# Patient Record
Sex: FEMALE | Race: WHITE | NOT HISPANIC OR LATINO | Employment: OTHER | URBAN - METROPOLITAN AREA
[De-identification: names, ages, dates, MRNs, and addresses within clinical notes are randomized per-mention and may not be internally consistent; named-entity substitution may affect disease eponyms.]

---

## 2017-01-09 ENCOUNTER — MYC MEDICAL ADVICE (OUTPATIENT)
Dept: FAMILY MEDICINE | Facility: CLINIC | Age: 72
End: 2017-01-09

## 2017-01-09 DIAGNOSIS — Z79.01 LONG-TERM (CURRENT) USE OF ANTICOAGULANTS: Primary | ICD-10-CM

## 2017-01-09 DIAGNOSIS — I48.0 PAROXYSMAL ATRIAL FIBRILLATION (H): ICD-10-CM

## 2017-01-09 NOTE — TELEPHONE ENCOUNTER
Attempted to call pt to clarify below. Phone was answered and hung up x2  Needs Recall  Madhuri Edge RN

## 2017-01-09 NOTE — TELEPHONE ENCOUNTER
Fax from [facility] in [place], [place] requesting refill on Atenolol 50  Take 1 tab daily qnty #90 last filled 10/29/2015.    [facility] has HISTORICALLY  Atenolol 50 mg with med note takes half tab twice daily.       ATENOLOL PO     --      Sig: Take 25 mg by mouth daily     Class: Historical     Route: Oral     Order: [id number]       Medication Notes           DERRICK RAMIREZ   Lizandromichael Aug 16, 2016 11:36 AM (CDT)  50mg tablet - take 1/2 tablet once a.m and once p.m                      Last Written Prescription Date: --  Last Fill Quantity: --, # refills: --    Last Office Visit with [facility], P or [facility] prescribing provider:  12/27/2016   Future Office Visit:        BP Readings from Last 3 Encounters:   12/27/16 126/77   10/12/16 124/67   08/16/16 126/72           .

## 2017-01-11 ENCOUNTER — MYC MEDICAL ADVICE (OUTPATIENT)
Dept: FAMILY MEDICINE | Facility: CLINIC | Age: 72
End: 2017-01-11

## 2017-01-12 RX ORDER — ATENOLOL 50 MG/1
50 TABLET ORAL 2 TIMES DAILY
Qty: 90 TABLET | Refills: 3 | Status: CANCELLED | OUTPATIENT
Start: 2017-01-12

## 2017-01-12 NOTE — TELEPHONE ENCOUNTER
Message received today clarifying original dose as noted below:  Atenololl 50MG Tab Sandoz Qty 180 one tb twice daily. This is on the computer. PLEASE HAVE dr. moralez call me in regards to my inr    INR addressed in a separate encounter routed to INR nurse.    50 mg dose pended for provider.  Please review and approve as appropriate,  Hanna Hendrickson RN  [employer]

## 2017-01-13 ENCOUNTER — ANTICOAGULATION THERAPY VISIT (OUTPATIENT)
Dept: NURSING | Facility: CLINIC | Age: 72
End: 2017-01-13
Payer: MEDICARE

## 2017-01-13 ENCOUNTER — TELEPHONE (OUTPATIENT)
Dept: FAMILY MEDICINE | Facility: CLINIC | Age: 72
End: 2017-01-13

## 2017-01-13 DIAGNOSIS — Z79.01 LONG-TERM (CURRENT) USE OF ANTICOAGULANTS: Primary | ICD-10-CM

## 2017-01-13 DIAGNOSIS — I48.91 ATRIAL FIBRILLATION (H): ICD-10-CM

## 2017-01-13 PROCEDURE — 99207 ZZC NO CHARGE NURSE ONLY: CPT | Performed by: INTERNAL MEDICINE

## 2017-01-13 NOTE — TELEPHONE ENCOUNTER
Reason for Call:  Other call back    Detailed comments: patient said she saw a Dr in [place] and had her INR checked and she has  Not heard back from us about the results, states he have it and it's High and would like to speak to the  INR nurse about her results    Phone Number Patient can be reached at: Cell number on file:    Telephone Information:   Mobile [phone]       Best Time: anytime (verified this phone # for call back)    Can we leave a detailed message on this number? YES    Call taken on 1/13/2017 at 11:45 AM by Jt Galicia

## 2017-01-13 NOTE — PROGRESS NOTES
"  ANTICOAGULATION FOLLOW-UP CLINIC VISIT    Patient Name:  Catalina Marie  Date:  1/13/2017  Contact Type:  Telephone    SUBJECTIVE:     Patient Findings     Positives OTC meds, Inflammation    Comments Patient states she just got over a cold and did start a new vitamin for her hair. She is worried that her newest INR result of 3.6 is too high (very anxious) and only wants advice from Dr. Tracy who is not in the office today. After 20 min of explaining to her all this writer knows about an INR of 3.6 she finally agreed to take the dosage advice I gave her.  I normally would not of changed her dose at all but due to her anxiety that her blood \"was too thin\" I instructed her to take half of today's dose only today and then continue on as normal. Also to increase her green vegetable intake but she quickly responded \"I already eat greens everyday\". Also instructed not to start any new medications or supplements until she checks with her pharmacy for an interaction with Warfarin.           OBJECTIVE    INR   Date Value Ref Range Status   01/10/2017 3.6  Final       ASSESSMENT / PLAN  INR assessment SUPRA    Recheck INR In: 2 WEEKS    INR Location Outside lab      Anticoagulation Summary as of 1/13/2017     INR goal 2.0-3.0   Selected INR No new INR was available at the time of this encounter.   Maintenance plan 2.5 mg (5 mg x 0.5) on Tue; 5 mg (5 mg x 1) all other days   Full instructions 1/13: 2.5 mg; Otherwise 2.5 mg on Tue; 5 mg all other days   Weekly total 32.5 mg   Plan last modified Elodia Campbell RN (9/15/2016)   Next INR check 1/30/2017   Priority INR   Target end date     Indications   Long-term (current) use of anticoagulants [Z79.01] [Z79.01]  Atrial fibrillation (H) [I48.91] [I48.91]         Anticoagulation Episode Summary     INR check location     Preferred lab     Send INR reminders to [facility] ANTICOAGULATION    Comments       Anticoagulation Care Providers     Provider Role Specialty Phone number    " "Ian Tracy MD Referring Internal Medicine [phone]            See the Encounter Report to view Anticoagulation Flowsheet and Dosing Calendar (Go to Encounters tab in chart review, and find the Anticoagulation Therapy Visit)    Patient states she just got over a cold and did start a new vitamin for her hair. She is worried that her newest INR result of 3.6 is too high (very anxious) and only wants advice from Dr. Tracy who is not in the office today. After 20 min of explaining to her all this writer knows about an INR of 3.6 she finally agreed to take the dosage advice I gave her.  I normally would not of changed her dose at all but due to her anxiety that her blood \"was too thin\" I instructed her to take half of today's dose only today and then continue on as normal. Also to increase her green vegetable intake but she quickly responded \"I already eat greens everyday\". Also instructed not to start any new medications or supplements until she checks with her pharmacy for an interaction with Warfarin.    Dosage adjustment made based on physician directed care plan.    Janice Lovett RN                 "

## 2017-02-01 ENCOUNTER — ANTICOAGULATION THERAPY VISIT (OUTPATIENT)
Dept: NURSING | Facility: CLINIC | Age: 72
End: 2017-02-01
Payer: MEDICARE

## 2017-02-01 ENCOUNTER — TELEPHONE (OUTPATIENT)
Dept: FAMILY MEDICINE | Facility: CLINIC | Age: 72
End: 2017-02-01

## 2017-02-01 DIAGNOSIS — Z79.01 LONG-TERM (CURRENT) USE OF ANTICOAGULANTS: Primary | ICD-10-CM

## 2017-02-01 DIAGNOSIS — I48.91 ATRIAL FIBRILLATION (H): ICD-10-CM

## 2017-02-01 LAB — INR PPP: 3

## 2017-02-01 PROCEDURE — 85610 PROTHROMBIN TIME: CPT | Performed by: INTERNAL MEDICINE

## 2017-02-01 PROCEDURE — 99207 ZZC NO CHARGE NURSE ONLY: CPT | Performed by: INTERNAL MEDICINE

## 2017-02-01 NOTE — TELEPHONE ENCOUNTER
Call back to patient, INR is 3.0. Instructed on dosage.  Verfied through [facility] located in [place], [place] [email or web address] [place]      [address]   [address] [address]   Phone Number: [phone]  Fax: [phone].    See Anticoagulation Encounter.  Janice Lovett RN

## 2017-02-01 NOTE — TELEPHONE ENCOUNTER
Reason for Call:  Other INR    Detailed comments: Pt called asking to speak to INR nurse  To get her INR result that was done somewhere in [place]  Pt said we were supposed to get a copy within 24hrs      Phone Number Patient can be reached at: Home number on file [phone] (home)    Best Time:     Can we leave a detailed message on this number? YES    Call taken on 2/1/2017 at 8:19 AM by Caty Humphries

## 2017-02-01 NOTE — PROGRESS NOTES
ANTICOAGULATION FOLLOW-UP CLINIC VISIT    Patient Name:  Catalina Marie  Date:  2/1/2017  Contact Type:  Face to Face    SUBJECTIVE:     Patient Findings     Positives No Problem Findings           OBJECTIVE    INR   Date Value Ref Range Status   01/30/2017 3.0  Final       ASSESSMENT / PLAN  INR assessment THER    Recheck INR In: 4 WEEKS    INR Location Outside lab      Anticoagulation Summary as of 2/1/2017     INR goal 2.0-3.0   Selected INR 3.0 (1/30/2017)   Maintenance plan 2.5 mg (5 mg x 0.5) on Tue; 5 mg (5 mg x 1) all other days   Full instructions 2.5 mg on Tue; 5 mg all other days   Weekly total 32.5 mg   No change documented Janice Lovett RN   Plan last modified Elodia Campbell RN (9/15/2016)   Next INR check 3/1/2017   Priority INR   Target end date     Indications   Long-term (current) use of anticoagulants [Z79.01] [Z79.01]  Atrial fibrillation (H) [I48.91] [I48.91]         Anticoagulation Episode Summary     INR check location     Preferred lab     Send INR reminders to  ANTICOAGULATION    Comments       Anticoagulation Care Providers     Provider Role Specialty Phone number    Ian Tracy MD Referring Internal Medicine [phone]            See the Encounter Report to view Anticoagulation Flowsheet and Dosing Calendar (Go to Encounters tab in chart review, and find the Anticoagulation Therapy Visit)    Dosage adjustment made based on physician directed care plan.    Janice Lovett, RN

## 2017-02-27 LAB — INR PPP: 2.6

## 2017-02-28 ENCOUNTER — MYC MEDICAL ADVICE (OUTPATIENT)
Dept: FAMILY MEDICINE | Facility: CLINIC | Age: 72
End: 2017-02-28

## 2017-02-28 ENCOUNTER — ANTICOAGULATION THERAPY VISIT (OUTPATIENT)
Dept: NURSING | Facility: CLINIC | Age: 72
End: 2017-02-28
Payer: MEDICARE

## 2017-02-28 DIAGNOSIS — I48.91 ATRIAL FIBRILLATION (H): ICD-10-CM

## 2017-02-28 DIAGNOSIS — Z79.01 LONG-TERM (CURRENT) USE OF ANTICOAGULANTS: ICD-10-CM

## 2017-02-28 PROCEDURE — 99207 ZZC NO CHARGE NURSE ONLY: CPT | Performed by: INTERNAL MEDICINE

## 2017-02-28 NOTE — MR AVS SNAPSHOT
Catalina Marie   2/28/2017   Anticoagulation Therapy Visit    Description:  71 year old female   Provider:  Ian Tracy MD   Department:  [facility]           INR as of 2/28/2017     Today's INR 2.6 (2/27/2017)      Anticoagulation Summary as of 2/28/2017     INR goal 2.0-3.0   Today's INR 2.6 (2/27/2017)   Full instructions 2.5 mg on Tue; 5 mg all other days   Next INR check 3/27/2017    Indications   Long-term (current) use of anticoagulants [Z79.01] [Z79.01]  Atrial fibrillation (H) [I48.91] [I48.91]         Contact Numbers     Clinic Number:         February 2017 Details    Sun Mon Tue Wed Thu Fri Sat        1               2               3               4                 5               6               7               8               9               10               11                 12               13               14               15               16               17               18                 19               20               21               22               23               24               25                 26               27               28      2.5 mg   See details           Date Details   02/28 This INR check               How to take your warfarin dose     To take:  2.5 mg Take 0.5 of a 5 mg tablet.           March 2017 Details    Sun Mon Tue Wed Thu Fri Sat        1      5 mg         2      5 mg         3      5 mg         4      5 mg           5      5 mg         6      5 mg         7      2.5 mg         8      5 mg         9      5 mg         10      5 mg         11      5 mg           12      5 mg         13      5 mg         14      2.5 mg         15      5 mg         16      5 mg         17      5 mg         18      5 mg           19      5 mg         20      5 mg         21      2.5 mg         22      5 mg         23      5 mg         24      5 mg         25      5 mg           26      5 mg         27            28               29               30               31                  Date Details   No additional details    Date of next INR:  3/27/2017         How to take your warfarin dose     To take:  2.5 mg Take 0.5 of a 5 mg tablet.    To take:  5 mg Take 1 of the 5 mg tablets.

## 2017-02-28 NOTE — TELEPHONE ENCOUNTER
I called [facility] directly and took verbal results over the phone  INR drawn on 2/27/17 was 2.6 and PT was 26.5   See Anticoagulation Encounter from today 2/28/17.  I called patient and spoke with her directly.     Carline Myrick RN

## 2017-02-28 NOTE — PROGRESS NOTES
ANTICOAGULATION FOLLOW-UP CLINIC VISIT    Patient Name:  Catalina Marie  Date:  2/28/2017  Contact Type:  Telephone/ Spoke with Jennifer on the phone and gave her the results from yesterday. Provided dosing instructions. Recheck 4 weeks. Patient will still be in [place] at that time.    SUBJECTIVE:        OBJECTIVE    INR   Date Value Ref Range Status   02/27/2017 2.6  Final       ASSESSMENT / PLAN  INR assessment THER    Recheck INR In: 4 WEEKS    INR Location Outside lab      Anticoagulation Summary as of 2/28/2017     INR goal 2.0-3.0   Today's INR 2.6 (2/27/2017)   Maintenance plan 2.5 mg (5 mg x 0.5) on Tue; 5 mg (5 mg x 1) all other days   Full instructions 2.5 mg on Tue; 5 mg all other days   Weekly total 32.5 mg   No change documented Carline Myrick RN   Plan last modified Elodia Campbell RN (9/15/2016)   Next INR check 3/27/2017   Priority INR   Target end date     Indications   Long-term (current) use of anticoagulants [Z79.01] [Z79.01]  Atrial fibrillation (H) [I48.91] [I48.91]         Anticoagulation Episode Summary     INR check location     Preferred lab     Send INR reminders to [facility] ANTICOAGULATION    Comments       Anticoagulation Care Providers     Provider Role Specialty Phone number    Ian Tracy MD Referring Internal Medicine [phone]            See the Encounter Report to view Anticoagulation Flowsheet and Dosing Calendar (Go to Encounters tab in chart review, and find the Anticoagulation Therapy Visit)    Dosage adjustment made based on physician directed care plan.  No change. I called [facility] directly and got verbal result of INR over the phone.   I called patient and gave dosing instructions and will recheck in 4 weeks.   Patient will be in [place] until end of May.     Carline Myrick RN

## 2017-03-27 LAB — INR PPP: 2.6

## 2017-03-29 ENCOUNTER — MYC MEDICAL ADVICE (OUTPATIENT)
Dept: FAMILY MEDICINE | Facility: CLINIC | Age: 72
End: 2017-03-29

## 2017-03-30 ENCOUNTER — ANTICOAGULATION THERAPY VISIT (OUTPATIENT)
Dept: FAMILY MEDICINE | Facility: CLINIC | Age: 72
End: 2017-03-30
Payer: MEDICARE

## 2017-03-30 DIAGNOSIS — Z79.01 LONG-TERM (CURRENT) USE OF ANTICOAGULANTS: ICD-10-CM

## 2017-03-30 DIAGNOSIS — I48.91 ATRIAL FIBRILLATION (H): ICD-10-CM

## 2017-03-30 PROCEDURE — 99207 ZZC NO CHARGE NURSE ONLY: CPT | Performed by: INTERNAL MEDICINE

## 2017-03-30 NOTE — MR AVS SNAPSHOT
Catalina Marie   3/30/2017   Anticoagulation Therapy Visit    Description:  71 year old female   Provider:  Ian Tracy MD   Department:  [facility]           INR as of 3/30/2017     Today's INR 2.6 (3/27/2017)      Anticoagulation Summary as of 3/30/2017     INR goal 2.0-3.0   Today's INR 2.6 (3/27/2017)   Full instructions 2.5 mg on Tue; 5 mg all other days   Next INR check 4/24/2017    Indications   Long-term (current) use of anticoagulants [Z79.01] [Z79.01]  Atrial fibrillation (H) [I48.91] [I48.91]         March 2017 Details    Sun Mon Tue Wed Thu Fri Sat        1               2               3               4                 5               6               7               8               9               10               11                 12               13               14               15               16               17               18                 19               20               21               22               23               24               25                 26               27               28               29               30      5 mg   See details      31      5 mg           Date Details   03/30 This INR check               How to take your warfarin dose     To take:  5 mg Take 1 of the 5 mg tablets.           April 2017 Details    Sun Mon Tue Wed Thu Fri Sat           1      5 mg           2      5 mg         3      5 mg         4      2.5 mg         5      5 mg         6      5 mg         7      5 mg         8      5 mg           9      5 mg         10      5 mg         11      2.5 mg         12      5 mg         13      5 mg         14      5 mg         15      5 mg           16      5 mg         17      5 mg         18      2.5 mg         19      5 mg         20      5 mg         21      5 mg         22      5 mg           23      5 mg         24            25               26               27               28               29                 30                       Date Details   No additional details    Date of next INR:  4/24/2017         How to take your warfarin dose     To take:  2.5 mg Take 0.5 of a 5 mg tablet.    To take:  5 mg Take 1 of the 5 mg tablets.

## 2017-03-30 NOTE — TELEPHONE ENCOUNTER
"Spoke with patient today (in [place], [place]).   No faxed INR results received in clinic.  INR done at [facility] Monday 3-27-17.     Called [facility].  [place] they have been faxing to unknown fax number.  They confirmed all the faxed attempts have \"not gone thru\".   Updated fax number information for [facility] (general line).   Left detailed message with the specific [facility] lab in [place], [place]---leaving the fax number---Attn: INR clnic.   Asked that [facility] return call to INR nurse to confirm they received the information and have updated their records.      In the meantime--INR results were received today from 3-27-17.  Patient contacted with results, Warfarin dosing, and next INR date. Patient informed of the reason why clinic has not been receiving INR results.      Elodia Campbell RN, BSN    "

## 2017-03-30 NOTE — PROGRESS NOTES
ANTICOAGULATION FOLLOW-UP CLINIC VISIT    Patient Name:  Catalina Marie  Date:  3/30/2017  Contact Type:  Faxed results received 3-30-17.  Pt contacted by phone.     SUBJECTIVE:     Patient Findings     Comments [address]   [address]  [place] [address]   [facility] Phone Number: [phone]  [place] location phone #:  [phone]  Fax: [phone].    Patient sent [employer] message that she had INR done 3-27-17 and hadn't heard from INR clinic.   No faxed results received.   Contacted [facility] for results: 2.6  [place] that [facility] has been faxing results to wrong fax #----not associated with this clinic.  [facility] also confirmed the faxes had not gone thru.   Updated all info, rachel fax # with [facility] today.   Spoke with patient over the phone with INR results, Warfarin dosing, and next INR date.    See flowsheet for plan.               OBJECTIVE    INR   Date Value Ref Range Status   03/27/2017 2.6  Final       ASSESSMENT / PLAN  INR assessment THER    Recheck INR In: 4 WEEKS    INR Location Outside lab      Anticoagulation Summary as of 3/30/2017     INR goal 2.0-3.0   Today's INR 2.6 (3/27/2017)   Maintenance plan 2.5 mg (5 mg x 0.5) on Tue; 5 mg (5 mg x 1) all other days   Full instructions 2.5 mg on Tue; 5 mg all other days   Weekly total 32.5 mg   No change documented Elodia Campbell, RN   Plan last modified Elodia Campbell RN (9/15/2016)   Next INR check 4/24/2017   Priority INR   Target end date     Indications   Long-term (current) use of anticoagulants [Z79.01] [Z79.01]  Atrial fibrillation (H) [I48.91] [I48.91]         Anticoagulation Episode Summary     INR check location     Preferred lab     Send INR reminders to [facility] ANTICOAGULATION    Comments Expect FAXED INR results from [facility] in [place], [place]. Phone: [phone].  General Results#: [phone]  Call pt's cell in [place] with results/plan: [phone]      Anticoagulation Care Providers     Provider Role Specialty Phone number    Ian Tracy MD  Referring Internal Medicine [phone]            See the Encounter Report to view Anticoagulation Flowsheet and Dosing Calendar (Go to Encounters tab in chart review, and find the Anticoagulation Therapy Visit)    Dosage adjustment made based on physician directed care plan.    Elodia Campbell RN

## 2017-03-30 NOTE — TELEPHONE ENCOUNTER
Still waiting to hear back from [facility] in [place]---to confirm they received updated fax information.      Elodia Campbell RN, BSN

## 2017-04-01 DIAGNOSIS — I10 ESSENTIAL HYPERTENSION: Primary | ICD-10-CM

## 2017-04-01 NOTE — TELEPHONE ENCOUNTER
furosemide (LASIX) 20 MG tablet      Last Written Prescription Date:  ?  Last Fill Quantity: ?,   # refills: ?  Last Office Visit with [facility], [facility] or [facility] prescribing provider: 12/27/2016  Future Office visit:    Next 5 appointments (look out 90 days)     Jun 28, 2017 11:30 AM CDT   [facility]    [address] [facility] [address]   [phone]                   Routing refill request to provider for review/approval because:  Medication is reported/historical

## 2017-04-03 DIAGNOSIS — I48.91 ATRIAL FIBRILLATION (H): ICD-10-CM

## 2017-04-03 DIAGNOSIS — Z79.01 LONG-TERM (CURRENT) USE OF ANTICOAGULANTS: Primary | ICD-10-CM

## 2017-04-03 RX ORDER — FUROSEMIDE 20 MG
TABLET ORAL
Qty: 180 TABLET | Refills: 0 | Status: SHIPPED | OUTPATIENT
Start: 2017-04-03 | End: 2017-06-27

## 2017-04-03 RX ORDER — WARFARIN SODIUM 5 MG/1
2.5-5 TABLET ORAL DAILY
Qty: 90 TABLET | Refills: 0 | Status: ON HOLD | OUTPATIENT
Start: 2017-04-03 | End: 2017-06-13

## 2017-04-03 NOTE — TELEPHONE ENCOUNTER
Prescription approved per [facility] Refill Protocol.  Elodia Campbell RN, BSN

## 2017-04-03 NOTE — TELEPHONE ENCOUNTER
warfarin (COUMADIN) 5 MG tablet        Last Written Prescription Date:  ?  Last Fill Quantity: ?,   # refills: ?  Last Office Visit with [facility], [facility] or [facility] prescribing provider: 12/27/2016  Future Office visit:    Next 5 appointments (look out 90 days)     Jun 28, 2017 11:30 AM CDT   PHYSICAL with Ian Tracy MD   [facility]    [address] [facility] [address]   [phone]                   Routing refill request to provider for review/approval because:  Medication is reported/historical

## 2017-04-04 NOTE — TELEPHONE ENCOUNTER
We received INR result for patient through the mail for her INR drawn on 3/28/17   No faxes received from Christine Myrick RN

## 2017-04-06 ENCOUNTER — TELEPHONE (OUTPATIENT)
Dept: FAMILY MEDICINE | Facility: CLINIC | Age: 72
End: 2017-04-06

## 2017-04-06 DIAGNOSIS — I48.20 CHRONIC ATRIAL FIBRILLATION (H): Primary | ICD-10-CM

## 2017-04-06 NOTE — TELEPHONE ENCOUNTER
Reason for Call:  Medication or medication refill:    Do you use a [facility]?  Name of the pharmacy and phone number for the current request:         [facility] [id number] [facility] [address] AT [facility].    Name of the medication requested: warfarin (COUMADIN) 5 MG tablet    Other request: patient is having trouble getting the 5mg pill from the .    Requesting that doctor or nurse call Leyda to approve a different dosage (possibly 2.5mg-2 pills)    [facility] phone number is:  [phone]    Can we leave a detailed message on this number? YES    Phone number patient can be reached at: Home number on file [phone] (home)    Best Time: any time    Call taken on 4/6/2017 at 4:36 PM by Sultana Cardoso  .

## 2017-04-07 RX ORDER — WARFARIN SODIUM 2.5 MG/1
2.5 TABLET ORAL DAILY
Qty: 60 TABLET | Refills: 3 | Status: ON HOLD | OUTPATIENT
Start: 2017-04-07 | End: 2017-06-13

## 2017-04-19 DIAGNOSIS — E78.5 HYPERLIPIDEMIA LDL GOAL <100: Primary | ICD-10-CM

## 2017-04-19 NOTE — TELEPHONE ENCOUNTER
Routing refill request to provider for review/approval because:  Medication is reported/historical    PCP: Called Patient to clarify her atorvastatin dosing.     Patient states that she takes Atorvastatin 10mg, 1/2 tablet daily.    Medication pended.     Please review and approve as appropriate.    Thank you    Kiki Love RN

## 2017-04-19 NOTE — TELEPHONE ENCOUNTER
Fax from [facility] ([facility]) requesting refill of     Atorvastatin 10mg    Chart has HISTORICAL entry of atorvastatin 5mg    We have not filled for patient from this clinic and there is no mention of atorvastatin in the 12/27/16 nor 8/16/16 OV with Dr Tracy.    Needs triage - what dose is patient taking?    Next 5 appointments (look out 90 days)     Jun 28, 2017 11:30 AM CDT   [facility]    [address] [facility] [address]   [phone]                   Lab Results   Component Value Date    CHOL 157 10/12/2016     Lab Results   Component Value Date    HDL 36 10/12/2016     Lab Results   Component Value Date    LDL 63 10/12/2016     Lab Results   Component Value Date    TRIG 288 10/12/2016     No results found for: LEVAR Zepeda, RT (R)

## 2017-04-20 RX ORDER — ATORVASTATIN CALCIUM 10 MG/1
TABLET, FILM COATED ORAL
Qty: 90 TABLET | Refills: 3 | Status: SHIPPED | OUTPATIENT
Start: 2017-04-20 | End: 2018-06-08

## 2017-04-24 LAB — INR PPP: 2.3

## 2017-04-25 ENCOUNTER — MYC MEDICAL ADVICE (OUTPATIENT)
Dept: FAMILY MEDICINE | Facility: CLINIC | Age: 72
End: 2017-04-25

## 2017-04-25 ENCOUNTER — ANTICOAGULATION THERAPY VISIT (OUTPATIENT)
Dept: NURSING | Facility: CLINIC | Age: 72
End: 2017-04-25
Payer: MEDICARE

## 2017-04-25 DIAGNOSIS — I48.91 ATRIAL FIBRILLATION (H): ICD-10-CM

## 2017-04-25 DIAGNOSIS — Z79.01 LONG-TERM (CURRENT) USE OF ANTICOAGULANTS: ICD-10-CM

## 2017-04-25 PROCEDURE — 99207 ZZC NO CHARGE NURSE ONLY: CPT | Performed by: INTERNAL MEDICINE

## 2017-04-25 NOTE — MR AVS SNAPSHOT
Catalina Marie   4/25/2017   Anticoagulation Therapy Visit    Description:  71 year old female   Provider:  Ian Tracy MD   Department:  [facility]           INR as of 4/25/2017     Today's INR 2.3 (4/24/2017)      Anticoagulation Summary as of 4/25/2017     INR goal 2.0-3.0   Today's INR 2.3 (4/24/2017)   Full instructions 2.5 mg on Tue; 5 mg all other days   Next INR check 5/23/2017    Indications   Long-term (current) use of anticoagulants [Z79.01] [Z79.01]  Atrial fibrillation (H) [I48.91] [I48.91]         Contact Numbers     Clinic Number:         April 2017 Details    Sun Mon Tue Wed Thu Fri Sat           1                 2               3               4               5               6               7               8                 9               10               11               12               13               14               15                 16               17               18               19               20               21               22                 23               24               25      2.5 mg   See details      26      5 mg         27      5 mg         28      5 mg         29      5 mg           30      5 mg                Date Details   04/25 This INR check               How to take your warfarin dose     To take:  2.5 mg Take 0.5 of a 5 mg tablet.    To take:  5 mg Take 1 of the 5 mg tablets.           May 2017 Details    Sun Mon Tue Wed Thu Fri Sat      1      5 mg         2      2.5 mg         3      5 mg         4      5 mg         5      5 mg         6      5 mg           7      5 mg         8      5 mg         9      2.5 mg         10      5 mg         11      5 mg         12      5 mg         13      5 mg           14      5 mg         15      5 mg         16      2.5 mg         17      5 mg         18      5 mg         19      5 mg         20      5 mg           21      5 mg         22      5 mg         23            24               25               26                27                 28               29               30               31                   Date Details   No additional details    Date of next INR:  5/23/2017         How to take your warfarin dose     To take:  2.5 mg Take 0.5 of a 5 mg tablet.    To take:  5 mg Take 1 of the 5 mg tablets.

## 2017-04-25 NOTE — PROGRESS NOTES
ANTICOAGULATION FOLLOW-UP CLINIC VISIT    Patient Name:  Catalina Marie  Date:  4/25/2017  Contact Type:  Sent patient Jimuboxhart message with INR results, dosing instructions, and recheck date.     SUBJECTIVE:     Patient Findings     Positives No Problem Findings           OBJECTIVE    INR   Date Value Ref Range Status   04/24/2017 2.3  Corrected       ASSESSMENT / PLAN  INR assessment THER    Recheck INR In: 4 WEEKS    INR Location Outside lab      Anticoagulation Summary as of 4/25/2017     INR goal 2.0-3.0   Today's INR    Maintenance plan 2.5 mg (5 mg x 0.5) on Tue; 5 mg (5 mg x 1) all other days   Full instructions 2.5 mg on Tue; 5 mg all other days   Weekly total 32.5 mg   No change documented Carline Myrick RN   Plan last modified Elodia Campbell RN (9/15/2016)   Next INR check 5/23/2017   Priority INR   Target end date     Indications   Long-term (current) use of anticoagulants [Z79.01] [Z79.01]  Atrial fibrillation (H) [I48.91] [I48.91]         Anticoagulation Episode Summary     INR check location     Preferred lab     Send INR reminders to [facility]    Comments Expect FAXED INR results from [employer] in [place], [place]. Phone: [phone].  General Results#: [phone]  Call pt's cell in [place] with results/plan: [phone]      Anticoagulation Care Providers     Provider Role Specialty Phone number    Ian Tracy MD Referring Internal Medicine [phone]            See the Encounter Report to view Anticoagulation Flowsheet and Dosing Calendar (Go to Encounters tab in chart review, and find the Anticoagulation Therapy Visit)    Dosage adjustment made based on physician directed care plan.      Carline Myrick, WAQAS

## 2017-05-16 ENCOUNTER — MYC MEDICAL ADVICE (OUTPATIENT)
Dept: FAMILY MEDICINE | Facility: CLINIC | Age: 72
End: 2017-05-16

## 2017-05-16 NOTE — TELEPHONE ENCOUNTER
Spoke with [employer] dx.  [facility] [place] is different then other [facility] locations. They have a separate database.  Spoke with Grazyna in customer service.  They are refaxing INR result from 5/15. Will await result.  Taryn Parham RN

## 2017-05-17 ENCOUNTER — ANTICOAGULATION THERAPY VISIT (OUTPATIENT)
Dept: FAMILY MEDICINE | Facility: CLINIC | Age: 72
End: 2017-05-17
Payer: MEDICARE

## 2017-05-17 DIAGNOSIS — Z79.01 LONG-TERM (CURRENT) USE OF ANTICOAGULANTS: ICD-10-CM

## 2017-05-17 DIAGNOSIS — I48.91 ATRIAL FIBRILLATION (H): ICD-10-CM

## 2017-05-17 LAB — INR PPP: 2.2

## 2017-05-17 PROCEDURE — 99207 ZZC NO CHARGE NURSE ONLY: CPT | Performed by: INTERNAL MEDICINE

## 2017-05-17 NOTE — PROGRESS NOTES
ANTICOAGULATION FOLLOW-UP CLINIC VISIT    Patient Name:  Catalina Marie  Date:  5/17/2017  Contact Type:  Telephone    SUBJECTIVE:     Patient Findings     Positives No Problem Findings           OBJECTIVE    INR   Date Value Ref Range Status   05/17/2017 2.2  Final       ASSESSMENT / PLAN  INR assessment THER    Recheck INR In: 4 WEEKS    INR Location Outside lab      Anticoagulation Summary as of 5/17/2017     INR goal 2.0-3.0   Today's INR 2.2   Maintenance plan 2.5 mg (5 mg x 0.5) on Tue; 5 mg (5 mg x 1) all other days   Full instructions 2.5 mg on Tue; 5 mg all other days   Weekly total 32.5 mg   Plan last modified Elodia Campbell RN (9/15/2016)   Next INR check 6/14/2017   Priority INR   Target end date     Indications   Long-term (current) use of anticoagulants [Z79.01] [Z79.01]  Atrial fibrillation (H) [I48.91] [I48.91]         Anticoagulation Episode Summary     INR check location     Preferred lab     Send INR reminders to [facility]    Comments Expect FAXED INR results from [employer] in [place], [place]. Phone: [phone].  General Results#: [phone]  Call pt's cell in [place] with results/plan: [phone]      Anticoagulation Care Providers     Provider Role Specialty Phone number    Ian Tracy MD Referring Internal Medicine [phone]            See the Encounter Report to view Anticoagulation Flowsheet and Dosing Calendar (Go to Encounters tab in chart review, and find the Anticoagulation Therapy Visit)    Dosage adjustment made based on physician directed care plan. Fax received from [employer] in [place].  Reached patient by phone AND sent my Chart message.  INR 2.2.  Continue same dose of 2.5 mg Tu, 5 mg ROW and recheck in 4 weeks.    Alyssa Valdovinos RN

## 2017-05-17 NOTE — MR AVS SNAPSHOT
Catalina Marie   5/17/2017   Anticoagulation Therapy Visit    Description:  71 year old female   Provider:  Ian Tracy MD   Department:  [facility]           INR as of 5/17/2017     Today's INR 2.2      Anticoagulation Summary as of 5/17/2017     INR goal 2.0-3.0   Today's INR 2.2   Full instructions 2.5 mg on Tue; 5 mg all other days   Next INR check 6/14/2017    Indications   Long-term (current) use of anticoagulants [Z79.01] [Z79.01]  Atrial fibrillation (H) [I48.91] [I48.91]         May 2017 Details    Sun Mon Tue Wed Thu Fri Sat      1               2               3               4               5               6                 7               8               9               10               11               12               13                 14               15               16               17      5 mg   See details      18      5 mg         19      5 mg         20      5 mg           21      5 mg         22      5 mg         23      2.5 mg         24      5 mg         25      5 mg         26      5 mg         27      5 mg           28      5 mg         29      5 mg         30      2.5 mg         31      5 mg             Date Details   05/17 This INR check               How to take your warfarin dose     To take:  2.5 mg Take 0.5 of a 5 mg tablet.    To take:  5 mg Take 1 of the 5 mg tablets.           June 2017 Details    Sun Mon Tue Wed Thu Fri Sat         1      5 mg         2      5 mg         3      5 mg           4      5 mg         5      5 mg         6      2.5 mg         7      5 mg         8      5 mg         9      5 mg         10      5 mg           11      5 mg         12      5 mg         13      2.5 mg         14            15               16               17                 18               19               20               21               22               23               24                 25               26               27               28               29                30                 Date Details   No additional details    Date of next INR:  6/14/2017         How to take your warfarin dose     To take:  2.5 mg Take 0.5 of a 5 mg tablet.    To take:  5 mg Take 1 of the 5 mg tablets.

## 2017-05-17 NOTE — TELEPHONE ENCOUNTER
See anticoagulation encounter.  I notified patient by My Chart and spoke to her on her mobile #.  INR 2.2.  Continue same and recheck in 4 weeks.  Alyssa Valdovinos RN

## 2017-06-01 ENCOUNTER — TELEPHONE (OUTPATIENT)
Dept: FAMILY MEDICINE | Facility: CLINIC | Age: 72
End: 2017-06-01

## 2017-06-01 NOTE — TELEPHONE ENCOUNTER
1st  Attempt: Left Message for VIP Mammogram Patient - schedule in VIP slots only.    2nd attempt not required per age

## 2017-06-12 ENCOUNTER — HOSPITAL ENCOUNTER (OUTPATIENT)
Facility: CLINIC | Age: 72
Setting detail: OBSERVATION
Discharge: HOME | End: 2017-06-13
Attending: EMERGENCY MEDICINE | Admitting: INTERNAL MEDICINE
Payer: MEDICARE

## 2017-06-12 DIAGNOSIS — T50.901A ACCIDENTAL MEDICATION OVERDOSE, INITIAL ENCOUNTER: ICD-10-CM

## 2017-06-12 LAB
ANION GAP SERPL CALCULATED.3IONS-SCNC: 9 MMOL/L (ref 3–14)
BUN SERPL-MCNC: 25 MG/DL (ref 7–30)
CALCIUM SERPL-MCNC: 8.4 MG/DL (ref 8.5–10.1)
CHLORIDE SERPL-SCNC: 106 MMOL/L (ref 94–109)
CO2 SERPL-SCNC: 27 MMOL/L (ref 20–32)
CREAT SERPL-MCNC: 1.17 MG/DL (ref 0.52–1.04)
GFR SERPL CREATININE-BSD FRML MDRD: 45 ML/MIN/1.7M2
GLUCOSE SERPL-MCNC: 145 MG/DL (ref 70–99)
INR PPP: 3.12 (ref 0.86–1.14)
POTASSIUM SERPL-SCNC: 3.7 MMOL/L (ref 3.4–5.3)
SODIUM SERPL-SCNC: 142 MMOL/L (ref 133–144)

## 2017-06-12 PROCEDURE — 80048 BASIC METABOLIC PNL TOTAL CA: CPT | Performed by: EMERGENCY MEDICINE

## 2017-06-12 PROCEDURE — 99285 EMERGENCY DEPT VISIT HI MDM: CPT

## 2017-06-12 PROCEDURE — 85610 PROTHROMBIN TIME: CPT | Performed by: EMERGENCY MEDICINE

## 2017-06-12 PROCEDURE — 93005 ELECTROCARDIOGRAM TRACING: CPT

## 2017-06-12 ASSESSMENT — ENCOUNTER SYMPTOMS
HEMATURIA: 0
NECK PAIN: 0
DYSURIA: 0
DIZZINESS: 0
ABDOMINAL PAIN: 0
VOMITING: 0
LIGHT-HEADEDNESS: 0
WEAKNESS: 0
SHORTNESS OF BREATH: 0
HEADACHES: 0
CHILLS: 0
BACK PAIN: 0
COUGH: 0
NAUSEA: 0
DIARRHEA: 0
FEVER: 0

## 2017-06-12 NOTE — IP AVS SNAPSHOT
[facility]    [address] [place] [address]    Phone:  [phone]                                       After Visit Summary   6/12/2017    Catalina Marie    MRN: [id number]           After Visit Summary Signature Page     I have received my discharge instructions, and my questions have been answered. I have discussed any challenges I see with this plan with the nurse or doctor.    ..........................................................................................................................................  Patient/Patient Representative Signature      ..........................................................................................................................................  Patient Representative Print Name and Relationship to Patient    ..................................................               ................................................  Date                                            Time    ..........................................................................................................................................  Reviewed by Signature/Title    ...................................................              ..............................................  Date                                                            Time

## 2017-06-12 NOTE — IP AVS SNAPSHOT
MRN:[id number]                      After Visit Summary   6/12/2017    aCtalina Marie    MRN: [id number]           Thank you!     Thank you for choosing [facility] for your care. Our goal is always to provide you with excellent care. Hearing back from our patients is one way we can continue to improve our services. Please take a few minutes to complete the written survey that you may receive in the mail after you visit with us. Thank you!        Patient Information     Date Of Birth          1945        About your hospital stay     You were admitted on:  June 13, 2017 [facility]    You were discharged on:  June 13, 2017        Reason for your hospital stay       You were admitted to [facility] for accidental overdose                  Who to Call     For medical emergencies, please call 911.  For non-urgent questions about your medical care, please call your primary care provider or clinic, [phone]          Attending Provider     Provider Specialty    Trigger, Abraham Hoang MD Emergency Medicine    Vicente, Smooth Romo DO Emergency Medicine    [facility] boyer MD Internal Medicine       Primary Care Provider Office Phone # Fax #    Ian Tracy [phone]      After Care Instructions     Activity       Your activity upon discharge: activity as tolerated            Diet       Follow this diet upon discharge: Orders Placed This Encounter      Regular Diet Adult                  Follow-up Appointments     Follow-up and recommended labs and tests        Follow up with primary care provider, Ian Tracy, within 7 days for hospital follow- up.  No follow up labs or test are needed.                  Your next 10 appointments already scheduled     Jun 13, 2017  1:45 PM CDT   Anticoagulation Visit with  ANTICOAGULATION CLINIC   [facility]    [address] [place] [address]   [phone]            Jun 28, 2017 11:30 AM CDT   [facility]YS[facility]ay [facility]   [facility]    [address] [place] [address]   [phone]              Further instructions from your care team       You were admitted to [facility] for accidental medication overdoses.  Hold these morning medications today  - Atenolol  - Furosamide (Lasix)  - Lisinopril  - Propafenone  - Verapamil    Hold your coumadin for two days.   Follow up with your primary care provider in 7-10 days for hospital follow up and INR recheck.    Warfarin Instruction     You have started taking a medicine called warfarin. This is a blood-thinning medicine (anticoagulant). It helps prevent and treat blood clots.      Before leaving the hospital, make sure you know how much to take and how long to take it.      You will need regular blood tests to make sure your blood is clotting safely. It is very important to see your doctor for regular blood tests.    Talk to your doctor before taking any new medicine (this includes over-the-counter drugs and herbal products). Many medicines can interact with warfarin. This may cause more bleeding or too much clotting.     Eating a lot of vitamin K--found in green, leafy vegetables--can change the way warfarin works in your body. Do NOT avoid these foods. Instead, try to eat the same amount each day.     Bleeding is the most common side-effect of warfarin. You may notice bleeding gums, a bloody nose, bruises and bleeding longer when you cut yourself. See a doctor at once if:   o You cough up blood  o You find blood in your stool (poop)  o You have a deep cut, or a cut that bleeds longer than 10 minutes   o You have a bad cut, hard fall, accident or hit your head (go to urgent care or the emergency room).    For women who can get pregnant: This medicine can harm an unborn baby. Be very careful not to  "get pregnant while taking this medicine. If you think you might be pregnant, call your doctor right away.    For more information, read \"Guide to Warfarin Therapy,  the booklet you received in the hospital.        Pending Results     Date and Time Order Name Status Description    6/13/2017 1006 EKG 12-lead, tracing only Preliminary             Statement of Approval     Ordered          06/13/17 1015  I have reviewed and agree with all the recommendations and orders detailed in this document.  EFFECTIVE NOW     Approved and electronically signed by:  Anne Lilly PA-C             Admission Information     Date & Time Department Dept. Phone    6/12/2017 [facility] [phone]      Your Vitals Were     Blood Pressure Pulse Temperature Respirations Height Weight    129/56 (BP Location: Right arm) 62 96.7  F (35.9  C) (Oral) 16 1.651 m (5' 5\") 65 kg (143 lb 3.2 oz)    Pulse Oximetry BMI (Body Mass Index)                94% 23.83 kg/m2          [employer] Information     [employer] gives you secure access to your electronic health record. If you see a primary care provider, you can also send messages to your care team and make appointments. If you have questions, please call your primary care clinic.  If you do not have a primary care provider, please call [phone] and they will assist you.        Care EveryWhere ID     This is your Care EveryWhere ID. This could be used by other organizations to access your [facility] medical records  [id number]           Review of your medicines      CONTINUE these medicines which have NOT CHANGED        Dose / Directions    ATENOLOL PO        Dose:  25 mg   Take 25 mg by mouth 2 times daily Takes 1/2 of 50mg tab   Refills:  0       atorvastatin 10 MG tablet   Commonly known as:  LIPITOR   Used for:  Hyperlipidemia LDL goal <100        Take 1/2 tablet by mouth daily.   Quantity:  90 tablet   Refills:  3       Biotin 3 MG Tabs        Take by mouth daily " (unknown dose)   Refills:  0       CALCIUM + D PO        Take by mouth daily   Refills:  0       carboxymethylcellulose 1 % ophthalmic solution   Commonly known as:  CELLUVISC/REFRESH LIQUIGEL        Dose:  1 drop   Place 1 drop into both eyes 4 times daily as needed for dry eyes   Refills:  0       DIGOX 250 MCG tablet   Used for:  Benign essential hypertension   Generic drug:  digoxin        TAKE 1 TABLET BY MOUTH EVERY DAY   Quantity:  90 tablet   Refills:  3       FELODIPINE ER PO        Dose:  5 mg   Take 5 mg by mouth 2 times daily   Refills:  0       FENOFIBRATE PO        Dose:  160 mg   Take 160 mg by mouth daily   Refills:  0       furosemide 20 MG tablet   Commonly known as:  LASIX   Used for:  Essential hypertension        TAKE 1 TABLET BY MOUTH TWICE DAILY   Quantity:  180 tablet   Refills:  0       lisinopril 40 MG tablet   Commonly known as:  PRINIVIL/ZESTRIL   Used for:  Benign essential hypertension        TAKE 1 TABLET BY MOUTH EVERY DAY   Quantity:  90 tablet   Refills:  3       LORazepam 0.5 MG tablet   Commonly known as:  ATIVAN   Used for:  Long-term (current) use of anticoagulants        Dose:  0.5 mg   Take 1 tablet (0.5 mg) by mouth every 8 hours as needed for anxiety   Quantity:  20 tablet   Refills:  0       PROPAFENONE HCL PO        Dose:  112.5-225 mg   Take 112.5-225 mg by mouth 3 times daily Takes 225mg in AM and afternoon and 112.5mg (1/2 tablet) in evening   Refills:  0       SERTRALINE HCL PO        Dose:  50 mg   Take 50 mg by mouth every evening   Refills:  0       verapamil 240 MG Cp24 24 hr capsule   Commonly known as:  VERELAN   Used for:  Benign essential hypertension        TAKE 1 CAPSULE BY MOUTH EVERY DAY   Quantity:  90 capsule   Refills:  3       WARFARIN SODIUM PO        Dose:  2.5-5 mg   Take 2.5-5 mg by mouth every evening Takes 5mg daily except 2.5mg on Tuesdays   Refills:  0                Protect others around you: Learn how to safely use, store and throw away your  medicines at www.disposemymeds.org.             Medication List: This is a list of all your medications and when to take them. Check marks below indicate your daily home schedule. Keep this list as a reference.      Medications           Morning Afternoon Evening Bedtime As Needed    ATENOLOL PO   Take 25 mg by mouth 2 times daily Takes 1/2 of 50mg tab                                atorvastatin 10 MG tablet   Commonly known as:  LIPITOR   Take 1/2 tablet by mouth daily.                                Biotin 3 MG Tabs   Take by mouth daily (unknown dose)                                CALCIUM + D PO   Take by mouth daily                                carboxymethylcellulose 1 % ophthalmic solution   Commonly known as:  CELLUVISC/REFRESH LIQUIGEL   Place 1 drop into both eyes 4 times daily as needed for dry eyes                                DIGOX 250 MCG tablet   TAKE 1 TABLET BY MOUTH EVERY DAY   Generic drug:  digoxin                                FELODIPINE ER PO   Take 5 mg by mouth 2 times daily                                FENOFIBRATE PO   Take 160 mg by mouth daily                                furosemide 20 MG tablet   Commonly known as:  LASIX   TAKE 1 TABLET BY MOUTH TWICE DAILY                                lisinopril 40 MG tablet   Commonly known as:  PRINIVIL/ZESTRIL   TAKE 1 TABLET BY MOUTH EVERY DAY                                LORazepam 0.5 MG tablet   Commonly known as:  ATIVAN   Take 1 tablet (0.5 mg) by mouth every 8 hours as needed for anxiety                                PROPAFENONE HCL PO   Take 112.5-225 mg by mouth 3 times daily Takes 225mg in AM and afternoon and 112.5mg (1/2 tablet) in evening                                SERTRALINE HCL PO   Take 50 mg by mouth every evening                                verapamil 240 MG Cp24 24 hr capsule   Commonly known as:  VERELAN   TAKE 1 CAPSULE BY MOUTH EVERY DAY                                WARFARIN SODIUM PO   Take 2.5-5 mg by mouth  every evening Takes 5mg daily except 2.5mg on Tuesdays

## 2017-06-13 VITALS
OXYGEN SATURATION: 94 % | BODY MASS INDEX: 23.86 KG/M2 | SYSTOLIC BLOOD PRESSURE: 129 MMHG | RESPIRATION RATE: 16 BRPM | WEIGHT: 143.2 LBS | HEART RATE: 62 BPM | TEMPERATURE: 96.7 F | DIASTOLIC BLOOD PRESSURE: 56 MMHG | HEIGHT: 65 IN

## 2017-06-13 PROBLEM — T50.901A OVERDOSE, ACCIDENTAL OR UNINTENTIONAL, INITIAL ENCOUNTER: Status: ACTIVE | Noted: 2017-06-13

## 2017-06-13 LAB
ANION GAP SERPL CALCULATED.3IONS-SCNC: 7 MMOL/L (ref 3–14)
BUN SERPL-MCNC: 22 MG/DL (ref 7–30)
CALCIUM SERPL-MCNC: 7.9 MG/DL (ref 8.5–10.1)
CHLORIDE SERPL-SCNC: 109 MMOL/L (ref 94–109)
CO2 SERPL-SCNC: 27 MMOL/L (ref 20–32)
CREAT SERPL-MCNC: 0.98 MG/DL (ref 0.52–1.04)
GFR SERPL CREATININE-BSD FRML MDRD: 56 ML/MIN/1.7M2
GLUCOSE SERPL-MCNC: 139 MG/DL (ref 70–99)
INR PPP: 3.8 (ref 0.86–1.14)
INTERPRETATION ECG - MUSE: NORMAL
POTASSIUM SERPL-SCNC: 3.9 MMOL/L (ref 3.4–5.3)
SODIUM SERPL-SCNC: 143 MMOL/L (ref 133–144)

## 2017-06-13 PROCEDURE — 36415 COLL VENOUS BLD VENIPUNCTURE: CPT | Performed by: INTERNAL MEDICINE

## 2017-06-13 PROCEDURE — 99207 ZZC APP CREDIT; MD BILLING SHARED VISIT: CPT | Performed by: PHYSICIAN ASSISTANT

## 2017-06-13 PROCEDURE — G0378 HOSPITAL OBSERVATION PER HR: HCPCS

## 2017-06-13 PROCEDURE — 85610 PROTHROMBIN TIME: CPT | Performed by: INTERNAL MEDICINE

## 2017-06-13 PROCEDURE — 80048 BASIC METABOLIC PNL TOTAL CA: CPT | Performed by: INTERNAL MEDICINE

## 2017-06-13 PROCEDURE — 99235 HOSP IP/OBS SAME DATE MOD 70: CPT | Performed by: INTERNAL MEDICINE

## 2017-06-13 PROCEDURE — 25000128 H RX IP 250 OP 636: Performed by: EMERGENCY MEDICINE

## 2017-06-13 PROCEDURE — 93005 ELECTROCARDIOGRAM TRACING: CPT

## 2017-06-13 RX ORDER — NALOXONE HYDROCHLORIDE 0.4 MG/ML
.1-.4 INJECTION, SOLUTION INTRAMUSCULAR; INTRAVENOUS; SUBCUTANEOUS
Status: DISCONTINUED | OUTPATIENT
Start: 2017-06-13 | End: 2017-06-13 | Stop reason: HOSPADM

## 2017-06-13 RX ORDER — FLUTICASONE PROPIONATE 50 MCG
2 SPRAY, SUSPENSION (ML) NASAL
Status: DISCONTINUED | OUTPATIENT
Start: 2017-06-13 | End: 2017-06-13 | Stop reason: HOSPADM

## 2017-06-13 RX ORDER — LORAZEPAM 0.5 MG/1
0.5 TABLET ORAL EVERY 8 HOURS PRN
Status: DISCONTINUED | OUTPATIENT
Start: 2017-06-13 | End: 2017-06-13 | Stop reason: HOSPADM

## 2017-06-13 RX ADMIN — SODIUM CHLORIDE 1000 ML: 9 INJECTION, SOLUTION INTRAVENOUS at 00:13

## 2017-06-13 NOTE — DISCHARGE INSTRUCTIONS
You were admitted to [facility] for accidental medication overdoses.  Hold these morning medications today  - Atenolol  - Furosamide (Lasix)  - Lisinopril  - Propafenone  - Verapamil    Hold your coumadin for two days.   Follow up with your primary care provider in 7-10 days for hospital follow up and INR recheck.

## 2017-06-13 NOTE — PLAN OF CARE
Problem: Goal Outcome Summary  Goal: Goal Outcome Summary  Outcome: Improving  A&O x4. VSS. Regular diet. Independent. Saline locked. Tele SB. Pt denies dizziness, nausea, headache, pain, SOB.  in room w/ pt overnight. Continue to monitor.

## 2017-06-13 NOTE — DISCHARGE SUMMARY
[facility]    Discharge Summary  Hospitalist    Date of Admission:  6/12/2017  Date of Discharge:  6/13/2017  Discharging Provider: Anne Lilly PA-C  Date of Service (when I saw the patient): 06/13/17    Discharge Diagnoses   Unintentional overdose  CKD 3  Supratherapeutic INR    History of Present Illness   Catalina Marie is an 71 year old female who presented with unintentional overdose.    Hospital Course   Catalina Marie was admitted on 6/12/2017.  The following problems were addressed during her hospitalization:    Unintentional overdose of propafenone, felodipine, warfarin, Zoloft, verapamil, and Lasix:  Patient accidentally doubled her evening dose of medications. 12 lead EKG in ED shows sinus bradycardia. Poison control contacted and recommended 12 hours of observation. Patient monitored overnight and continued to have sinus bradycardia on telemetry without any dizziness, lightheadedness or palpitaitons. Repeat EKG in the AM showed no QT prolongation. Patient discharged to home with instructions to hold AM medications and to hold coumadin for two days.     Supratherapeutic INR in setting of paroxysmal atrial fibrillation:  Patients INR 3.12 on presentation. This elevated to 3.8 on recheck. Patient instructed to hold coumadin for two days. Patient to follow up with PCP for recheck    Chronic kidney disease:  Creatinine remained at baseline during hospitalization.     Anne Lilly PA-C    The patient was discussed with Dr. Engel who agrees with the plan as outlined above.    Significant Results and Procedures   none    Pending Results   These results will be followed up by none  Unresulted Labs Ordered in the Past 30 Days of this Admission     No orders found for last 61 day(s).          Code Status   Full Code       Primary Care Physician   Ian Tracy    Physical Exam   Temp: 96.7  F (35.9  C) Temp src: Oral BP: 129/56 Pulse: 62 Heart Rate: 51 Resp:  16 SpO2: 94 % O2 Device: None (Room air)    Vitals:    06/13/17 0028   Weight: 65 kg (143 lb 3.2 oz)     Vital Signs with Ranges  Temp:  [96.5  F (35.8  C)-98.2  F (36.8  C)] 96.7  F (35.9  C)  Pulse:  [62] 62  Heart Rate:  [51-62] 51  Resp:  [10-18] 16  BP: (119-135)/(47-70) 129/56  SpO2:  [92 %-94 %] 94 %       Constitutional: Well appearing, female, in no acute distress  Respiratory: Clear to auscultation bilaterally, no increased work of breathing  Cardiovascular: Regular rate and rhythm, no S3, no S4, no other extra heart sounds  GI: Soft, nontender, nondistended, normal active bowel sounds  Skin/Integumen: Warm, dry, no edema  Other:    Discharge Disposition   Discharged to home  Condition at discharge: Stable    Consultations This Hospital Stay   None    Time Spent on this Encounter   I, Anne Lilly, personally saw the patient today and spent greater than 30 minutes discharging this patient.    Discharge Orders     Reason for your hospital stay   You were admitted to [facility] Observation unit for accidental overdose     Follow-up and recommended labs and tests    Follow up with primary care provider, Ian Tracy, within 7 days for hospital follow- up.  No follow up labs or test are needed.     Activity   Your activity upon discharge: activity as tolerated     Full Code     Diet   Follow this diet upon discharge: Orders Placed This Encounter     Regular Diet Adult       Discharge Medications   Current Discharge Medication List      CONTINUE these medications which have NOT CHANGED    Details   ATENOLOL PO Take 25 mg by mouth 2 times daily Takes 1/2 of 50mg tab      FENOFIBRATE PO Take 160 mg by mouth daily      PROPAFENONE HCL PO Take 112.5-225 mg by mouth 3 times daily Takes 225mg in AM and afternoon and 112.5mg (1/2 tablet) in evening      SERTRALINE HCL PO Take 50 mg by mouth every evening      WARFARIN SODIUM PO Take 2.5-5 mg by mouth every evening Takes 5mg daily except 2.5mg on  Tuesdays      Biotin 3 MG TABS Take by mouth daily (unknown dose)      carboxymethylcellulose (CELLUVISC/REFRESH LIQUIGEL) 1 % ophthalmic solution Place 1 drop into both eyes 4 times daily as needed for dry eyes      atorvastatin (LIPITOR) 10 MG tablet Take 1/2 tablet by mouth daily.  Qty: 90 tablet, Refills: 3    Associated Diagnoses: Hyperlipidemia LDL goal <100      furosemide (LASIX) 20 MG tablet TAKE 1 TABLET BY MOUTH TWICE DAILY  Qty: 180 tablet, Refills: 0    Associated Diagnoses: Essential hypertension      lisinopril (PRINIVIL/ZESTRIL) 40 MG tablet TAKE 1 TABLET BY MOUTH EVERY DAY  Qty: 90 tablet, Refills: 3    Associated Diagnoses: Benign essential hypertension      DIGOX 250 MCG tablet TAKE 1 TABLET BY MOUTH EVERY DAY  Qty: 90 tablet, Refills: 3    Associated Diagnoses: Benign essential hypertension      verapamil (VERELAN) 240 MG CP24 24 hr capsule TAKE 1 CAPSULE BY MOUTH EVERY DAY  Qty: 90 capsule, Refills: 3    Associated Diagnoses: Benign essential hypertension      LORazepam (ATIVAN) 0.5 MG tablet Take 1 tablet (0.5 mg) by mouth every 8 hours as needed for anxiety  Qty: 20 tablet, Refills: 0    Associated Diagnoses: Long-term (current) use of anticoagulants      Calcium Carbonate-Vitamin D (CALCIUM + D PO) Take by mouth daily      FELODIPINE ER PO Take 5 mg by mouth 2 times daily         STOP taking these medications       fluticasone (FLONASE) 50 MCG/ACT spray Comments:   Reason for Stopping:             Allergies   Allergies   Allergen Reactions     Penicillins      Data   Most Recent 3 CBC's:No lab results found.   Most Recent 3 BMP's:  Recent Labs   Lab Test  06/13/17   0540  06/12/17   2235  12/27/16   1155   NA  143  142  139   POTASSIUM  3.9  3.7  3.9   CHLORIDE  109  106  104   CO2  27  27  29   BUN  22  25  17   CR  0.98  1.17*  1.05*   ANIONGAP  7  9  6   MARIELA  7.9*  8.4*  8.9   GLC  139*  145*  108*     Most Recent 2 LFT's:  Recent Labs   Lab Test 06/27/16 06/24/15   AST  18  23   ALT  21   25   ALKPHOS  77  84   BILITOTAL  0.4  0.6     Most Recent INR's and Anticoagulation Dosing History:  Anticoagulation Dose History     Recent Dosing and Labs Latest Ref Rng & Units 2017 2017 3/27/2017 2017 2017 2017 2017    INR 0.86 - 1.14 3.0 2.6 2.6 2.3  2.2 3.12(H) 3.80(H)    INR 0.86 - 1.14 - - - - - - -        Most Recent 3 Troponin's:No lab results found.  Most Recent Cholesterol Panel:  Recent Labs   Lab Test  10/12/16   1130   CHOL  157   LDL  63   HDL  36*   TRIG  288*     Most Recent 6 Bacteria Isolates From Any Culture (See EPIC Reports for Culture Details):No lab results found.  Most Recent TSH, T4 and A1c Labs:  Recent Labs   Lab Test  10/12/16   1130 16   TSH   --   1.68   A1C  6.2*   --      Results for orders placed or performed in visit on 07/15/09   MYOCARDIAL IMAGE CHUY,SPECT    Impression       CATALINA PETTY       GATED MYOCARDIAL PERFUSION SCINTIGRAPHY WITH INTRAVENOUS   PHARMACOLOGIC VASODILATATION       Done on 7/15/2009.  Catalina Petty is a 63-year-old female.  :    1945.       INDICATION:  Assessment of myocardial perfusion in a patient with a   history of chest pain.         IMPRESSION:   I.  Adenosine Test   1.  Adenosine infusion dictated under separate cover.   2.  ECG report done under separate cover.       II. Myocardial Perfusion Scintigraphy   1.  Myocardial perfusion using single isotope technique demonstrated   no perfusion defects.  There is no evidence of myocardial ischemia or   infarction on this study.   2.  Gating demonstrated normal wall motion and normal left   ventricular chamber size.   3.  The ejection fraction is normal at 74%.   4.  This is a high quality, normal study.       RESTING TOMOGRAPHY:  Following the injection of 11.5 mCi of   technetium 99m sestamibi intravenously, gamma camera imaging was   performed using 180* SPECT technique.       INTRAVENOUS ADENOSINE MONITORING PERIOD:  Done under separate  cover.    At three minutes into the infusion, the patient was injected with 33   mCi technetium 99m sestamibi intravenously.       ECG report done under separate cover.        Gamma camera imaging was performed later using 180* SPECT technique.       TOMOGRAPHIC RESULTS:  On the stress images, there is normal   perfusion.  On resting images, there is normal perfusion.  Gated   imaging demonstrates normal wall motion and normal left ventricular   chamber size.  The ejection fraction is normal at 74%.

## 2017-06-13 NOTE — H&P
CHIEF COMPLAINT:  Unintentional overdose.      HISTORY OF PRESENT ILLNESS:  Catalina Marie is a very pleasant 71-year-old woman with a known history of atrial fibrillation, chronic kidney disease, hypertension, and a history of breast cancer, as well as schwannoma in the past, who was in her routine state of health, but inadvertently took a double dose of her evening medications.  She apparently had a couple glasses of wine, and forgot that she had already taken her medications.  This was not an intentional overdose.      She tells me that she took an extra dose of her atenolol, propafenone, felodipine, warfarin, Zoloft, verapamil, and Lasix.  She also takes lisinopril daily, but that is in the morning and she did not take an extra dose of that, nor did she take an extra dose of her digoxin which is also a morning medication.  She has been feeling okay, a little bit weak and dizzy, but mostly she was worried about the effects of the propafenone, so she came into the Emergency Department.  She was seen by Dr. Smooth Zhang, who discussed the case with me.  He had also discussed the case with Poison Control, and based on their recommendations, it was recommended that she be observed for 12 hours to make sure that there are no adverse effects.  He obtained an EKG, which I personally reviewed.  The EKG does show sinus bradycardia and is otherwise unremarkable.      On my questioning, she is not short of breath.  She denies any complaints other than leg swelling.  She is not having any dysuria or any GI complaints.  She denies any history of stroke or seizure.  She denies any history of diabetes, although there is a family history.  Overall, she just feels a bit dehydrated and is thirsty at this time.      PAST MEDICAL HISTORY:   1.  History of schwannoma.   2.  History of breast cancer.   3.  Chronic kidney disease.   4.  Hypertension.   5.  Atrial fibrillation.      PAST SURGICAL HISTORY:   1.   Phacoemulsification of clear cornea with intraocular lens implant.   2.  Status post brain tumor resection.   3.  Status post colonoscopy.   4.  Status post breast surgery.      FAMILY HISTORY:  Significant for type 2 diabetes.      SOCIAL HISTORY:  She drinks wine.  She lives in [place] for 7 months of the year.  She normally sees Dr. Tracy for her medical problems.  She does not smoke cigarettes; she quit many years ago.  She is here with her .      ALLERGIES:  She notes a remote reaction to penicillin, but she cannot remember what it was.      PRIOR TO ADMISSION MEDICATIONS:   1.  Digoxin 0.25 mg p.o. daily.   2.  Lipitor 5 mg p.o. daily.   3.  Lisinopril 40 mg p.o. daily.   4.  Furosemide 20 mg p.o. daily.   5.  Propafenone 225 mg p.o. three times daily.   6.  Atenolol 25 mg p.o. twice daily.   7.  Felodipine 5 mg p.o. twice daily.   8.  Warfarin 5 mg p.o. daily.   9.  Zoloft 50 mg p.o. daily.   10.  Verapamil 240 mg p.o. daily.      REVIEW OF SYSTEMS:  A 10-point system review was performed and was negative with the exception of those complaints noted in the HPI.      PHYSICAL EXAMINATION:   VITAL SIGNS:  Temperature 98.2, heart rate 62, blood pressure 125/68, respiratory rate 18, heart rate 94.   GENERAL:  This is a pleasant woman in no immediate distress.  She looks her stated age.   HEENT:  Head exam reveals no facial asymmetry.  Pupils are equal and reactive to light bilaterally; extraocular movements are intact.  Oropharynx is clear.  Tongue is midline.  Mucous membranes are a little bit dry.   RESPIRATORY:  Clear bilaterally without crackles or wheezes.   CARDIAC:  Regular rate and rhythm.  S1 and S2 are heard.  No murmurs.   ABDOMEN:  Soft, nontender, nondistended.  Bowel sounds are heard.   EXTREMITIES:  Lower extremity exam is significant for 2+ pedal edema bilaterally at this time.  There is no calf tenderness on exam.   NEUROLOGIC:  No gross focal deficits.   EYES:  Pupils are equal and  reactive to light bilaterally; extraocular movements are intact.  Sclerae are anicteric.      LABORATORY FINDINGS:  I personally reviewed the EKG and found it to be sinus bradycardia.      Sodium 142, potassium 3.7, chloride 106, CO2 27, BUN 25, creatinine 1.17, calcium 8.4, anion gap 9, glucose 145.  INR is 3.12.      ASSESSMENT AND PLAN:  This is a 71-year-old woman who had an accidental overdose of her evening medications.   1.  Unintentional overdose of propafenone, felodipine, warfarin, Zoloft, verapamil, and Lasix:  She is presently hemodynamically stable with only bradycardia noted on EKG.  Based on the recommendations of Poison Control, we will place her on observation status and monitor her for 12 hours on telemetry.  We will plan on holding her a.m. medications.  We will repeat an INR in the a.m., and a set of electrolytes and a CBC.  She will likely need to hold the warfarin for a day, and may need to reduce the propafenone for a day.   2.  Supratherapeutic INR:  Her INR is slightly above 3, which is outside the therapeutic range for her.  This is most likely due to the overdose as noted above.   3.  Chronic kidney disease:  At present, her creatinine appears to be at her baseline.  Due to the additional dose of Lasix she received, we will give her some IV fluids tonight, and plan on holding her a.m. lisinopril dose until the creatinine is known.      DVT PROPHYLAXIS:  Sequential knee sleeves will be ordered.      CODE STATUS:  FULL.      DISPOSITION:  I anticipate a less than 48-hour stay.  As such, she will be placed on observation status.         NIMISHA SKY MD             D: 2017 00:18   T: 2017 00:59   MT: [id number]      Name:     ESSENCE PETTY   MRN:      [id number]        Account:      [id number]   :      1945           Admitted:     [id number]      Document: [id number]       cc: Ian Tracy MD

## 2017-06-13 NOTE — ED NOTES
[facility]  ED Nurse Handoff Report    ED Chief complaint: Ingestion (Pt. accidentally took pm meds twice. 1st dose at 1700 and 2nd dose at 2015. Propaferone 225mg, atenolol 50 mg, felodinpine 10mg, warfarin 10mg, serstraline 100mg, verapamil 480mg, lasix 40mg. These are total doses. Pt. also had 2 glasses of wine. Denies CP or SOB. No light headedness. Alert and orient. times 3.)      ED Diagnosis:   Final diagnoses:   None       Code Status: Full Code    Allergies:   Allergies   Allergen Reactions     Penicillins        Activity level - Baseline/Home:  Independent    Activity Level - Current:   Independent     Needed?: No    Isolation: No  Infection: Not Applicable    Bariatric?: No    Vital Signs:   Vitals:    06/12/17 2145 06/12/17 2200 06/12/17 2215 06/12/17 2230   BP: 119/61 122/59 132/70    Pulse:       Resp:       Temp:       TempSrc:       SpO2: 92% 92% 93% 94%       Cardiac Rhythm: ,        Pain level:      Is this patient confused?: No    Patient Report: Initial Complaint: Took meds twice tonight by accident  Focused Assessment: Pt here with history of hypertension, hyperlipidemia, atrial fibrillation and CKD who presents after accidentally doubling up on her pills today. The patient reports that she is nervous but otherwise feels fine. She states she took her pills at 1700 and then again at 2015: Propaferone 225mg, Atenolol 50 mg, Felodipine 10mg, Warfarin 10mg, Sertraline 100mg, Verapamil 480mg, Lasix 40mg. These are total doses. She states she has had 2 glasses of wine.  She states her usual blood pressure is around 130 in the morning and then drops until she takes her second dose of medication.  A&O; independent.    Tests Performed: labs, ekg  Abnormal Results: INR ^  Treatments provided: monitor    Family Comments: spouse    OBS brochure/video discussed/provided to patient: Yes    ED Medications: Medications - No data to display    Drips infusing?:  No      ED NURSE PHONE  NUMBER: [id number]

## 2017-06-13 NOTE — PROGRESS NOTES
Observation goals PRIOR TO DISCHARGE     Comments: -diagnostic tests and consults completed and resulted - N/A  -vital signs normal or at patient baseline, normal telemetry - met, NSR/SB. Pt asymptomatic       Pt feels back to baseline and ready to discharge. Will update PA.

## 2017-06-13 NOTE — ED PROVIDER NOTES
History   Chief Complaint:  Ingestion    HPI   Catalina Marie is a 71 year old female with a history of hypertension, hyperlipidemia, atrial fibrillation and CKD who presents after accidentally doubling up on her pills today. The patient reports that she is nervous but otherwise feels fine. She states she took her pills at 1700 and then again at 2015: Propafenone 225mg, Atenolol 50 mg, Felodipine 10mg, Warfarin 10mg, Sertraline 100mg, Verapamil 480mg, Lasix 40mg. These are total doses. She states she has had 2 glasses of wine. She states she get leg swelling in the evening or when traveling. The patient denies any dizziness, lightheadedness, vision changes, headache, pain, chest pain, shortness of breath, abdominal pain, diarrhea., nausea, vomiting, or fever. Of note, Ian Tracy is the patient's primary care physician. She states her usual blood pressure is around 130 in the morning and then drops until she takes her second dose of medication. The patient reports that after taking her mediations at 1700 this evening, she had some wine and then when she saw her pill container again, she didn't register that she had already taken her medications so she took her Tuesday medications by accident.    Allergies:  Penicillins    Medications:    Lipitor  Coumadin  Lasix  Lisinopril  Digox  Fenofibrate  Verelan  Tenormin  Flonase  Ativan  Felodipine  Calcium Carbonate  Propafenone  Zoloft    Past Medical History:    Arrhythmia  Diabetes -type 2  Hypertension  Hyperlipidemia  Adjustment disorder   CKD  Atrial fibrillation    Past Surgical History:    Breast surgery  Eye surgery  Head and neck surgery    Family History:    History reviewed. No pertinent family history.     Social History:  Marital Status:   [2]  Smoking status; former   Alcohol use: occasional    Review of Systems   Constitutional: Negative for chills and fever.   Respiratory: Negative for cough and shortness of breath.    Cardiovascular:  Negative for chest pain.   Gastrointestinal: Negative for abdominal pain, diarrhea, nausea and vomiting.   Genitourinary: Negative for dysuria and hematuria.   Musculoskeletal: Negative for back pain and neck pain.   Neurological: Negative for dizziness, syncope, weakness, light-headedness and headaches.   All other systems reviewed and are negative.    Physical Exam     Patient Vitals for the past 24 hrs:   BP Temp Temp src Pulse Heart Rate Resp SpO2   06/12/17 2300 135/69 - - - 53 10 92 %   06/12/17 2230 - - - - - - 94 %   06/12/17 2215 132/70 - - - - - 93 %   06/12/17 2200 122/59 - - - - - 92 %   06/12/17 2145 119/61 - - - - - 92 %   06/12/17 2131 125/68 98.2  F (36.8  C) Oral 62 62 18 92 %   06/12/17 2130 125/68 - - - - - -        Physical Exam  General: Alert and cooperative with exam. Patient in mild distress. Normal mentation.  Head:  Scalp is NC/AT  Eyes:  No scleral icterus, PERRL  ENT:  The external nose and ears are normal. The oropharynx is normal and without erythema; mucus membranes are moist. Uvula midline, no evidence of deep space infection.  Neck:  Normal range of motion without rigidity.  CV:  Regular rate and rhythm    No pathologic murmur   Resp:  Breath sounds are clear bilaterally    Non-labored, no retractions or accessory muscle use  GI:  Abdomen is soft, no distension, no tenderness. No peritoneal signs  MS:  No lower extremity edema   Skin:  Warm and dry, No rash or lesions noted.  Neuro: Oriented x 3. No gross motor deficits.    Emergency Department Course     Laboratory:  Laboratory findings were communicated with the patient who voiced understanding of the findings.    BMP: Glucose 145 (H), Creatinine 1.17 (H), GFR Estimate 45 (L), Calcium 8.4 (L)  INR: 3.12 (H)    Emergency Department Course:  Nursing notes and vitals reviewed.  I performed an exam of the patient as documented above.   IV was inserted and blood was drawn for laboratory testing, results above.  I discussed the treatment  plan with the patient. They expressed understanding of this plan and consented to admission for observation. I discussed the patient with Dr. Christiano Dye, who will admit the patient to a monitored observation bed for further evaluation and treatment.  I personally reviewed the laboratory results with the patient and answered all related questions prior to admission for observation.  Impression & Plan      Medical Decision Making:  Catalina Marie is a 71 year old female who accidentally ingested a double dose of her medication this evening. The patient's medical history and records were reviewed. Poison control was contacted and recommended twelve hours of monitoring. Currently, the patient feels asymptomatic. Labs obtained and demonstrate mild elevation of INR (3.12); no current bleeding. Basic metabolic panel is not significantly changed. Poison control recommended calcium gluconate and Atropine as needed for hypotension/bradycardia respectively if it develops. Patient remains stable throughout her emergency department course. EKG obtained and demonstrates mild sinus bradycardia. She will be admitted to the observation unit for further evaluation and care. Presentation consistent with accidental drug ingestion.     Diagnosis:    ICD-10-CM    1. Accidental medication overdose, initial encounter T50.901A      Disposition:   The patient is admitted for observation into the care of Dr. Christiano Dye.    Scribe Disclosure:  I, Dean Jefferson, am serving as a scribe at 11:17 PM on 6/12/2017 to document services personally performed by Smooth Zhang DO, based on my observations and the provider's statements to me.     EMERGENCY DEPARTMENT       Smooth Zhang DO  06/19/17 1743

## 2017-06-13 NOTE — PLAN OF CARE
Problem: Discharge Planning  Goal: Discharge Planning (Adult, OB, Behavioral, Peds)  Outcome: Improving  Observation goals PRIOR TO DISCHARGE     Comments: -diagnostic tests and consults completed and resulted - not met  -vital signs normal or at patient baseline, normal telemetry - partially met, Tele SB  Nurse to notify provider when observation goals have been met and patient is ready for discharge.      Pt arrived to Obs unit around 0030. Pt denies dizziness, nausea, lightheadedness. States she feels fine.

## 2017-06-13 NOTE — PLAN OF CARE
Problem: Goal Outcome Summary  Goal: Goal Outcome Summary  Outcome: Adequate for Discharge Date Met:  06/13/17  Observation goals PRIOR TO DISCHARGE     Comments: -diagnostic tests and consults completed and resulted - N/A  -vital signs normal or at patient baseline, normal telemetry - met, NSR/SB. Pt asymptomatic     Repeat EKG done.       Patient A/O x4.  No syncopal episodes noted.  Patient up IND.  Spouse at bedside to transport her home.   Discharge instructions were reviewed with patient. Went over medication schedule with pt. She verbalized understanding. Discharge papers sent home with patient.

## 2017-06-13 NOTE — PLAN OF CARE
Problem: Discharge Planning  Goal: Discharge Planning (Adult, OB, Behavioral, Peds)  Outcome: Improving  Observation goals PRIOR TO DISCHARGE     Comments: -diagnostic tests and consults completed and resulted - not met  -vital signs normal or at patient baseline, normal telemetry - met, NSR  Nurse to notify provider when observation goals have been met and patient is ready for discharge.      Pt arrived to Obs unit around 0030. Pt denies dizziness, nausea, lightheadedness. States she feels fine.

## 2017-06-13 NOTE — PHARMACY-ADMISSION MEDICATION HISTORY
Admission medication history interview status for the 6/12/2017  admission is complete. See EPIC admission navigator for prior to admission medications     Medication history source reliability:Good    Actions taken by pharmacist (provider contacted, etc): Spoke with patient who provided a list and also knew what she takes.       Additional medication history information not noted on PTA med list :None    Medication reconciliation/reorder completed by provider prior to medication history? Yes    Time spent in this activity: 20 minutes    Prior to Admission medications    Medication Sig Last Dose Taking? Auth Provider   ATENOLOL PO Take 25 mg by mouth 2 times daily Takes 1/2 of 50mg tab 6/12/2017 at x3 Yes Unknown, Entered By History   FENOFIBRATE PO Take 160 mg by mouth daily 6/12/2017 at Unknown time Yes Unknown, Entered By History   PROPAFENONE HCL PO Take 112.5-225 mg by mouth 3 times daily Takes 225mg in AM and afternoon and 112.5mg (1/2 tablet) in evening 6/12/2017 at x4 Yes Unknown, Entered By History   SERTRALINE HCL PO Take 50 mg by mouth every evening 6/12/2017 at x2 Yes Unknown, Entered By History   WARFARIN SODIUM PO Take 2.5-5 mg by mouth every evening Takes 5mg daily except 2.5mg on Tuesdays  Yes Unknown, Entered By History   Biotin 3 MG TABS Take by mouth daily (unknown dose) 6/12/2017 at Unknown time Yes Unknown, Entered By History   carboxymethylcellulose (CELLUVISC/REFRESH LIQUIGEL) 1 % ophthalmic solution Place 1 drop into both eyes 4 times daily as needed for dry eyes prn Yes Unknown, Entered By History   atorvastatin (LIPITOR) 10 MG tablet Take 1/2 tablet by mouth daily. 6/12/2017 at AM Yes Ian Tracy MD   furosemide (LASIX) 20 MG tablet TAKE 1 TABLET BY MOUTH TWICE DAILY 6/12/2017 at x3 Yes Ian Tracy MD   lisinopril (PRINIVIL/ZESTRIL) 40 MG tablet TAKE 1 TABLET BY MOUTH EVERY DAY 6/12/2017 at Unknown time Yes Ian Tracy MD   DIGOX 250 MCG tablet TAKE 1 TABLET BY MOUTH EVERY  DAY 6/12/2017 at Unknown time Yes Ian Tracy MD   verapamil (VERELAN) 240 MG CP24 24 hr capsule TAKE 1 CAPSULE BY MOUTH EVERY DAY 6/12/2017 at AM Yes Ian Tracy MD   LORazepam (ATIVAN) 0.5 MG tablet Take 1 tablet (0.5 mg) by mouth every 8 hours as needed for anxiety prn, rare use Yes Ian Tracy MD   Calcium Carbonate-Vitamin D (CALCIUM + D PO) Take by mouth daily 6/12/2017 at Unknown time Yes Reported, Patient   FELODIPINE ER PO Take 5 mg by mouth 2 times daily   Reported, Patient       Shelia Arredondo, PharmD

## 2017-06-17 LAB — INTERPRETATION ECG - MUSE: NORMAL

## 2017-06-19 ENCOUNTER — ANTICOAGULATION THERAPY VISIT (OUTPATIENT)
Dept: NURSING | Facility: CLINIC | Age: 72
End: 2017-06-19
Payer: MEDICARE

## 2017-06-19 DIAGNOSIS — Z79.01 LONG-TERM (CURRENT) USE OF ANTICOAGULANTS: ICD-10-CM

## 2017-06-19 DIAGNOSIS — I48.91 ATRIAL FIBRILLATION (H): ICD-10-CM

## 2017-06-19 LAB — INR POINT OF CARE: 3.1 (ref 0.86–1.14)

## 2017-06-19 PROCEDURE — 99207 ZZC NO CHARGE NURSE ONLY: CPT

## 2017-06-19 PROCEDURE — 85610 PROTHROMBIN TIME: CPT | Mod: QW

## 2017-06-19 PROCEDURE — 36416 COLLJ CAPILLARY BLOOD SPEC: CPT

## 2017-06-19 NOTE — MR AVS SNAPSHOT
Catalina Marie   6/19/2017 11:15 AM   Anticoagulation Therapy Visit    Description:  71 year old female   Provider:   ANTICOAGULATION CLINIC   Department:  [facility] Nurse           INR as of 6/19/2017     Today's INR 3.1!      Anticoagulation Summary as of 6/19/2017     INR goal 2.0-3.0   Today's INR 3.1!   Full instructions 6/19: 2.5 mg; Otherwise 2.5 mg on Tue; 5 mg all other days   Next INR check 6/28/2017    Indications   Long-term (current) use of anticoagulants [Z79.01] [Z79.01]  Atrial fibrillation (H) [I48.91] [I48.91]         Your next Anticoagulation Clinic appointment(s)     Jun 28, 2017 11:00 AM CDT   Anticoagulation Visit with  ANTICOAGULATION CLINIC   [facility] [place] ([facility])    [address]  [place] [address]   [phone]              Contact Numbers     Clinic Number:         June 2017 Details    Sun Mon Tue Wed Thu Fri Sat         1               2               3                 4               5               6               7               8               9               10                 11               12               13               14               15               16               17                 18               19      2.5 mg   See details      20      2.5 mg         21      5 mg         22      5 mg         23      5 mg         24      5 mg           25      5 mg         26      5 mg         27      2.5 mg         28            29               30                 Date Details   06/19 This INR check       Date of next INR:  6/28/2017         How to take your warfarin dose     To take:  2.5 mg Take 0.5 of a 5 mg tablet.    To take:  5 mg Take 1 of the 5 mg tablets.

## 2017-06-19 NOTE — PROGRESS NOTES
ANTICOAGULATION FOLLOW-UP CLINIC VISIT    Patient Name:  Catalina Marie  Date:  6/19/2017  Contact Type:  Face to Face    SUBJECTIVE:     Patient Findings     Positives Hospital admission    Comments Recent hospitalization due to accidental medication overdose.            OBJECTIVE    INR Protime   Date Value Ref Range Status   06/19/2017 3.1 (A) 0.86 - 1.14 Final       ASSESSMENT / PLAN  INR assessment THER    Recheck INR In: 1 WEEK    INR Location Clinic      Anticoagulation Summary as of 6/19/2017     INR goal 2.0-3.0   Today's INR 3.1!   Maintenance plan 2.5 mg (5 mg x 0.5) on Tue; 5 mg (5 mg x 1) all other days   Full instructions 6/19: 2.5 mg; Otherwise 2.5 mg on Tue; 5 mg all other days   Weekly total 32.5 mg   Plan last modified Elodia Campbell RN (9/15/2016)   Next INR check 6/28/2017   Priority INR   Target end date     Indications   Long-term (current) use of anticoagulants [Z79.01] [Z79.01]  Atrial fibrillation (H) [I48.91] [I48.91]         Anticoagulation Episode Summary     INR check location     Preferred lab     Send INR reminders to CS ANTICOAGULATION    Comments Expect FAXED INR results from [facility] in [place], [place]. Phone: [phone].  General Results#: [phone]  Call pt's cell in [place] with results/plan: [phone]      Anticoagulation Care Providers     Provider Role Specialty Phone number    Ian Tracy MD Referring Internal Medicine [phone]            See the Encounter Report to view Anticoagulation Flowsheet and Dosing Calendar (Go to Encounters tab in chart review, and find the Anticoagulation Therapy Visit)    Dosage adjustment made based on physician directed care plan.  Patient had recent hospitalization following accidental medication double dose of all medications.   INR was SUPRA last week. Will have patient do 2.5 mg tonight and tomorrow; then resume normal dosing after. Recheck INR in 1 week when she is scheduled for Physical with Dr. Tracy.     Carline Myrick RN

## 2017-06-20 DIAGNOSIS — I48.0 PAROXYSMAL ATRIAL FIBRILLATION (H): Primary | ICD-10-CM

## 2017-06-20 RX ORDER — PROPAFENONE HYDROCHLORIDE 225 MG/1
225 TABLET, FILM COATED ORAL 3 TIMES DAILY
Qty: 225 TABLET | Refills: 3 | Status: SHIPPED | OUTPATIENT
Start: 2017-06-20 | End: 2017-06-22

## 2017-06-21 NOTE — TELEPHONE ENCOUNTER
Refill request from [facility] for Propafenone 150MG tablets, SIG take one tablet by mouth three times a day  Med is historical, 225MG, SIG take 225mg in AM and afternoon and 112.5 (1/2 tablet) in evening    Please review and adjust as needed    Pending Prescriptions:                       Disp   Refills    propafenone (RYTHMOL) 225 MG tablet       225 ta*1            Sig: Take 1 tablet (225 mg) by mouth 3 times daily           Takes 225mg in AM and afternoon and 112.5mg (1/2           tablet) in evening        Last Written Prescription Date:  Historical  Last Fill Quantity: -,   # refills: -  Last Office Visit with [facility], [facility] or [facility] prescribing provider: 12/27/16 Rossana  Future Office visit:    Next 5 appointments (look out 90 days)     Jun 28, 2017 11:30 AM CDT   PHYSICAL with Ian Tracy MD   [facility]    [address] [address]   [phone]                   Routing refill request to provider for review/approval because:  Medication is reported/historical    Margie Dowell RT(R)

## 2017-06-22 ENCOUNTER — MYC MEDICAL ADVICE (OUTPATIENT)
Dept: FAMILY MEDICINE | Facility: CLINIC | Age: 72
End: 2017-06-22

## 2017-06-22 ENCOUNTER — TELEPHONE (OUTPATIENT)
Dept: FAMILY MEDICINE | Facility: CLINIC | Age: 72
End: 2017-06-22

## 2017-06-22 DIAGNOSIS — I48.0 PAROXYSMAL ATRIAL FIBRILLATION (H): ICD-10-CM

## 2017-06-22 RX ORDER — PROPAFENONE HYDROCHLORIDE 225 MG/1
225 TABLET, FILM COATED ORAL 3 TIMES DAILY
Qty: 225 TABLET | Refills: 3 | Status: SHIPPED | OUTPATIENT
Start: 2017-06-22 | End: 2017-06-22

## 2017-06-22 RX ORDER — PROPAFENONE HYDROCHLORIDE 150 MG/1
150 TABLET, COATED ORAL 3 TIMES DAILY
Qty: 270 TABLET | Refills: 3 | Status: SHIPPED | OUTPATIENT
Start: 2017-06-22 | End: 2017-10-12

## 2017-06-22 NOTE — TELEPHONE ENCOUNTER
Reason for Call:  Medication or medication refill:    Do you use a [facility]?  Name of the pharmacy and phone number for the current request:       [facility] [address] [place], [place] - [address]    Name of the medication requested: propafenone (RYTHMOL) 225 MG tablet    Other request: pt usually takes 150 mg of medication.  Pharmacy just wants to know if they should fill as sent for 225 mg or get  A new script for 150 mg?  Pt. Is waiting for meds.  Please call    Can we leave a detailed message on this number? YES    Phone number pharmacy can be reached at: [phone]    Best Time: any time    Call taken on 6/22/2017 at 2:36 PM by Sultana Cardoso  .

## 2017-06-22 NOTE — TELEPHONE ENCOUNTER
Patient called to report that her pharmacy benefit plan through [employer] was not carrying her Rythmol and it's making her feel unbelievably anxious.  Contacted the [facility] pharmacy and the [facility] to have the availability of filling her prescription and the patient will be talking to her pharmacy benefit plan through her insurance company

## 2017-06-27 DIAGNOSIS — I10 ESSENTIAL HYPERTENSION: ICD-10-CM

## 2017-06-28 ENCOUNTER — OFFICE VISIT (OUTPATIENT)
Dept: FAMILY MEDICINE | Facility: CLINIC | Age: 72
End: 2017-06-28
Payer: MEDICARE

## 2017-06-28 ENCOUNTER — ANTICOAGULATION THERAPY VISIT (OUTPATIENT)
Dept: NURSING | Facility: CLINIC | Age: 72
End: 2017-06-28
Payer: MEDICARE

## 2017-06-28 VITALS
OXYGEN SATURATION: 96 % | HEART RATE: 53 BPM | WEIGHT: 138 LBS | BODY MASS INDEX: 23.56 KG/M2 | HEIGHT: 64 IN | DIASTOLIC BLOOD PRESSURE: 60 MMHG | TEMPERATURE: 97.5 F | SYSTOLIC BLOOD PRESSURE: 100 MMHG

## 2017-06-28 DIAGNOSIS — M85.89 OSTEOPENIA OF MULTIPLE SITES: ICD-10-CM

## 2017-06-28 DIAGNOSIS — F43.22 ADJUSTMENT DISORDER WITH ANXIOUS MOOD: ICD-10-CM

## 2017-06-28 DIAGNOSIS — C50.919 MALIGNANT NEOPLASM OF BREAST IN FEMALE, ESTROGEN RECEPTOR POSITIVE, UNSPECIFIED LATERALITY, UNSPECIFIED SITE OF BREAST (H): ICD-10-CM

## 2017-06-28 DIAGNOSIS — I48.91 ATRIAL FIBRILLATION (H): ICD-10-CM

## 2017-06-28 DIAGNOSIS — Z79.01 LONG-TERM (CURRENT) USE OF ANTICOAGULANTS: ICD-10-CM

## 2017-06-28 DIAGNOSIS — R53.83 FATIGUE, UNSPECIFIED TYPE: ICD-10-CM

## 2017-06-28 DIAGNOSIS — E04.2 NON-TOXIC MULTINODULAR GOITER: ICD-10-CM

## 2017-06-28 DIAGNOSIS — N18.30 CKD (CHRONIC KIDNEY DISEASE) STAGE 3, GFR 30-59 ML/MIN (H): ICD-10-CM

## 2017-06-28 DIAGNOSIS — E78.5 HYPERLIPIDEMIA LDL GOAL <100: ICD-10-CM

## 2017-06-28 DIAGNOSIS — I48.0 PAROXYSMAL ATRIAL FIBRILLATION (H): Primary | ICD-10-CM

## 2017-06-28 DIAGNOSIS — Z90.13 H/O BILATERAL MASTECTOMY: ICD-10-CM

## 2017-06-28 DIAGNOSIS — Z17.0 MALIGNANT NEOPLASM OF BREAST IN FEMALE, ESTROGEN RECEPTOR POSITIVE, UNSPECIFIED LATERALITY, UNSPECIFIED SITE OF BREAST (H): ICD-10-CM

## 2017-06-28 DIAGNOSIS — E11.9 TYPE 2 DIABETES MELLITUS WITHOUT COMPLICATION, WITHOUT LONG-TERM CURRENT USE OF INSULIN (H): ICD-10-CM

## 2017-06-28 DIAGNOSIS — Z00.00 ENCOUNTER FOR ROUTINE ADULT HEALTH EXAMINATION WITHOUT ABNORMAL FINDINGS: ICD-10-CM

## 2017-06-28 DIAGNOSIS — I10 BENIGN ESSENTIAL HYPERTENSION: ICD-10-CM

## 2017-06-28 LAB
ALBUMIN UR-MCNC: NEGATIVE MG/DL
APPEARANCE UR: CLEAR
BILIRUB UR QL STRIP: NEGATIVE
COLOR UR AUTO: YELLOW
ERYTHROCYTE [DISTWIDTH] IN BLOOD BY AUTOMATED COUNT: 13.2 % (ref 10–15)
GLUCOSE UR STRIP-MCNC: NEGATIVE MG/DL
HBA1C MFR BLD: 6.5 % (ref 4.3–6)
HCT VFR BLD AUTO: 42.6 % (ref 35–47)
HGB BLD-MCNC: 14.2 G/DL (ref 11.7–15.7)
HGB UR QL STRIP: NEGATIVE
HYALINE CASTS #/AREA URNS LPF: ABNORMAL /LPF (ref 0–2)
INR POINT OF CARE: 2.3 (ref 0.86–1.14)
KETONES UR STRIP-MCNC: NEGATIVE MG/DL
LEUKOCYTE ESTERASE UR QL STRIP: ABNORMAL
MCH RBC QN AUTO: 30.7 PG (ref 26.5–33)
MCHC RBC AUTO-ENTMCNC: 33.3 G/DL (ref 31.5–36.5)
MCV RBC AUTO: 92 FL (ref 78–100)
NITRATE UR QL: NEGATIVE
NON-SQ EPI CELLS #/AREA URNS LPF: ABNORMAL /LPF
PH UR STRIP: 5 PH (ref 5–7)
PLATELET # BLD AUTO: 294 10E9/L (ref 150–450)
RBC # BLD AUTO: 4.63 10E12/L (ref 3.8–5.2)
RBC #/AREA URNS AUTO: ABNORMAL /HPF (ref 0–2)
SP GR UR STRIP: 1.02 (ref 1–1.03)
URN SPEC COLLECT METH UR: ABNORMAL
UROBILINOGEN UR STRIP-ACNC: 0.2 EU/DL (ref 0.2–1)
WBC # BLD AUTO: 10 10E9/L (ref 4–11)
WBC #/AREA URNS AUTO: ABNORMAL /HPF (ref 0–2)

## 2017-06-28 PROCEDURE — 82306 VITAMIN D 25 HYDROXY: CPT | Performed by: INTERNAL MEDICINE

## 2017-06-28 PROCEDURE — 85610 PROTHROMBIN TIME: CPT | Mod: QW

## 2017-06-28 PROCEDURE — 84443 ASSAY THYROID STIM HORMONE: CPT | Performed by: INTERNAL MEDICINE

## 2017-06-28 PROCEDURE — 82043 UR ALBUMIN QUANTITATIVE: CPT | Performed by: INTERNAL MEDICINE

## 2017-06-28 PROCEDURE — 81001 URINALYSIS AUTO W/SCOPE: CPT | Performed by: INTERNAL MEDICINE

## 2017-06-28 PROCEDURE — 99207 C FOOT EXAM  NO CHARGE: CPT | Performed by: INTERNAL MEDICINE

## 2017-06-28 PROCEDURE — 80061 LIPID PANEL: CPT | Performed by: INTERNAL MEDICINE

## 2017-06-28 PROCEDURE — 85027 COMPLETE CBC AUTOMATED: CPT | Performed by: INTERNAL MEDICINE

## 2017-06-28 PROCEDURE — 36416 COLLJ CAPILLARY BLOOD SPEC: CPT

## 2017-06-28 PROCEDURE — 80053 COMPREHEN METABOLIC PANEL: CPT | Performed by: INTERNAL MEDICINE

## 2017-06-28 PROCEDURE — 83036 HEMOGLOBIN GLYCOSYLATED A1C: CPT | Performed by: INTERNAL MEDICINE

## 2017-06-28 PROCEDURE — G0439 PPPS, SUBSEQ VISIT: HCPCS | Performed by: INTERNAL MEDICINE

## 2017-06-28 PROCEDURE — 99207 ZZC NO CHARGE NURSE ONLY: CPT

## 2017-06-28 RX ORDER — FUROSEMIDE 20 MG
20 TABLET ORAL 2 TIMES DAILY
Qty: 180 TABLET | Refills: 1 | Status: SHIPPED | OUTPATIENT
Start: 2017-06-28 | End: 2017-12-30

## 2017-06-28 NOTE — MR AVS SNAPSHOT
Catalina Marie   6/28/2017 11:00 AM   Anticoagulation Therapy Visit    Description:  71 year old female   Provider:   ANTICOAGULATION CLINIC   Department:  [facility] Nurse           INR as of 6/28/2017     Today's INR 2.3      Anticoagulation Summary as of 6/28/2017     INR goal 2.0-3.0   Today's INR 2.3   Full instructions 2.5 mg on Tue; 5 mg all other days   Next INR check 7/26/2017    Indications   Long-term (current) use of anticoagulants [Z79.01] [Z79.01]  Atrial fibrillation (H) [I48.91] [I48.91]         Your next Anticoagulation Clinic appointment(s)     Jul 26, 2017 11:00 AM CDT   Anticoagulation Visit with  ANTICOAGULATION CLINIC   [facility]    [address]  [place] [address]   [phone]              Contact Numbers     Clinic Number:         June 2017 Details    Sun Mon Tue Wed Thu Fri Sat         1               2               3                 4               5               6               7               8               9               10                 11               12               13               14               15               16               17                 18               19               20               21               22               23               24                 25               26               27               28      5 mg   See details      29      5 mg         30      5 mg           Date Details   06/28 This INR check               How to take your warfarin dose     To take:  5 mg Take 1 of the 5 mg tablets.           July 2017 Details    Sun Mon Tue Wed Thu Fri Sat           1      5 mg           2      5 mg         3      5 mg         4      2.5 mg         5      5 mg         6      5 mg         7      5 mg         8      5 mg           9      5 mg         10      5 mg         11      2.5 mg         12      5 mg         13      5 mg         14      5 mg         15      5 mg           16      5 mg         17      5  mg         18      2.5 mg         19      5 mg         20      5 mg         21      5 mg         22      5 mg           23      5 mg         24      5 mg         25      2.5 mg         26            27               28               29                 30               31                     Date Details   No additional details    Date of next INR:  7/26/2017         How to take your warfarin dose     To take:  2.5 mg Take 0.5 of a 5 mg tablet.    To take:  5 mg Take 1 of the 5 mg tablets.

## 2017-06-28 NOTE — TELEPHONE ENCOUNTER
Prescription approved per [facility] [place] [facility].  Madhuri Edge RN

## 2017-06-28 NOTE — NURSING NOTE
"Chief Complaint   Patient presents with     Wellness Visit       Initial /60 (BP Location: Left arm, Patient Position: Sitting, Cuff Size: Adult Regular)  Pulse 53  Temp 97.5  F (36.4  C) (Oral)  Ht 5' 4\" (1.626 m)  Wt 138 lb (62.6 kg)  SpO2 96%  Breastfeeding? No  BMI 23.69 kg/m2 Estimated body mass index is 23.69 kg/(m^2) as calculated from the following:    Height as of this encounter: 5' 4\" (1.626 m).    Weight as of this encounter: 138 lb (62.6 kg).  Medication Reconciliation: complete   Betty Zunigaing- CMA      "
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
## 2017-06-28 NOTE — TELEPHONE ENCOUNTER
Pending Prescriptions:                       Disp   Refills    furosemide (LASIX) 20 MG tablet           180 ta*0            Sig: Take 1 tablet (20 mg) by mouth 2 times daily          Last Written Prescription Date: 4/3/17  Last Fill Quantity: 180, # refills: 0  Last Office Visit with [facility], [facility] or [facility] prescribing provider: 12/27/17 Rossana  Next 5 appointments (look out 90 days)     Jun 28, 2017 11:30 AM CDT   PHYSICAL with Ian Tracy MD   [facility]    [address] [address]   [phone]                   Potassium   Date Value Ref Range Status   06/13/2017 3.9 3.4 - 5.3 mmol/L Final     Creatinine   Date Value Ref Range Status   06/13/2017 0.98 0.52 - 1.04 mg/dL Final     BP Readings from Last 3 Encounters:   06/13/17 129/56   12/27/16 126/77   10/12/16 124/67     RT Ana(R)

## 2017-06-28 NOTE — MR AVS SNAPSHOT
After Visit Summary   6/28/2017    Catalina Marie    MRN: [id number]           Patient Information     Date Of Birth          1945        Visit Information        Provider Department      6/28/2017 11:30 AM Ian Tracy MD [facility]        Today's Diagnoses     Paroxysmal atrial fibrillation (H)    -  1    Type 2 diabetes mellitus without complication, without long-term current use of insulin (H)        Malignant neoplasm of breast in female, estrogen receptor positive, unspecified laterality, unspecified site of breast (H)        Adjustment disorder with anxious mood        Benign essential hypertension        Hyperlipidemia LDL goal <100        CKD (chronic kidney disease) stage 3, GFR 30-59 ml/min        H/O bilateral mastectomy        Fatigue, unspecified type        Osteopenia of multiple sites        Encounter for routine adult health examination without abnormal findings        Non-toxic multinodular goiter          Care Instructions      Preventive Health Recommendations    Female Ages 65 +    Yearly exam:     See your health care provider every year in order to  o Review health changes.   o Discuss preventive care.    o Review your medicines if your doctor has prescribed any.      You no longer need a yearly Pap test unless you've had an abnormal Pap test in the past 10 years. If you have vaginal symptoms, such as bleeding or discharge, be sure to talk with your provider about a Pap test.      Every 1 to 2 years, have a mammogram.  If you are over 69, talk with your health care provider about whether or not you want to continue having screening mammograms.      Every 10 years, have a colonoscopy. Or, have a yearly FIT test (stool test). These exams will check for colon cancer.       Have a cholesterol test every 5 years, or more often if your doctor advises it.       Have a diabetes test (fasting glucose) every three years. If you are at risk for diabetes, you should  have this test more often.       At age 65, have a bone density scan (DEXA) to check for osteoporosis (brittle bone disease).    Shots:    Get a flu shot each year.    Get a tetanus shot every 10 years.    Talk to your doctor about your pneumonia vaccines. There are now two you should receive - Pneumovax (PPSV 23) and Prevnar (PCV 13).    Talk to your doctor about the shingles vaccine.    Talk to your doctor about the hepatitis B vaccine.    Nutrition:     Eat at least 5 servings of fruits and vegetables each day.      Eat whole-grain bread, whole-wheat pasta and brown rice instead of white grains and rice.      Talk to your provider about Calcium and Vitamin D.     Lifestyle    Exercise at least 150 minutes a week (30 minutes a day, 5 days a week). This will help you control your weight and prevent disease.      Limit alcohol to one drink per day.      No smoking.       Wear sunscreen to prevent skin cancer.       See your dentist twice a year for an exam and cleaning.      See your eye doctor every 1 to 2 years to screen for conditions such as glaucoma, macular degeneration and cataracts.          Follow-ups after your visit        Your next 10 appointments already scheduled     Jul 12, 2017  3:00 PM CDT   [facility]    [address] [facility] [id number] [phone]           Bring a current list of meds and any records pertaining to this visit.  For Physicals, please bring immunization records and any forms needing to be filled out.  Please arrive 10 minutes early to complete paperwork.            Jul 26, 2017 11:00 AM CDT   Anticoagulation Visit with  ANTICOAGULATION CLINIC   [facility])    [address]  [facility] [id number] [phone]              Who to contact     If you have questions or need follow up information about today's clinic visit or your schedule please contact  "[facility] directly at [phone].  Normal or non-critical lab and imaging results will be communicated to you by MyChart, letter or phone within 4 business days after the clinic has received the results. If you do not hear from us within 7 days, please contact the clinic through [employer] or phone. If you have a critical or abnormal lab result, we will notify you by phone as soon as possible.  Submit refill requests through [employer] or call your pharmacy and they will forward the refill request to us. Please allow 3 business days for your refill to be completed.          Additional Information About Your Visit        [employer] Information     [employer] gives you secure access to your electronic health record. If you see a primary care provider, you can also send messages to your care team and make appointments. If you have questions, please call your primary care clinic.  If you do not have a primary care provider, please call [phone] and they will assist you.        Care EveryWhere ID     This is your Care EveryWhere ID. This could be used by other organizations to access your [facility] medical records  [id number]        Your Vitals Were     Pulse Temperature Height Pulse Oximetry Breastfeeding? BMI (Body Mass Index)    53 97.5  F (36.4  C) (Oral) 5' 4\" (1.626 m) 96% No 23.69 kg/m2       Blood Pressure from Last 3 Encounters:   06/28/17 100/60   06/13/17 129/56   12/27/16 126/77    Weight from Last 3 Encounters:   06/28/17 138 lb (62.6 kg)   06/13/17 143 lb 3.2 oz (65 kg)   12/27/16 140 lb (63.5 kg)              We Performed the Following     Albumin Random Urine Quantitative     CBC with platelets     Comprehensive metabolic panel     FOOT EXAM     Hemoglobin A1c     Lipid panel reflex to direct LDL     TSH with free T4 reflex     UA reflex to Microscopic and Culture     Urine Microscopic     Vitamin D Deficiency          Today's Medication Changes          These changes are accurate as of: 6/28/17 " 11:59 PM.  If you have any questions, ask your nurse or doctor.               These medicines have changed or have updated prescriptions.        Dose/Directions    propafenone 150 MG Tabs tablet   Commonly known as:  RYTHMOL   This may have changed:  additional instructions   Used for:  Paroxysmal atrial fibrillation (H)        Dose:  150 mg   Take 1 tablet (150 mg) by mouth 3 times daily Takes 225mg in AM and afternoon and 112.5mg (1/2 tablet) in evening   Quantity:  270 tablet   Refills:  3                Primary Care Provider Office Phone # Fax #    Ian Tracy [phone]       [facility] [address]  [facility] [address]        Equal Access to Services     ANJANA JAMES : Hadii brandyn aguilerao Soadolfo, waaxda luqadaha, qaybta kaalmada adeegyada, ed sharma . So [facility] [phone].    ATENCIÓN: Si habla español, tiene a dahl disposición servicios gratuitos de asistencia lingüística. Llame al [phone].    We comply with applicable federal civil rights laws and [place] laws. We do not discriminate on the basis of race, color, national origin, age, disability sex, sexual orientation or gender identity.            Thank you!     Thank you for choosing [facility]  for your care. Our goal is always to provide you with excellent care. Hearing back from our patients is one way we can continue to improve our services. Please take a few minutes to complete the written survey that you may receive in the mail after your visit with us. Thank you!             Your Updated Medication List - Protect others around you: Learn how to safely use, store and throw away your medicines at www.disposemymeds.org.          This list is accurate as of: 6/28/17 11:59 PM.  Always use your most recent med list.                   Brand Name Dispense Instructions for use Diagnosis    atorvastatin 10 MG tablet    LIPITOR    90 tablet    Take 1/2 tablet by mouth  daily.    Hyperlipidemia LDL goal <100       Biotin 3 MG Tabs      Take by mouth daily (unknown dose)        CALCIUM + D PO      Take by mouth daily        carboxymethylcellulose 1 % ophthalmic solution    CELLUVISC/REFRESH LIQUIGEL     Place 1 drop into both eyes 4 times daily as needed for dry eyes        DIGOX 250 MCG tablet   Generic drug:  digoxin     90 tablet    TAKE 1 TABLET BY MOUTH EVERY DAY    Benign essential hypertension       FELODIPINE ER PO      Take 5 mg by mouth 2 times daily        FENOFIBRATE PO      Take 160 mg by mouth daily        furosemide 20 MG tablet    LASIX    180 tablet    Take 1 tablet (20 mg) by mouth 2 times daily    Essential hypertension       lisinopril 40 MG tablet    PRINIVIL/ZESTRIL    90 tablet    TAKE 1 TABLET BY MOUTH EVERY DAY    Benign essential hypertension       LORazepam 0.5 MG tablet    ATIVAN    20 tablet    Take 1 tablet (0.5 mg) by mouth every 8 hours as needed for anxiety    Long-term (current) use of anticoagulants       propafenone 150 MG Tabs tablet    RYTHMOL    270 tablet    Take 1 tablet (150 mg) by mouth 3 times daily Takes 225mg in AM and afternoon and 112.5mg (1/2 tablet) in evening    Paroxysmal atrial fibrillation (H)       SERTRALINE HCL PO      Take 50 mg by mouth every evening        verapamil 240 MG Cp24 24 hr capsule    VERELAN    90 capsule    TAKE 1 CAPSULE BY MOUTH EVERY DAY    Benign essential hypertension       WARFARIN SODIUM PO      Take 2.5-5 mg by mouth every evening Takes 5mg daily except 2.5mg on Tuesdays

## 2017-06-29 LAB
ALBUMIN SERPL-MCNC: 4.4 G/DL (ref 3.4–5)
ALP SERPL-CCNC: 82 U/L (ref 40–150)
ALT SERPL W P-5'-P-CCNC: 29 U/L (ref 0–50)
ANION GAP SERPL CALCULATED.3IONS-SCNC: 8 MMOL/L (ref 3–14)
AST SERPL W P-5'-P-CCNC: 20 U/L (ref 0–45)
BILIRUB SERPL-MCNC: 0.6 MG/DL (ref 0.2–1.3)
BUN SERPL-MCNC: 23 MG/DL (ref 7–30)
CALCIUM SERPL-MCNC: 9.6 MG/DL (ref 8.5–10.1)
CHLORIDE SERPL-SCNC: 107 MMOL/L (ref 94–109)
CHOLEST SERPL-MCNC: 159 MG/DL
CO2 SERPL-SCNC: 27 MMOL/L (ref 20–32)
CREAT SERPL-MCNC: 1.32 MG/DL (ref 0.52–1.04)
CREAT UR-MCNC: 158 MG/DL
DEPRECATED CALCIDIOL+CALCIFEROL SERPL-MC: 43 UG/L (ref 20–75)
GFR SERPL CREATININE-BSD FRML MDRD: 39 ML/MIN/1.7M2
GLUCOSE SERPL-MCNC: 124 MG/DL (ref 70–99)
HDLC SERPL-MCNC: 36 MG/DL
LDLC SERPL CALC-MCNC: 63 MG/DL
MICROALBUMIN UR-MCNC: 22 MG/L
MICROALBUMIN/CREAT UR: 13.8 MG/G CR (ref 0–25)
NONHDLC SERPL-MCNC: 123 MG/DL
POTASSIUM SERPL-SCNC: 4.6 MMOL/L (ref 3.4–5.3)
PROT SERPL-MCNC: 8.1 G/DL (ref 6.8–8.8)
SODIUM SERPL-SCNC: 142 MMOL/L (ref 133–144)
TRIGL SERPL-MCNC: 301 MG/DL
TSH SERPL DL<=0.005 MIU/L-ACNC: 0.82 MU/L (ref 0.4–4)

## 2017-07-02 DIAGNOSIS — Z79.01 LONG-TERM (CURRENT) USE OF ANTICOAGULANTS: ICD-10-CM

## 2017-07-02 DIAGNOSIS — I48.91 ATRIAL FIBRILLATION, UNSPECIFIED TYPE (H): Primary | ICD-10-CM

## 2017-07-03 NOTE — TELEPHONE ENCOUNTER
"QUESTION Atenolol       Epic historical entry in June \"25 mg twice daily.  Takes 1/2 of 50 mg\"    Pharm request  Is for 50 mg take 1 tab daily    ATENOLOL PO     --   Sig: Take 25 mg by mouth 2 times daily Takes 1/2 of 50mg tab   Class: Historical          Last Written Prescription Date: ---historical  Last Fill Quantity: --, # refills: --    Last Office Visit with [facility] or  Health prescribing provider:  06/28/2017   Future Office Visit:    Next 5 appointments (look out 90 days)     Jul 12, 2017  3:00 PM CDT   [facility] Visit with Ian [facility] MD   [facility]    [address] [facility] [id number] [phone]                    BP Readings from Last 3 Encounters:   06/28/17 100/60   06/13/17 129/56   12/27/16 126/77       WARFARIN SODIUM PO     --   Sig: Take 2.5-5 mg by mouth every evening Takes 5mg daily except 2.5mg on Tuesdays         Last Written Prescription Date: ---  Last Fill Qty: --, # refills: --  Last Office Visit with [facility], [facility] or [facility] prescribing provider: 06/28/2017  Next 5 appointments (look out 90 days)     Jul 12, 2017  3:00 PM CDT   Office Visit with Ian Tracy MD   [facility]    [address] [facility] [id number] [phone]                   Date and Result of Last PT/INR:   Lab Results   Component Value Date    INR 2.3 06/28/2017    INR 3.1 06/19/2017    INR 3.80 06/13/2017    INR 3.12 06/12/2017    PT 39.8 04/29/2016    PT 24.3 02/09/2016            "

## 2017-07-03 NOTE — TELEPHONE ENCOUNTER
Can we call patient and confirm dose she is taking before sending in prescription?    Jose Ponce MD

## 2017-07-03 NOTE — TELEPHONE ENCOUNTER
PCP: Please clarify what dose of Atenolol patient should be taking.   Is she suppose to be taking Atenolol 25 mg daily or Atenolol 25 mg BID (total 50 mg daily)?  Pharmacy is requesting Atenolol 50 mg with instructions to take 1 tablet daily  See pended medication  Medication is listed as HISTORICAL.  Our medication is reports the following:    ATENOLOL PO     --      Sig: Take 25 mg by mouth 2 times daily Takes 1/2 of 50mg tab     Carline Myrick RN

## 2017-07-05 RX ORDER — ATENOLOL 25 MG/1
25 TABLET ORAL 2 TIMES DAILY
Qty: 180 TABLET | Refills: 3 | Status: SHIPPED | OUTPATIENT
Start: 2017-07-05 | End: 2017-07-19 | Stop reason: DRUGHIGH

## 2017-07-05 RX ORDER — WARFARIN SODIUM 5 MG/1
TABLET ORAL
Qty: 90 TABLET | Refills: 0 | Status: SHIPPED | OUTPATIENT
Start: 2017-07-05 | End: 2018-05-29

## 2017-07-05 NOTE — TELEPHONE ENCOUNTER
Phoned patient and she is taking Atenolol 25 mg BID (total 50 mg daily)  She would also like the script to say that it needs to be the Viktor Brand.  Erika Haley CMA

## 2017-07-12 ENCOUNTER — OFFICE VISIT (OUTPATIENT)
Dept: FAMILY MEDICINE | Facility: CLINIC | Age: 72
End: 2017-07-12
Payer: MEDICARE

## 2017-07-12 VITALS
SYSTOLIC BLOOD PRESSURE: 137 MMHG | BODY MASS INDEX: 23.78 KG/M2 | OXYGEN SATURATION: 95 % | HEART RATE: 55 BPM | WEIGHT: 139.3 LBS | HEIGHT: 64 IN | DIASTOLIC BLOOD PRESSURE: 63 MMHG | TEMPERATURE: 97.6 F

## 2017-07-12 DIAGNOSIS — C50.919 MALIGNANT NEOPLASM OF BREAST IN FEMALE, ESTROGEN RECEPTOR POSITIVE, UNSPECIFIED LATERALITY, UNSPECIFIED SITE OF BREAST (H): ICD-10-CM

## 2017-07-12 DIAGNOSIS — N18.30 CKD (CHRONIC KIDNEY DISEASE) STAGE 3, GFR 30-59 ML/MIN (H): ICD-10-CM

## 2017-07-12 DIAGNOSIS — E78.5 HYPERLIPIDEMIA LDL GOAL <100: ICD-10-CM

## 2017-07-12 DIAGNOSIS — L82.0 INFLAMED SEBORRHEIC KERATOSIS: Primary | ICD-10-CM

## 2017-07-12 DIAGNOSIS — I10 BENIGN ESSENTIAL HYPERTENSION: ICD-10-CM

## 2017-07-12 DIAGNOSIS — F43.22 ADJUSTMENT DISORDER WITH ANXIOUS MOOD: ICD-10-CM

## 2017-07-12 DIAGNOSIS — I48.0 PAROXYSMAL ATRIAL FIBRILLATION (H): ICD-10-CM

## 2017-07-12 DIAGNOSIS — E11.9 TYPE 2 DIABETES MELLITUS WITHOUT COMPLICATION, WITHOUT LONG-TERM CURRENT USE OF INSULIN (H): ICD-10-CM

## 2017-07-12 DIAGNOSIS — Z17.0 MALIGNANT NEOPLASM OF BREAST IN FEMALE, ESTROGEN RECEPTOR POSITIVE, UNSPECIFIED LATERALITY, UNSPECIFIED SITE OF BREAST (H): ICD-10-CM

## 2017-07-12 DIAGNOSIS — Z90.13 H/O BILATERAL MASTECTOMY: ICD-10-CM

## 2017-07-12 PROCEDURE — 17110 DESTRUCTION B9 LES UP TO 14: CPT | Performed by: INTERNAL MEDICINE

## 2017-07-12 PROCEDURE — 99214 OFFICE O/P EST MOD 30 MIN: CPT | Mod: 25 | Performed by: INTERNAL MEDICINE

## 2017-07-12 NOTE — PROGRESS NOTES
SUBJECTIVE:                                                    Catalina Marie is a 71 year old female who presents to clinic today for the following health issues:    Mrs. Marie presents to the clinic for lab results from annual physical on 6/28/2017. Patient also returns for cryotherapy of several warts.     She notes an episode of atrial fibrillation after annual physical exam when she was asked to partake in a cognitive exam. She notes combination of familial history of Alzheimer's, recognition of getting older and current family stressors including her daughter and increased her anxiety to the point of causing atrial fibrillation.     She notes vasomotor rhinitis was not helped with fluticasone.     Patient notes when she tried to refill her atenolol, her pharmacist told her she was unable to obtain this prescription due to a national shortage. She wonders if she will need to take this medication any longer. Currently her rate is under good control. She notes her rate will occasionally be as low as 50 and she will wait to take her atenolol as heart rate will lower further.     She notes lower extremity edema is doing well today, however, at times her ankle will have extreme, bothersome, swelling.    Patient notes several seborrheic keratoses that she has been excoriating, and would like them cryogenically treated; one on her right arm, one on her back and one on her left lower rib    Problem list and histories reviewed & adjusted, as indicated.  Additional history: as documented    Current Outpatient Prescriptions   Medication Sig Dispense Refill     warfarin (COUMADIN) 5 MG tablet TAKE ONE-HALF TO ONE TABLET BY MOUTH DAILY OR AS DIRECTED BY INR CLINIC 90 tablet 0     atenolol (TENORMIN) 25 MG tablet Take 1 tablet (25 mg) by mouth 2 times daily 180 tablet 3     furosemide (LASIX) 20 MG tablet Take 1 tablet (20 mg) by mouth 2 times daily 180 tablet 1     propafenone (RYTHMOL) 150 MG TABS tablet Take 1  tablet (150 mg) by mouth 3 times daily Takes 225mg in AM and afternoon and 112.5mg (1/2 tablet) in evening (Patient taking differently: Take 150 mg by mouth 3 times daily Takes 150mg in AM at 5pm 75mg and at bedtime patient takes 150mg) 270 tablet 3     FENOFIBRATE PO Take 160 mg by mouth daily       SERTRALINE HCL PO Take 50 mg by mouth every evening       WARFARIN SODIUM PO Take 2.5-5 mg by mouth every evening Takes 5mg daily except 2.5mg on Tuesdays       Biotin 3 MG TABS Take by mouth daily (unknown dose)       carboxymethylcellulose (CELLUVISC/REFRESH LIQUIGEL) 1 % ophthalmic solution Place 1 drop into both eyes 4 times daily as needed for dry eyes       atorvastatin (LIPITOR) 10 MG tablet Take 1/2 tablet by mouth daily. 90 tablet 3     lisinopril (PRINIVIL/ZESTRIL) 40 MG tablet TAKE 1 TABLET BY MOUTH EVERY DAY 90 tablet 3     DIGOX 250 MCG tablet TAKE 1 TABLET BY MOUTH EVERY DAY 90 tablet 3     verapamil (VERELAN) 240 MG CP24 24 hr capsule TAKE 1 CAPSULE BY MOUTH EVERY DAY 90 capsule 3     LORazepam (ATIVAN) 0.5 MG tablet Take 1 tablet (0.5 mg) by mouth every 8 hours as needed for anxiety 20 tablet 0     FELODIPINE ER PO Take 5 mg by mouth 2 times daily       Calcium Carbonate-Vitamin D (CALCIUM + D PO) Take by mouth daily       Allergies   Allergen Reactions     Penicillins      Reviewed and updated as needed this visit by clinical staff  Tobacco  Allergies  Meds  Soc Hx      Reviewed and updated as needed this visit by Provider       ROS:  Constitutional, HEENT, cardiovascular, pulmonary, gi and gu systems are negative, except as otherwise noted.    This document serves as a record of the services and decisions personally performed and made by Ian Tracy MD. It was created on his/her behalf by Hanna Tse, a trained medical scribe. The creation of this document is based the provider's statements to the medical scribe.  Nicko Tse 3:59 PM, July 12, 2017     OBJECTIVE:     /63  "(BP Location: Left arm, Patient Position: Chair, Cuff Size: Adult Regular)  Pulse 55  Temp 97.6  F (36.4  C) (Oral)  Ht 1.626 m (5' 4\")  Wt 63.2 kg (139 lb 4.8 oz)  SpO2 95%  Breastfeeding? No  BMI 23.91 kg/m2  Body mass index is 23.91 kg/(m^2).   Neck was supple without adenopathy or thyromegaly her carotids were normal without bruits  Chest clear to auscultation and percussion  Cardiovascular S1 and S2 are physiologic without murmurs or gallops  Abdomen bowel sounds were normal.  There is no palpable mass or organomegaly  Extremities nontender with trace-1+ pretibail edema  Pulses pedal pulses are as described otherwise his pulses are bilaterally symmetrical throughout without bruits  Procedure  Skin she had 3 painful raised seborrheic keratoses right forearm and right posterior shoulder and the left lower rib cage that were treated with nitrogen x2.  No HJR and JVD    Labs reviewed from 6/28/2017.     ASSESSMENT/PLAN:   1. Inflamed seborrheic keratosis  Treatment of three seborrheic keratoses were performed using a freeze-thaw-freeze technique.    2. Paroxysmal atrial fibrillation (H)    3. Type 2 diabetes mellitus without complication, without long-term current use of insulin (H)    4. Malignant neoplasm of breast in female, estrogen receptor positive, unspecified laterality, unspecified site of breast (H)    5. Benign essential hypertension  Cut to 0.5 tab of digoxin QD. Follow up for change in beta-blocker if needed.    6. Adjustment disorder with anxious mood    7. Hyperlipidemia LDL goal <100    8. CKD (chronic kidney disease) stage 3, GFR 30-59 ml/min    9. H/O bilateral mastectomy    Hypercholesterolemia and hypertriglyceridemia:  -Advised to watch the amounts of fats and carbohydrates in diet. Continue medication unchanged       Expect seborrheic keratoses to scab and convalesce in 2 two weeks, if further treatment is needed, return to clinic.    Ian Tracy MD  [facility]  The " information in this document, created by the medical scribe for me, accurately reflects the services I personally performed and the decisions made by me. I have reviewed and approved this document for accuracy prior to leaving the patient care area.  Ian Tracy MD  3:33 PM, 07/12/17

## 2017-07-12 NOTE — NURSING NOTE
"Chief Complaint   Patient presents with     RECHECK       Initial /63 (BP Location: Left arm, Patient Position: Chair, Cuff Size: Adult Regular)  Pulse 55  Temp 97.6  F (36.4  C) (Oral)  Ht 5' 4\" (1.626 m)  Wt 139 lb 4.8 oz (63.2 kg)  SpO2 95%  Breastfeeding? No  BMI 23.91 kg/m2 Estimated body mass index is 23.91 kg/(m^2) as calculated from the following:    Height as of this encounter: 5' 4\" (1.626 m).    Weight as of this encounter: 139 lb 4.8 oz (63.2 kg).  Medication Reconciliation: complete.  Rylie Llamas CMA    "

## 2017-07-12 NOTE — MR AVS SNAPSHOT
After Visit Summary   7/12/2017    Catalina Marie    MRN: [id number]           Patient Information     Date Of Birth          1945        Visit Information        Provider Department      7/12/2017 3:00 PM Ian Tracy MD [facility]        Today's Diagnoses     Inflamed seborrheic keratosis    -  1    Paroxysmal atrial fibrillation (H)        Type 2 diabetes mellitus without complication, without long-term current use of insulin (H)        Malignant neoplasm of breast in female, estrogen receptor positive, unspecified laterality, unspecified site of breast (H)        Benign essential hypertension        Adjustment disorder with anxious mood        Hyperlipidemia LDL goal <100        CKD (chronic kidney disease) stage 3, GFR 30-59 ml/min        H/O bilateral mastectomy           Follow-ups after your visit        Your next 10 appointments already scheduled     Jul 26, 2017 11:00 AM CDT   Antico[facility] Visit with [facility]   [facility]    [address]  [place] [address] [phone]              Who to contact     If you have questions or need follow up information about today's clinic visit or your schedule please contact [facility] directly at [phone].  Normal or non-critical lab and imaging results will be communicated to you by MyChart, letter or phone within 4 business days after the clinic has received the results. If you do not hear from us within 7 days, please contact the clinic through MyChart or phone. If you have a critical or abnormal lab result, we will notify you by phone as soon as possible.  Submit refill requests through [employer] (formerly [employer]) or call your pharmacy and they will forward the refill request to us. Please allow 3 business days for your refill to be completed.          Additional Information About Your Visit        MyChart Information     [employer] (formerly [employer]) gives you secure access to your  "electronic health record. If you see a primary care provider, you can also send messages to your care team and make appointments. If you have questions, please call your primary care clinic.  If you do not have a primary care provider, please call [phone] and they will assist you.        Care EveryWhere ID     This is your Care EveryWhere ID. This could be used by other organizations to access your [place] medical records  [id number]        Your Vitals Were     Pulse Temperature Height Pulse Oximetry Breastfeeding? BMI (Body Mass Index)    55 97.6  F (36.4  C) (Oral) 5' 4\" (1.626 m) 95% No 23.91 kg/m2       Blood Pressure from Last 3 Encounters:   07/19/17 134/73   07/12/17 137/63   06/28/17 100/60    Weight from Last 3 Encounters:   07/19/17 137 lb (62.1 kg)   07/12/17 139 lb 4.8 oz (63.2 kg)   06/28/17 138 lb (62.6 kg)              We Performed the Following     DESTRUCT BENIGN LESION, UP TO 14          Today's Medication Changes          These changes are accurate as of: 7/12/17 11:59 PM.  If you have any questions, ask your nurse or doctor.               These medicines have changed or have updated prescriptions.        Dose/Directions    propafenone 150 MG Tabs tablet   Commonly known as:  RYTHMOL   This may have changed:  additional instructions   Used for:  Paroxysmal atrial fibrillation (H)        Dose:  150 mg   Take 1 tablet (150 mg) by mouth 3 times daily Takes 225mg in AM and afternoon and 112.5mg (1/2 tablet) in evening   Quantity:  270 tablet   Refills:  3                Primary Care Provider Office Phone # Fax #    Ian Tracy [phone]       [facility] [address] [facility] [[email or web address] [address]  [facility] [address]        Equal Access to Services     ANJANA JAMES : Hadii brandyn Horn, waantoinette gonzalez, qaybta kaalmajoyce flores, ed tyler. So [facility] [phone].    ATENCIÓN: Si habla español, tiene a dahl disposición servicios gratuitos de " asistencia lingüística. Hamida al [phone].    We comply with applicable federal civil rights laws and [place] laws. We do not discriminate on the basis of race, color, national origin, age, disability sex, sexual orientation or gender identity.            Thank you!     Thank you for choosing [facility]  for your care. Our goal is always to provide you with excellent care. Hearing back from our patients is one way we can continue to improve our services. Please take a few minutes to complete the written survey that you may receive in the mail after your visit with us. Thank you!             Your Updated Medication List - Protect others around you: Learn how to safely use, store and throw away your medicines at www.disposemymeds.org.          This list is accurate as of: 7/12/17 11:59 PM.  Always use your most recent med list.                   Brand Name Dispense Instructions for use Diagnosis    atenolol 25 MG tablet    TENORMIN    180 tablet    Take 1 tablet (25 mg) by mouth 2 times daily    Atrial fibrillation, unspecified type (H)       atorvastatin 10 MG tablet    LIPITOR    90 tablet    Take 1/2 tablet by mouth daily.    Hyperlipidemia LDL goal <100       Biotin 3 MG Tabs      Take by mouth daily (unknown dose)        CALCIUM + D PO      Take by mouth daily        carboxymethylcellulose 1 % ophthalmic solution    CELLUVISC/REFRESH LIQUIGEL     Place 1 drop into both eyes 4 times daily as needed for dry eyes        DIGOX 250 MCG tablet   Generic drug:  digoxin     90 tablet    TAKE 1 TABLET BY MOUTH EVERY DAY    Benign essential hypertension       FELODIPINE ER PO      Take 5 mg by mouth 2 times daily        FENOFIBRATE PO      Take 160 mg by mouth daily        furosemide 20 MG tablet    LASIX    180 tablet    Take 1 tablet (20 mg) by mouth 2 times daily    Essential hypertension       lisinopril 40 MG tablet    PRINIVIL/ZESTRIL    90 tablet    TAKE 1 TABLET BY MOUTH EVERY DAY    Benign  essential hypertension       LORazepam 0.5 MG tablet    ATIVAN    20 tablet    Take 1 tablet (0.5 mg) by mouth every 8 hours as needed for anxiety    Long-term (current) use of anticoagulants       propafenone 150 MG Tabs tablet    RYTHMOL    270 tablet    Take 1 tablet (150 mg) by mouth 3 times daily Takes 225mg in AM and afternoon and 112.5mg (1/2 tablet) in evening    Paroxysmal atrial fibrillation (H)       SERTRALINE HCL PO      Take 50 mg by mouth every evening        verapamil 240 MG Cp24 24 hr capsule    VERELAN    90 capsule    TAKE 1 CAPSULE BY MOUTH EVERY DAY    Benign essential hypertension       * WARFARIN SODIUM PO      Take 2.5-5 mg by mouth every evening Takes 5mg daily except 2.5mg on Tuesdays        * warfarin 5 MG tablet    COUMADIN    90 tablet    TAKE ONE-HALF TO ONE TABLET BY MOUTH DAILY OR AS DIRECTED BY INR CLINIC    Long-term (current) use of anticoagulants       * Notice:  This list has 2 medication(s) that are the same as other medications prescribed for you. Read the directions carefully, and ask your doctor or other care provider to review them with you.

## 2017-07-17 ENCOUNTER — TRANSFERRED RECORDS (OUTPATIENT)
Dept: HEALTH INFORMATION MANAGEMENT | Facility: CLINIC | Age: 72
End: 2017-07-17

## 2017-07-19 ENCOUNTER — OFFICE VISIT (OUTPATIENT)
Dept: FAMILY MEDICINE | Facility: CLINIC | Age: 72
End: 2017-07-19
Payer: MEDICARE

## 2017-07-19 VITALS
OXYGEN SATURATION: 99 % | TEMPERATURE: 97.6 F | HEIGHT: 64 IN | HEART RATE: 64 BPM | BODY MASS INDEX: 23.39 KG/M2 | WEIGHT: 137 LBS | SYSTOLIC BLOOD PRESSURE: 134 MMHG | DIASTOLIC BLOOD PRESSURE: 73 MMHG

## 2017-07-19 DIAGNOSIS — I10 BENIGN ESSENTIAL HYPERTENSION: Primary | ICD-10-CM

## 2017-07-19 DIAGNOSIS — I48.0 PAROXYSMAL ATRIAL FIBRILLATION (H): ICD-10-CM

## 2017-07-19 DIAGNOSIS — F43.22 ADJUSTMENT DISORDER WITH ANXIOUS MOOD: ICD-10-CM

## 2017-07-19 DIAGNOSIS — E11.9 TYPE 2 DIABETES MELLITUS WITHOUT COMPLICATION, WITHOUT LONG-TERM CURRENT USE OF INSULIN (H): ICD-10-CM

## 2017-07-19 DIAGNOSIS — R07.89 ATYPICAL CHEST PAIN: ICD-10-CM

## 2017-07-19 PROCEDURE — 99214 OFFICE O/P EST MOD 30 MIN: CPT | Performed by: INTERNAL MEDICINE

## 2017-07-19 RX ORDER — ATENOLOL 100 MG/1
100 TABLET ORAL DAILY
Qty: 30 TABLET | Refills: 3 | Status: SHIPPED | OUTPATIENT
Start: 2017-07-19 | End: 2018-05-29

## 2017-07-19 RX ORDER — ATENOLOL 50 MG/1
50 TABLET ORAL DAILY
Qty: 30 TABLET | Refills: 0 | Status: CANCELLED | OUTPATIENT
Start: 2017-07-19

## 2017-07-19 NOTE — PROGRESS NOTES
SUBJECTIVE:                                                    Catalina Marie is a 71 year old female who presents to clinic today for the following health issues:    Patient has received a month supply of atenolol from [employer] and 60 day supply from [employer]. She wants to have enough medication to get through the end of the year as she will be in the middle of the [place], in [facility], starting December 8th through Christmas.    Patient also notes intermittent right side pain below the rib cage yesterday and three days ago, each following lunch, while walking. Lunch consisted of ham, a few chips, a pickle, radish and a piece of cheese. Reportedly the pains ceased after resting. She denies pain with exertion.  Denies radiation. Denies rash, changes in bowel habits and difficulties with urination. She endorses fired and greasy food causes loose stools. Patient also notes dysphagia with baguettes.     Since patient changed to 0.5 tablets digoxin and notes she has taken her dose later in the the morning due to a rate of 50 when she wakes. At this time her rate is closer to mid 50s.     Diabetes Follow-up      Patient is checking blood sugars: not at all    Diabetic concerns: None     Symptoms of hypoglycemia (low blood sugar): none     Paresthesias (numbness or burning in feet) or sores: No     Date of last diabetic eye exam: 2016    Hyperlipidemia Follow-Up      Rate your low fat/cholesterol diet?: good    Taking statin?  Yes, no muscle aches from statin    Other lipid medications/supplements?:  none    Hypertension Follow-up      Outpatient blood pressures are being checked at home.  Results are /60-70's.    Low Salt Diet: not monitoring salt    Amount of exercise or physical activity: 6-7 days/week for an average of 30-45 minutes    Problems taking medications regularly: No    Medication side effects: none    Diet: regular (no restrictions)    Medication Followup of Atenolol    Taking Medication as  prescribed: yes    Side Effects:  None    Medication Helping Symptoms:  yes     Problem list and histories reviewed & adjusted, as indicated.  Additional history: as documented    Current Outpatient Prescriptions   Medication Sig Dispense Refill     atenolol (TENORMIN) 50 MG tablet Take 1 tablet (50 mg) by mouth daily (Patient taking differently: Take 50 mg by mouth daily Patient is to take 1/2 tablet twice daily.) 45 tablet 3     warfarin (COUMADIN) 5 MG tablet TAKE ONE-HALF TO ONE TABLET BY MOUTH DAILY OR AS DIRECTED BY INR CLINIC 90 tablet 0     furosemide (LASIX) 20 MG tablet Take 1 tablet (20 mg) by mouth 2 times daily 180 tablet 1     propafenone (RYTHMOL) 150 MG TABS tablet Take 1 tablet (150 mg) by mouth 3 times daily Takes 225mg in AM and afternoon and 112.5mg (1/2 tablet) in evening (Patient taking differently: Take 150 mg by mouth 3 times daily Takes 150mg in AM at 5pm 75mg and at bedtime patient takes 150mg) 270 tablet 3     FENOFIBRATE PO Take 160 mg by mouth daily       SERTRALINE HCL PO Take 50 mg by mouth every evening       WARFARIN SODIUM PO Take 2.5-5 mg by mouth every evening Takes 5mg daily except 2.5mg on Tuesdays       Biotin 3 MG TABS Take by mouth daily (unknown dose)       carboxymethylcellulose (CELLUVISC/REFRESH LIQUIGEL) 1 % ophthalmic solution Place 1 drop into both eyes 4 times daily as needed for dry eyes       atorvastatin (LIPITOR) 10 MG tablet Take 1/2 tablet by mouth daily. 90 tablet 3     lisinopril (PRINIVIL/ZESTRIL) 40 MG tablet TAKE 1 TABLET BY MOUTH EVERY DAY 90 tablet 3     DIGOX 250 MCG tablet TAKE 1 TABLET BY MOUTH EVERY DAY 90 tablet 3     verapamil (VERELAN) 240 MG CP24 24 hr capsule TAKE 1 CAPSULE BY MOUTH EVERY DAY 90 capsule 3     LORazepam (ATIVAN) 0.5 MG tablet Take 1 tablet (0.5 mg) by mouth every 8 hours as needed for anxiety 20 tablet 0     FELODIPINE ER PO Take 5 mg by mouth 2 times daily       Calcium Carbonate-Vitamin D (CALCIUM + D PO) Take by mouth daily    "    [DISCONTINUED] atenolol (TENORMIN) 25 MG tablet Take 1 tablet (25 mg) by mouth 2 times daily 180 tablet 3     Allergies   Allergen Reactions     Penicillins        Reviewed and updated as needed this visit by clinical staff  Tobacco  Allergies  Meds  Soc Hx      Reviewed and updated as needed this visit by Provider         ROS:  Constitutional, HEENT, cardiovascular, pulmonary, gi and gu systems are negative, except as otherwise noted.    This document serves as a record of the services and decisions personally performed and made by Ian Tracy MD. It was created on his/her behalf by Hanna Tse, a trained medical scribe. The creation of this document is based the provider's statements to the medical scribe.  Scribe Hanna Tse 2:45 PM, July 19, 2017     OBJECTIVE:   Breastfeeding? No   /73 (BP Location: Right arm, Patient Position: Sitting, Cuff Size: Adult Regular)  Pulse 64  Temp 97.6  F (36.4  C) (Tympanic)  Ht 5' 4\" (1.626 m)  Wt 137 lb (62.1 kg)  SpO2 99%  Breastfeeding? No  BMI 23.52 kg/m2    There is no height or weight on file to calculate BMI.   Neck was supple without adenopathy or thyromegaly her carotids were normal without bruits  Chest clear to auscultation and percussion  Cardiovascular S1 and S2 are physiologic without murmurs or gallops  Abdomen bowel sounds were normal.  There is no palpable mass or organomegaly  Extremities nontender without any edema  Pulses pedal pulses are as described otherwise his pulses are bilaterally symmetrical throughout without bruits  Skin without significant abnormality   ASSESSMENT/PLAN:   1. Paroxysmal atrial fibrillation (H)  Begin atenolol at dose and regimen below.   - atenolol (TENORMIN) 100 MG tablet; Take 1 tablet (100 mg) by mouth daily  Dispense: 30 tablet; Refill: 3      - atenolol (TENORMIN) 100 MG tablet; Take 1 tablet (100 mg) by mouth daily  Dispense: 30 tablet; Refill: 3  - Digoxin level; Future    2. Benign " essential hypertension      3. Type 2 diabetes mellitus without complication, without long-term current use of insulin (H)      4. Atypical chest pain  Chest pain @ rt lower rib cage most likely related to musculoskelatl,doubt cholelithiasis, pulmonary or cardiac etiol.    5. Adjustment disorder with anxious mood        For hair loss, follow up with patient for potential treatments that have no contraindication or interacting with current medications     Follow up for digoxin at next INR appointment.    Ian Tracy MD  [facility]    The information in this document, created by the medical scribe for me, accurately reflects the services I personally performed and the decisions made by me. I have reviewed and approved this document for accuracy prior to leaving the patient care area.  Ian Tracy MD  2:30 PM, 07/19/17

## 2017-07-19 NOTE — MR AVS SNAPSHOT
After Visit Summary   7/19/2017    Catalina Marie    MRN: [id number]           Patient Information     Date Of Birth          1945        Visit Information        Provider Department      7/19/2017 2:30 PM Ian Tracy MD [facility]        Today's Diagnoses     Benign essential hypertension    -  1    Paroxysmal atrial fibrillation (H)        Type 2 diabetes mellitus without complication, without long-term current use of insulin (H)        Atypical chest pain        Adjustment disorder with anxious mood           Follow-ups after your visit        Your next 10 appointments already scheduled     Jul 26, 2017 11:00 AM CDT   Anticoagulation Visit with [facility] ANTICOAGULATION CLINIC   [facility] ([facility]    [address] [facility] [address] [phone]            Jul 26, 2017 11:30 AM CDT   Office Visit with Ian Tracy MD   [facility] ([facility])    [address] [facility] [address] [phone]           Bring a current list of meds and any records pertaining to this visit.  For Physicals, please bring immunization records and any forms needing to be filled out.  Please arrive 10 minutes early to complete paperwork.              Future tests that were ordered for you today     Open Future Orders        Priority Expected Expires Ordered    Digoxin level Routine  7/19/2018 7/19/2017            Who to contact     If you have questions or need follow up information about today's clinic visit or your schedule please contact [facility] directly at [phone].  Normal or non-critical lab and imaging results will be communicated to you by MyChart, letter or phone within 4 business days after the clinic has received the results. If you do not hear from us within 7 days, please contact the clinic through MyChart or phone. If you have a critical or abnormal lab result, we will notify you by phone as soon as  "possible.  Submit refill requests through [employer] or call your pharmacy and they will forward the refill request to us. Please allow 3 business days for your refill to be completed.          Additional Information About Your Visit        [employer] Information     [employer] gives you secure access to your electronic health record. If you see a primary care provider, you can also send messages to your care team and make appointments. If you have questions, please call your primary care clinic.  If you do not have a primary care provider, please call [phone] and they will assist you.        Care EveryWhere ID     This is your Care EveryWhere ID. This could be used by other organizations to access your [place] medical records  [id number]        Your Vitals Were     Pulse Temperature Height Pulse Oximetry Breastfeeding? BMI (Body Mass Index)    64 97.6  F (36.4  C) (Tympanic) 5' 4\" (1.626 m) 99% No 23.52 kg/m2       Blood Pressure from Last 3 Encounters:   07/19/17 134/73   07/12/17 137/63   06/28/17 100/60    Weight from Last 3 Encounters:   07/19/17 137 lb (62.1 kg)   07/12/17 139 lb 4.8 oz (63.2 kg)   06/28/17 138 lb (62.6 kg)                 Today's Medication Changes          These changes are accurate as of: 7/19/17 11:59 PM.  If you have any questions, ask your nurse or doctor.               These medicines have changed or have updated prescriptions.        Dose/Directions    * atenolol 50 MG tablet   Commonly known as:  TENORMIN   This may have changed:  additional instructions   Used for:  Paroxysmal atrial fibrillation (H)   Changed by:  Ian Tracy MD        Dose:  50 mg   Take 1 tablet (50 mg) by mouth daily   Quantity:  45 tablet   Refills:  3       * atenolol 100 MG tablet   Commonly known as:  TENORMIN   This may have changed:    - medication strength  - how much to take  - when to take this   Used for:  Paroxysmal atrial fibrillation (H)   Changed by:  Ian Tracy MD        Dose:  100 mg "   Take 1 tablet (100 mg) by mouth daily   Quantity:  30 tablet   Refills:  3       propafenone 150 MG Tabs tablet   Commonly known as:  RYTHMOL   This may have changed:  additional instructions   Used for:  Paroxysmal atrial fibrillation (H)        Dose:  150 mg   Take 1 tablet (150 mg) by mouth 3 times daily Takes 225mg in AM and afternoon and 112.5mg (1/2 tablet) in evening   Quantity:  270 tablet   Refills:  3       * Notice:  This list has 2 medication(s) that are the same as other medications prescribed for you. Read the directions carefully, and ask your doctor or other care provider to review them with you.         Where to get your medicines      These medications were sent to [facility] [address] [place] [facility] [place], [place] - [address]  [address] [facility] [address]     Phone:  [phone]     atenolol 100 MG tablet                Primary Care Provider Office Phone # Fax #    Ian Tracy [phone]       [facility] [address]  [facility] [address]        Equal Access to Services     [facility]: Hadii aad ku hadasho Soadolfo, waaxda luqadaha, qaybta kaalmada adeamberyajoyce, ed sharma . So [facility] [phone].    ATENCIÓN: Si habla español, tiene a dahl disposición servicios gratuitos de asistencia lingüística. Llame al [phone].    We comply with applicable federal civil rights laws and [place] laws. We do not discriminate on the basis of race, color, national origin, age, disability sex, sexual orientation or gender identity.            Thank you!     Thank you for choosing [facility]  for your care. Our goal is always to provide you with excellent care. Hearing back from our patients is one way we can continue to improve our services. Please take a few minutes to complete the written survey that you may receive in the mail after your visit with us. Thank you!             Your Updated Medication List - Protect others around you:  Learn how to safely use, store and throw away your medicines at www.disposemymeds.org.          This list is accurate as of: 7/19/17 11:59 PM.  Always use your most recent med list.                   Brand Name Dispense Instructions for use Diagnosis    * atenolol 50 MG tablet    TENORMIN    45 tablet    Take 1 tablet (50 mg) by mouth daily    Paroxysmal atrial fibrillation (H)       * atenolol 100 MG tablet    TENORMIN    30 tablet    Take 1 tablet (100 mg) by mouth daily    Paroxysmal atrial fibrillation (H)       atorvastatin 10 MG tablet    LIPITOR    90 tablet    Take 1/2 tablet by mouth daily.    Hyperlipidemia LDL goal <100       Biotin 3 MG Tabs      Take by mouth daily (unknown dose)        CALCIUM + D PO      Take by mouth daily        carboxymethylcellulose 1 % ophthalmic solution    CELLUVISC/REFRESH LIQUIGEL     Place 1 drop into both eyes 4 times daily as needed for dry eyes        DIGOX 250 MCG tablet   Generic drug:  digoxin     90 tablet    TAKE 1 TABLET BY MOUTH EVERY DAY    Benign essential hypertension       FELODIPINE ER PO      Take 5 mg by mouth 2 times daily        FENOFIBRATE PO      Take 160 mg by mouth daily        furosemide 20 MG tablet    LASIX    180 tablet    Take 1 tablet (20 mg) by mouth 2 times daily    Essential hypertension       lisinopril 40 MG tablet    PRINIVIL/ZESTRIL    90 tablet    TAKE 1 TABLET BY MOUTH EVERY DAY    Benign essential hypertension       LORazepam 0.5 MG tablet    ATIVAN    20 tablet    Take 1 tablet (0.5 mg) by mouth every 8 hours as needed for anxiety    Long-term (current) use of anticoagulants       propafenone 150 MG Tabs tablet    RYTHMOL    270 tablet    Take 1 tablet (150 mg) by mouth 3 times daily Takes 225mg in AM and afternoon and 112.5mg (1/2 tablet) in evening    Paroxysmal atrial fibrillation (H)       SERTRALINE HCL PO      Take 50 mg by mouth every evening        verapamil 240 MG Cp24 24 hr capsule    VERELAN    90 capsule    TAKE 1 CAPSULE  BY MOUTH EVERY DAY    Benign essential hypertension       * WARFARIN SODIUM PO      Take 2.5-5 mg by mouth every evening Takes 5mg daily except 2.5mg on Tuesdays        * warfarin 5 MG tablet    COUMADIN    90 tablet    TAKE ONE-HALF TO ONE TABLET BY MOUTH DAILY OR AS DIRECTED BY INR CLINIC    Long-term (current) use of anticoagulants       * Notice:  This list has 4 medication(s) that are the same as other medications prescribed for you. Read the directions carefully, and ask your doctor or other care provider to review them with you.

## 2017-07-19 NOTE — NURSING NOTE
"Chief Complaint   Patient presents with     RECHECK       Initial /73 (BP Location: Right arm, Patient Position: Sitting, Cuff Size: Adult Regular)  Pulse 64  Temp 97.6  F (36.4  C) (Tympanic)  Ht 5' 4\" (1.626 m)  Wt 137 lb (62.1 kg)  SpO2 99%  Breastfeeding? No  BMI 23.52 kg/m2 Estimated body mass index is 23.52 kg/(m^2) as calculated from the following:    Height as of this encounter: 5' 4\" (1.626 m).    Weight as of this encounter: 137 lb (62.1 kg).  Medication Reconciliation: complete   Betty Zunigaing- CMA      "

## 2017-07-20 ENCOUNTER — MYC MEDICAL ADVICE (OUTPATIENT)
Dept: FAMILY MEDICINE | Facility: CLINIC | Age: 72
End: 2017-07-20

## 2017-07-26 ENCOUNTER — ANTICOAGULATION THERAPY VISIT (OUTPATIENT)
Dept: NURSING | Facility: CLINIC | Age: 72
End: 2017-07-26
Payer: MEDICARE

## 2017-07-26 DIAGNOSIS — I48.0 PAROXYSMAL ATRIAL FIBRILLATION (H): ICD-10-CM

## 2017-07-26 DIAGNOSIS — Z79.01 LONG-TERM (CURRENT) USE OF ANTICOAGULANTS: ICD-10-CM

## 2017-07-26 LAB
DIGOXIN SERPL-MCNC: 1.1 UG/L (ref 0.5–2)
INR POINT OF CARE: 3.2 (ref 0.86–1.14)

## 2017-07-26 PROCEDURE — 36416 COLLJ CAPILLARY BLOOD SPEC: CPT

## 2017-07-26 PROCEDURE — 80162 ASSAY OF DIGOXIN TOTAL: CPT | Performed by: INTERNAL MEDICINE

## 2017-07-26 PROCEDURE — 85610 PROTHROMBIN TIME: CPT | Mod: QW

## 2017-07-26 PROCEDURE — 99207 ZZC NO CHARGE NURSE ONLY: CPT

## 2017-07-26 NOTE — PROGRESS NOTES
ANTICOAGULATION FOLLOW-UP CLINIC VISIT    Patient Name:  Catalina Marie  Date:  7/26/2017  Contact Type:  Face to Face    SUBJECTIVE:     Patient Findings     Positives No Problem Findings    Comments Patient does report that she has not eaten as much greens over last 1 week as she normally does. INR slightly SUPRA today. Will have patient continue with current dose and patient plans to go back to her normal intake of greens as she usually does. Recheck in 4 weeks.            OBJECTIVE    INR Protime   Date Value Ref Range Status   07/26/2017 3.2 (A) 0.86 - 1.14 Final       ASSESSMENT / PLAN  INR assessment SUPRA    Recheck INR In: 4 WEEKS    INR Location Clinic      Anticoagulation Summary as of 7/26/2017     INR goal 2.0-3.0   Today's INR 3.2!   Maintenance plan 2.5 mg (5 mg x 0.5) on Tue; 5 mg (5 mg x 1) all other days   Full instructions 2.5 mg on Tue; 5 mg all other days   Weekly total 32.5 mg   No change documented Carline Myrick RN   Plan last modified Elodia Campbell RN (9/15/2016)   Next INR check 8/23/2017   Priority INR   Target end date     Indications   Long-term (current) use of anticoagulants [Z79.01] [Z79.01]  Atrial fibrillation (H) [I48.91] [I48.91]         Anticoagulation Episode Summary     INR check location     Preferred lab     Send INR reminders to [facility]    Comments Expect FAXED INR results from [facility] in [place], [place]. Phone: [phone].  General Results#: [phone]  Call pt's cell in [place] with results/plan: [phone]      Anticoagulation Care Providers     Provider Role Specialty Phone number    Ian Tracy MD Referring Internal Medicine [phone]            See the Encounter Report to view Anticoagulation Flowsheet and Dosing Calendar (Go to Encounters tab in chart review, and find the Anticoagulation Therapy Visit)    Dosage adjustment made based on physician directed care plan.  Recheck in 4 weeks. No changes to dose.     Carline Myrick RN

## 2017-07-26 NOTE — MR AVS SNAPSHOT
Catalina Marie   7/26/2017 11:00 AM   Anticoagulation Therapy Visit    Description:  71 year old female   Provider:  [facility]   Department:  [facility] Nurse           INR as of 7/26/2017     Today's INR 3.2!      Anticoagulation Summary as of 7/26/2017     INR goal 2.0-3.0   Today's INR 3.2!   Full instructions 2.5 mg on Tue; 5 mg all other days   Next INR check 8/23/2017    Indications   Long-term (current) use of anticoagulants [Z79.01] [Z79.01]  Atrial fibrillation (H) [I48.91] [I48.91]         Your next Anticoagulation Clinic appointment(s)     Aug 23, 2017 11:15 AM CDT   Anticoagulation Visit with  ANTICOAGULATION CLINIC   [facility]    [address]  [place] [address]   [phone]              Contact Numbers     Clinic Number:         July 2017 Details    Sun Mon Tue Wed Thu Fri Sat           1                 2               3               4               5               6               7               8                 9               10               11               12               13               14               15                 16               17               18               19               20               21               22                 23               24               25               26      5 mg   See details      27      5 mg         28      5 mg         29      5 mg           30      5 mg         31      5 mg               Date Details   07/26 This INR check               How to take your warfarin dose     To take:  5 mg Take 1 of the 5 mg tablets.           August 2017 Details    Sun Mon Tue Wed Thu Fri Sat       1      2.5 mg         2      5 mg         3      5 mg         4      5 mg         5      5 mg           6      5 mg         7      5 mg         8      2.5 mg         9      5 mg         10      5 mg         11      5 mg         12      5 mg           13      5 mg         14      5 mg         15      2.5 mg          16      5 mg         17      5 mg         18      5 mg         19      5 mg           20      5 mg         21      5 mg         22      2.5 mg         23            24               25               26                 27               28               29               30               31                  Date Details   No additional details    Date of next INR:  8/23/2017         How to take your warfarin dose     To take:  2.5 mg Take 0.5 of a 5 mg tablet.    To take:  5 mg Take 1 of the 5 mg tablets.

## 2017-08-23 ENCOUNTER — ANTICOAGULATION THERAPY VISIT (OUTPATIENT)
Dept: NURSING | Facility: CLINIC | Age: 72
End: 2017-08-23
Payer: MEDICARE

## 2017-08-23 ENCOUNTER — TELEPHONE (OUTPATIENT)
Dept: FAMILY MEDICINE | Facility: CLINIC | Age: 72
End: 2017-08-23

## 2017-08-23 DIAGNOSIS — Z79.01 LONG-TERM (CURRENT) USE OF ANTICOAGULANTS: ICD-10-CM

## 2017-08-23 DIAGNOSIS — I48.0 PAROXYSMAL ATRIAL FIBRILLATION (H): ICD-10-CM

## 2017-08-23 DIAGNOSIS — Z79.01 LONG-TERM (CURRENT) USE OF ANTICOAGULANTS: Primary | ICD-10-CM

## 2017-08-23 LAB — INR POINT OF CARE: 3.7 (ref 0.86–1.14)

## 2017-08-23 PROCEDURE — 85610 PROTHROMBIN TIME: CPT | Mod: QW

## 2017-08-23 PROCEDURE — 99207 ZZC NO CHARGE NURSE ONLY: CPT

## 2017-08-23 PROCEDURE — 36416 COLLJ CAPILLARY BLOOD SPEC: CPT

## 2017-08-23 NOTE — MR AVS SNAPSHOT
Catalina Marie   8/23/2017 11:15 AM   Anticoagulation Therapy Visit    Description:  71 year old female   Provider:   [facility]INIC   Department:  [facility] Nurse           INR as of 8/23/2017     Today's INR 3.7!      Anticoagulation Summary as of 8/23/2017     INR goal 2.0-3.0   Today's INR 3.7!   Full instructions 8/23: 2.5 mg; Otherwise 2.5 mg on Tue; 5 mg all other days   Next INR check 9/5/2017    Indications   Long-term (current) use of anticoagulants [Z79.01] [Z79.01]  Atrial fibrillation (H) [I48.91] [I48.91]         Your next Anticoagulation Clinic appointment(s)     Sep 05, 2017 11:15 AM CDT   Anticoagulation Visit with  ANT[facility]AGULATION CLINIC   [facility]    [address]  [place] [address]   [phone]              Contact Numbers     Clinic Number:         August 2017 Details    Sun Mon Tue Wed Thu Fri Sat       1               2               3               4               5                 6               7               8               9               10               11               12                 13               14               15               16               17               18               19                 20               21               22               23      2.5 mg   See details      24      5 mg         25      5 mg         26      5 mg           27      5 mg         28      5 mg         29      2.5 mg         30      5 mg         31      5 mg            Date Details   08/23 This INR check               How to take your warfarin dose     To take:  2.5 mg Take 0.5 of a 5 mg tablet.    To take:  5 mg Take 1 of the 5 mg tablets.           September 2017 Details    Sun Mon Tue Wed Thu Fri Sat          1      5 mg         2      5 mg           3      5 mg         4      5 mg         5            6               7               8               9                 10               11               12               13                14               15               16                 17               18               19               20               21               22               23                 24               25               26               27               28               29               30                Date Details   No additional details    Date of next INR:  9/5/2017         How to take your warfarin dose     To take:  2.5 mg Take 0.5 of a 5 mg tablet.    To take:  5 mg Take 1 of the 5 mg tablets.

## 2017-08-23 NOTE — TELEPHONE ENCOUNTER
Dr Tracy---please sign annual INR Referral.   Pended---ready for signature.     Thank you,  Elodia MENG RN,BSN

## 2017-08-23 NOTE — PROGRESS NOTES
"  ANTICOAGULATION FOLLOW-UP CLINIC VISIT    Patient Name:  Catalina Marie  Date:  8/23/2017  Contact Type:  Face to Face    SUBJECTIVE:     Patient Findings     Positives Unexplained INR or factor level change    Comments Patient doesn't want to change Warfarin dosing schedule \"yet\".  One time dose change today--then resuming previous dosing schedule .   Will recheck x 10 days.   Patient advised to make sure she is eating greens---her normal diet.            OBJECTIVE    INR Protime   Date Value Ref Range Status   08/23/2017 3.7 (A) 0.86 - 1.14 Final       ASSESSMENT / PLAN  INR assessment SUPRA    Recheck INR In: 10 DAYS    INR Location Clinic      Anticoagulation Summary as of 8/23/2017     INR goal 2.0-3.0   Today's INR 3.7!   Maintenance plan 2.5 mg (5 mg x 0.5) on Tue; 5 mg (5 mg x 1) all other days   Full instructions 8/23: 2.5 mg; Otherwise 2.5 mg on Tue; 5 mg all other days   Weekly total 32.5 mg   Plan last modified Elodia Campbell RN (9/15/2016)   Next INR check 9/5/2017   Priority INR   Target end date     Indications   Long-term (current) use of anticoagulants [Z79.01] [Z79.01]  Atrial fibrillation (H) [I48.91] [I48.91]         Anticoagulation Episode Summary     INR check location     Preferred lab     Send INR reminders to [facility]    Comments Expect FAXED INR results from [facility] in [place], [place]. Phone: [phone].  General Results#: [phone]  Call pt's cell in [place] with results/plan: [phone]      Anticoagulation Care Providers     Provider Role Specialty Phone number    Ian Tracy MD Referring Internal Medicine [phone]            See the Encounter Report to view Anticoagulation Flowsheet and Dosing Calendar (Go to Encounters tab in chart review, and find the Anticoagulation Therapy Visit)    Dosage adjustment made based on physician directed care plan.    Elodia Campbell RN               "

## 2017-09-05 ENCOUNTER — ANTICOAGULATION THERAPY VISIT (OUTPATIENT)
Dept: NURSING | Facility: CLINIC | Age: 72
End: 2017-09-05
Payer: MEDICARE

## 2017-09-05 DIAGNOSIS — I48.0 PAROXYSMAL ATRIAL FIBRILLATION (H): ICD-10-CM

## 2017-09-05 DIAGNOSIS — Z79.01 LONG-TERM (CURRENT) USE OF ANTICOAGULANTS: ICD-10-CM

## 2017-09-05 LAB — INR POINT OF CARE: 3.1 (ref 0.86–1.14)

## 2017-09-05 PROCEDURE — 85610 PROTHROMBIN TIME: CPT | Mod: QW

## 2017-09-05 PROCEDURE — 99207 ZZC NO CHARGE NURSE ONLY: CPT

## 2017-09-05 PROCEDURE — 36416 COLLJ CAPILLARY BLOOD SPEC: CPT

## 2017-09-05 NOTE — MR AVS SNAPSHOT
Catalina Marie   9/5/2017 11:15 AM   Anticoagulation Therapy Visit    Description:  71 year old female   Provider:  [facility]   Department:   Nurse           INR as of 9/5/2017     Today's INR 3.1!      Anticoagulation Summary as of 9/5/2017     INR goal 2.0-3.0   Today's INR 3.1!   Full instructions 2.5 mg on Tue; 5 mg all other days   Next INR check 9/28/2017    Indications   Long-term (current) use of anticoagulants [Z79.01] [Z79.01]  Atrial fibrillation (H) [I48.91] [I48.91]         Your next Anticoagulation Clinic appointment(s)     Sep 28, 2017  9:45 AM CDT   Anticoagulation Visit with  ANTICOAGULATION CLINIC   [facility] [place] ([facility])    [address]  [place] [address]   [phone]              Contact Numbers     Clinic Number:         September 2017 Details    Sun Mon Tue Wed Thu Fri Sat          1               2                 3               4               5      2.5 mg   See details      6      5 mg         7      5 mg         8      5 mg         9      5 mg           10      5 mg         11      5 mg         12      2.5 mg         13      5 mg         14      5 mg         15      5 mg         16      5 mg           17      5 mg         18      5 mg         19      2.5 mg         20      5 mg         21      5 mg         22      5 mg         23      5 mg           24      5 mg         25      5 mg         26      2.5 mg         27      5 mg         28            29               30                Date Details   09/05 This INR check       Date of next INR:  9/28/2017         How to take your warfarin dose     To take:  2.5 mg Take 0.5 of a 5 mg tablet.    To take:  5 mg Take 1 of the 5 mg tablets.

## 2017-09-05 NOTE — PROGRESS NOTES
ANTICOAGULATION FOLLOW-UP CLINIC VISIT    Patient Name:  Catalina Marie  Date:  9/5/2017  Contact Type:  Face to Face    SUBJECTIVE:     Patient Findings     Positives No Problem Findings    Comments Patient did have unexplained SUPRA 10 days ago. Patient's INR is 3.1 today. Patient reports that she continued on same Vitamin K intake over last 10 days. Will have patient continue 2.5 mg Tues; 5 mg AOD. Reheck in 4 weeks.   Patient will be leaving for [place] at beginning of November. Would like a INR check at end of September and then end of October before leaving for [place].            OBJECTIVE    INR Protime   Date Value Ref Range Status   09/05/2017 3.1 (A) 0.86 - 1.14 Final       ASSESSMENT / PLAN  INR assessment THER    Recheck INR In: 4 WEEKS    INR Location Clinic      Anticoagulation Summary as of 9/5/2017     INR goal 2.0-3.0   Today's INR 3.1!   Maintenance plan 2.5 mg (5 mg x 0.5) on Tue; 5 mg (5 mg x 1) all other days   Full instructions 2.5 mg on Tue; 5 mg all other days   Weekly total 32.5 mg   No change documented Carline Myrick, RN   Plan last modified Elodia Campbell, RN (9/15/2016)   Next INR check 9/28/2017   Priority INR   Target end date     Indications   Long-term (current) use of anticoagulants [Z79.01] [Z79.01]  Atrial fibrillation (H) [I48.91] [I48.91]         Anticoagulation Episode Summary     INR check location     Preferred lab     Send INR reminders to [facility]    Comments Expect FAXED INR results from [facility] in [place], [place]. Phone: [phone].  General Results#: [phone]  Call pt's cell in [place] with results/plan: [phone]      Anticoagulation Care Providers     Provider Role Specialty Phone number    Ian Tracy MD Referring Internal Medicine [phone]            See the Encounter Report to view Anticoagulation Flowsheet and Dosing Calendar (Go to Encounters tab in chart review, and find the Anticoagulation Therapy Visit)    Dosage adjustment made based on  physician directed care plan.  Patient did have unexplained SUPRA 10 days ago. Patient's INR is 3.1 today. Patient reports that she continued on same Vitamin K intake over last 10 days. Will have patient continue 2.5 mg Tues; 5 mg AOD. Recheck in 4 weeks.   Patient will be leaving for [place] at beginning of November. Would like a INR check at end of September and then end of October before leaving for [place].     Carline Myrick RN

## 2017-09-15 DIAGNOSIS — I10 BENIGN ESSENTIAL HYPERTENSION: Primary | ICD-10-CM

## 2017-09-16 NOTE — TELEPHONE ENCOUNTER
FELODIPINE ER PO      Last Written Prescription Date:  ?  Last Fill Quantity: ?,   # refills: ?  Last Office Visit with [facility], P or [facility] prescribing provider: 7/19/17  Future Office visit:    Next 5 appointments (look out 90 days)     Oct 10, 2017 10:00 AM CDT   Office Visit with Ian Tracy MD   [facility]    [address] [address]   [phone]                   Routing refill request to provider for review/approval because:  Medication is reported/historical        Maribell Ram RT(R)

## 2017-09-18 ENCOUNTER — TELEPHONE (OUTPATIENT)
Dept: FAMILY MEDICINE | Facility: CLINIC | Age: 72
End: 2017-09-18

## 2017-09-18 RX ORDER — FELODIPINE 5 MG/1
5 TABLET, EXTENDED RELEASE ORAL 2 TIMES DAILY
Qty: 180 TABLET | Refills: 3 | Status: SHIPPED | OUTPATIENT
Start: 2017-09-18 | End: 2019-06-03

## 2017-09-18 NOTE — TELEPHONE ENCOUNTER
Reason for call:  Same Day Appointment   Requested Provider: Dr. Lin     PCP: [unfilled]    Reason for visit: Patient states that she is leaving out [place] in the next month for the winter and needs to be seen    Duration of symptoms: none     Have you been treated for this in the past? Yes    Additional comments: Patient would like appointment today or tomorrow         Phone number to reach patient:  Cell number on file:    Telephone Information:   Mobile [phone]       Best Time:  Anytime     Can we leave a detailed message on this number?  YES

## 2017-09-18 NOTE — TELEPHONE ENCOUNTER
PCP:    Pt has chronic A-Fib. The Pt reports she has been on Rythmol for the past year. The Pt reports that over the summer she has had multiple episodes of A-fib.   During the last week, she reports going into A-fib a few times, not symptomatic during episodes. Taking Rythmol as prescribed. Is leaving for out of state in about a month, would like to make any changes to medication prior to leaving.     Due to recent increase in A-fib episodes -- I did scheduled the Pt in same day slot tomorrow.     Nicole Umanzor RN

## 2017-09-19 ENCOUNTER — OFFICE VISIT (OUTPATIENT)
Dept: FAMILY MEDICINE | Facility: CLINIC | Age: 72
End: 2017-09-19
Payer: MEDICARE

## 2017-09-19 VITALS
HEIGHT: 64 IN | WEIGHT: 137 LBS | TEMPERATURE: 97.2 F | SYSTOLIC BLOOD PRESSURE: 135 MMHG | BODY MASS INDEX: 23.39 KG/M2 | OXYGEN SATURATION: 96 % | HEART RATE: 53 BPM | DIASTOLIC BLOOD PRESSURE: 65 MMHG

## 2017-09-19 DIAGNOSIS — E11.9 TYPE 2 DIABETES MELLITUS WITHOUT COMPLICATION, WITHOUT LONG-TERM CURRENT USE OF INSULIN (H): ICD-10-CM

## 2017-09-19 DIAGNOSIS — N18.30 CKD (CHRONIC KIDNEY DISEASE) STAGE 3, GFR 30-59 ML/MIN (H): ICD-10-CM

## 2017-09-19 DIAGNOSIS — I10 BENIGN ESSENTIAL HYPERTENSION: ICD-10-CM

## 2017-09-19 DIAGNOSIS — I48.0 PAROXYSMAL ATRIAL FIBRILLATION (H): Primary | ICD-10-CM

## 2017-09-19 DIAGNOSIS — E78.5 HYPERLIPIDEMIA LDL GOAL <100: ICD-10-CM

## 2017-09-19 DIAGNOSIS — F43.22 ADJUSTMENT DISORDER WITH ANXIOUS MOOD: ICD-10-CM

## 2017-09-19 PROCEDURE — 99214 OFFICE O/P EST MOD 30 MIN: CPT | Performed by: INTERNAL MEDICINE

## 2017-09-19 NOTE — MR AVS SNAPSHOT
After Visit Summary   9/19/2017    Catalina Marie    MRN: [id number]           Patient Information     Date Of Birth          1945        Visit Information        Provider Department      9/19/2017 2:30 PM Ian Tracy MD [facility]        Today's Diagnoses     Paroxysmal atrial fibrillation (H)    -  1    Benign essential hypertension        Type 2 diabetes mellitus without complication, without long-term current use of insulin (H)        Adjustment disorder with anxious mood        Hyperlipidemia LDL goal <100        CKD (chronic kidney disease) stage 3, GFR 30-59 ml/min           Follow-ups after your visit        Your next 10 appointments already scheduled     Sep 28, 2017  9:45 AM CDT   Anticoagulation Visit with  ANTICOAGULATION CLINIC   [facility]    [address] [address] [phone]            Oct 10, 2017 10:00 AM CDT   Office Visit with Ian Tracy MD   [facility] ([facility])    [address] [facility] [address] [phone]           Bring a current list of meds and any records pertaining to this visit. For Physicals, please bring immunization records and any forms needing to be filled out. Please arrive 10 minutes early to complete paperwork.              Who to contact     If you have questions or need follow up information about today's clinic visit or your schedule please contact [facility] directly at [phone].  Normal or non-critical lab and imaging results will be communicated to you by MyChart, letter or phone within 4 business days after the clinic has received the results. If you do not hear from us within 7 days, please contact the clinic through MyChart or phone. If you have a critical or abnormal lab result, we will notify you by phone as soon as possible.  Submit refill requests through [employer] or call your pharmacy and they will forward the  "refill request to us. Please allow 3 business days for your refill to be completed.          Additional Information About Your Visit        [employer] Information     [email or web address] gives you secure access to your electronic health record. If you see a primary care provider, you can also send messages to your care team and make appointments. If you have questions, please call your primary care clinic.  If you do not have a primary care provider, please call [phone] and they will assist you.        Care EveryWhere ID     This is your Care EveryWhere ID. This could be used by other organizations to access your [facility] medical records  [id number]        Your Vitals Were     Pulse Temperature Height Pulse Oximetry Breastfeeding? BMI (Body Mass Index)    53 97.2  F (36.2  C) (Tympanic) 5' 4\" (1.626 m) 96% No 23.52 kg/m2       Blood Pressure from Last 3 Encounters:   09/19/17 135/65   07/19/17 134/73   07/12/17 137/63    Weight from Last 3 Encounters:   09/19/17 137 lb (62.1 kg)   07/19/17 137 lb (62.1 kg)   07/12/17 139 lb 4.8 oz (63.2 kg)                 Today's Medication Changes          These changes are accurate as of: 9/19/17 11:59 PM.  If you have any questions, ask your nurse or doctor.               These medicines have changed or have updated prescriptions.        Dose/Directions    * atenolol 50 MG tablet   Commonly known as:  TENORMIN   This may have changed:  additional instructions   Used for:  Paroxysmal atrial fibrillation (H)   Changed by:  Ian Tracy MD        Dose:  50 mg   Take 1 tablet (50 mg) by mouth daily   Quantity:  45 tablet   Refills:  3       * atenolol 100 MG tablet   Commonly known as:  TENORMIN   This may have changed:  Another medication with the same name was changed. Make sure you understand how and when to take each.   Used for:  Paroxysmal atrial fibrillation (H)   Changed by:  Ian Tracy MD        Dose:  100 mg   Take 1 tablet (100 mg) by mouth daily   Quantity:  30 tablet "   Refills:  3       DIGOX 250 MCG tablet   This may have changed:  See the new instructions.   Used for:  Benign essential hypertension   Generic drug:  digoxin        TAKE 1 TABLET BY MOUTH EVERY DAY   Quantity:  90 tablet   Refills:  3       propafenone 150 MG Tabs tablet   Commonly known as:  RYTHMOL   This may have changed:  additional instructions   Used for:  Paroxysmal atrial fibrillation (H)        Dose:  150 mg   Take 1 tablet (150 mg) by mouth 3 times daily Takes 225mg in AM and afternoon and 112.5mg (1/2 tablet) in evening   Quantity:  270 tablet   Refills:  3       * Notice:  This list has 2 medication(s) that are the same as other medications prescribed for you. Read the directions carefully, and ask your doctor or other care provider to review them with you.             Primary Care Provider Office Phone # Fax #    Ian Tracy [phone] [address] [facility] [address]  [facility] [address]        Equal Access to Services     [facility] HospitalMILAGRO : Hadii brandyn fischer hadasho Soshellyali, waaxda luqadaha, qaybta kaalmada adeegyada, waxay lina sharma . So [facility] [phone].    ATENCIÓN: Si habla español, tiene a dahl disposición servicios gratuitos de asistencia lingüística. Hamida al [phone].    We comply with applicable federal civil rights laws and [place] laws. We do not discriminate on the basis of race, color, national origin, age, disability sex, sexual orientation or gender identity.            Thank you!     Thank you for choosing [facility]  for your care. Our goal is always to provide you with excellent care. Hearing back from our patients is one way we can continue to improve our services. Please take a few minutes to complete the written survey that you may receive in the mail after your visit with us. Thank you!             Your Updated Medication List - Protect others around you: Learn how to safely use, store and throw away your medicines at  www.disposemymeds.org.          This list is accurate as of: 9/19/17 11:59 PM.  Always use your most recent med list.                   Brand Name Dispense Instructions for use Diagnosis    * atenolol 50 MG tablet    TENORMIN    45 tablet    Take 1 tablet (50 mg) by mouth daily    Paroxysmal atrial fibrillation (H)       * atenolol 100 MG tablet    TENORMIN    30 tablet    Take 1 tablet (100 mg) by mouth daily    Paroxysmal atrial fibrillation (H)       atorvastatin 10 MG tablet    LIPITOR    90 tablet    Take 1/2 tablet by mouth daily.    Hyperlipidemia LDL goal <100       Biotin 3 MG Tabs      Take by mouth daily (unknown dose)        CALCIUM + D PO      Take by mouth daily        carboxymethylcellulose 1 % ophthalmic solution    CELLUVISC/REFRESH LIQUIGEL     Place 1 drop into both eyes 4 times daily as needed for dry eyes        DIGOX 250 MCG tablet   Generic drug:  digoxin     90 tablet    TAKE 1 TABLET BY MOUTH EVERY DAY    Benign essential hypertension       felodipine ER 5 MG 24 hr tablet    PLENDIL    180 tablet    Take 1 tablet (5 mg) by mouth 2 times daily    Benign essential hypertension       FENOFIBRATE PO      Take 160 mg by mouth daily        furosemide 20 MG tablet    LASIX    180 tablet    Take 1 tablet (20 mg) by mouth 2 times daily    Essential hypertension       lisinopril 40 MG tablet    PRINIVIL/ZESTRIL    90 tablet    TAKE 1 TABLET BY MOUTH EVERY DAY    Benign essential hypertension       LORazepam 0.5 MG tablet    ATIVAN    20 tablet    Take 1 tablet (0.5 mg) by mouth every 8 hours as needed for anxiety    Long-term (current) use of anticoagulants       propafenone 150 MG Tabs tablet    RYTHMOL    270 tablet    Take 1 tablet (150 mg) by mouth 3 times daily Takes 225mg in AM and afternoon and 112.5mg (1/2 tablet) in evening    Paroxysmal atrial fibrillation (H)       SERTRALINE HCL PO      Take 50 mg by mouth every evening        verapamil 240 MG Cp24 24 hr capsule    VERELAN    90 capsule     TAKE 1 CAPSULE BY MOUTH EVERY DAY    Benign essential hypertension       * WARFARIN SODIUM PO      Take 2.5-5 mg by mouth every evening Takes 5mg daily except 2.5mg on Tuesdays        * warfarin 5 MG tablet    COUMADIN    90 tablet    TAKE ONE-HALF TO ONE TABLET BY MOUTH DAILY OR AS DIRECTED BY INR CLINIC    Long-term (current) use of anticoagulants       * Notice:  This list has 4 medication(s) that are the same as other medications prescribed for you. Read the directions carefully, and ask your doctor or other care provider to review them with you.

## 2017-09-19 NOTE — NURSING NOTE
"Chief Complaint   Patient presents with     Recheck Medication       Initial /65 (BP Location: Left arm, Patient Position: Sitting, Cuff Size: Adult Regular)  Pulse 53  Temp 97.2  F (36.2  C) (Tympanic)  Ht 5' 4\" (1.626 m)  Wt 137 lb (62.1 kg)  SpO2 96%  Breastfeeding? No  BMI 23.52 kg/m2 Estimated body mass index is 23.52 kg/(m^2) as calculated from the following:    Height as of this encounter: 5' 4\" (1.626 m).    Weight as of this encounter: 137 lb (62.1 kg).  Medication Reconciliation: complete   Betty Peguero- RKAESH      "
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
## 2017-09-20 ENCOUNTER — TELEPHONE (OUTPATIENT)
Dept: FAMILY MEDICINE | Facility: CLINIC | Age: 72
End: 2017-09-20

## 2017-09-20 DIAGNOSIS — I48.0 PAROXYSMAL ATRIAL FIBRILLATION (H): Primary | ICD-10-CM

## 2017-09-20 DIAGNOSIS — I48.0 PAROXYSMAL ATRIAL FIBRILLATION (H): ICD-10-CM

## 2017-09-20 LAB — DIGOXIN SERPL-MCNC: 2.6 UG/L (ref 0.5–2)

## 2017-09-20 PROCEDURE — 80162 ASSAY OF DIGOXIN TOTAL: CPT | Performed by: INTERNAL MEDICINE

## 2017-09-20 PROCEDURE — 80048 BASIC METABOLIC PNL TOTAL CA: CPT | Performed by: INTERNAL MEDICINE

## 2017-09-20 PROCEDURE — 36415 COLL VENOUS BLD VENIPUNCTURE: CPT | Performed by: INTERNAL MEDICINE

## 2017-09-20 NOTE — TELEPHONE ENCOUNTER
Patient forgot to hold her digoxin prior to having her blood drawn.  We'll repeat dig level tomorrow fasting prior to taking her medication.

## 2017-09-20 NOTE — TELEPHONE ENCOUNTER
I called and spoke with Sarah from [facility]  He was calling to report that the Dig level taken today was 2.6      PCP: Please advise.     Carline Myrick RN

## 2017-09-20 NOTE — TELEPHONE ENCOUNTER
Reason for Call:  Other Labs 9/20    Detailed comments: Sarah requests to speak with a nurse regarding labs  That were done today 9/20    Sarah from [facility]  ph.[phone]    Best Time: Anytime    Call taken on 9/20/2017 at 3:36 PM by Cecilia Parada

## 2017-09-21 DIAGNOSIS — I48.0 PAROXYSMAL ATRIAL FIBRILLATION (H): ICD-10-CM

## 2017-09-21 LAB
ANION GAP SERPL CALCULATED.3IONS-SCNC: 7 MMOL/L (ref 3–14)
BUN SERPL-MCNC: 21 MG/DL (ref 7–30)
CALCIUM SERPL-MCNC: 9 MG/DL (ref 8.5–10.1)
CHLORIDE SERPL-SCNC: 105 MMOL/L (ref 94–109)
CO2 SERPL-SCNC: 28 MMOL/L (ref 20–32)
CREAT SERPL-MCNC: 1.34 MG/DL (ref 0.52–1.04)
DIGOXIN SERPL-MCNC: 1.1 UG/L (ref 0.5–2)
GFR SERPL CREATININE-BSD FRML MDRD: 39 ML/MIN/1.7M2
GLUCOSE SERPL-MCNC: 121 MG/DL (ref 70–99)
POTASSIUM SERPL-SCNC: 4.2 MMOL/L (ref 3.4–5.3)
SODIUM SERPL-SCNC: 140 MMOL/L (ref 133–144)

## 2017-09-21 PROCEDURE — 36415 COLL VENOUS BLD VENIPUNCTURE: CPT | Performed by: INTERNAL MEDICINE

## 2017-09-21 PROCEDURE — 80162 ASSAY OF DIGOXIN TOTAL: CPT | Performed by: INTERNAL MEDICINE

## 2017-09-28 ENCOUNTER — ANTICOAGULATION THERAPY VISIT (OUTPATIENT)
Dept: NURSING | Facility: CLINIC | Age: 72
End: 2017-09-28
Payer: MEDICARE

## 2017-09-28 ENCOUNTER — OFFICE VISIT (OUTPATIENT)
Dept: FAMILY MEDICINE | Facility: CLINIC | Age: 72
End: 2017-09-28
Payer: MEDICARE

## 2017-09-28 VITALS
BODY MASS INDEX: 23.9 KG/M2 | DIASTOLIC BLOOD PRESSURE: 70 MMHG | HEART RATE: 64 BPM | HEIGHT: 64 IN | TEMPERATURE: 98 F | SYSTOLIC BLOOD PRESSURE: 133 MMHG | OXYGEN SATURATION: 97 % | WEIGHT: 140 LBS

## 2017-09-28 DIAGNOSIS — I48.0 PAROXYSMAL ATRIAL FIBRILLATION (H): Primary | ICD-10-CM

## 2017-09-28 DIAGNOSIS — I48.0 PAROXYSMAL ATRIAL FIBRILLATION (H): ICD-10-CM

## 2017-09-28 DIAGNOSIS — Z17.0 MALIGNANT NEOPLASM OF BREAST IN FEMALE, ESTROGEN RECEPTOR POSITIVE, UNSPECIFIED LATERALITY, UNSPECIFIED SITE OF BREAST (H): ICD-10-CM

## 2017-09-28 DIAGNOSIS — I10 BENIGN ESSENTIAL HYPERTENSION: ICD-10-CM

## 2017-09-28 DIAGNOSIS — E11.9 TYPE 2 DIABETES MELLITUS WITHOUT COMPLICATION, WITHOUT LONG-TERM CURRENT USE OF INSULIN (H): ICD-10-CM

## 2017-09-28 DIAGNOSIS — Z79.01 LONG-TERM (CURRENT) USE OF ANTICOAGULANTS: ICD-10-CM

## 2017-09-28 DIAGNOSIS — C50.919 MALIGNANT NEOPLASM OF BREAST IN FEMALE, ESTROGEN RECEPTOR POSITIVE, UNSPECIFIED LATERALITY, UNSPECIFIED SITE OF BREAST (H): ICD-10-CM

## 2017-09-28 DIAGNOSIS — N18.30 CKD (CHRONIC KIDNEY DISEASE) STAGE 3, GFR 30-59 ML/MIN (H): ICD-10-CM

## 2017-09-28 DIAGNOSIS — F43.22 ADJUSTMENT DISORDER WITH ANXIOUS MOOD: ICD-10-CM

## 2017-09-28 LAB — INR POINT OF CARE: 2.7 (ref 0.86–1.14)

## 2017-09-28 PROCEDURE — 99214 OFFICE O/P EST MOD 30 MIN: CPT | Performed by: INTERNAL MEDICINE

## 2017-09-28 PROCEDURE — 99207 ZZC NO CHARGE NURSE ONLY: CPT

## 2017-09-28 PROCEDURE — 85610 PROTHROMBIN TIME: CPT | Mod: QW

## 2017-09-28 PROCEDURE — 36416 COLLJ CAPILLARY BLOOD SPEC: CPT

## 2017-09-28 NOTE — PROGRESS NOTES
SUBJECTIVE:   Catalina Marie is a 72 year old female who presents to clinic today for the following health issues:    Patient presents to the clinic to follow up on her atrial fibrillation. She states that yesterday she was in Afib for two hours from around 5 am to 7 am, she then reports that the Afib resolved. She went back into Afib around 5 pm in the evening and was not able to go to sleep till around 2 am. She is exercising regularly, with only one or two days off a week depending on how she is feeling. She states that she is having some family stress that is hard on her. She complains of a fragile right hip, she states that when she gets out of the car she is sometimes paralyzed by pain and cannot move.  She pointed out that she felt that she had a change in her implants where it looked like she might be leaking    Problem list and histories reviewed & adjusted, as indicated.  Additional history: as documented    Current Outpatient Prescriptions   Medication Sig Dispense Refill     felodipine ER (PLENDIL) 5 MG 24 hr tablet Take 1 tablet (5 mg) by mouth 2 times daily 180 tablet 3     atenolol (TENORMIN) 100 MG tablet Take 1 tablet (100 mg) by mouth daily 30 tablet 3     atenolol (TENORMIN) 50 MG tablet Take 1 tablet (50 mg) by mouth daily (Patient taking differently: Take 50 mg by mouth daily Patient is to take 1/2 tablet twice daily.) 45 tablet 3     warfarin (COUMADIN) 5 MG tablet TAKE ONE-HALF TO ONE TABLET BY MOUTH DAILY OR AS DIRECTED BY INR CLINIC 90 tablet 0     furosemide (LASIX) 20 MG tablet Take 1 tablet (20 mg) by mouth 2 times daily 180 tablet 1     propafenone (RYTHMOL) 150 MG TABS tablet Take 1 tablet (150 mg) by mouth 3 times daily Takes 225mg in AM and afternoon and 112.5mg (1/2 tablet) in evening (Patient taking differently: Take 150 mg by mouth 3 times daily Takes 150mg in AM at 5pm 75mg and at bedtime patient takes 150mg) 270 tablet 3     FENOFIBRATE PO Take 160 mg by mouth daily        "SERTRALINE HCL PO Take 50 mg by mouth every evening       WARFARIN SODIUM PO Take 2.5-5 mg by mouth every evening Takes 5mg daily except 2.5mg on Tuesdays       Biotin 3 MG TABS Take by mouth daily (unknown dose)       carboxymethylcellulose (CELLUVISC/REFRESH LIQUIGEL) 1 % ophthalmic solution Place 1 drop into both eyes 4 times daily as needed for dry eyes       atorvastatin (LIPITOR) 10 MG tablet Take 1/2 tablet by mouth daily. 90 tablet 3     lisinopril (PRINIVIL/ZESTRIL) 40 MG tablet TAKE 1 TABLET BY MOUTH EVERY DAY 90 tablet 3     DIGOX 250 MCG tablet TAKE 1 TABLET BY MOUTH EVERY DAY (Patient taking differently: TAKE 1/2 TABLET BY MOUTH EVERY DAY) 90 tablet 3     verapamil (VERELAN) 240 MG CP24 24 hr capsule TAKE 1 CAPSULE BY MOUTH EVERY DAY 90 capsule 3     LORazepam (ATIVAN) 0.5 MG tablet Take 1 tablet (0.5 mg) by mouth every 8 hours as needed for anxiety 20 tablet 0     Calcium Carbonate-Vitamin D (CALCIUM + D PO) Take by mouth daily       Allergies   Allergen Reactions     Penicillins        Reviewed and updated as needed this visit by clinical staff       Reviewed and updated as needed this visit by Provider         ROS:  Constitutional, HEENT, cardiovascular, pulmonary, gi and gu systems are negative, except as otherwise noted.    This document serves as a record of the services and decisions personally performed and made by Ian Tracy MD. It was created on his/her behalf by Momo Santillan, a trained medical scribe. The creation of this document is based the provider's statements to the medical scribe.  Scribmichael Santillan 2:20 PM, September 28, 2017    OBJECTIVE:   /70 (BP Location: Right arm, Patient Position: Chair, Cuff Size: Adult Regular)  Pulse 64  Temp 98  F (36.7  C) (Oral)  Ht 1.626 m (5' 4\")  Wt 63.5 kg (140 lb)  SpO2 97%  Breastfeeding? No  BMI 24.03 kg/m2  Body mass index is 24.03 kg/(m^2).    Neck was supple without adenopathy or thyromegaly her carotids were normal " without bruits  Chest clear to auscultation and percussion  Cardiovascular S1 and S2 are physiologic without murmurs or gallops  Abdomen bowel sounds were normal.  There is no palpable mass or organomegaly  Extremities nontender with trace of pretibial edema  Pulses pedal pulses are as described otherwise his pulses are bilaterally symmetrical throughout without bruits  Skin without significant abnormality,   Her implants showed a pie shaped defect on the superior aspect of her incision on both of her implants without any crepitation or other noticeable changes  Discussed her associated anxiety and stress as a key role in aggravating her afib  Diagnostic Test Results:  Results for orders placed or performed in visit on 09/28/17 (from the past 24 hour(s))   INR point of care   Result Value Ref Range    INR Protime 2.7 (A) 0.86 - 1.14       ASSESSMENT/PLAN:     There are no diagnoses linked to this encounter.  1. Paroxysmal atrial fibrillation (H)  Increasing frequency of atrial fibrillation most likely related to excessive stress will reevaluate medication changes at her next visit    2. Malignant neoplasm of breast in female, estrogen receptor positive, unspecified laterality, unspecified site of breast (H)  Defect noted in bilateral implants which requires further evaluation.    3. CKD (chronic kidney disease) stage 3, GFR 30-59 ml/min      4. Benign essential hypertension  Currently under good control    5. Type 2 diabetes mellitus without complication, without long-term current use of insulin (H)  ,Monitoring diet relatively well, continued activityAnd general weight control    6. Adjustment disorder with anxious mood  Being exacerbated by her daughters agitated depression associated with her evolving marital discord and anticipating her filing for divorce      -Future ultrasound to determine status of implants, patient will return after ultra sound.    Ian Tracy MD  [facility]    The  information in this document, created by the medical scribe for me, accurately reflects the services I personally performed and the decisions made by me. I have reviewed and approved this document for accuracy prior to leaving the patient care area.  Ian Tracy MD  4:26 PM, 09/28/17

## 2017-09-28 NOTE — MR AVS SNAPSHOT
After Visit Summary   9/28/2017    Catalina Marie    MRN: [id number]           Patient Information     Date Of Birth          1945        Visit Information        Provider Department      9/28/2017 1:45 PM Ian Tracy MD [facility]        Today's Diagnoses     Paroxysmal atrial fibrillation (H)    -  1    Malignant neoplasm of breast in female, estrogen receptor positive, unspecified laterality, unspecified site of breast (H)        CKD (chronic kidney disease) stage 3, GFR 30-59 ml/min        Benign essential hypertension        Type 2 diabetes mellitus without complication, without long-term current use of insulin (H)        Adjustment disorder with anxious mood           Follow-ups after your visit        Your next 10 appointments already scheduled     Oct 10, 2017 10:00 AM CDT   [facility] Tracy [facility]    [address]  [facility] [id number] [phone]           Bring a current list of meds and any records pertaining to this visit. For Physicals, please bring immunization records and any forms needing to be filled out. Please arrive 10 minutes early to complete paperwork.            Oct 23, 2017 10:00 AM CDT   [facility] with [facility]   [facility]    [address]  [facility] [id number] [phone]              Who to contact     If you have questions or need follow up information about today's clinic visit or your schedule please contact [facility] directly at [phone].  Normal or non-critical lab and imaging results will be communicated to you by MyChart, letter or phone within 4 business days after the clinic has received the results. If you do not hear from us within 7 days, please contact the clinic through MyChart or phone. If you have a critical or abnormal lab result, we will notify you by phone as soon as  "possible.  Submit refill requests through [facility] or call your pharmacy and they will forward the refill request to us. Please allow 3 business days for your refill to be completed.          Additional Information About Your Visit        [employer] Information     [facility] gives you secure access to your electronic health record. If you see a primary care provider, you can also send messages to your care team and make appointments. If you have questions, please call your primary care clinic.  If you do not have a primary care provider, please call [phone] and they will assist you.        Care EveryWhere ID     This is your Care EveryWhere ID. This could be used by other organizations to access your [facility] medical records  [id number]        Your Vitals Were     Pulse Temperature Height Pulse Oximetry Breastfeeding? BMI (Body Mass Index)    64 98  F (36.7  C) (Oral) 5' 4\" (1.626 m) 97% No 24.03 kg/m2       Blood Pressure from Last 3 Encounters:   09/28/17 133/70   09/19/17 135/65   07/19/17 134/73    Weight from Last 3 Encounters:   09/28/17 140 lb (63.5 kg)   09/19/17 137 lb (62.1 kg)   07/19/17 137 lb (62.1 kg)              Today, you had the following     No orders found for display         Today's Medication Changes          These changes are accurate as of: 9/28/17 11:59 PM.  If you have any questions, ask your nurse or doctor.               These medicines have changed or have updated prescriptions.        Dose/Directions    * atenolol 50 MG tablet   Commonly known as:  TENORMIN   This may have changed:  additional instructions   Used for:  Paroxysmal atrial fibrillation (H)   Changed by:  Ian Tracy MD        Dose:  50 mg   Take 1 tablet (50 mg) by mouth daily   Quantity:  45 tablet   Refills:  3       * atenolol 100 MG tablet   Commonly known as:  TENORMIN   This may have changed:  Another medication with the same name was changed. Make sure you understand how and when to take each.   Used for:  " Paroxysmal atrial fibrillation (H)   Changed by:  Ian Tracy MD        Dose:  100 mg   Take 1 tablet (100 mg) by mouth daily   Quantity:  30 tablet   Refills:  3       DIGOX 250 MCG tablet   This may have changed:  See the new instructions.   Used for:  Benign essential hypertension   Generic drug:  digoxin        TAKE 1 TABLET BY MOUTH EVERY DAY   Quantity:  90 tablet   Refills:  3       propafenone 150 MG Tabs tablet   Commonly known as:  RYTHMOL   This may have changed:  additional instructions   Used for:  Paroxysmal atrial fibrillation (H)        Dose:  150 mg   Take 1 tablet (150 mg) by mouth 3 times daily Takes 225mg in AM and afternoon and 112.5mg (1/2 tablet) in evening   Quantity:  270 tablet   Refills:  3       * Notice:  This list has 2 medication(s) that are the same as other medications prescribed for you. Read the directions carefully, and ask your doctor or other care provider to review them with you.             Primary Care Provider Office Phone # Fax #    Ian Tracy [phone] [address] [address]        Equal Access to Services     [facility]: Hadii aad ku hadasho Soadolfo, waaxda luqadaha, qaybta kaalmada adeegyajoyce, ed sharma . So [facility] [phone].    ATENCIÓN: Si habla español, tiene a dahl disposición servicios gratuitos de asistencia lingüística. Llame al [phone].    We comply with applicable federal civil rights laws and [place] laws. We do not discriminate on the basis of race, color, national origin, age, disability, sex, sexual orientation, or gender identity.            Thank you!     Thank you for choosing [facility]  for your care. Our goal is always to provide you with excellent care. Hearing back from our patients is one way we can continue to improve our services. Please take a few minutes to complete the written survey that you may receive in the mail after your visit  with us. Thank you!             Your Updated Medication List - Protect others around you: Learn how to safely use, store and throw away your medicines at www.disposemymeds.org.          This list is accurate as of: 9/28/17 11:59 PM.  Always use your most recent med list.                   Brand Name Dispense Instructions for use Diagnosis    * atenolol 50 MG tablet    TENORMIN    45 tablet    Take 1 tablet (50 mg) by mouth daily    Paroxysmal atrial fibrillation (H)       * atenolol 100 MG tablet    TENORMIN    30 tablet    Take 1 tablet (100 mg) by mouth daily    Paroxysmal atrial fibrillation (H)       atorvastatin 10 MG tablet    LIPITOR    90 tablet    Take 1/2 tablet by mouth daily.    Hyperlipidemia LDL goal <100       Biotin 3 MG Tabs      Take by mouth daily (unknown dose)        CALCIUM + D PO      Take by mouth daily        carboxymethylcellulose 1 % ophthalmic solution    CELLUVISC/REFRESH LIQUIGEL     Place 1 drop into both eyes 4 times daily as needed for dry eyes        DIGOX 250 MCG tablet   Generic drug:  digoxin     90 tablet    TAKE 1 TABLET BY MOUTH EVERY DAY    Benign essential hypertension       felodipine ER 5 MG 24 hr tablet    PLENDIL    180 tablet    Take 1 tablet (5 mg) by mouth 2 times daily    Benign essential hypertension       FENOFIBRATE PO      Take 160 mg by mouth daily        furosemide 20 MG tablet    LASIX    180 tablet    Take 1 tablet (20 mg) by mouth 2 times daily    Essential hypertension       lisinopril 40 MG tablet    PRINIVIL/ZESTRIL    90 tablet    TAKE 1 TABLET BY MOUTH EVERY DAY    Benign essential hypertension       LORazepam 0.5 MG tablet    ATIVAN    20 tablet    Take 1 tablet (0.5 mg) by mouth every 8 hours as needed for anxiety    Long-term (current) use of anticoagulants       propafenone 150 MG Tabs tablet    RYTHMOL    270 tablet    Take 1 tablet (150 mg) by mouth 3 times daily Takes 225mg in AM and afternoon and 112.5mg (1/2 tablet) in evening    Paroxysmal  atrial fibrillation (H)       SERTRALINE HCL PO      Take 50 mg by mouth every evening        verapamil 240 MG Cp24 24 hr capsule    VERELAN    90 capsule    TAKE 1 CAPSULE BY MOUTH EVERY DAY    Benign essential hypertension       * WARFARIN SODIUM PO      Take 2.5-5 mg by mouth every evening Takes 5mg daily except 2.5mg on Tuesdays        * warfarin 5 MG tablet    COUMADIN    90 tablet    TAKE ONE-HALF TO ONE TABLET BY MOUTH DAILY OR AS DIRECTED BY INR CLINIC    Long-term (current) use of anticoagulants       * Notice:  This list has 4 medication(s) that are the same as other medications prescribed for you. Read the directions carefully, and ask your doctor or other care provider to review them with you.

## 2017-09-28 NOTE — MR AVS SNAPSHOT
Catalina Marie   9/28/2017 9:45 AM   Anticoagulation Therapy Visit    Description:  72 year old female   Provider:   ANTICOAGULATION CLINIC   Department:   Nurse           INR as of 9/28/2017     Today's INR 2.7      Anticoagulation Summary as of 9/28/2017     INR goal 2.0-3.0   Today's INR 2.7   Full instructions 2.5 mg on Tue; 5 mg all other days   Next INR check 10/23/2017    Indications   Long-term (current) use of anticoagulants [Z79.01] [Z79.01]  Atrial fibrillation (H) [I48.91] [I48.91]         Your next Anticoagulation Clinic appointment(s)     Sep 28, 2017  9:45 AM CDT   Anticoagulation Visit with  ANTICOAGULATION CLINIC   [facility]    [address]  [place] [address]   [phone]            Oct 23, 2017 10:00 AM CDT   Anticoagulation Visit with  ANTICOAGULATION CLINIC   [facility] ([facility]    [address]  [place] [address]   [phone]              Contact Numbers     Clinic Number:         September 2017 Details    Sun Mon Tue Wed Thu Fri Sat          1               2                 3               4               5               6               7               8               9                 10               11               12               13               14               15               16                 17               18               19               20               21               22               23                 24               25               26               27               28      5 mg   See details      29      5 mg         30      5 mg          Date Details   09/28 This INR check               How to take your warfarin dose     To take:  5 mg Take 1 of the 5 mg tablets.           October 2017 Details    Sun Mon Tue Wed Thu Fri Sat     1      5 mg         2      5 mg         3      2.5 mg         4      5 mg         5      5 mg         6      5 mg         7      5 mg           8       5 mg         9      5 mg         10      2.5 mg         11      5 mg         12      5 mg         13      5 mg         14      5 mg           15      5 mg         16      5 mg         17      2.5 mg         18      5 mg         19      5 mg         20      5 mg         21      5 mg           22      5 mg         23            24               25               26               27               28                 29               30               31                    Date Details   No additional details    Date of next INR:  10/23/2017         How to take your warfarin dose     To take:  2.5 mg Take 0.5 of a 5 mg tablet.    To take:  5 mg Take 1 of the 5 mg tablets.

## 2017-09-28 NOTE — PROGRESS NOTES
ANTICOAGULATION FOLLOW-UP CLINIC VISIT    Patient Name:  Catalina Marie  Date:  9/28/2017  Contact Type:  Face to Face    SUBJECTIVE:     Patient Findings     Positives No Problem Findings           OBJECTIVE    INR Protime   Date Value Ref Range Status   09/28/2017 2.7 (A) 0.86 - 1.14 Final       ASSESSMENT / PLAN  INR assessment THER    Recheck INR In: 4 WEEKS    INR Location Clinic      Anticoagulation Summary as of 9/28/2017     INR goal 2.0-3.0   Today's INR 2.7   Maintenance plan 2.5 mg (5 mg x 0.5) on Tue; 5 mg (5 mg x 1) all other days   Full instructions 2.5 mg on Tue; 5 mg all other days   Weekly total 32.5 mg   No change documented Elodia Campbell RN   Plan last modified Elodia Campbell RN (9/15/2016)   Next INR check 10/23/2017   Priority INR   Target end date     Indications   Long-term (current) use of anticoagulants [Z79.01] [Z79.01]  Atrial fibrillation (H) [I48.91] [I48.91]         Anticoagulation Episode Summary     INR check location     Preferred lab     Send INR reminders to [facility]    Comments Expect FAXED INR results from [facility] in [place], [place]. Phone: [phone].  General Results#: [phone]  Call pt's cell in [place] with results/plan: [phone]      Anticoagulation Care Providers     Provider Role Specialty Phone number    Ian Tracy MD Referring Internal Medicine [phone]            See the Encounter Report to view Anticoagulation Flowsheet and Dosing Calendar (Go to Encounters tab in chart review, and find the Anticoagulation Therapy Visit)    Dosage adjustment made based on physician directed care plan.    Elodia Campbell RN

## 2017-09-28 NOTE — NURSING NOTE
"Chief Complaint   Patient presents with     RECHECK       Initial /70 (BP Location: Right arm, Patient Position: Chair, Cuff Size: Adult Regular)  Pulse 64  Temp 98  F (36.7  C) (Oral)  Ht 5' 4\" (1.626 m)  Wt 140 lb (63.5 kg)  SpO2 97%  Breastfeeding? No  BMI 24.03 kg/m2 Estimated body mass index is 24.03 kg/(m^2) as calculated from the following:    Height as of this encounter: 5' 4\" (1.626 m).    Weight as of this encounter: 140 lb (63.5 kg).  Medication Reconciliation: complete     Nayeli Melton MA     "

## 2017-10-10 ENCOUNTER — OFFICE VISIT (OUTPATIENT)
Dept: FAMILY MEDICINE | Facility: CLINIC | Age: 72
End: 2017-10-10
Payer: MEDICARE

## 2017-10-10 VITALS
HEIGHT: 64 IN | HEART RATE: 54 BPM | OXYGEN SATURATION: 95 % | TEMPERATURE: 98.2 F | WEIGHT: 138 LBS | SYSTOLIC BLOOD PRESSURE: 113 MMHG | BODY MASS INDEX: 23.56 KG/M2 | DIASTOLIC BLOOD PRESSURE: 66 MMHG

## 2017-10-10 DIAGNOSIS — F43.22 ADJUSTMENT DISORDER WITH ANXIOUS MOOD: ICD-10-CM

## 2017-10-10 DIAGNOSIS — Z23 NEED FOR PROPHYLACTIC VACCINATION AND INOCULATION AGAINST INFLUENZA: ICD-10-CM

## 2017-10-10 DIAGNOSIS — I48.0 PAROXYSMAL ATRIAL FIBRILLATION (H): ICD-10-CM

## 2017-10-10 DIAGNOSIS — E78.5 HYPERLIPIDEMIA LDL GOAL <100: ICD-10-CM

## 2017-10-10 DIAGNOSIS — Z17.0 MALIGNANT NEOPLASM OF BREAST IN FEMALE, ESTROGEN RECEPTOR POSITIVE, UNSPECIFIED LATERALITY, UNSPECIFIED SITE OF BREAST (H): ICD-10-CM

## 2017-10-10 DIAGNOSIS — Z90.13 H/O BILATERAL MASTECTOMY: ICD-10-CM

## 2017-10-10 DIAGNOSIS — C50.919 MALIGNANT NEOPLASM OF BREAST IN FEMALE, ESTROGEN RECEPTOR POSITIVE, UNSPECIFIED LATERALITY, UNSPECIFIED SITE OF BREAST (H): ICD-10-CM

## 2017-10-10 DIAGNOSIS — I10 BENIGN ESSENTIAL HYPERTENSION: ICD-10-CM

## 2017-10-10 DIAGNOSIS — E11.9 TYPE 2 DIABETES MELLITUS WITHOUT COMPLICATION, WITHOUT LONG-TERM CURRENT USE OF INSULIN (H): Primary | ICD-10-CM

## 2017-10-10 LAB — HBA1C MFR BLD: 6.5 % (ref 4.3–6)

## 2017-10-10 PROCEDURE — 83036 HEMOGLOBIN GLYCOSYLATED A1C: CPT | Performed by: INTERNAL MEDICINE

## 2017-10-10 PROCEDURE — 99207 C FOOT EXAM  NO CHARGE: CPT | Performed by: INTERNAL MEDICINE

## 2017-10-10 PROCEDURE — 99215 OFFICE O/P EST HI 40 MIN: CPT | Mod: 25 | Performed by: INTERNAL MEDICINE

## 2017-10-10 PROCEDURE — 90686 IIV4 VACC NO PRSV 0.5 ML IM: CPT | Performed by: INTERNAL MEDICINE

## 2017-10-10 PROCEDURE — G0008 ADMIN INFLUENZA VIRUS VAC: HCPCS | Performed by: INTERNAL MEDICINE

## 2017-10-10 PROCEDURE — 36415 COLL VENOUS BLD VENIPUNCTURE: CPT | Performed by: INTERNAL MEDICINE

## 2017-10-10 NOTE — PROGRESS NOTES
SUBJECTIVE:   Catalina Marie is a 72 year old female who presents to clinic today for the following health issues:    Patient reports 2 episodes of atrial fibrillation. The first episode was last Thursday from 3 AM to 3 PM with a rate of 109, 7 days after he daughter went to the . The second episode was last night and began at 6 PM, she reports she fell asleep in afib at 1 AM and woke with a regular rate and rhythm at 5:15 AM. Last night, rate ranged from . Patient also reports severe bloating and indigestion last night after she ate a dinner consisting of potato soup and salad. She wonders if afib could be correlated with diet. Patient eats 2 table spoons of yogurt daily and does not take a probilotic. Endorses bloating almost daily.      She has been experiencing significantly increased stress since the beginning of the summer due to the termination of her daughters marriage. She notes stress has subsequently increased since last visit. Patient has a history of anxiety and reports shortness of breath for half a day, daily. Patient sees improvement soon as she will be moving to [place]. She reports current mood is 2/10, with 1 being life threatening.     Patient reports she received 225 mg atenolol. She also requests the brand Myelin as she does not believe other brands work as well. Digoxin has decreased to 0.5 tablet.     Patient wonders if she should receive regular or high potency influenza immunization. Patient requests records for other providers in [place].     Diabetes Follow-up      Patient is checking blood sugars: not at all    Diabetic concerns: None     Symptoms of hypoglycemia (low blood sugar): none     Paresthesias (numbness or burning in feet) or sores: No     Date of last diabetic eye exam: 10/09/17    Hyperlipidemia Follow-Up      Rate your low fat/cholesterol diet?: good    Taking statin?  Yes, no muscle aches from statin    Other lipid medications/supplements?:   none    Hypertension Follow-up      Outpatient blood pressures are being checked at home.  Results are good.    Low Salt Diet: no added salt    Amount of exercise or physical activity: 4-5 days/week for an average of 30-45 minutes    Problems taking medications regularly: No    Medication side effects: none    Diet: low salt and low fat/cholesterol    Problem list and histories reviewed & adjusted, as indicated.  Additional history: as documented    Current Outpatient Prescriptions   Medication Sig Dispense Refill     felodipine ER (PLENDIL) 5 MG 24 hr tablet Take 1 tablet (5 mg) by mouth 2 times daily 180 tablet 3     atenolol (TENORMIN) 100 MG tablet Take 1 tablet (100 mg) by mouth daily 30 tablet 3     atenolol (TENORMIN) 50 MG tablet Take 1 tablet (50 mg) by mouth daily (Patient taking differently: Take 50 mg by mouth daily Patient is to take 1/2 tablet twice daily.) 45 tablet 3     warfarin (COUMADIN) 5 MG tablet TAKE ONE-HALF TO ONE TABLET BY MOUTH DAILY OR AS DIRECTED BY INR CLINIC 90 tablet 0     furosemide (LASIX) 20 MG tablet Take 1 tablet (20 mg) by mouth 2 times daily 180 tablet 1     propafenone (RYTHMOL) 150 MG TABS tablet Take 1 tablet (150 mg) by mouth 3 times daily Takes 225mg in AM and afternoon and 112.5mg (1/2 tablet) in evening (Patient taking differently: Take 150 mg by mouth 3 times daily Takes 150mg in AM at 5pm 75mg and at bedtime patient takes 150mg) 270 tablet 3     FENOFIBRATE PO Take 160 mg by mouth daily       SERTRALINE HCL PO Take 50 mg by mouth every evening       WARFARIN SODIUM PO Takes 5mg daily except 2.5mg on Tuesdays        Biotin 3 MG TABS Take by mouth daily (unknown dose)       carboxymethylcellulose (CELLUVISC/REFRESH LIQUIGEL) 1 % ophthalmic solution Place 1 drop into both eyes 4 times daily as needed for dry eyes       atorvastatin (LIPITOR) 10 MG tablet Take 1/2 tablet by mouth daily. 90 tablet 3     lisinopril (PRINIVIL/ZESTRIL) 40 MG tablet TAKE 1 TABLET BY MOUTH EVERY  "DAY 90 tablet 3     DIGOX 250 MCG tablet TAKE 1 TABLET BY MOUTH EVERY DAY (Patient taking differently: TAKE 1/2 TABLET BY MOUTH EVERY DAY) 90 tablet 3     verapamil (VERELAN) 240 MG CP24 24 hr capsule TAKE 1 CAPSULE BY MOUTH EVERY DAY 90 capsule 3     LORazepam (ATIVAN) 0.5 MG tablet Take 1 tablet (0.5 mg) by mouth every 8 hours as needed for anxiety 20 tablet 0     Calcium Carbonate-Vitamin D (CALCIUM + D PO) Take by mouth daily       Allergies   Allergen Reactions     Penicillins        Reviewed and updated as needed this visit by clinical staffTobacco  Allergies  Meds  Problems  Med Hx  Surg Hx  Fam Hx  Soc Hx        Reviewed and updated as needed this visit by Provider       ROS:  Constitutional, HEENT, cardiovascular, pulmonary, gi and gu systems are negative, except as otherwise noted.    This document serves as a record of the services and decisions personally performed and made by Ian Tracy MD. It was created on his/her behalf by Hanna Tse, a trained medical scribe. The creation of this document is based the provider's statements to the medical scribe.  Scribe Hanna Tse 10:32 AM, October 10, 2017     OBJECTIVE:   /66 (BP Location: Left arm, Cuff Size: Adult Regular)  Pulse 54  Temp 98.2  F (36.8  C) (Tympanic)  Ht 1.626 m (5' 4\")  Wt 62.6 kg (138 lb)  SpO2 95%  BMI 23.69 kg/m2  Body mass index is 23.69 kg/(m^2).  Neck was supple without adenopathy or thyromegaly her carotids were normal without bruits  Chest clear to auscultation and percussion  Cardiovascular S1 and S2 are physiologic without murmurs or gallops. Normal sinus rhythm with a well controlled ventricular response   Abdomen bowel sounds were normal.  There is no palpable mass or organomegaly  Extremities nontender with 1+ pedal edema  Pulses pedal pulses are as described otherwise his pulses are bilaterally symmetrical throughout without bruits  Skin without significant abnormality. Feet clean and dry "     ASSESSMENT/PLAN:   1. Type 2 diabetes mellitus without complication, without long-term current use of insulin (H)  Medication change will be discussed, if needed, with return of lab results.   - Hemoglobin A1c  - C FOOT EXAM  NO CHARGE    2. Paroxysmal atrial fibrillation (H)  Currently quiescent. Suspect increase episodic atrial fibrillation is related to stress. See #6.    3. Malignant neoplasm of breast in female, estrogen receptor positive, unspecified laterality, unspecified site of breast (H)    4. H/O bilateral mastectomy    5. Benign essential hypertension  Under great control. Continue medication unchanged.     6. Adjustment disorder with anxious mood  With acute increased stress, increased to 1.5 tablets sertraline daily.     7. Hyperlipidemia LDL goal <100    Follow up tonight with lab results and Rx information. Records were printed for patient to bring to [place]. Prescription was given for INR for the winter.    Except otherwise noted as above, all conditions are stable and medications are tolerated well. Continue related medications unchanged.     Ian Tracy MD  [facility]    The information in this document, created by the medical scribe for me, accurately reflects the services I personally performed and the decisions made by me. I have reviewed and approved this document for accuracy prior to leaving the patient care area.  Ian Tracy MD  10:28 AM, 10/10/17

## 2017-10-10 NOTE — MR AVS SNAPSHOT
After Visit Summary   10/10/2017    Catalina Marie    MRN: [id number]           Patient Information     Date Of Birth          1945        Visit Information        Provider Department      10/10/2017 10:00 AM Ian Tracy MD [facility]        Today's Diagnoses     Type 2 diabetes mellitus without complication, without long-term current use of insulin (H)    -  1    Paroxysmal atrial fibrillation (H)        Malignant neoplasm of breast in female, estrogen receptor positive, unspecified laterality, unspecified site of breast (H)        H/O bilateral mastectomy        Benign essential hypertension        Adjustment disorder with anxious mood        Hyperlipidemia LDL goal <100        Need for prophylactic vaccination and inoculation against influenza           Follow-ups after your visit        Your next 10 appointments already scheduled     Oct 23, 2017 10:00 AM CDT   Anticoagulation Visit with  ANTICOAGULATION CLINIC   [facility] [place] ([facility])    [address]  [place] [address] [phone]              Who to contact     If you have questions or need follow up information about today's clinic visit or your schedule please contact [facility] directly at [phone].  Normal or non-critical lab and imaging results will be communicated to you by [employer], letter or phone within 4 business days after the clinic has received the results. If you do not hear from us within 7 days, please contact the clinic through [employer] or phone. If you have a critical or abnormal lab result, we will notify you by phone as soon as possible.  Submit refill requests through [employer] or call your pharmacy and they will forward the refill request to us. Please allow 3 business days for your refill to be completed.          Additional Information About Your Visit        [employer] Information     [employer] gives you secure access to your electronic health record. If  "you see a primary care provider, you can also send messages to your care team and make appointments. If you have questions, please call your primary care clinic.  If you do not have a primary care provider, please call [phone] and they will assist you.        Care EveryWhere ID     This is your Care EveryWhere ID. This could be used by other organizations to access your [facility] medical records  [id number]        Your Vitals Were     Pulse Temperature Height Pulse Oximetry Breastfeeding? BMI (Body Mass Index)    54 98.2  F (36.8  C) (Tympanic) 5' 4\" (1.626 m) 95% No 23.69 kg/m2       Blood Pressure from Last 3 Encounters:   10/10/17 113/66   09/28/17 133/70   09/19/17 135/65    Weight from Last 3 Encounters:   10/10/17 138 lb (62.6 kg)   09/28/17 140 lb (63.5 kg)   09/19/17 137 lb (62.1 kg)              We Performed the Following     ADMIN INFLUENZA (For MEDICARE Patients ONLY) []     C FOOT EXAM  NO CHARGE     FLU VAC, SPLIT VIRUS IM > 3 YO (QUADRIVALENT) [91655]     Hemoglobin A1c          Today's Medication Changes          These changes are accurate as of: 10/10/17 11:59 PM.  If you have any questions, ask your nurse or doctor.               These medicines have changed or have updated prescriptions.        Dose/Directions    * atenolol 50 MG tablet   Commonly known as:  TENORMIN   This may have changed:  additional instructions   Used for:  Paroxysmal atrial fibrillation (H)   Changed by:  Ian Tracy MD        Dose:  50 mg   Take 1 tablet (50 mg) by mouth daily   Quantity:  45 tablet   Refills:  3       * atenolol 100 MG tablet   Commonly known as:  TENORMIN   This may have changed:  Another medication with the same name was changed. Make sure you understand how and when to take each.   Used for:  Paroxysmal atrial fibrillation (H)   Changed by:  Ian Tracy MD        Dose:  100 mg   Take 1 tablet (100 mg) by mouth daily   Quantity:  30 tablet   Refills:  3       DIGOX 250 MCG tablet "   This may have changed:  See the new instructions.   Used for:  Benign essential hypertension   Generic drug:  digoxin        TAKE 1 TABLET BY MOUTH EVERY DAY   Quantity:  90 tablet   Refills:  3       propafenone 150 MG Tabs tablet   Commonly known as:  RYTHMOL   This may have changed:  additional instructions   Used for:  Paroxysmal atrial fibrillation (H)        Dose:  150 mg   Take 1 tablet (150 mg) by mouth 3 times daily Takes 225mg in AM and afternoon and 112.5mg (1/2 tablet) in evening   Quantity:  270 tablet   Refills:  3       * Notice:  This list has 2 medication(s) that are the same as other medications prescribed for you. Read the directions carefully, and ask your doctor or other care provider to review them with you.             Primary Care Provider Office Phone # Fax #    Ian Tracy [phone] [address] [address]        Equal Access to Services     [facility]: Hadii brandyn ku hadasho Soomaali, waaxda luqadaha, qaybta kaalmada adeegyada, ed romoin hayjordan sharma . So [facility] [phone].    ATENCIÓN: Si habla español, tiene a dahl disposición servicios gratuitos de asistencia lingüística. Hamida al [phone].    We comply with applicable federal civil rights laws and [place] laws. We do not discriminate on the basis of race, color, national origin, age, disability, sex, sexual orientation, or gender identity.            Thank you!     Thank you for choosing [facility]  for your care. Our goal is always to provide you with excellent care. Hearing back from our patients is one way we can continue to improve our services. Please take a few minutes to complete the written survey that you may receive in the mail after your visit with us. Thank you!             Your Updated Medication List - Protect others around you: Learn how to safely use, store and throw away your medicines at www.disposemymeds.org.          This list  is accurate as of: 10/10/17 11:59 PM.  Always use your most recent med list.                   Brand Name Dispense Instructions for use Diagnosis    * atenolol 50 MG tablet    TENORMIN    45 tablet    Take 1 tablet (50 mg) by mouth daily    Paroxysmal atrial fibrillation (H)       * atenolol 100 MG tablet    TENORMIN    30 tablet    Take 1 tablet (100 mg) by mouth daily    Paroxysmal atrial fibrillation (H)       atorvastatin 10 MG tablet    LIPITOR    90 tablet    Take 1/2 tablet by mouth daily.    Hyperlipidemia LDL goal <100       Biotin 3 MG Tabs      Take by mouth daily (unknown dose)        CALCIUM + D PO      Take by mouth daily        carboxymethylcellulose 1 % ophthalmic solution    CELLUVISC/REFRESH LIQUIGEL     Place 1 drop into both eyes 4 times daily as needed for dry eyes        DIGOX 250 MCG tablet   Generic drug:  digoxin     90 tablet    TAKE 1 TABLET BY MOUTH EVERY DAY    Benign essential hypertension       felodipine ER 5 MG 24 hr tablet    PLENDIL    180 tablet    Take 1 tablet (5 mg) by mouth 2 times daily    Benign essential hypertension       FENOFIBRATE PO      Take 160 mg by mouth daily        furosemide 20 MG tablet    LASIX    180 tablet    Take 1 tablet (20 mg) by mouth 2 times daily    Essential hypertension       lisinopril 40 MG tablet    PRINIVIL/ZESTRIL    90 tablet    TAKE 1 TABLET BY MOUTH EVERY DAY    Benign essential hypertension       LORazepam 0.5 MG tablet    ATIVAN    20 tablet    Take 1 tablet (0.5 mg) by mouth every 8 hours as needed for anxiety    Long-term (current) use of anticoagulants       propafenone 150 MG Tabs tablet    RYTHMOL    270 tablet    Take 1 tablet (150 mg) by mouth 3 times daily Takes 225mg in AM and afternoon and 112.5mg (1/2 tablet) in evening    Paroxysmal atrial fibrillation (H)       SERTRALINE HCL PO      Take 50 mg by mouth every evening        verapamil 240 MG Cp24 24 hr capsule    VERELAN    90 capsule    TAKE 1 CAPSULE BY MOUTH EVERY DAY     Benign essential hypertension       * WARFARIN SODIUM PO      Takes 5mg daily except 2.5mg on Tuesdays        * warfarin 5 MG tablet    COUMADIN    90 tablet    TAKE ONE-HALF TO ONE TABLET BY MOUTH DAILY OR AS DIRECTED BY INR CLINIC    Long-term (current) use of anticoagulants       * Notice:  This list has 4 medication(s) that are the same as other medications prescribed for you. Read the directions carefully, and ask your doctor or other care provider to review them with you.

## 2017-10-10 NOTE — PROGRESS NOTES
Injectable Influenza Immunization Documentation    1.  Is the person to be vaccinated sick today?   No    2. Does the person to be vaccinated have an allergy to a component   of the vaccine?   No    3. Has the person to be vaccinated ever had a serious reaction   to influenza vaccine in the past?   No    4. Has the person to be vaccinated ever had Guillain-Barré syndrome?   No    Form completed by Betty Redmond CMA  Prior to injection verified patient identity using patient's name and date of birth.

## 2017-10-13 DIAGNOSIS — Z79.01 LONG-TERM (CURRENT) USE OF ANTICOAGULANTS: ICD-10-CM

## 2017-10-13 DIAGNOSIS — I48.91 ATRIAL FIBRILLATION (H): ICD-10-CM

## 2017-10-13 NOTE — TELEPHONE ENCOUNTER
warfarin (COUMADIN) 5 MG tablet      Last Written Prescription Date: 7/5/2017  Last Fill Qty: 90, # refills: 0  Last Office Visit with G, P or [facility] prescribing provider: 10/10/2017       Date and Result of Last PT/INR:   Lab Results   Component Value Date    INR 2.7 09/28/2017    INR 3.1 09/05/2017    INR 3.80 06/13/2017    INR 3.12 06/12/2017    PT 39.8 04/29/2016    PT 24.3 02/09/2016

## 2017-10-16 RX ORDER — WARFARIN SODIUM 5 MG/1
TABLET ORAL
Qty: 90 TABLET | Refills: 0 | Status: SHIPPED | OUTPATIENT
Start: 2017-10-16 | End: 2018-01-13

## 2017-10-16 NOTE — TELEPHONE ENCOUNTER
Prescription approved per [facility] Refill Protocol.  Maylin HOWARD RN

## 2017-10-18 ENCOUNTER — MYC MEDICAL ADVICE (OUTPATIENT)
Dept: FAMILY MEDICINE | Facility: CLINIC | Age: 72
End: 2017-10-18

## 2017-10-23 ENCOUNTER — TELEPHONE (OUTPATIENT)
Dept: FAMILY MEDICINE | Facility: CLINIC | Age: 72
End: 2017-10-23

## 2017-10-23 ENCOUNTER — OFFICE VISIT (OUTPATIENT)
Dept: FAMILY MEDICINE | Facility: CLINIC | Age: 72
End: 2017-10-23
Payer: MEDICARE

## 2017-10-23 ENCOUNTER — ANTICOAGULATION THERAPY VISIT (OUTPATIENT)
Dept: NURSING | Facility: CLINIC | Age: 72
End: 2017-10-23
Payer: MEDICARE

## 2017-10-23 VITALS
DIASTOLIC BLOOD PRESSURE: 62 MMHG | OXYGEN SATURATION: 96 % | WEIGHT: 138 LBS | BODY MASS INDEX: 23.56 KG/M2 | HEART RATE: 52 BPM | SYSTOLIC BLOOD PRESSURE: 124 MMHG | TEMPERATURE: 98.7 F | HEIGHT: 64 IN

## 2017-10-23 DIAGNOSIS — I48.0 PAROXYSMAL ATRIAL FIBRILLATION (H): ICD-10-CM

## 2017-10-23 DIAGNOSIS — I48.0 PAROXYSMAL ATRIAL FIBRILLATION (H): Primary | ICD-10-CM

## 2017-10-23 DIAGNOSIS — I10 BENIGN ESSENTIAL HYPERTENSION: ICD-10-CM

## 2017-10-23 DIAGNOSIS — F43.22 ADJUSTMENT DISORDER WITH ANXIOUS MOOD: ICD-10-CM

## 2017-10-23 DIAGNOSIS — Z79.01 LONG-TERM (CURRENT) USE OF ANTICOAGULANTS: ICD-10-CM

## 2017-10-23 DIAGNOSIS — E11.9 TYPE 2 DIABETES MELLITUS WITHOUT COMPLICATION, WITHOUT LONG-TERM CURRENT USE OF INSULIN (H): ICD-10-CM

## 2017-10-23 DIAGNOSIS — I50.31 ACUTE DIASTOLIC CONGESTIVE HEART FAILURE (H): ICD-10-CM

## 2017-10-23 LAB
ANION GAP SERPL CALCULATED.3IONS-SCNC: 10 MMOL/L (ref 3–14)
BUN SERPL-MCNC: 20 MG/DL (ref 7–30)
CALCIUM SERPL-MCNC: 9.2 MG/DL (ref 8.5–10.1)
CHLORIDE SERPL-SCNC: 107 MMOL/L (ref 94–109)
CO2 SERPL-SCNC: 25 MMOL/L (ref 20–32)
CREAT SERPL-MCNC: 1.03 MG/DL (ref 0.52–1.04)
GFR SERPL CREATININE-BSD FRML MDRD: 52 ML/MIN/1.7M2
GLUCOSE SERPL-MCNC: 115 MG/DL (ref 70–99)
INR POINT OF CARE: 3.4 (ref 0.86–1.14)
NT-PROBNP SERPL-MCNC: 485 PG/ML (ref 0–125)
POTASSIUM SERPL-SCNC: 4.5 MMOL/L (ref 3.4–5.3)
SODIUM SERPL-SCNC: 142 MMOL/L (ref 133–144)

## 2017-10-23 PROCEDURE — 99207 ZZC NO CHARGE NURSE ONLY: CPT

## 2017-10-23 PROCEDURE — 80048 BASIC METABOLIC PNL TOTAL CA: CPT | Performed by: INTERNAL MEDICINE

## 2017-10-23 PROCEDURE — 36416 COLLJ CAPILLARY BLOOD SPEC: CPT

## 2017-10-23 PROCEDURE — 85610 PROTHROMBIN TIME: CPT | Mod: QW

## 2017-10-23 PROCEDURE — 83880 ASSAY OF NATRIURETIC PEPTIDE: CPT | Performed by: INTERNAL MEDICINE

## 2017-10-23 PROCEDURE — 99215 OFFICE O/P EST HI 40 MIN: CPT | Performed by: INTERNAL MEDICINE

## 2017-10-23 RX ORDER — PROPAFENONE HYDROCHLORIDE 150 MG/1
150 TABLET, COATED ORAL 3 TIMES DAILY
Qty: 270 TABLET | Refills: 3 | Status: CANCELLED | OUTPATIENT
Start: 2017-10-23

## 2017-10-23 NOTE — MR AVS SNAPSHOT
Catalina Marie   10/23/2017 10:00 AM   Anticoagulation Therapy Visit    Description:  72 year old female   Provider:   ANTICOAGULATION CLINIC   Department:  [facility] Nurse           INR as of 10/23/2017     Today's INR 3.4!      Anticoagulation Summary as of 10/23/2017     INR goal 2.0-3.0   Today's INR 3.4!   Full instructions 10/27: 2.5 mg; Otherwise 2.5 mg on Tue; 5 mg all other days   Next INR check 10/30/2017    Indications   Long-term (current) use of anticoagulants [Z79.01] [Z79.01]  Atrial fibrillation (H) [I48.91] [I48.91]         Your next Anticoagulation Clinic appointment(s)     Oct 30, 2017 11:15 AM CDT   Anticoagulation Visit with  ANTICOAGULATION CLINIC   [facility] [place] ([facility])    [address]  [place] [address]   [phone]              Contact Numbers     Clinic Number:         October 2017 Details    Sun Mon Tue Wed Thu Fri Sat     1               2               3               4               5               6               7                 8               9               10               11               12               13               14                 15               16               17               18               19               20               21                 22               23      5 mg   See details      24      2.5 mg         25      5 mg         26      5 mg         27      2.5 mg         28      5 mg           29      5 mg         30            31                    Date Details   10/23 This INR check       Date of next INR:  10/30/2017         How to take your warfarin dose     To take:  2.5 mg Take 0.5 of a 5 mg tablet.    To take:  5 mg Take 1 of the 5 mg tablets.

## 2017-10-23 NOTE — PROGRESS NOTES
SUBJECTIVE:   Catalina Marie is a 72 year old female who presents to clinic today for the following health issues:    Patient with history of atrial fibrillation presents to the clinic to follow up on her dyspnea. She states that she has been having chest tightness with associated dyspnea with activity. She states her dyspnea is exacerbated by walking and climbing stairs etc. She denies any dyspnea while sleeping or sitting.  She has history of atrial fibrillation and has been having constant episodes of AFIB since starting propafenone. She states that her last episode of AFIB was on Saturday, she reports that the episode persisted for around 12 hours and finally resolved when she woke up the next day. She has occasional episodes of pretibial edema if she sits for long periods of time.      Problem list and histories reviewed & adjusted, as indicated.  Additional history: as documented    Current Outpatient Prescriptions   Medication Sig Dispense Refill     warfarin (COUMADIN) 5 MG tablet TAKE ONE-HALF TO ONE TABLET BY MOUTH DAILY OR AS DIRECTED BY INR CLINIC 90 tablet 0     propafenone (RYTHMOL) 150 MG TABS tablet Take 1 tablet (150 mg) by mouth 3 times daily 270 tablet 3     sertraline (ZOLOFT) 50 MG tablet Sig 75mg or 1 1/2 tabs po qd 135 tablet 3     felodipine ER (PLENDIL) 5 MG 24 hr tablet Take 1 tablet (5 mg) by mouth 2 times daily 180 tablet 3     atenolol (TENORMIN) 100 MG tablet Take 1 tablet (100 mg) by mouth daily 30 tablet 3     atenolol (TENORMIN) 50 MG tablet Take 1 tablet (50 mg) by mouth daily (Patient taking differently: Take 50 mg by mouth daily Patient is to take 1/2 tablet twice daily.) 45 tablet 3     warfarin (COUMADIN) 5 MG tablet TAKE ONE-HALF TO ONE TABLET BY MOUTH DAILY OR AS DIRECTED BY INR CLINIC 90 tablet 0     furosemide (LASIX) 20 MG tablet Take 1 tablet (20 mg) by mouth 2 times daily 180 tablet 1     FENOFIBRATE PO Take 160 mg by mouth daily       WARFARIN SODIUM PO Takes 5mg  "daily except 2.5mg on Tuesdays        Biotin 3 MG TABS Take by mouth daily (unknown dose)       carboxymethylcellulose (CELLUVISC/REFRESH LIQUIGEL) 1 % ophthalmic solution Place 1 drop into both eyes 4 times daily as needed for dry eyes       atorvastatin (LIPITOR) 10 MG tablet Take 1/2 tablet by mouth daily. 90 tablet 3     lisinopril (PRINIVIL/ZESTRIL) 40 MG tablet TAKE 1 TABLET BY MOUTH EVERY DAY 90 tablet 3     DIGOX 250 MCG tablet TAKE 1 TABLET BY MOUTH EVERY DAY (Patient taking differently: TAKE 1/2 TABLET BY MOUTH EVERY DAY) 90 tablet 3     verapamil (VERELAN) 240 MG CP24 24 hr capsule TAKE 1 CAPSULE BY MOUTH EVERY DAY 90 capsule 3     LORazepam (ATIVAN) 0.5 MG tablet Take 1 tablet (0.5 mg) by mouth every 8 hours as needed for anxiety 20 tablet 0     Calcium Carbonate-Vitamin D (CALCIUM + D PO) Take by mouth daily       Allergies   Allergen Reactions     Penicillins      Reviewed and updated as needed this visit by clinical staff  Tobacco  Allergies  Meds  Problems  Med Hx  Surg Hx  Fam Hx  Soc Hx        Reviewed and updated as needed this visit by Provider       ROS:  Constitutional, HEENT, cardiovascular, pulmonary, gi and gu systems are negative, except as otherwise noted.    This document serves as a record of the services and decisions personally performed and made by Ian Tracy MD. It was created on his/her behalf by Momo Santillan, a trained medical scribe. The creation of this document is based the provider's statements to the medical scribe.  Scribmichael Santillan 10:46 AM, October 23, 2017    OBJECTIVE:   /62 (BP Location: Left arm, Cuff Size: Adult Regular)  Pulse 52  Temp 98.7  F (37.1  C) (Tympanic)  Ht 1.626 m (5' 4\")  Wt 62.6 kg (138 lb)  SpO2 96%  BMI 23.69 kg/m2  Body mass index is 23.69 kg/(m^2).    Neck was supple without adenopathy or thyromegaly her carotids were normal without bruits  Chest clear to auscultation and percussion  Cardiovascular S1 and S2 are " physiologic without murmurs or gallops  Abdomen bowel sounds were normal.  There is no palpable mass or organomegaly  Extremities nontender with 1+ pretibial edema  Pulses pedal pulses are as described otherwise his pulses are bilaterally symmetrical throughout without bruits  Skin without significant abnormality  Neck veins were elevated with a positiive HJR    Diagnostic Test Results:  Results for orders placed or performed in visit on 10/23/17 (from the past 24 hour(s))   INR point of care   Result Value Ref Range    INR Protime 3.4 (A) 0.86 - 1.14       ASSESSMENT/PLAN:     1. Paroxysmal atrial fibrillation (H)  - Basic metabolic panel    2. Benign essential hypertension  -Well managed with current therapies.    3. Type 2 diabetes mellitus without complication, without long-term current use of insulin (H)    4. Adjustment disorder with anxious mood    5. Acute diastolic congestive heart failure (H)  - BNP-N terminal pro    -Recommended patient stay on the current dose of propafenone. She will return if she experiences an other episodes of atrial fibrillation. She is returning next Monday October 30, for a repeat INR    Ian Tracy MD  [facility]    The information in this document, created by the medical scribe for me, accurately reflects the services I personally performed and the decisions made by me. I have reviewed and approved this document for accuracy prior to leaving the patient care area.  Ian Tracy MD  11:04 AM, 10/23/17

## 2017-10-23 NOTE — MR AVS SNAPSHOT
After Visit Summary   10/23/2017    Catalina Marie    MRN: [id number]           Patient Information     Date Of Birth          1945        Visit Information        Provider Department      10/23/2017 10:45 AM Ian Tracy MD [facility]        Today's Diagnoses     Paroxysmal atrial fibrillation (H)    -  1    Benign essential hypertension        Type 2 diabetes mellitus without complication, without long-term current use of insulin (H)        Adjustment disorder with anxious mood        Acute diastolic congestive heart failure (H)           Follow-ups after your visit        Your next 10 appointments already scheduled     Oct 30, 2017 11:15 AM CDT   Anticoagulation Visit with  [facility]AGULATION CLINIC   [facility]    [address]  [facility] [address] [phone]              Who to contact     If you have questions or need follow up information about today's clinic visit or your schedule please contact [facility] directly at [phone].  Normal or non-critical lab and imaging results will be communicated to you by "[employer]hart, letter or phone within 4 business days after the clinic has received the results. If you do not hear from us within 7 days, please contact the clinic through [employer] or phone. If you have a critical or abnormal lab result, we will notify you by phone as soon as possible.  Submit refill requests through [email or web address] or call your pharmacy and they will forward the refill request to us. Please allow 3 business days for your refill to be completed.          Additional Information About Your Visit        MyChart Information     [email or web address] gives you secure access to your electronic health record. If you see a primary care provider, you can also send messages to your care team and make appointments. If you have questions, please call your primary care clinic.  If you do not have a primary care provider, please call  "[phone] and they will assist you.        Care EveryWhere ID     This is your Care EveryWhere ID. This could be used by other organizations to access your [facility] medical records  [id number]        Your Vitals Were     Pulse Temperature Height Pulse Oximetry BMI (Body Mass Index)       52 98.7  F (37.1  C) (Tympanic) 5' 4\" (1.626 m) 96% 23.69 kg/m2        Blood Pressure from Last 3 Encounters:   10/23/17 124/62   10/10/17 113/66   09/28/17 133/70    Weight from Last 3 Encounters:   10/23/17 138 lb (62.6 kg)   10/10/17 138 lb (62.6 kg)   09/28/17 140 lb (63.5 kg)              We Performed the Following     Basic metabolic panel     BNP-N terminal pro          Today's Medication Changes          These changes are accurate as of: 10/23/17 11:59 PM.  If you have any questions, ask your nurse or doctor.               These medicines have changed or have updated prescriptions.        Dose/Directions    * atenolol 50 MG tablet   Commonly known as:  TENORMIN   This may have changed:  additional instructions   Used for:  Paroxysmal atrial fibrillation (H)   Changed by:  Ian Tracy MD        Dose:  50 mg   Take 1 tablet (50 mg) by mouth daily   Quantity:  45 tablet   Refills:  3       * atenolol 100 MG tablet   Commonly known as:  TENORMIN   This may have changed:  Another medication with the same name was changed. Make sure you understand how and when to take each.   Used for:  Paroxysmal atrial fibrillation (H)   Changed by:  Ian Tracy MD        Dose:  100 mg   Take 1 tablet (100 mg) by mouth daily   Quantity:  30 tablet   Refills:  3       DIGOX 250 MCG tablet   This may have changed:  See the new instructions.   Used for:  Benign essential hypertension   Generic drug:  digoxin        TAKE 1 TABLET BY MOUTH EVERY DAY   Quantity:  90 tablet   Refills:  3       * Notice:  This list has 2 medication(s) that are the same as other medications prescribed for you. Read the directions carefully, and ask your " doctor or other care provider to review them with you.             Primary Care Provider Office Phone # Fax #    Ian Tracy [phone] [address] [address]        Equal Access to Services     ANJANA JAMES : Hadii aad ku hadasho Soomaali, waaxda luqadaha, qaybta kaalmada adeegyada, waxay idiin hayaan adeeg manish lameghagabi ah. So [facility] [phone].    ATENCIÓN: Si habla español, tiene a dahl disposición servicios gratuitos de asistencia lingüística. Llame al [phone].    We comply with applicable federal civil rights laws and [place] laws. We do not discriminate on the basis of race, color, national origin, age, disability, sex, sexual orientation, or gender identity.            Thank you!     Thank you for choosing [facility]  for your care. Our goal is always to provide you with excellent care. Hearing back from our patients is one way we can continue to improve our services. Please take a few minutes to complete the written survey that you may receive in the mail after your visit with us. Thank you!             Your Updated Medication List - Protect others around you: Learn how to safely use, store and throw away your medicines at www.disposemymeds.org.          This list is accurate as of: 10/23/17 11:59 PM.  Always use your most recent med list.                   Brand Name Dispense Instructions for use Diagnosis    * atenolol 50 MG tablet    TENORMIN    45 tablet    Take 1 tablet (50 mg) by mouth daily    Paroxysmal atrial fibrillation (H)       * atenolol 100 MG tablet    TENORMIN    30 tablet    Take 1 tablet (100 mg) by mouth daily    Paroxysmal atrial fibrillation (H)       atorvastatin 10 MG tablet    LIPITOR    90 tablet    Take 1/2 tablet by mouth daily.    Hyperlipidemia LDL goal <100       Biotin 3 MG Tabs      Take by mouth daily (unknown dose)        CALCIUM + D PO      Take by mouth daily        carboxymethylcellulose 1 % ophthalmic  solution    CELLUVISC/REFRESH LIQUIGEL     Place 1 drop into both eyes 4 times daily as needed for dry eyes        DIGOX 250 MCG tablet   Generic drug:  digoxin     90 tablet    TAKE 1 TABLET BY MOUTH EVERY DAY    Benign essential hypertension       felodipine ER 5 MG 24 hr tablet    PLENDIL    180 tablet    Take 1 tablet (5 mg) by mouth 2 times daily    Benign essential hypertension       FENOFIBRATE PO      Take 160 mg by mouth daily        furosemide 20 MG tablet    LASIX    180 tablet    Take 1 tablet (20 mg) by mouth 2 times daily    Essential hypertension       lisinopril 40 MG tablet    PRINIVIL/ZESTRIL    90 tablet    TAKE 1 TABLET BY MOUTH EVERY DAY    Benign essential hypertension       LORazepam 0.5 MG tablet    ATIVAN    20 tablet    Take 1 tablet (0.5 mg) by mouth every 8 hours as needed for anxiety    Long-term (current) use of anticoagulants       propafenone 150 MG Tabs tablet    RYTHMOL    270 tablet    Take 1 tablet (150 mg) by mouth 3 times daily    Paroxysmal atrial fibrillation (H)       sertraline 50 MG tablet    ZOLOFT    135 tablet    Sig 75mg or 1 1/2 tabs po qd    Adjustment disorder with anxious mood       verapamil 240 MG Cp24 24 hr capsule    VERELAN    90 capsule    TAKE 1 CAPSULE BY MOUTH EVERY DAY    Benign essential hypertension       * WARFARIN SODIUM PO      Takes 5mg daily except 2.5mg on Tuesdays        * warfarin 5 MG tablet    COUMADIN    90 tablet    TAKE ONE-HALF TO ONE TABLET BY MOUTH DAILY OR AS DIRECTED BY INR CLINIC    Long-term (current) use of anticoagulants       * warfarin 5 MG tablet    COUMADIN    90 tablet    TAKE ONE-HALF TO ONE TABLET BY MOUTH DAILY OR AS DIRECTED BY INR CLINIC    Long-term (current) use of anticoagulants, Atrial fibrillation (H)       * Notice:  This list has 5 medication(s) that are the same as other medications prescribed for you. Read the directions carefully, and ask your doctor or other care provider to review them with you.

## 2017-10-23 NOTE — PROGRESS NOTES
ANTICOAGULATION FOLLOW-UP CLINIC VISIT    Patient Name:  Catalina Marie  Date:  10/23/2017  Contact Type:  Face to Face    SUBJECTIVE:        OBJECTIVE    INR Protime   Date Value Ref Range Status   10/23/2017 3.4 (A) 0.86 - 1.14 Final       ASSESSMENT / PLAN  INR assessment SUPRA    Recheck INR In: 1 WEEK    INR Location Clinic      Anticoagulation Summary as of 10/23/2017     INR goal 2.0-3.0   Today's INR 3.4!   Maintenance plan 2.5 mg (5 mg x 0.5) on Tue; 5 mg (5 mg x 1) all other days   Full instructions 10/27: 2.5 mg; Otherwise 2.5 mg on Tue; 5 mg all other days   Weekly total 32.5 mg   Plan last modified Elodia Campbell RN (9/15/2016)   Next INR check 10/30/2017   Priority INR   Target end date     Indications   Long-term (current) use of anticoagulants [Z79.01] [Z79.01]  Atrial fibrillation (H) [I48.91] [I48.91]         Anticoagulation Episode Summary     INR check location     Preferred lab     Send INR reminders to [facility] ANT[facility]AG[facility]ATION    Comments Expect FAXED INR results from [facility] in [place], [place]. Phone: [phone].  General Results#: [phone]  Call pt's cell in [place] with results/plan: [phone]      Anticoagulation Care Providers     Provider Role Specialty Phone number    Ian Tracy MD Referring Internal Medicine [phone]            See the Encounter Report to view Anticoagulation Flowsheet and Dosing Calendar (Go to Encounters tab in chart review, and find the Anticoagulation Therapy Visit)    Dosage adjustment made based on physician directed care plan.  Pt at 3.4 today and is concerned with high level. Provided education on how INR dosed, but pt will be going to [facility] in 1-2 weeks.  Rechecking INR in clinic next Monday with slight dose change, and will dose for [place] transition based on this.  Gave pt printed INR Order to bring to [facility] in [place].  Pt will call/mychart with any issues with this.     Madhuri Edge RN

## 2017-10-23 NOTE — NURSING NOTE
"Chief Complaint   Patient presents with     Shortness of Breath     pt states since starting new med, rythmol, she has had more SOB       Initial /62 (BP Location: Left arm, Cuff Size: Adult Regular)  Pulse 52  Temp 98.7  F (37.1  C) (Tympanic)  Ht 5' 4\" (1.626 m)  Wt 138 lb (62.6 kg)  SpO2 96%  BMI 23.69 kg/m2 Estimated body mass index is 23.69 kg/(m^2) as calculated from the following:    Height as of this encounter: 5' 4\" (1.626 m).    Weight as of this encounter: 138 lb (62.6 kg).  Medication Reconciliation: complete     Lia Mariscal MA    "

## 2017-10-24 ENCOUNTER — TELEPHONE (OUTPATIENT)
Dept: FAMILY MEDICINE | Facility: CLINIC | Age: 72
End: 2017-10-24

## 2017-10-24 NOTE — TELEPHONE ENCOUNTER
I looked through insurance and this is coming back as a covered drug and no quantity limit issues as this medication is naturally prescribed at a TID dosing. Also I checked with the pharmacy and they are not aware of any insurance issues. The only abnormal thing that they are doing for this patient in regards to the Rythmol is trying to get it from a particular , but this should not cause any issues through insurance.    In any case, I called the patient to let her know that insurance will still cover the particular  as long as it is still a generic. Patient also wanted to mention to Dr Tracy that she is again experiencing another episode of afib that started about 20 min ago. Apparently patient has been experiencing this frequently and Dr Tracy knows about this. I will certainly inform Dr Tracy of her most recent episode today.    Lavelle France, [facility]

## 2017-10-24 NOTE — TELEPHONE ENCOUNTER
Reason for Call:  Other prescription    Detailed comments: propafenone (RYTHMOL) 150 MG TABS tablet  Needs to start a PA   The patient is required to pay for this out of her pocket and she needs it now    Phone Number Patient can be reached at: Home number on file [phone] (home)    Best Time: anytime    Can we leave a detailed message on this number? YES    Call taken on 10/24/2017 at 12:58 PM by Trista Karimi

## 2017-10-25 ENCOUNTER — TELEPHONE (OUTPATIENT)
Dept: FAMILY MEDICINE | Facility: CLINIC | Age: 72
End: 2017-10-25

## 2017-10-25 NOTE — TELEPHONE ENCOUNTER
"Went into afib last night at 6.  Not thinking too much about it, still have it right now.    Feeling SOB and lightheaded - normal for pt's AFIB which pt has been having frequent episodes lately, last was Saturday.  Pt BP today 72/56,  Drank lots of water and bp started going up, then back down to 77/56  Reports \"I feel like shit\" Could probably make it there slowly.   Denies Chest pain or worsening condition since 6 pm.  PCP saw pt 2 days ago with Plan to follow up in clinic if another episode of AFIB.    Did advise should  likely go to ER, pt refused.  Advised if any worsening condition before call back, to call 911 which pt agreed to.    Huddled with PCP who states will call pt back himself.    Call back  [phone]  Madhuri Edge RN        "

## 2017-10-25 NOTE — TELEPHONE ENCOUNTER
Reason for call:  Patient reporting a symptom    Symptom or request: The patient called C/O A fib  And Hypotension 72/50  Sh esaid she has been in Afib since yesterday    Duration (how long have symptoms been present): since yesterday    Have you been treated for this before? NA    Additional comments: Transferred to RN    Phone Number patient can be reached at:  Home number on file [phone] (home)    Best Time:  NA    Can we leave a detailed message on this number:  YES    Call taken on 10/25/2017 at 10:27 AM by Trista Karimi

## 2017-10-25 NOTE — TELEPHONE ENCOUNTER
"FYI PCP:  Patient states she is \"out of A. Fib\". Has been for about an hour.  BP slowly coming back up and now is 116/66 HR 55.  Feels better but still fatigued.  Will await your follow up phone call later.  She will call back with any further concerns/symptoms in the interim.  Taryn Parham RN      "

## 2017-10-27 NOTE — TELEPHONE ENCOUNTER
On 1025 the patient was contacted x2 for her atrial fibrillation. Initial contact was reviewed the lightheadedness may not correlated perfectly with the low blood pressure as for atrial fibrillation may have peaked the accuracy of her blood pressure unit however she had no excessive faintness and the symptoms resolve spontaneously. As noted she did convert spontaneously back into normal sinus rhythm with no adverse sequelae and the anxiousness of the moment was relieved. Close follow-up is scheduled and she will see me on Monday when she returns to clinic for INR.

## 2017-10-30 ENCOUNTER — ANTICOAGULATION THERAPY VISIT (OUTPATIENT)
Dept: NURSING | Facility: CLINIC | Age: 72
End: 2017-10-30
Payer: MEDICARE

## 2017-10-30 DIAGNOSIS — I48.0 PAROXYSMAL ATRIAL FIBRILLATION (H): ICD-10-CM

## 2017-10-30 DIAGNOSIS — Z79.01 LONG-TERM (CURRENT) USE OF ANTICOAGULANTS: ICD-10-CM

## 2017-10-30 PROBLEM — I50.31 ACUTE DIASTOLIC CONGESTIVE HEART FAILURE (H): Status: ACTIVE | Noted: 2017-10-30

## 2017-10-30 LAB — INR POINT OF CARE: 3.2 (ref 0.86–1.14)

## 2017-10-30 PROCEDURE — 85610 PROTHROMBIN TIME: CPT | Mod: QW

## 2017-10-30 PROCEDURE — 36416 COLLJ CAPILLARY BLOOD SPEC: CPT

## 2017-10-30 PROCEDURE — 99207 ZZC NO CHARGE NURSE ONLY: CPT

## 2017-10-30 NOTE — MR AVS SNAPSHOT
Catalina Marie   10/30/2017 11:15 AM   Anticoagulation Therapy Visit    Description:  72 year old female   Provider:   ANTICOAGULATION CLINIC   Department:  [facility] Nurse           INR as of 10/30/2017     Today's INR 3.2!      Anticoagulation Summary as of 10/30/2017     INR goal 2.0-3.0   Today's INR 3.2!   Full instructions 2.5 mg on Tue; 5 mg all other days   Next INR check 11/6/2017    Indications   Long-term (current) use of anticoagulants [Z79.01] [Z79.01]  Atrial fibrillation (H) [I48.91] [I48.91]         Your next Anticoagulation Clinic appointment(s)     Nov 06, 2017 11:45 AM CST   Anticoagulation Visit with  ANTICOAGULATION CLINIC   [facility]    [address]  [place] [address]   [phone]              Contact Numbers     Clinic Number:         October 2017 Details    Sun Mon Tue Wed Thu Fri Sat     1               2               3               4               5               6               7                 8               9               10               11               12               13               14                 15               16               17               18               19               20               21                 22               23               24               25               26               27               28                 29               30      5 mg   See details      31      2.5 mg              Date Details   10/30 This INR check               How to take your warfarin dose     To take:  2.5 mg Take 0.5 of a 5 mg tablet.    To take:  5 mg Take 1 of the 5 mg tablets.           November 2017 Details    Sun Mon Tue Wed Thu Fri Sat        1      5 mg         2      5 mg         3      5 mg         4      5 mg           5      5 mg         6            7               8               9               10               11                 12               13               14               15               16                17               18                 19               20               21               22               23               24               25                 26               27               28               29               30                  Date Details   No additional details    Date of next INR:  11/6/2017         How to take your warfarin dose     To take:  5 mg Take 1 of the 5 mg tablets.

## 2017-10-30 NOTE — PROGRESS NOTES
ANTICOAGULATION FOLLOW-UP CLINIC VISIT    Patient Name:  Catalina Marie  Date:  10/30/2017  Contact Type:  Face to Face    SUBJECTIVE:        OBJECTIVE    INR Protime   Date Value Ref Range Status   10/30/2017 3.2 (A) 0.86 - 1.14 Final       ASSESSMENT / PLAN  INR assessment THER    Recheck INR In: 1 WEEK    INR Location Clinic      Anticoagulation Summary as of 10/30/2017     INR goal 2.0-3.0   Today's INR 3.2!   Maintenance plan 2.5 mg (5 mg x 0.5) on Tue; 5 mg (5 mg x 1) all other days   Full instructions 2.5 mg on Tue; 5 mg all other days   Weekly total 32.5 mg   No change documented Madhuri Edge RN   Plan last modified Elodia Campbell RN (9/15/2016)   Next INR check 11/6/2017   Priority INR   Target end date     Indications   Long-term (current) use of anticoagulants [Z79.01] [Z79.01]  Atrial fibrillation (H) [I48.91] [I48.91]         Anticoagulation Episode Summary     INR check location     Preferred lab     Send INR reminders to [facility] ANT[facility]AGULATION    Comments Expect FAXED INR results from [facility] in [place], [place]. Phone: [phone].  General Results#: [phone]  Call pt's cell in [place] with results/plan: [phone]      Anticoagulation Care Providers     Provider Role Specialty Phone number    Ian Tracy MD Referring Internal Medicine [phone]            See the Encounter Report to view Anticoagulation Flowsheet and Dosing Calendar (Go to Encounters tab in chart review, and find the Anticoagulation Therapy Visit)    Dosage adjustment made based on physician directed care plan.    Madhuri Edge, RN

## 2017-11-06 ENCOUNTER — ANTICOAGULATION THERAPY VISIT (OUTPATIENT)
Dept: NURSING | Facility: CLINIC | Age: 72
End: 2017-11-06
Payer: MEDICARE

## 2017-11-06 DIAGNOSIS — Z79.01 LONG-TERM (CURRENT) USE OF ANTICOAGULANTS: ICD-10-CM

## 2017-11-06 DIAGNOSIS — I48.0 PAROXYSMAL ATRIAL FIBRILLATION (H): ICD-10-CM

## 2017-11-06 LAB — INR POINT OF CARE: 3.3 (ref 0.86–1.14)

## 2017-11-06 PROCEDURE — 36416 COLLJ CAPILLARY BLOOD SPEC: CPT

## 2017-11-06 PROCEDURE — 85610 PROTHROMBIN TIME: CPT | Mod: QW

## 2017-11-06 PROCEDURE — 99207 ZZC NO CHARGE NURSE ONLY: CPT

## 2017-11-06 NOTE — PROGRESS NOTES
ANTICOAGULATION FOLLOW-UP CLINIC VISIT    Patient Name:  Catalina Marie  Date:  11/6/2017  Contact Type:  Face to Face    SUBJECTIVE:        OBJECTIVE    INR Protime   Date Value Ref Range Status   11/06/2017 3.3 (A) 0.86 - 1.14 Final       ASSESSMENT / PLAN  INR assessment SUPRA    Recheck INR In: 2 WEEKS    INR Location Clinic      Anticoagulation Summary as of 11/6/2017     INR goal 2.0-3.0   Today's INR 3.3!   Maintenance plan 2.5 mg (5 mg x 0.5) on Tue, Fri; 5 mg (5 mg x 1) all other days   Full instructions 2.5 mg on Tue, Fri; 5 mg all other days   Weekly total 30 mg   Plan last modified Madhuri Edge RN (11/6/2017)   Next INR check 11/20/2017   Priority INR   Target end date     Indications   Long-term (current) use of anticoagulants [Z79.01] [Z79.01]  Atrial fibrillation (H) [I48.91] [I48.91]         Anticoagulation Episode Summary     INR check location     Preferred lab     Send INR reminders to [facility]    Comments Expect FAXED INR results from [facility] in [place], [place]. Phone: [phone].  General Results#: [phone]  Call pt's cell in [place] with results/plan: [phone]      Anticoagulation Care Providers     Provider Role Specialty Phone number    Ian Tracy MD Referring Internal Medicine [phone]            See the Encounter Report to view Anticoagulation Flowsheet and Dosing Calendar (Go to Encounters tab in chart review, and find the Anticoagulation Therapy Visit)    Dosage adjustment made based on physician directed care plan.    Madhuri Edge RN

## 2017-11-06 NOTE — MR AVS SNAPSHOT
Catalina Marie   11/6/2017 11:45 AM   Anticoagulation Therapy Visit    Description:  72 year old female   Provider:  [facility]   Department:  [facility]           INR as of 11/6/2017     Today's INR 3.3!      Anticoagulation Summary as of 11/6/2017     INR goal 2.0-3.0   Today's INR 3.3!   Full instructions 2.5 mg on Tue, Fri; 5 mg all other days   Next INR check 11/20/2017    Indications   Long-term (current) use of anticoagulants [Z79.01] [Z79.01]  Atrial fibrillation (H) [I48.91] [I48.91]         Contact Numbers     Clinic Number:         November 2017 Details    Sun Mon Tue Wed Thu Fri Sat        1               2               3               4                 5               6      5 mg   See details      7      2.5 mg         8      5 mg         9      5 mg         10      2.5 mg         11      5 mg           12      5 mg         13      5 mg         14      2.5 mg         15      5 mg         16      5 mg         17      2.5 mg         18      5 mg           19      5 mg         20            21               22               23               24               25                 26               27               28               29               30                  Date Details   11/06 This INR check       Date of next INR:  11/20/2017         How to take your warfarin dose     To take:  2.5 mg Take 0.5 of a 5 mg tablet.    To take:  5 mg Take 1 of the 5 mg tablets.

## 2017-11-07 ENCOUNTER — TELEPHONE (OUTPATIENT)
Dept: FAMILY MEDICINE | Facility: CLINIC | Age: 72
End: 2017-11-07

## 2017-11-07 NOTE — TELEPHONE ENCOUNTER
To PCP:  Please see below message.  Did you speak with her yesterday?  Thank you.  Taryn Parham RN

## 2017-11-07 NOTE — TELEPHONE ENCOUNTER
Reason for call:  Patient reporting a symptom    Symptom or request: Dizziness    Duration (how long have symptoms been present): Few days    Have you been treated for this before? No    Additional comments: Pt would like to be contacted to discuss. Pt states she is  Going to take Meclizine    Phone Number patient can be reached at:  Home number on file [phone] (home)    Best Time:  Anytime    Can we leave a detailed message on this number:  YES    Call taken on 11/7/2017 at 3:25 PM by Cecilia Parada

## 2017-11-09 NOTE — TELEPHONE ENCOUNTER
Patient was experiencing lightheadedness, which was most likely related to relative hypotension associated with her intermittent atrial fibrillation. We had reviewed adjusting her antihypertensives if she is having A. Fib.

## 2017-11-20 ENCOUNTER — TELEPHONE (OUTPATIENT)
Dept: FAMILY MEDICINE | Facility: CLINIC | Age: 72
End: 2017-11-20

## 2017-11-20 ENCOUNTER — TRANSFERRED RECORDS (OUTPATIENT)
Dept: HEALTH INFORMATION MANAGEMENT | Facility: CLINIC | Age: 72
End: 2017-11-20

## 2017-11-20 LAB — INR PPP: 1.8

## 2017-11-20 NOTE — TELEPHONE ENCOUNTER
Reason for Call: Request for an order or referral:    Order or referral being requested: [facility]    Date needed: as soon as possible    Has the patient been seen by the PCP for this problem? YES    Additional comments: she is at there clinic now and they need:  The Start Date, End Date, DX Code, and Frequency    Please Fax this to: # [phone]    Call taken on 11/20/2017 at 10:16 AM by Jt Galicia

## 2017-11-21 ENCOUNTER — ANTICOAGULATION THERAPY VISIT (OUTPATIENT)
Dept: FAMILY MEDICINE | Facility: CLINIC | Age: 72
End: 2017-11-21
Payer: MEDICARE

## 2017-11-21 DIAGNOSIS — I48.91 ATRIAL FIBRILLATION (H): ICD-10-CM

## 2017-11-21 DIAGNOSIS — Z79.01 LONG-TERM (CURRENT) USE OF ANTICOAGULANTS: ICD-10-CM

## 2017-11-21 LAB — INR PPP: 1.8

## 2017-11-21 PROCEDURE — 99207 ZZC NO CHARGE NURSE ONLY: CPT | Performed by: INTERNAL MEDICINE

## 2017-11-21 NOTE — PROGRESS NOTES
ANTICOAGULATION FOLLOW-UP CLINIC VISIT    Patient Name:  Catalina Marie  Date:  11/21/2017  Contact Type:  Face to Face    SUBJECTIVE:     Patient Findings     Positives No Problem Findings           OBJECTIVE    INR   Date Value Ref Range Status   11/21/2017 1.8  Final       ASSESSMENT / PLAN  INR assessment SUB    Recheck INR In: 1 WEEK    INR Location Outside lab      Anticoagulation Summary as of 11/21/2017     INR goal 2.0-3.0   Today's INR 1.8!   Maintenance plan 2.5 mg (5 mg x 0.5) on Fri; 5 mg (5 mg x 1) all other days   Full instructions 2.5 mg on Fri; 5 mg all other days   Weekly total 32.5 mg   Plan last modified Alyssa Valdovinos RN (11/21/2017)   Next INR check 11/28/2017   Priority INR   Target end date     Indications   Long-term (current) use of anticoagulants [Z79.01] [Z79.01]  Atrial fibrillation (H) [I48.91] [I48.91]         Anticoagulation Episode Summary     INR check location     Preferred lab     Send INR reminders to [facility]    Comments Expect FAXED INR results from [facility] in [place], [place]. Phone: [phone].  General Results#: [phone]  Call pt's cell in [place] with results/plan: [phone]      Anticoagulation Care Providers     Provider Role Specialty Phone number    Ian Tracy MD Referring Internal Medicine [phone]            See the Encounter Report to view Anticoagulation Flowsheet and Dosing Calendar (Go to Encounters tab in chart review, and find the Anticoagulation Therapy Visit)    Dosage adjustment made based on physician directed care plan. INR 1.8 per [facility] report faxed to clinic from [place].  Increase 2.5 mg Fri, 5 mg ROW and recheck in one week.  She verbalizes understanding.  Need to stabilize INR prior to travel on 12/8/17 to [place].    Alyssa Valdovinos, WAQAS

## 2017-11-28 ENCOUNTER — ANTICOAGULATION THERAPY VISIT (OUTPATIENT)
Dept: FAMILY MEDICINE | Facility: CLINIC | Age: 72
End: 2017-11-28
Payer: MEDICARE

## 2017-11-28 ENCOUNTER — TELEPHONE (OUTPATIENT)
Dept: FAMILY MEDICINE | Facility: CLINIC | Age: 72
End: 2017-11-28

## 2017-11-28 DIAGNOSIS — I48.0 PAROXYSMAL ATRIAL FIBRILLATION (H): ICD-10-CM

## 2017-11-28 DIAGNOSIS — Z79.01 LONG-TERM (CURRENT) USE OF ANTICOAGULANTS: ICD-10-CM

## 2017-11-28 LAB — INR PPP: 2.6

## 2017-11-28 PROCEDURE — 99207 ZZC NO CHARGE NURSE ONLY: CPT | Performed by: INTERNAL MEDICINE

## 2017-11-28 NOTE — TELEPHONE ENCOUNTER
Left message for patient to return a call to the clinic.  Taryn Parham, RN      Also see anticoagulation encounter which was started.  Taryn Parham, RN

## 2017-11-28 NOTE — PROGRESS NOTES
ANTICOAGULATION FOLLOW-UP CLINIC VISIT    Patient Name:  Catalina Marie  Date:  11/28/2017  Contact Type:  Telephone    SUBJECTIVE:        OBJECTIVE    INR   Date Value Ref Range Status   11/28/2017 2.6  Final       ASSESSMENT / PLAN  No question data found.  Anticoagulation Summary as of 11/28/2017     INR goal 2.0-3.0   Today's INR 2.6   Maintenance plan 2.5 mg (5 mg x 0.5) on Fri; 5 mg (5 mg x 1) all other days   Full instructions 2.5 mg on Fri; 5 mg all other days   Weekly total 32.5 mg   No change documented Taryn Parham RN   Plan last modified Alyssa Valdovinos RN (11/21/2017)   Next INR check 12/4/2017   Priority INR   Target end date     Indications   Long-term (current) use of anticoagulants [Z79.01] [Z79.01]  Atrial fibrillation (H) [I48.91] [I48.91]         Anticoagulation Episode Summary     INR check location     Preferred lab     Send INR reminders to [facility]    Comments Expect FAXED INR results from [facility] in [place], [place]. Phone: [phone].  General Results#: [phone]  Call pt's cell in [place] with results/plan: [phone]      Anticoagulation Care Providers     Provider Role Specialty Phone number    Ian Tracy MD Referring Internal Medicine [phone]            See the Encounter Report to view Anticoagulation Flowsheet and Dosing Calendar (Go to Encounters tab in chart review, and find the Anticoagulation Therapy Visit)    Patient confirms and agrees with dosing schedule.  She will have INR drawn on 12/4 because she will be leaving for extended vacation. Dosing based on [facility] Protocol and Provider directed care plan.      Taryn Parham, RN

## 2017-11-28 NOTE — MR AVS SNAPSHOT
Catalina DOV Frank   11/28/2017   Anticoagulation Therapy Visit    Description:  72 year old female   Provider:  Ian Tracy MD   Department:  [facility]           INR as of 11/28/2017     Today's INR 2.6      Anticoagulation Summary as of 11/28/2017     INR goal 2.0-3.0   Today's INR 2.6   Full instructions 2.5 mg on Fri; 5 mg all other days   Next INR check 12/4/2017    Indications   Long-term (current) use of anticoagulants [Z79.01] [Z79.01]  Atrial fibrillation (H) [I48.91] [I48.91]         Description     [facility]   Patient will be going on a long trip in a couple weeks. She will have INR drawn on 12/4.      November 2017 Details    Sun Mon Tue Wed Thu Fri Sat        1               2               3               4                 5               6               7               8               9               10               11                 12               13               14               15               16               17               18                 19               20               21               22               23               24               25                 26               27               28      5 mg   See details      29      5 mg         30      5 mg            Date Details   11/28 This INR check               How to take your warfarin dose     To take:  5 mg Take 1 of the 5 mg tablets.           December 2017 Details    Sun Mon Tue Wed Thu Fri Sat          1      2.5 mg         2      5 mg           3      5 mg         4            5               6               7               8               9                 10               11               12               13               14               15               16                 17               18               19               20               21               22               23                 24               25               26               27               28               29               30                  31                      Date Details   No additional details    Date of next INR:  12/4/2017         How to take your warfarin dose     To take:  2.5 mg Take 0.5 of a 5 mg tablet.    To take:  5 mg Take 1 of the 5 mg tablets.

## 2017-11-28 NOTE — TELEPHONE ENCOUNTER
Reason for Call:  Other call back    Detailed comments: Pt called this afternoon and would like an INR nurse to give her a call back due to her having some questions about her most recent INR that was done. Please give pt a call back ASAP. Thank you.    Phone Number Patient can be reached at: Cell number on file:    Telephone Information:   Mobile [phone]       Best Time:     Can we leave a detailed message on this number? YES    Call taken on 11/28/2017 at 4:13 PM by Beverly Betancourt

## 2017-12-05 ENCOUNTER — ANTICOAGULATION THERAPY VISIT (OUTPATIENT)
Dept: FAMILY MEDICINE | Facility: CLINIC | Age: 72
End: 2017-12-05
Payer: MEDICARE

## 2017-12-05 DIAGNOSIS — Z79.01 LONG-TERM (CURRENT) USE OF ANTICOAGULANTS: ICD-10-CM

## 2017-12-05 DIAGNOSIS — I48.0 PAROXYSMAL ATRIAL FIBRILLATION (H): ICD-10-CM

## 2017-12-05 LAB — INR PPP: 2.6

## 2017-12-05 PROCEDURE — 99207 ZZC NO CHARGE NURSE ONLY: CPT | Performed by: INTERNAL MEDICINE

## 2017-12-05 NOTE — PROGRESS NOTES
ANTICOAGULATION FOLLOW-UP CLINIC VISIT    Patient Name:  Catalina Marie  Date:  12/5/2017  Contact Type:  Telephone    SUBJECTIVE:     Patient Findings     Positives No Problem Findings           OBJECTIVE    INR   Date Value Ref Range Status   12/05/2017 2.6  Final       ASSESSMENT / PLAN  INR assessment THER    Recheck INR In: 3 WEEKS    INR Location Outside lab      Anticoagulation Summary as of 12/5/2017     INR goal 2.0-3.0   Today's INR 2.6   Maintenance plan 2.5 mg (5 mg x 0.5) on Fri; 5 mg (5 mg x 1) all other days   Full instructions 2.5 mg on Fri; 5 mg all other days   Weekly total 32.5 mg   No change documented Taryn Parham RN   Plan last modified Alyssa Valdovinos RN (11/21/2017)   Next INR check 1/2/2018   Priority INR   Target end date     Indications   Long-term (current) use of anticoagulants [Z79.01] [Z79.01]  Atrial fibrillation (H) [I48.91] [I48.91]         Anticoagulation Episode Summary     INR check location     Preferred lab     Send INR reminders to [facility]    Comments Expect FAXED INR results from [facility] in [place], [place]. Phone: [phone].  General Results#: [phone]  Call pt's cell in [place] with results/plan: [phone]      Anticoagulation Care Providers     Provider Role Specialty Phone number    Ian Tracy MD Referring Internal Medicine [phone]            See the Encounter Report to view Anticoagulation Flowsheet and Dosing Calendar (Go to Encounters tab in chart review, and find the Anticoagulation Therapy Visit)    INR drawn at outside lab ([facility]).  Detailed message left for patient with dosing instructions. Dosing based on [facility] Protocol and Provider directed care plan.      Taryn Parham, RN

## 2017-12-05 NOTE — MR AVS SNAPSHOT
Catalina Marie   12/5/2017   Anticoagulation Therapy Visit    Description:  72 year old female   Provider:  Ian Tracy MD   Department:  [facility]           INR as of 12/5/2017     Today's INR 2.6      Anticoagulation Summary as of 12/5/2017     INR goal 2.0-3.0   Today's INR 2.6   Full instructions 2.5 mg on Fri; 5 mg all other days   Next INR check 1/2/2018    Indications   Long-term (current) use of anticoagulants [Z79.01] [Z79.01]  Atrial fibrillation (H) [I48.91] [I48.91]         Description     [facility] [place]      December 2017 Details    Sun Mon Tue Wed Thu Fri Sat          1               2                 3               4               5      5 mg   See details      6      5 mg         7      5 mg         8      2.5 mg         9      5 mg           10      5 mg         11      5 mg         12      5 mg         13      5 mg         14      5 mg         15      2.5 mg         16      5 mg           17      5 mg         18      5 mg         19      5 mg         20      5 mg         21      5 mg         22      2.5 mg         23      5 mg           24      5 mg         25      5 mg         26      5 mg         27      5 mg         28      5 mg         29      2.5 mg         30      5 mg           31      5 mg                Date Details   12/05 This INR check               How to take your warfarin dose     To take:  2.5 mg Take 0.5 of a 5 mg tablet.    To take:  5 mg Take 1 of the 5 mg tablets.           January 2018 Details    Sun Mon Tue Wed Thu Fri Sat      1      5 mg         2            3               4               5               6                 7               8               9               10               11               12               13                 14               15               16               17               18               19               20                 21               22               23               24               25               26                27                 28               29               30               31                   Date Details   No additional details    Date of next INR:  1/2/2018         How to take your warfarin dose     To take:  5 mg Take 1 of the 5 mg tablets.

## 2017-12-30 DIAGNOSIS — F43.22 ADJUSTMENT DISORDER WITH ANXIOUS MOOD: ICD-10-CM

## 2017-12-30 DIAGNOSIS — I10 ESSENTIAL HYPERTENSION: ICD-10-CM

## 2017-12-30 NOTE — TELEPHONE ENCOUNTER
Requested Prescriptions   Pending Prescriptions Disp Refills     furosemide (LASIX) 20 MG tablet [Pharmacy Med Name: FUROSEMIDE 20MG TABLETS]  Last Written Prescription Date:  6/28/17  Last Fill Quantity: 180 tablet,  # refills: 1   Last Office Visit with [facility], [facility] or [facility] prescribing provider:  10/23/17   Future Office Visit:      180 tablet 0     Sig: TAKE 1 TABLET(20 MG) BY MOUTH TWICE DAILY    Diuretics (Including Combos) Protocol Passed    12/30/2017 12:33 PM       Passed - Blood pressure under 140/90    BP Readings from Last 3 Encounters:   10/23/17 124/62   10/10/17 113/66   09/28/17 133/70          Passed - Recent or future visit with authorizing provider's specialty    Patient had office visit in the last year or has a visit in the next 30 days with authorizing provider.  See chart review.          Passed - Patient is age 18 or older       Passed - No active pregancy on record       Passed - Normal serum creatinine on file in past 12 months    Recent Labs   Lab Test  10/23/17   1103   CR  1.03           Passed - Normal serum potassium on file in past 12 months    Recent Labs   Lab Test  10/23/17   1103   POTASSIUM  4.5           Passed - Normal serum sodium on file in past 12 months    Recent Labs   Lab Test  10/23/17   1103   NA  142           Passed - No positive pregnancy test in past 12 months

## 2018-01-02 ENCOUNTER — TELEPHONE (OUTPATIENT)
Dept: FAMILY MEDICINE | Facility: CLINIC | Age: 73
End: 2018-01-02

## 2018-01-02 RX ORDER — FUROSEMIDE 20 MG
TABLET ORAL
Qty: 180 TABLET | Refills: 0 | Status: SHIPPED | OUTPATIENT
Start: 2018-01-02 | End: 2018-04-02

## 2018-01-02 NOTE — TELEPHONE ENCOUNTER
"Spoke with patient:   Pt is in [place] and states she is having \"several problems\" with the medical system there   Has been going into a-fib most of the time  The last week has had a bacterial infection of right hand - \"infection quadrupled last 3 days\" - looks like flesh-eating disease   Saw a provider in [place] - ordered abx that targets bacterial infection (pt is unsure of name of medication)   Pharmacy won't fill because this abx interacts with warfarin   Also was started Anti-inflammatory cream for a swollen painful finger - pharmacy told pt this could cause stomach bleeding so pt did not fill this   Had INR done today - they will be faxing it over tomorrow per pt   Won't be back until June   Pt wanted PCP to know she is not happy with the medical system in [place]     Advised pt she contact the clinic she was seen at in [place] and have them call the pharmacy so she can fill the abx  Also advised pt INR clinic will need to know what abx she is taking so they can dose her warfarin appropriately     Pt wanted message routed to PCP as FARRAH HOWARD RN    "

## 2018-01-02 NOTE — TELEPHONE ENCOUNTER
Pending Prescriptions:                       Disp   Refills    furosemide (LASIX) 20 MG tablet [Pharmacy*180 ta*0            Sig: TAKE 1 TABLET(20 MG) BY MOUTH TWICE DAILY    sertraline (ZOLOFT) 50 MG tablet          135 ta*3            Sig: Sig 75mg or 1 1/2 tabs po qd    Sertraline      Last Written Prescription Date:  10/13/2017  Last Fill Quantity: 135,   # refills: 3  Last Office Visit: 10/23/2017  Future Office visit:

## 2018-01-03 NOTE — TELEPHONE ENCOUNTER
Review situation with patient regarding on going care. Picture of dorsal and dermatitis sent and reviewed. Agree with pharmacist the backroom will not be the drug of choice for ongoing follow-up. Recommended ongoing local cares and initiation of keflex 500 mg TID. Recent INR pending and recommended repeat INR next week.

## 2018-01-05 ENCOUNTER — ANTICOAGULATION THERAPY VISIT (OUTPATIENT)
Dept: NURSING | Facility: CLINIC | Age: 73
End: 2018-01-05
Payer: MEDICARE

## 2018-01-05 ENCOUNTER — MYC MEDICAL ADVICE (OUTPATIENT)
Dept: FAMILY MEDICINE | Facility: CLINIC | Age: 73
End: 2018-01-05

## 2018-01-05 DIAGNOSIS — I48.91 ATRIAL FIBRILLATION, UNSPECIFIED TYPE (H): ICD-10-CM

## 2018-01-05 DIAGNOSIS — Z79.01 LONG-TERM (CURRENT) USE OF ANTICOAGULANTS: ICD-10-CM

## 2018-01-05 LAB — INR PPP: 3.9

## 2018-01-05 PROCEDURE — 99207 ZZC NO CHARGE NURSE ONLY: CPT | Performed by: INTERNAL MEDICINE

## 2018-01-05 NOTE — MR AVS SNAPSHOT
Catalina Marie   1/5/2018   Anticoagulation Therapy Visit    Description:  72 year old female   Provider:  Ian Tracy MD   Department:  [facility]           INR as of 1/5/2018     Today's INR 3.9! (1/2/2018)      Anticoagulation Summary as of 1/5/2018     INR goal 2.0-3.0   Today's INR 3.9! (1/2/2018)   Full instructions 1/5: Hold; Otherwise 2.5 mg on Fri; 5 mg all other days   Next INR check 1/10/2018    Indications   Long-term (current) use of anticoagulants [Z79.01] [Z79.01]  Atrial fibrillation (H) [I48.91] [I48.91]         Description     Resulted today. Spoke with patient on the phone in [place].      Contact Numbers     Clinic Number:         January 2018 Details    Sun Mon Tue Wed Thu Fri Sat      1               2               3               4               5      Hold   See details      6      5 mg           7      5 mg         8      5 mg         9      5 mg         10            11               12               13                 14               15               16               17               18               19               20                 21               22               23               24               25               26               27                 28               29               30               31                   Date Details   01/05 This INR check   Hold dose   Today only       Date of next INR:  1/10/2018         How to take your warfarin dose     To take:  5 mg Take 1 of the 5 mg tablets.    Hold Do not take your warfarin dose. See the Details table to the right for additional instructions.

## 2018-01-05 NOTE — PROGRESS NOTES
ANTICOAGULATION FOLLOW-UP CLINIC VISIT    Patient Name:  Catalina Marie  Date:  1/5/2018  Contact Type:  Telephone/ Verbal orders given to Patient over the phone, she is in [place].    SUBJECTIVE:     Patient Findings     Positives Inflammation, Antibiotic use or infection    Comments Patient has staff infection started treating today with Keflex, also inflammation of low back, states it is getting better.           OBJECTIVE    INR   Date Value Ref Range Status   01/02/2018 3.9  Final       ASSESSMENT / PLAN  INR assessment SUPRA    Recheck INR In: 1 WEEK    INR Location Outside lab      Anticoagulation Summary as of 1/5/2018     INR goal 2.0-3.0   Today's INR 3.9! (1/2/2018)   Maintenance plan 2.5 mg (5 mg x 0.5) on Fri; 5 mg (5 mg x 1) all other days   Full instructions 1/5: Hold; Otherwise 2.5 mg on Fri; 5 mg all other days   Weekly total 32.5 mg   Plan last modified Alyssa Valdovinos RN (11/21/2017)   Next INR check 1/10/2018   Priority INR   Target end date     Indications   Long-term (current) use of anticoagulants [Z79.01] [Z79.01]  Atrial fibrillation (H) [I48.91] [I48.91]         Anticoagulation Episode Summary     INR check location     Preferred lab     Send INR reminders to [facility]    Comments Expect FAXED INR results from [facility] in [place], [place]. Phone: [phone].  General Results#: [phone]  Call pt's cell in [place] with results/plan: [phone]      Anticoagulation Care Providers     Provider Role Specialty Phone number    Ian Tracy MD Referring Internal Medicine [phone]            See the Encounter Report to view Anticoagulation Flowsheet and Dosing Calendar (Go to Encounters tab in chart review, and find the Anticoagulation Therapy Visit)    Dosage adjustment made based on physician directed care plan.    Janice Lovett RN

## 2018-01-13 DIAGNOSIS — I48.91 ATRIAL FIBRILLATION (H): ICD-10-CM

## 2018-01-13 DIAGNOSIS — Z79.01 LONG-TERM (CURRENT) USE OF ANTICOAGULANTS: ICD-10-CM

## 2018-01-13 NOTE — TELEPHONE ENCOUNTER
"Requested Prescriptions   Pending Prescriptions Disp Refills     warfarin (COUMADIN) 5 MG tablet [Pharmacy Med Name: WARFARIN SOD 5MG TABLETS (PEACH)]  Last Written Prescription Date:  10/16/17  Last Fill Quantity: 90 tablet,  # refills: 0   Last Office Visit with [facility], [facility] or [facility] prescribing provider:  10/23/17 [facility]   Future Office Visit:    90 tablet 0     Sig: TAKE 1/2 TO 1 TABLET BY MOUTH DAILY OR AS DIRECTED BY INR CLINIC    Vitamin K Antagonists Failed    1/13/2018 12:13 PM       Failed - INR is within goal in the past 6 weeks    Confirm INR is within goal in the past 6 weeks.     Recent Labs   Lab Test 01/02/18   INR  3.9          Passed - Recent or future visit with authorizing provider's specialty    Patient had office visit in the last year or has a visit in the next 30 days with authorizing provider.  See \"Patient Info\" tab in inbasket, or \"Choose Columns\" in Meds & Orders section of the refill encounter.        Passed - Patient is 18 years of age or older       Passed - Patient is not pregnant       Passed - No positive pregnancy on file in past 12 months          "

## 2018-01-15 LAB — INR PPP: 3.7

## 2018-01-15 RX ORDER — WARFARIN SODIUM 5 MG/1
TABLET ORAL
Qty: 90 TABLET | Refills: 0 | Status: SHIPPED | OUTPATIENT
Start: 2018-01-15 | End: 2018-04-28

## 2018-01-16 ENCOUNTER — ANTICOAGULATION THERAPY VISIT (OUTPATIENT)
Dept: NURSING | Facility: CLINIC | Age: 73
End: 2018-01-16
Payer: MEDICARE

## 2018-01-16 ENCOUNTER — TELEPHONE (OUTPATIENT)
Dept: FAMILY MEDICINE | Facility: CLINIC | Age: 73
End: 2018-01-16

## 2018-01-16 DIAGNOSIS — I48.91 ATRIAL FIBRILLATION, UNSPECIFIED TYPE (H): ICD-10-CM

## 2018-01-16 DIAGNOSIS — Z79.01 LONG-TERM (CURRENT) USE OF ANTICOAGULANTS: ICD-10-CM

## 2018-01-16 NOTE — TELEPHONE ENCOUNTER
Dr. Tracy,    I spoke with Jennifer earlier regarding her INR result from yesterday (1/15/18).  INR was 3.7.    While speaking with her, she reports of having periods of A Fib that lasts 2-3 days and then stops.  This has been occurring over last 3 weeks.   She also reports that her blood pressure has been running below 100 at times as well.     She is concerned as these are new symptoms for her.   Requesting return phone call from Dr. Tracy.   Patient currently in [place] for winter months.   Phone number to reach patient [phone] (H)    Carline Myrick RN

## 2018-01-16 NOTE — MR AVS SNAPSHOT
Catalina Marie   1/16/2018   Anticoagulation Therapy Visit    Description:  72 year old female   Provider:  Ian Tracy MD   Department:  [facility]           INR as of 1/16/2018     Today's INR 3.7! (1/15/2018)      Anticoagulation Summary as of 1/16/2018     INR goal 2.0-3.0   Today's INR 3.7! (1/15/2018)   Full instructions 1/16: 2.5 mg; 1/23: 2.5 mg; Otherwise 2.5 mg on Fri; 5 mg all other days   Next INR check 1/22/2018    Indications   Long-term (current) use of anticoagulants [Z79.01] [Z79.01]  Atrial fibrillation (H) [I48.91] [I48.91]         Contact Numbers     Clinic Number:         January 2018 Details    Sun Mon Tue Wed Thu Fri Sat      1               2               3               4               5               6                 7               8               9               10               11               12               13                 14               15               16      2.5 mg   See details      17      5 mg         18      5 mg         19      2.5 mg         20      5 mg           21      5 mg         22            23               24               25               26               27                 28               29               30               31                   Date Details   01/16 This INR check       Date of next INR:  1/22/2018         How to take your warfarin dose     To take:  2.5 mg Take 0.5 of a 5 mg tablet.    To take:  5 mg Take 1 of the 5 mg tablets.

## 2018-01-16 NOTE — PROGRESS NOTES
ANTICOAGULATION FOLLOW-UP CLINIC VISIT    Patient Name:  Catalina Marie  Date:  1/16/2018  Contact Type:  Telephone/ Spoke with patient on the phone and provided dosing instruction and recheck date. Rec'd INR result from [facility] [place], [place].    SUBJECTIVE:     Patient Findings     Positives Other complaints    Comments Patient reporting that last 3 weeks she has been experiencing a fib and low blood pressure. Requesting call back from Dr. Tracy. Rossana made aware and will reach out to patient in [place].            OBJECTIVE    INR   Date Value Ref Range Status   01/15/2018 3.7  Final       ASSESSMENT / PLAN  INR assessment SUPRA    Recheck INR In: 1 WEEK    INR Location Outside lab      Anticoagulation Summary as of 1/16/2018     INR goal 2.0-3.0   Today's INR 3.7! (1/15/2018)   Maintenance plan 2.5 mg (5 mg x 0.5) on Fri; 5 mg (5 mg x 1) all other days   Full instructions 1/16: 2.5 mg; 1/23: 2.5 mg; Otherwise 2.5 mg on Fri; 5 mg all other days   Weekly total 32.5 mg   Plan last modified Alyssa Valdovinos RN (11/21/2017)   Next INR check 1/22/2018   Priority INR   Target end date     Indications   Long-term (current) use of anticoagulants [Z79.01] [Z79.01]  Atrial fibrillation (H) [I48.91] [I48.91]         Anticoagulation Episode Summary     INR check location     Preferred lab     Send INR reminders to [facility]    Comments Expect FAXED INR results from [facility] in [place], [place]. Phone: [phone].  General Results#: [phone]  Call pt's cell in [place] with results/plan: [phone]      Anticoagulation Care Providers     Provider Role Specialty Phone number    Ian Tracy MD Referring Internal Medicine [phone]            See the Encounter Report to view Anticoagulation Flowsheet and Dosing Calendar (Go to Encounters tab in chart review, and find the Anticoagulation Therapy Visit)    Dosage adjustment made based on physician directed care plan.  Advised patient to take 2.5 mg  TuesFri and 5 mg all other days.   Recheck in 1 week due to other reported symptoms.     Carline Myrick RN

## 2018-01-17 NOTE — TELEPHONE ENCOUNTER
F'up rash, a. Fib.  Rash evolved, warned about sunburn.  A. Fib more persistant lasting for up to 3 days w/o RVR & better tolerated however noting need to adjust antihypertensives to avoid hypotension.reviewed preferred drugs to eliminate.

## 2018-01-23 LAB — INR PPP: 3.2

## 2018-01-25 ENCOUNTER — MYC MEDICAL ADVICE (OUTPATIENT)
Dept: FAMILY MEDICINE | Facility: CLINIC | Age: 73
End: 2018-01-25

## 2018-01-25 ENCOUNTER — ANTICOAGULATION THERAPY VISIT (OUTPATIENT)
Dept: FAMILY MEDICINE | Facility: CLINIC | Age: 73
End: 2018-01-25
Payer: MEDICARE

## 2018-01-25 DIAGNOSIS — I48.91 ATRIAL FIBRILLATION, UNSPECIFIED TYPE (H): ICD-10-CM

## 2018-01-25 DIAGNOSIS — Z79.01 LONG-TERM (CURRENT) USE OF ANTICOAGULANTS: ICD-10-CM

## 2018-01-25 PROCEDURE — 99207 ZZC NO CHARGE NURSE ONLY: CPT | Performed by: INTERNAL MEDICINE

## 2018-01-25 NOTE — PROGRESS NOTES
ANTICOAGULATION FOLLOW-UP CLINIC VISIT    Patient Name:  Catalina Marie  Date:  1/25/2018  Contact Typefax results.  MyChart communicationfax results.  MyChart communication    SUBJECTIVE:        OBJECTIVE    INR   Date Value Ref Range Status   01/22/2018 3.2  Final       ASSESSMENT / PLAN  INR assessment SUPRA    Recheck INR In: 1 WEEK    INR Location Outside lab      Anticoagulation Summary as of 1/25/2018     INR goal 2.0-3.0   Today's INR 3.2! (1/22/2018)   Maintenance plan 2.5 mg (5 mg x 0.5) on Tue, Fri; 5 mg (5 mg x 1) all other days   Full instructions 2.5 mg on Tue, Fri; 5 mg all other days   Weekly total 30 mg   Plan last modified Elodia Campbell RN (1/25/2018)   Next INR check 2/1/2018   Priority INR   Target end date     Indications   Long-term (current) use of anticoagulants [Z79.01] [Z79.01]  Atrial fibrillation (H) [I48.91] [I48.91]         Anticoagulation Episode Summary     INR check location     Preferred lab     Send INR reminders to [facility]    Comments Expect FAXED INR results from [facility] in [place], [place]. Phone: [phone].  General Results#: [phone]  Call pt's cell in [place] with results/plan: [phone]      Anticoagulation Care Providers     Provider Role Specialty Phone number    Ian Tracy MD Referring Internal Medicine [phone]            See the Encounter Report to view Anticoagulation Flowsheet and Dosing Calendar (Go to Encounters tab in chart review, and find the Anticoagulation Therapy Visit)    Dosage adjustment made based on physician directed care plan.  Received faxed results from [facility] lab in [place].   Sent [employer] message with results, Warfarin dosing and next INR to patient.   Asked that patient sent note back with any questions/concerns.   Elodia Campbell RN

## 2018-01-25 NOTE — MR AVS SNAPSHOT
Catalina Marie   1/25/2018   Anticoagulation Therapy Visit    Description:  72 year old female   Provider:  Ian Tracy MD   Department:  [facility]           INR as of 1/25/2018     Today's INR 3.2! (1/22/2018)      Anticoagulation Summary as of 1/25/2018     INR goal 2.0-3.0   Today's INR 3.2! (1/22/2018)   Full instructions 2.5 mg on Tue, Fri; 5 mg all other days   Next INR check 1/29/2018    Indications   Long-term (current) use of anticoagulants [Z79.01] [Z79.01]  Atrial fibrillation (H) [I48.91] [I48.91]         January 2018 Details    Sun Mon Tue Wed Thu Fri Sat      1               2               3               4               5               6                 7               8               9               10               11               12               13                 14               15               16               17               18               19               20                 21               22               23               24               25      5 mg   See details      26      2.5 mg         27      5 mg           28      5 mg         29            30               31                   Date Details   01/25 This INR check       Date of next INR:  1/29/2018         How to take your warfarin dose     To take:  2.5 mg Take 0.5 of a 5 mg tablet.    To take:  5 mg Take 1 of the 5 mg tablets.

## 2018-01-30 LAB — INR PPP: 2.9

## 2018-01-31 ENCOUNTER — ANTICOAGULATION THERAPY VISIT (OUTPATIENT)
Dept: FAMILY MEDICINE | Facility: CLINIC | Age: 73
End: 2018-01-31
Payer: MEDICARE

## 2018-01-31 DIAGNOSIS — Z79.01 LONG-TERM (CURRENT) USE OF ANTICOAGULANTS: ICD-10-CM

## 2018-01-31 DIAGNOSIS — I48.91 ATRIAL FIBRILLATION (H): ICD-10-CM

## 2018-01-31 PROCEDURE — 99207 ZZC NO CHARGE NURSE ONLY: CPT | Performed by: INTERNAL MEDICINE

## 2018-01-31 NOTE — MR AVS SNAPSHOT
Catalina Marie   1/31/2018   Anticoagulation Therapy Visit    Description:  72 year old female   Provider:  Ian Tracy MD   Department:  [facility]           INR as of 1/31/2018     Today's INR 2.9 (1/30/2018)      Anticoagulation Summary as of 1/31/2018     INR goal 2.0-3.0   Today's INR 2.9 (1/30/2018)   Full instructions 2.5 mg on Tue, Fri; 5 mg all other days   Next INR check 2/14/2018    Indications   Long-term (current) use of anticoagulants [Z79.01] [Z79.01]  Atrial fibrillation (H) [I48.91] [I48.91]         January 2018 Details    Sun Mon Tue Wed Thu Fri Sat      1               2               3               4               5               6                 7               8               9               10               11               12               13                 14               15               16               17               18               19               20                 21               22               23               24               25               26               27                 28               29               30               31      5 mg   See details          Date Details   01/31 This INR check               How to take your warfarin dose     To take:  5 mg Take 1 of the 5 mg tablets.           February 2018 Details    Sun Mon Tue Wed Thu Fri Sat         1      5 mg         2      2.5 mg         3      5 mg           4      5 mg         5      5 mg         6      2.5 mg         7      5 mg         8      5 mg         9      2.5 mg         10      5 mg           11      5 mg         12      5 mg         13      2.5 mg         14            15               16               17                 18               19               20               21               22               23               24                 25               26               27               28                   Date Details   No additional details    Date of next INR:  2/14/2018          How to take your warfarin dose     To take:  2.5 mg Take 0.5 of a 5 mg tablet.    To take:  5 mg Take 1 of the 5 mg tablets.

## 2018-01-31 NOTE — PROGRESS NOTES
ANTICOAGULATION FOLLOW-UP CLINIC VISIT    Patient Name:  Catalina Marie  Date:  1/31/2018  Contact Type:  Josee    SUBJECTIVE:     Patient Findings     Positives No Problem Findings           OBJECTIVE    INR   Date Value Ref Range Status   01/30/2018 2.9  Final       ASSESSMENT / PLAN  INR assessment THER    Recheck INR In: 2 WEEKS    INR Location Outside lab      Anticoagulation Summary as of 1/31/2018     INR goal 2.0-3.0   Today's INR 2.9 (1/30/2018)   Maintenance plan 2.5 mg (5 mg x 0.5) on Tue, Fri; 5 mg (5 mg x 1) all other days   Full instructions 2.5 mg on Tue, Fri; 5 mg all other days   Weekly total 30 mg   No change documented Suad Antonio RN   Plan last modified Elodia Campbell RN (1/25/2018)   Next INR check 2/14/2018   Priority INR   Target end date     Indications   Long-term (current) use of anticoagulants [Z79.01] [Z79.01]  Atrial fibrillation (H) [I48.91] [I48.91]         Anticoagulation Episode Summary     INR check location     Preferred lab     Send INR reminders to [facility]    Comments Expect FAXED INR results from [facility] in [place], [place]. Phone: [phone].  General Results#: [phone]  Call pt's cell in [place] with results/plan: [phone]      Anticoagulation Care Providers     Provider Role Specialty Phone number    Ian Tracy MD Referring Internal Medicine [phone]            See the Encounter Report to view Anticoagulation Flowsheet and Dosing Calendar (Go to Encounters tab in chart review, and find the Anticoagulation Therapy Visit)    Dosage adjustment made based on physician directed care plan.    Suad Antonio, WAQAS

## 2018-02-11 DIAGNOSIS — I10 BENIGN ESSENTIAL HYPERTENSION: ICD-10-CM

## 2018-02-12 NOTE — TELEPHONE ENCOUNTER
"Requested Prescriptions   Pending Prescriptions Disp Refills     verapamil (VERELAN) 240 MG CP24 24 hr capsule [Pharmacy Med Name: VERAPAMIL 240MG ER CAPSULES] 90 capsule 0     Sig: TAKE 1 CAPSULE BY MOUTH EVERY DAY    Calcium Channel Blockers Protocol  Passed    2/11/2018  3:34 PM       Passed - Blood pressure under 140/90    BP Readings from Last 3 Encounters:   10/23/17 124/62   10/10/17 113/66   09/28/17 133/70                Passed - Normal ALT in past 12 months    Recent Labs   Lab Test  06/28/17   1244   ALT  29            Passed - Recent or future visit with authorizing provider    Patient had office visit in the last year or has a visit in the next 30 days with authorizing provider.  See \"Patient Info\" tab in inbasket, or \"Choose Columns\" in Meds & Orders section of the refill encounter.            Passed - Patient is age 18 or older       Passed - No active pregnancy on record       Passed - Normal serum creatinine on file in past 12 months    Recent Labs   Lab Test  10/23/17   1103   CR  1.03            Passed - No positive pregnancy test in past 12 months        verapamil (VERELAN) 240 MG CP24 24 hr capsule 90 capsule 3 2/17/2017       Last Written Prescription Date:  02/17/2017  Last Fill Quantity: 90,  # refills: 3   Last office visit: 10/23/2017 with prescribing provider:  10/23/2017   Future Office Visit:  Unknown     "

## 2018-02-13 ENCOUNTER — ANTICOAGULATION THERAPY VISIT (OUTPATIENT)
Dept: NURSING | Facility: CLINIC | Age: 73
End: 2018-02-13
Payer: MEDICARE

## 2018-02-13 DIAGNOSIS — Z79.01 LONG-TERM (CURRENT) USE OF ANTICOAGULANTS: ICD-10-CM

## 2018-02-13 DIAGNOSIS — I48.91 ATRIAL FIBRILLATION (H): ICD-10-CM

## 2018-02-13 LAB — INR PPP: 2.4

## 2018-02-13 NOTE — MR AVS SNAPSHOT
Catalina Marie   2/13/2018   Anticoagulation Therapy Visit    Description:  72 year old female   Provider:  Ian Tracy MD   Department:  [facility]           INR as of 2/13/2018     Today's INR 2.4 (2/12/2018)      Anticoagulation Summary as of 2/13/2018     INR goal 2.0-3.0   Today's INR 2.4 (2/12/2018)   Full instructions 2.5 mg on Tue, Fri; 5 mg all other days   Next INR check 2/27/2018    Indications   Long-term (current) use of anticoagulants [Z79.01] [Z79.01]  Atrial fibrillation (H) [I48.91] [I48.91]         Contact Numbers     Clinic Number:         February 2018 Details    Sun Mon Tue Wed Thu Fri Sat         1               2               3                 4               5               6               7               8               9               10                 11               12               13      2.5 mg   See details      14      5 mg         15      5 mg         16      2.5 mg         17      5 mg           18      5 mg         19      5 mg         20      2.5 mg         21      5 mg         22      5 mg         23      2.5 mg         24      5 mg           25      5 mg         26      5 mg         27            28                   Date Details   02/13 This INR check       Date of next INR:  2/27/2018         How to take your warfarin dose     To take:  2.5 mg Take 0.5 of a 5 mg tablet.    To take:  5 mg Take 1 of the 5 mg tablets.

## 2018-02-13 NOTE — PROGRESS NOTES
ANTICOAGULATION FOLLOW-UP CLINIC VISIT    Patient Name:  Catalina Marie  Date:  2/13/2018  Contact Type:  Sent detailed Almin[employer] message to patient with dosing instruction and recheck date. Advised recheck in 2 weeks.     SUBJECTIVE: Received fax from [facility] [place] where patient is for winter months.         OBJECTIVE    INR   Date Value Ref Range Status   02/12/2018 2.4  Final       ASSESSMENT / PLAN  INR assessment THER    Recheck INR In: 2 WEEKS    INR Location Outside lab      Anticoagulation Summary as of 2/13/2018     INR goal 2.0-3.0   Today's INR 2.4 (2/12/2018)   Maintenance plan 2.5 mg (5 mg x 0.5) on Tue, Fri; 5 mg (5 mg x 1) all other days   Full instructions 2.5 mg on Tue, Fri; 5 mg all other days   Weekly total 30 mg   No change documented Carline Myrick RN   Plan last modified Elodia Capmbell RN (1/25/2018)   Next INR check 2/27/2018   Priority INR   Target end date     Indications   Long-term (current) use of anticoagulants [Z79.01] [Z79.01]  Atrial fibrillation (H) [I48.91] [I48.91]         Anticoagulation Episode Summary     INR check location     Preferred lab     Send INR reminders to [facility]    Comments Expect FAXED INR results from "[facility] in [place], [place]. Phone: [phone].  General Results#: [phone]  Call pt's cell in [place] with results/plan: [phone]      Anticoagulation Care Providers     Provider Role Specialty Phone number    Ian Tracy MD Referring Internal Medicine [phone]            See the Encounter Report to view Anticoagulation Flowsheet and Dosing Calendar (Go to Encounters tab in chart review, and find the Anticoagulation Therapy Visit)    Dosage adjustment made based on physician directed care plan.      Carline Myrick, WAQAS

## 2018-02-14 DIAGNOSIS — I10 BENIGN ESSENTIAL HYPERTENSION: ICD-10-CM

## 2018-02-14 NOTE — TELEPHONE ENCOUNTER
To PCP    Routing refill request to provider for review/approval because:  Drug interaction warning x3    Please review and authorize if appropriate,     Thank you,   Amrita VERA RN

## 2018-02-14 NOTE — TELEPHONE ENCOUNTER
To PCP:     Please review refill. Drug interaction warnings noted. RX pended.     Thank you,   Amrita FLOWERS RN

## 2018-02-14 NOTE — TELEPHONE ENCOUNTER
"Last Written Prescription Date:  6/13/17  Last Fill Quantity: ?,  # refills: ?   Last office visit: 10/23/2017 with prescribing provider:  ?   Future Office Visit:      Requested Prescriptions   Pending Prescriptions Disp Refills     fenofibrate 160 MG tablet [Pharmacy Med Name: FENOFIBRATE 160MG TABLETS] 90 tablet 0     Sig: TAKE 1 TABLET BY MOUTH EVERY DAY    Fibrates Passed    2/14/2018  3:04 PM       Passed - Lipid panel on file in past 12 months    Recent Labs   Lab Test  06/28/17   1244   CHOL  159   TRIG  301*   HDL  36*   LDL  63   NHDL  123              Passed - No abnormal creatine kinase in past 12 months    No lab results found.         Passed - Recent or future visit with authorizing provider    Patient had office visit in the last year or has a visit in the next 30 days with authorizing provider.  See \"Patient Info\" tab in inbasket, or \"Choose Columns\" in Meds & Orders section of the refill encounter.            Passed - Patient is age 18 or older       Passed - No active pregnancy on record       Passed - No positive pregnancy test in past 12 months      Routing refill request to provider for review/approval because:  Medication is reported/historical    Destiney Fisher RN  [facility] [facility]            "

## 2018-02-14 NOTE — TELEPHONE ENCOUNTER
Reason for Call:  Other prescription update     Detailed comments: patient Pamela calling, said she went to  rx for verapamil (VERELAN) 240 MG CP24 24 hr capsule And pharmacy told her it was denied.  She is checking on status of that request, also concerned about refill of fenofibrate, request was received today.  Currently in [place].     Phone Number Patient can be reached at: Other phone number:  [phone]    Best Time: any    Can we leave a detailed message on this number? YES    Call taken on 2/14/2018 at 3:24 PM by Sammie Rojo

## 2018-02-15 RX ORDER — VERAPAMIL HYDROCHLORIDE 240 MG/1
CAPSULE, EXTENDED RELEASE ORAL
Qty: 90 CAPSULE | Refills: 2 | Status: SHIPPED | OUTPATIENT
Start: 2018-02-15 | End: 2018-10-29

## 2018-02-15 RX ORDER — FENOFIBRATE 160 MG/1
TABLET ORAL
Qty: 90 TABLET | Refills: 0 | Status: SHIPPED | OUTPATIENT
Start: 2018-02-15 | End: 2018-05-11

## 2018-02-23 DIAGNOSIS — I10 BENIGN ESSENTIAL HYPERTENSION: ICD-10-CM

## 2018-02-23 NOTE — TELEPHONE ENCOUNTER
"   Disp Refills Start End EDITA   lisinopril (PRINIVIL/ZESTRIL) 40 MG tablet 90 tablet 3 2/17/2017  No   Sig: TAKE 1 TABLET BY MOUTH EVERY DAY          Disp Refills Start End EDITA   DIGOX 250 MCG tablet 90 tablet 3 2/17/2017  No   Sig: TAKE 1 TABLET BY MOUTH EVERY DAY   Patient taking differently: TAKE 1/2 TABLET BY MOUTH EVERY DAY          Request is for taking 1 tab daily     Last Written Prescription Date:  02/17/2017  Last Fill Quantity: 90,  # refills: 3   Last office visit: 10/23/2017 with prescribing provider:     Future Office Visit:      Requested Prescriptions   Pending Prescriptions Disp Refills     lisinopril (PRINIVIL/ZESTRIL) 40 MG tablet [Pharmacy Med Name: LISINOPRIL 40MG TABLETS] 90 tablet 0     Sig: TAKE 1 TABLET BY MOUTH EVERY DAY    ACE Inhibitors (Including Combos) Protocol Failed    2/23/2018  2:37 PM       Failed - No positive pregnancy test in past 12 months       Passed - Blood pressure under 140/90 in past 12 months    BP Readings from Last 3 Encounters:   10/23/17 124/62   10/10/17 113/66   09/28/17 133/70                Passed - Recent or future visit with authorizing provider's specialty    Patient had office visit in the last year or has a visit in the next 30 days with authorizing provider.  See \"Patient Info\" tab in inbasket, or \"Choose Columns\" in Meds & Orders section of the refill encounter.            Passed - Patient is age 18 or older       Passed - No active pregnancy on record       Passed - Normal serum creatinine on file in past 12 months    Recent Labs   Lab Test  10/23/17   1103   CR  1.03            Passed - Normal serum potassium on file in past 12 months    Recent Labs   Lab Test  10/23/17   1103   POTASSIUM  4.5             DIGOX 250 MCG tablet [Pharmacy Med Name: DIGOXIN 0.25MG TABLETS (WHITE)] 90 tablet 0     Sig: TAKE 1 TABLET BY MOUTH EVERY DAY    Cardiac Glycoside Agents Protocol Failed    2/23/2018  2:37 PM       Failed - No positive pregnancy test on file in past " "12 mos       Passed - Normal digoxin level on file in past 12 mos    Recent Labs   Lab Test  09/21/17   0934   DIGOXIN  1.1            Passed - Patient is 18 years of age or older       Passed - Patient is not pregnant       Passed - Normal serum creatinine on file in past 12 mos    Recent Labs   Lab Test  10/23/17   1103   CR  1.03            Passed - Appointment with authorizing provider or provider within department in past 6 mos or scheduled in next 30 days.    Patient had office visit in the last 6 months or has a visit in the next 30 days with authorizing provider.  See \"Patient Info\" tab in inbasket, or \"Choose Columns\" in Meds & Orders section of the refill encounter.              "

## 2018-02-26 LAB — INR PPP: 2.4

## 2018-02-26 RX ORDER — DIGOXIN 250 UG/1
TABLET ORAL
Qty: 90 TABLET | Refills: 0 | Status: SHIPPED | OUTPATIENT
Start: 2018-02-26 | End: 2018-05-29

## 2018-02-26 RX ORDER — LISINOPRIL 40 MG/1
TABLET ORAL
Qty: 90 TABLET | Refills: 0 | Status: SHIPPED | OUTPATIENT
Start: 2018-02-26 | End: 2018-09-05

## 2018-02-26 NOTE — TELEPHONE ENCOUNTER
To PCP:     Routing refill request to provider for review/approval because:  Drug interaction warning (MEDIUM)        Amrita FLOWERS RN

## 2018-02-27 ENCOUNTER — ANTICOAGULATION THERAPY VISIT (OUTPATIENT)
Dept: FAMILY MEDICINE | Facility: CLINIC | Age: 73
End: 2018-02-27
Payer: MEDICARE

## 2018-02-27 ENCOUNTER — MYC MEDICAL ADVICE (OUTPATIENT)
Dept: FAMILY MEDICINE | Facility: CLINIC | Age: 73
End: 2018-02-27

## 2018-02-27 DIAGNOSIS — Z79.01 LONG-TERM (CURRENT) USE OF ANTICOAGULANTS: ICD-10-CM

## 2018-02-27 DIAGNOSIS — F43.22 ADJUSTMENT DISORDER WITH ANXIOUS MOOD: ICD-10-CM

## 2018-02-27 DIAGNOSIS — I48.91 ATRIAL FIBRILLATION (H): ICD-10-CM

## 2018-02-27 PROCEDURE — 99207 ZZC NO CHARGE NURSE ONLY: CPT | Performed by: INTERNAL MEDICINE

## 2018-02-27 RX ORDER — SERTRALINE HYDROCHLORIDE 100 MG/1
100 TABLET, FILM COATED ORAL DAILY
Qty: 90 TABLET | Refills: 3 | Status: SHIPPED | OUTPATIENT
Start: 2018-02-27 | End: 2019-02-21

## 2018-02-27 NOTE — PROGRESS NOTES
ANTICOAGULATION FOLLOW-UP CLINIC VISIT    Patient Name:  Catalina Marie  Date:  2/27/2018  Contact Type:  Fax from [facility] [employer] communication with patient     SUBJECTIVE:     Patient Findings     Positives No Problem Findings    Comments [employer] message sent asking patient to send message back if any changes/problem findings            OBJECTIVE    INR   Date Value Ref Range Status   02/26/2018 2.4  Corrected       ASSESSMENT / PLAN  INR assessment THER    Recheck INR In: 4 WEEKS    INR Location Outside lab      Anticoagulation Summary as of 2/27/2018     INR goal 2.0-3.0   Today's INR 2.4 (2/26/2018)   Maintenance plan 2.5 mg (5 mg x 0.5) on Tue, Fri; 5 mg (5 mg x 1) all other days   Full instructions 2.5 mg on Tue, Fri; 5 mg all other days   Weekly total 30 mg   No change documented Maylin Altamirano RN   Plan last modified Elodia Campbell RN (1/25/2018)   Next INR check 3/27/2018   Priority INR   Target end date     Indications   Long-term (current) use of anticoagulants [Z79.01] [Z79.01]  Atrial fibrillation (H) [I48.91] [I48.91]         Anticoagulation Episode Summary     INR check location     Preferred lab     Send INR reminders to [facility] ANTICOAGULATION    Comments Expect FAXED INR results from [facility] in [place], [place]. Phone: [phone].  General Results#: [phone]  Call pt's cell in [place] with results/plan: [phone]      Anticoagulation Care Providers     Provider Role Specialty Phone number    Ian Tracy MD Referring Internal Medicine [phone]            See the Encounter Report to view Anticoagulation Flowsheet and Dosing Calendar (Go to Encounters tab in chart review, and find the Anticoagulation Therapy Visit)    No changes in meds/diet. No missed/extra warfarin doses. No unusual bruising/bleeding or other changes. Pt will continue same warfarin dose and recheck INR in 4 weeks.       Maylin Altamirano, RN

## 2018-02-27 NOTE — MR AVS SNAPSHOT
Catalina Marie   2/27/2018   Anticoagulation Therapy Visit    Description:  72 year old female   Provider:  Ian Tracy MD   Department:  [facility]           INR as of 2/27/2018     Today's INR 2.4 (2/26/2018)      Anticoagulation Summary as of 2/27/2018     INR goal 2.0-3.0   Today's INR 2.4 (2/26/2018)   Full instructions 2.5 mg on Tue, Fri; 5 mg all other days   Next INR check 3/27/2018    Indications   Long-term (current) use of anticoagulants [Z79.01] [Z79.01]  Atrial fibrillation (H) [I48.91] [I48.91]         February 2018 Details    Sun Mon Tue Wed Thu Fri Sat         1               2               3                 4               5               6               7               8               9               10                 11               12               13               14               15               16               17                 18               19               20               21               22               23               24                 25               26               27      2.5 mg   See details      28      5 mg             Date Details   02/27 This INR check               How to take your warfarin dose     To take:  2.5 mg Take 0.5 of a 5 mg tablet.    To take:  5 mg Take 1 of the 5 mg tablets.           March 2018 Details    Sun Mon Tue Wed Thu Fri Sat         1      5 mg         2      2.5 mg         3      5 mg           4      5 mg         5      5 mg         6      2.5 mg         7      5 mg         8      5 mg         9      2.5 mg         10      5 mg           11      5 mg         12      5 mg         13      2.5 mg         14      5 mg         15      5 mg         16      2.5 mg         17      5 mg           18      5 mg         19      5 mg         20      2.5 mg         21      5 mg         22      5 mg         23      2.5 mg         24      5 mg           25      5 mg         26      5 mg         27            28               29                30               31                Date Details   No additional details    Date of next INR:  3/27/2018         How to take your warfarin dose     To take:  2.5 mg Take 0.5 of a 5 mg tablet.    To take:  5 mg Take 1 of the 5 mg tablets.

## 2018-02-28 NOTE — TELEPHONE ENCOUNTER
Dr Tracy,    Please see below MyChart as well as other MyChart from today:  Dear Catalina,   Please use the MyChart refill tool for all refill requests.   Thank you,   Destiney Fisher RN   [employer]    Last read by Catalina Marie at 6:10 PM on 2/27/2018.    Catalina Marie to Ian Tracy MD    2/27/18 3:41 PM   Dr. Tracy..I need my sertraline refilled. In the fall when I saw you, you upped the med from one and one half pills a day to 2 pills 100milligrams. Would you ok that at the [facility] [id number].      Rx order pended  Please authorize if appropriate.  Thanks,  Kami ABURTO RN

## 2018-03-27 LAB — INR PPP: 2.9

## 2018-03-29 ENCOUNTER — ANTICOAGULATION THERAPY VISIT (OUTPATIENT)
Dept: FAMILY MEDICINE | Facility: CLINIC | Age: 73
End: 2018-03-29
Payer: MEDICARE

## 2018-03-29 DIAGNOSIS — I48.91 ATRIAL FIBRILLATION (H): ICD-10-CM

## 2018-03-29 DIAGNOSIS — Z79.01 LONG-TERM (CURRENT) USE OF ANTICOAGULANTS: ICD-10-CM

## 2018-03-29 PROCEDURE — 99207 ZZC NO CHARGE NURSE ONLY: CPT | Performed by: INTERNAL MEDICINE

## 2018-03-29 NOTE — PROGRESS NOTES
ANTICOAGULATION FOLLOW-UP CLINIC VISIT    Patient Name:  Catalina Marie  Date:  3/29/2018  Contact Type:  INR results faxed from [facility] in [place].  Sent [employer] message to pt with results and dosing/next INR advise.      SUBJECTIVE:        OBJECTIVE    INR   Date Value Ref Range Status   03/26/2018 2.9  Corrected       ASSESSMENT / PLAN  INR assessment THER    Recheck INR In: 4 WEEKS    INR Location Outside lab      Anticoagulation Summary as of 3/29/2018     INR goal 2.0-3.0   Today's INR 2.9 (3/26/2018)   Maintenance plan 2.5 mg (5 mg x 0.5) on Tue, Fri; 5 mg (5 mg x 1) all other days   Full instructions 2.5 mg on Tue, Fri; 5 mg all other days   Weekly total 30 mg   No change documented Elodia Campbell RN   Plan last modified Elodia Campbell RN (1/25/2018)   Next INR check 4/23/2018   Priority INR   Target end date     Indications   Long-term (current) use of anticoagulants [Z79.01] [Z79.01]  Atrial fibrillation (H) [I48.91] [I48.91]         Anticoagulation Episode Summary     INR check location     Preferred lab     Send INR reminders to [facility]    Comments Expect FAXED INR results from [facility] in [place], [place]. Phone: [phone].  General Results#: [phone]  Call pt's cell in [place] with results/plan: [phone]      Anticoagulation Care Providers     Provider Role Specialty Phone number    Ian Tracy MD Referring Internal Medicine [phone]            See the Encounter Report to view Anticoagulation Flowsheet and Dosing Calendar (Go to Encounters tab in chart review, and find the Anticoagulation Therapy Visit)    Dosage adjustment made based on physician directed care plan.    Elodia Campbell RN

## 2018-03-29 NOTE — MR AVS SNAPSHOT
Catalina Marie   3/29/2018   Anticoagulation Therapy Visit    Description:  72 year old female   Provider:  Ian Tracy MD   Department:  [facility]           INR as of 3/29/2018     Today's INR 2.9 (3/26/2018)      Anticoagulation Summary as of 3/29/2018     INR goal 2.0-3.0   Today's INR 2.9 (3/26/2018)   Full instructions 2.5 mg on Tue, Fri; 5 mg all other days   Next INR check 4/23/2018    Indications   Long-term (current) use of anticoagulants [Z79.01] [Z79.01]  Atrial fibrillation (H) [I48.91] [I48.91]         March 2018 Details    Sun Mon Tue Wed Thu Fri Sat         1               2               3                 4               5               6               7               8               9               10                 11               12               13               14               15               16               17                 18               19               20               21               22               23               24                 25               26               27               28               29      5 mg   See details      30      2.5 mg         31      5 mg          Date Details   03/29 This INR check               How to take your warfarin dose     To take:  2.5 mg Take 0.5 of a 5 mg tablet.    To take:  5 mg Take 1 of the 5 mg tablets.           April 2018 Details    Sun Mon Tue Wed Thu Fri Sat     1      5 mg         2      5 mg         3      2.5 mg         4      5 mg         5      5 mg         6      2.5 mg         7      5 mg           8      5 mg         9      5 mg         10      2.5 mg         11      5 mg         12      5 mg         13      2.5 mg         14      5 mg           15      5 mg         16      5 mg         17      2.5 mg         18      5 mg         19      5 mg         20      2.5 mg         21      5 mg           22      5 mg         23            24               25               26               27               28                  29 30                     Date Details   No additional details    Date of next INR:  4/23/2018         How to take your warfarin dose     To take:  2.5 mg Take 0.5 of a 5 mg tablet.    To take:  5 mg Take 1 of the 5 mg tablets.

## 2018-04-23 ENCOUNTER — MYC MEDICAL ADVICE (OUTPATIENT)
Dept: FAMILY MEDICINE | Facility: CLINIC | Age: 73
End: 2018-04-23

## 2018-04-23 LAB — INR PPP: 3.2

## 2018-04-23 NOTE — TELEPHONE ENCOUNTER
"I called [facility] (General Results#: [phone])  I was told that results are still \"in process\" and to call back around 3-4:00pm to receive results.  I asked if I could make sure that they have our correct fax number and the representative couldn't verify this with me on the phone as she said she \"couldn't see the order\".    Will need to recall [facility] for INR result later today.    I sent patient [employer] message to see if maybe they gave patient the INR result while she was there if via finger poke.    Carline Myrick RN        "

## 2018-04-24 ENCOUNTER — ANTICOAGULATION THERAPY VISIT (OUTPATIENT)
Dept: NURSING | Facility: CLINIC | Age: 73
End: 2018-04-24
Payer: MEDICARE

## 2018-04-24 DIAGNOSIS — I48.91 ATRIAL FIBRILLATION (H): ICD-10-CM

## 2018-04-24 DIAGNOSIS — Z79.01 LONG-TERM (CURRENT) USE OF ANTICOAGULANTS: ICD-10-CM

## 2018-04-24 NOTE — MR AVS SNAPSHOT
Catalina Marie   4/24/2018   Anticoagulation Therapy Visit    Description:  72 year old female   Provider:  Ian Tracy MD   Department:  [facility]           INR as of 4/24/2018     Today's INR 3.2! (4/23/2018)      Anticoagulation Summary as of 4/24/2018     INR goal 2.0-3.0   Today's INR 3.2! (4/23/2018)   Full instructions 2.5 mg on Tue, Fri; 5 mg all other days   Next INR check 5/22/2018    Indications   Long-term (current) use of anticoagulants [Z79.01] [Z79.01]  Atrial fibrillation (H) [I48.91] [I48.91]         Your next Anticoagulation Clinic appointment(s)     May 24, 2018  1:30 PM CDT   Anticoagulation Visit with  ANTICOAGULATION CLINIC   [facility]    [address]  [place] [address]   [phone]              Contact Numbers     Clinic Number:         April 2018 Details    Sun Mon Tue Wed Thu Fri Sat     1               2               3               4               5               6               7                 8               9               10               11               12               13               14                 15               16               17               18               19               20               21                 22               23               24      2.5 mg   See details      25      5 mg         26      5 mg         27      2.5 mg         28      5 mg           29      5 mg         30      5 mg               Date Details   04/24 This INR check               How to take your warfarin dose     To take:  2.5 mg Take 0.5 of a 5 mg tablet.    To take:  5 mg Take 1 of the 5 mg tablets.           May 2018 Details    Sun Mon Tue Wed Thu Fri Sat       1      2.5 mg         2      5 mg         3      5 mg         4      2.5 mg         5      5 mg           6      5 mg         7      5 mg         8      2.5 mg         9      5 mg         10      5 mg         11      2.5 mg         12      5 mg           13      5 mg          14      5 mg         15      2.5 mg         16      5 mg         17      5 mg         18      2.5 mg         19      5 mg           20      5 mg         21      5 mg         22            23               24               25               26                 27               28               29               30               31                  Date Details   No additional details    Date of next INR:  5/22/2018         How to take your warfarin dose     To take:  2.5 mg Take 0.5 of a 5 mg tablet.    To take:  5 mg Take 1 of the 5 mg tablets.

## 2018-04-25 ENCOUNTER — MYC MEDICAL ADVICE (OUTPATIENT)
Dept: FAMILY MEDICINE | Facility: CLINIC | Age: 73
End: 2018-04-25

## 2018-05-11 DIAGNOSIS — I10 BENIGN ESSENTIAL HYPERTENSION: ICD-10-CM

## 2018-05-11 RX ORDER — FENOFIBRATE 160 MG/1
TABLET ORAL
Qty: 90 TABLET | Refills: 0 | Status: SHIPPED | OUTPATIENT
Start: 2018-05-11 | End: 2018-08-11

## 2018-05-24 ENCOUNTER — ANTICOAGULATION THERAPY VISIT (OUTPATIENT)
Dept: NURSING | Facility: CLINIC | Age: 73
End: 2018-05-24
Payer: MEDICARE

## 2018-05-24 DIAGNOSIS — I48.91 ATRIAL FIBRILLATION (H): ICD-10-CM

## 2018-05-24 DIAGNOSIS — Z79.01 LONG-TERM (CURRENT) USE OF ANTICOAGULANTS: ICD-10-CM

## 2018-05-24 LAB — INR POINT OF CARE: 2.6 (ref 0.86–1.14)

## 2018-05-24 PROCEDURE — 85610 PROTHROMBIN TIME: CPT | Mod: QW

## 2018-05-24 PROCEDURE — 36416 COLLJ CAPILLARY BLOOD SPEC: CPT

## 2018-05-24 PROCEDURE — 99207 ZZC NO CHARGE NURSE ONLY: CPT

## 2018-05-24 NOTE — MR AVS SNAPSHOT
Catalina Marie   5/24/2018 3:30 PM   Anticoagulation Therapy Visit    Description:  72 year old female   Provider:   ANTICOAGULATION CLINIC   Department:  [facility]           INR as of 5/24/2018     Today's INR 2.6      Anticoagulation Summary as of 5/24/2018     INR goal 2.0-3.0   Today's INR 2.6   Full warfarin instructions 2.5 mg on Tue, Fri; 5 mg all other days   Next INR check 6/21/2018    Indications   Long-term (current) use of anticoagulants [Z79.01] [Z79.01]  Atrial fibrillation (H) [I48.91] [I48.91]         Your next Anticoagulation Clinic appointment(s)     Jun 21, 2018  1:45 PM CDT   Anticoagulation Visit with  ANTICOAGULATION CLINIC   [facility] [place] ([facility])    [address]  [place] [address]   [phone]              Contact Numbers     Clinic Number:         May 2018 Details    Sun Mon Tue Wed Thu Fri Sat       1               2               3               4               5                 6               7               8               9               10               11               12                 13               14               15               16               17               18               19                 20               21               22               23               24      5 mg   See details      25      2.5 mg         26      5 mg           27      5 mg         28      5 mg         29      2.5 mg         30      5 mg         31      5 mg            Date Details   05/24 This INR check               How to take your warfarin dose     To take:  2.5 mg Take 0.5 of a 5 mg tablet.    To take:  5 mg Take 1 of the 5 mg tablets.           June 2018 Details    Sun Mon Tue Wed Thu Fri Sat          1      2.5 mg         2      5 mg           3      5 mg         4      5 mg         5      2.5 mg         6      5 mg         7      5 mg         8      2.5 mg         9      5 mg           10      5 mg         11      5 mg         12      2.5 mg          13      5 mg         14      5 mg         15      2.5 mg         16      5 mg           17      5 mg         18      5 mg         19      2.5 mg         20      5 mg         21            22               23                 24               25               26               27               28               29               30                Date Details   No additional details    Date of next INR:  6/21/2018         How to take your warfarin dose     To take:  2.5 mg Take 0.5 of a 5 mg tablet.    To take:  5 mg Take 1 of the 5 mg tablets.

## 2018-05-24 NOTE — PROGRESS NOTES
ANTICOAGULATION FOLLOW-UP CLINIC VISIT    Patient Name:  Catalina Marie  Date:  5/24/2018  Contact Type:  Face to Face    SUBJECTIVE:        OBJECTIVE    INR   Date Value Ref Range Status   04/23/2018 3.2  Final       ASSESSMENT / PLAN  No question data found.  Anticoagulation Summary as of 5/24/2018     INR goal 2.0-3.0   Today's INR    Warfarin maintenance plan 2.5 mg (5 mg x 0.5) on Tue, Fri; 5 mg (5 mg x 1) all other days   Full warfarin instructions 2.5 mg on Tue, Fri; 5 mg all other days   Weekly warfarin total 30 mg   No change documented Elodia Campbell RN   Plan last modified Elodia Campbell RN (1/25/2018)   Next INR check 6/21/2018   Priority INR   Target end date     Indications   Long-term (current) use of anticoagulants [Z79.01] [Z79.01]  Atrial fibrillation (H) [I48.91] [I48.91]         Anticoagulation Episode Summary     INR check location     Preferred lab     Send INR reminders to [facility]    Comments Expect FAXED INR results from [facility] in [place], [place]. Phone: [phone].  General Results#: [phone]  Call pt's cell in [place] with results/plan: [phone]      Anticoagulation Care Providers     Provider Role Specialty Phone number    Ian Tracy MD Referring Internal Medicine [phone]            See the Encounter Report to view Anticoagulation Flowsheet and Dosing Calendar (Go to Encounters tab in chart review, and find the Anticoagulation Therapy Visit)    Dosage adjustment made based on physician directed care plan.    Elodia Campbell RN

## 2018-05-29 ENCOUNTER — OFFICE VISIT (OUTPATIENT)
Dept: FAMILY MEDICINE | Facility: CLINIC | Age: 73
End: 2018-05-29
Payer: MEDICARE

## 2018-05-29 VITALS
OXYGEN SATURATION: 95 % | BODY MASS INDEX: 23.73 KG/M2 | TEMPERATURE: 98 F | HEIGHT: 64 IN | DIASTOLIC BLOOD PRESSURE: 62 MMHG | WEIGHT: 139 LBS | SYSTOLIC BLOOD PRESSURE: 116 MMHG | HEART RATE: 78 BPM

## 2018-05-29 DIAGNOSIS — E78.5 HYPERLIPIDEMIA LDL GOAL <100: ICD-10-CM

## 2018-05-29 DIAGNOSIS — I48.0 PAROXYSMAL ATRIAL FIBRILLATION (H): Primary | ICD-10-CM

## 2018-05-29 DIAGNOSIS — L82.0 INFLAMED SEBORRHEIC KERATOSIS: ICD-10-CM

## 2018-05-29 DIAGNOSIS — I10 BENIGN ESSENTIAL HYPERTENSION: ICD-10-CM

## 2018-05-29 DIAGNOSIS — N18.30 CKD (CHRONIC KIDNEY DISEASE) STAGE 3, GFR 30-59 ML/MIN (H): ICD-10-CM

## 2018-05-29 DIAGNOSIS — F43.22 ADJUSTMENT DISORDER WITH ANXIOUS MOOD: ICD-10-CM

## 2018-05-29 DIAGNOSIS — E11.9 TYPE 2 DIABETES MELLITUS WITHOUT COMPLICATION, WITHOUT LONG-TERM CURRENT USE OF INSULIN (H): ICD-10-CM

## 2018-05-29 DIAGNOSIS — Z17.0 MALIGNANT NEOPLASM OF BREAST IN FEMALE, ESTROGEN RECEPTOR POSITIVE, UNSPECIFIED LATERALITY, UNSPECIFIED SITE OF BREAST (H): ICD-10-CM

## 2018-05-29 DIAGNOSIS — C50.919 MALIGNANT NEOPLASM OF BREAST IN FEMALE, ESTROGEN RECEPTOR POSITIVE, UNSPECIFIED LATERALITY, UNSPECIFIED SITE OF BREAST (H): ICD-10-CM

## 2018-05-29 DIAGNOSIS — I50.31 ACUTE DIASTOLIC CONGESTIVE HEART FAILURE (H): ICD-10-CM

## 2018-05-29 LAB — HBA1C MFR BLD: 6.5 % (ref 0–5.6)

## 2018-05-29 PROCEDURE — 17110 DESTRUCTION B9 LES UP TO 14: CPT | Performed by: INTERNAL MEDICINE

## 2018-05-29 PROCEDURE — 80048 BASIC METABOLIC PNL TOTAL CA: CPT | Performed by: INTERNAL MEDICINE

## 2018-05-29 PROCEDURE — 36415 COLL VENOUS BLD VENIPUNCTURE: CPT | Performed by: INTERNAL MEDICINE

## 2018-05-29 PROCEDURE — 83036 HEMOGLOBIN GLYCOSYLATED A1C: CPT | Performed by: INTERNAL MEDICINE

## 2018-05-29 PROCEDURE — 99214 OFFICE O/P EST MOD 30 MIN: CPT | Mod: 25 | Performed by: INTERNAL MEDICINE

## 2018-05-29 RX ORDER — DIGOXIN 250 MCG
125 TABLET ORAL DAILY
COMMUNITY
End: 2019-03-29

## 2018-05-29 RX ORDER — ATENOLOL 25 MG/1
25 TABLET ORAL 2 TIMES DAILY
COMMUNITY
End: 2018-06-13

## 2018-05-29 RX ORDER — PROPAFENONE HYDROCHLORIDE 150 MG/1
TABLET, COATED ORAL
COMMUNITY
Start: 2018-05-29 | End: 2018-07-09

## 2018-05-29 NOTE — MR AVS SNAPSHOT
After Visit Summary   5/29/2018    Catalina Marie    MRN: [id number]           Patient Information     Date Of Birth          1945        Visit Information        Provider Department      5/29/2018 11:00 AM Ian Tracy MD [facility]        Today's Diagnoses     Paroxysmal atrial fibrillation (H)    -  1    Type 2 diabetes mellitus without complication, without long-term current use of insulin (H)        Benign essential hypertension        Malignant neoplasm of breast in female, estrogen receptor positive, unspecified laterality, unspecified site of breast (H)        Acute diastolic congestive heart failure (H)        Adjustment disorder with anxious mood        Hyperlipidemia LDL goal <100        CKD (chronic kidney disease) stage 3, GFR 30-59 ml/min        Inflamed seborrheic keratosis           Follow-ups after your visit        Your next 10 appointments already scheduled     Jun 21, 2018  1:45 PM CDT   Anticoagulation [facility] with  [facility] CLINIC   [facility]    [address] [facility] [address]   [phone]            Jul 09, 2018  9:00 AM CDT   PHYSICAL with Ian Tracy MD   [facility]    [address] [facility] [address] [address] [phone]              Who to contact     If you have questions or need follow up information about today's clinic visit or your schedule please contact [facility] directly at [phone].  Normal or non-critical lab and imaging results will be communicated to you by MyChart, letter or phone within 4 business days after the clinic has received the results. If you do not hear from us within 7 days, please contact the clinic through MyChart or phone. If you have a critical or abnormal lab result, we will notify you by phone as soon as possible.  Submit refill requests through [facility] or call your pharmacy and they will forward the refill  "request to us. Please allow 3 business days for your refill to be completed.          Additional Information About Your Visit        [facility] Information     [facility] gives you secure access to your electronic health record. If you see a primary care provider, you can also send messages to your care team and make appointments. If you have questions, please call your primary care clinic.  If you do not have a primary care provider, please call [phone] and they will assist you.        Care EveryWhere ID     This is your Care EveryWhere ID. This could be used by other organizations to access your [facility] medical records  [id number]        Your Vitals Were     Pulse Temperature Height Pulse Oximetry Breastfeeding? BMI (Body Mass Index)    78 98  F (36.7  C) (Oral) 5' 4\" (1.626 m) 95% No 23.86 kg/m2       Blood Pressure from Last 3 Encounters:   05/29/18 116/62   10/23/17 124/62   10/10/17 113/66    Weight from Last 3 Encounters:   05/29/18 139 lb (63 kg)   10/23/17 138 lb (62.6 kg)   10/10/17 138 lb (62.6 kg)              We Performed the Following     Basic metabolic panel     DESTRUCT BENIGN LESION, UP TO 14     Hemoglobin A1c          Today's Medication Changes          These changes are accurate as of 5/29/18 11:59 PM.  If you have any questions, ask your nurse or doctor.               These medicines have changed or have updated prescriptions.        Dose/Directions    atenolol 25 MG tablet   Commonly known as:  TENORMIN   This may have changed:    - medication strength  - how much to take  - when to take this  - Another medication with the same name was removed. Continue taking this medication, and follow the directions you see here.   Used for:  Paroxysmal atrial fibrillation (H)   Changed by:  Ian Tracy MD        Dose:  25 mg   Take 1 tablet (25 mg) by mouth 2 times daily   Refills:  0       DIGOX 250 MCG tablet   This may have changed:  See the new instructions.   Used for:  Benign essential " hypertension   Generic drug:  digoxin   Changed by:  Ian Tracy MD        Dose:  125 mcg   Take 0.5 tablets (125 mcg) by mouth daily   Refills:  0       propafenone 150 MG Tabs tablet   Commonly known as:  RYTHMOL   This may have changed:    - how much to take  - how to take this  - when to take this  - additional instructions   Used for:  Paroxysmal atrial fibrillation (H)   Changed by:  Ian Tracy MD        Take 1 tablet in morning, 0.5 tablet in afternoon, and 1 tablet in evening   Refills:  0       warfarin 5 MG tablet   Commonly known as:  COUMADIN   This may have changed:  Another medication with the same name was removed. Continue taking this medication, and follow the directions you see here.   Used for:  Paroxysmal atrial fibrillation (H)   Changed by:  Ian Tracy MD        TAKE 1/2 TO 1 TABLET BY MOUTH DAILY OR AS DIRECTED BY INR CLINIC   Quantity:  90 tablet   Refills:  0         Stop taking these medicines if you haven't already. Please contact your care team if you have questions.     LORazepam 0.5 MG tablet   Commonly known as:  ATIVAN   Stopped by:  Ian Tracy MD                    Primary Care Provider Office Phone # Fax #    Ian Tracy [phone] [address] [address]        Equal Access to Services     BRENDA JAMES AH: Hadii brandyn fischer hadchenchoo Soshellyali, waaxda luqadaha, qaybta kaalmada adeegyada, ed sharma . So [facility] [phone].    ATENCIÓN: Si habla español, tiene a dahl disposición servicios gratmayuros de asistencia lingüística. Llame al [phone].    We comply with applicable federal civil rights laws and [place] laws. We do not discriminate on the basis of race, color, national origin, age, disability, sex, sexual orientation, or gender identity.            Thank you!     Thank you for choosing [facility]  for your care. Our goal is always to provide you with excellent care.  Hearing back from our patients is one way we can continue to improve our services. Please take a few minutes to complete the written survey that you may receive in the mail after your visit with us. Thank you!             Your Updated Medication List - Protect others around you: Learn how to safely use, store and throw away your medicines at www.disposemymeds.org.          This list is accurate as of 5/29/18 11:59 PM.  Always use your most recent med list.                   Brand Name Dispense Instructions for use Diagnosis    atenolol 25 MG tablet    TENORMIN     Take 1 tablet (25 mg) by mouth 2 times daily    Paroxysmal atrial fibrillation (H)       atorvastatin 10 MG tablet    LIPITOR    90 tablet    Take 1/2 tablet by mouth daily.    Hyperlipidemia LDL goal <100       Biotin 3 MG Tabs      Take by mouth daily (unknown dose)        CALCIUM + D PO      Take by mouth daily        carboxymethylcellulose 1 % ophthalmic solution    CELLUVISC/REFRESH LIQUIGEL     Place 1 drop into both eyes 4 times daily as needed for dry eyes        DIGOX 250 MCG tablet   Generic drug:  digoxin      Take 0.5 tablets (125 mcg) by mouth daily    Benign essential hypertension       felodipine ER 5 MG 24 hr tablet    PLENDIL    180 tablet    Take 1 tablet (5 mg) by mouth 2 times daily    Benign essential hypertension       fenofibrate 160 MG tablet     90 tablet    TAKE 1 TABLET BY MOUTH EVERY DAY    Benign essential hypertension       furosemide 20 MG tablet    LASIX    180 tablet    TAKE 1 TABLET(20 MG) BY MOUTH TWICE DAILY    Essential hypertension       lisinopril 40 MG tablet    PRINIVIL/ZESTRIL    90 tablet    TAKE 1 TABLET BY MOUTH EVERY DAY    Benign essential hypertension       propafenone 150 MG Tabs tablet    RYTHMOL     Take 1 tablet in morning, 0.5 tablet in afternoon, and 1 tablet in evening    Paroxysmal atrial fibrillation (H)       sertraline 100 MG tablet    ZOLOFT    90 tablet    Take 1 tablet (100 mg) by mouth daily     Adjustment disorder with anxious mood       verapamil 240 MG Cp24 24 hr capsule    VERELAN    90 capsule    TAKE 1 CAPSULE BY MOUTH EVERY DAY    Benign essential hypertension       warfarin 5 MG tablet    COUMADIN    90 tablet    TAKE 1/2 TO 1 TABLET BY MOUTH DAILY OR AS DIRECTED BY INR CLINIC    Paroxysmal atrial fibrillation (H)

## 2018-05-29 NOTE — PROGRESS NOTES
SUBJECTIVE:   Catalina Marie is a 72 year old female who presents to clinic today for the following health issues:      Medication Followup of  AFIB, HTN, CKD, LIPIDS    Taking Medication as prescribed: yes,     Side Effects:  None    Medication Helping Symptoms:  yes     Patient presents to the clinic to follow up on her medications. She reports that her propafenone medication reads that she is supposed to take one tablet of the medication 3x a day but she only takes two tablets twice a day and a half tablet at dinner. Her heart rate this morning was 78, stating that it has been 150 three times a day in the past. She has been having rapid heart rate more frequently, but notes that she is down to 1 episode of racing heartbeat a day. She has no ankle swelling as well and is off of felodipine.  She isn't exercising as much as she used to be because she feels a little more tired an her balance is off as well. She still weight trains and walks however. She notes that she deviates bilaterally but mostly to the left. She also notes increased lightheadedness, denies terribly low blood pressure. She notes that her blood pressure has been increasing, notes diastolic can be in the 80-90s.  Patient complains of rhinorrhea. States she has a dripping nose constantly, she isn't using a flonase however. She complains of cough and hoarseness as well due to post nasal drip. She has issues with occasional diarrhea.        Problem list and histories reviewed & adjusted, as indicated.  Additional history: as documented    Current Outpatient Prescriptions   Medication Sig Dispense Refill     atenolol (TENORMIN) 25 MG tablet Take 1 tablet (25 mg) by mouth 2 times daily       atorvastatin (LIPITOR) 10 MG tablet Take 1/2 tablet by mouth daily. 90 tablet 3     Biotin 3 MG TABS Take by mouth daily (unknown dose)       Calcium Carbonate-Vitamin D (CALCIUM + D PO) Take by mouth daily       carboxymethylcellulose (CELLUVISC/REFRESH  LIQUIGEL) 1 % ophthalmic solution Place 1 drop into both eyes 4 times daily as needed for dry eyes       digoxin (DIGOX) 250 MCG tablet Take 0.5 tablets (125 mcg) by mouth daily       felodipine ER (PLENDIL) 5 MG 24 hr tablet Take 1 tablet (5 mg) by mouth 2 times daily 180 tablet 3     fenofibrate 160 MG tablet TAKE 1 TABLET BY MOUTH EVERY DAY 90 tablet 0     furosemide (LASIX) 20 MG tablet TAKE 1 TABLET(20 MG) BY MOUTH TWICE DAILY 180 tablet 0     lisinopril (PRINIVIL/ZESTRIL) 40 MG tablet TAKE 1 TABLET BY MOUTH EVERY DAY 90 tablet 0     propafenone (RYTHMOL) 150 MG TABS tablet Take 1 tablet in morning, 0.5 tablet in afternoon, and 1 tablet in evening       sertraline (ZOLOFT) 100 MG tablet Take 1 tablet (100 mg) by mouth daily 90 tablet 3     verapamil (VERELAN) 240 MG CP24 24 hr capsule TAKE 1 CAPSULE BY MOUTH EVERY DAY 90 capsule 2     warfarin (COUMADIN) 5 MG tablet TAKE 1/2 TO 1 TABLET BY MOUTH DAILY OR AS DIRECTED BY INR CLINIC 90 tablet 0     [DISCONTINUED] atenolol (TENORMIN) 100 MG tablet Take 1 tablet (100 mg) by mouth daily 30 tablet 3     [DISCONTINUED] atenolol (TENORMIN) 50 MG tablet Take 1 tablet (50 mg) by mouth daily (Patient taking differently: Take 50 mg by mouth daily Patient is to take 1/2 tablet twice daily.) 45 tablet 3     [DISCONTINUED] DIGOX 250 MCG tablet TAKE 1 TABLET BY MOUTH EVERY DAY 90 tablet 0     [DISCONTINUED] propafenone (RYTHMOL) 150 MG TABS tablet Take 1 tablet (150 mg) by mouth 3 times daily 270 tablet 3     [DISCONTINUED] warfarin (COUMADIN) 5 MG tablet TAKE ONE-HALF TO ONE TABLET BY MOUTH DAILY OR AS DIRECTED BY INR CLINIC 90 tablet 0     [DISCONTINUED] WARFARIN SODIUM PO Takes 5mg daily except 2.5mg on Tuesdays        Allergies   Allergen Reactions     Penicillins        Reviewed and updated as needed this visit by clinical staff  Tobacco  Allergies  Meds       Reviewed and updated as needed this visit by Provider         ROS:  Constitutional, HEENT, cardiovascular,  "pulmonary, gi and gu systems are negative, except as otherwise noted.    This document serves as a record of the services and decisions personally performed and made by Ian Tracy MD. It was created on his/her behalf by Momo Santillan, a trained medical scribe. The creation of this document is based the provider's statements to the medical scribe.  Scribmichael Santillan 11:35 AM, May 29, 2018    OBJECTIVE:     /62  Pulse 78  Temp 98  F (36.7  C) (Oral)  Ht 1.626 m (5' 4\")  Wt 63 kg (139 lb)  SpO2 95%  Breastfeeding? No  BMI 23.86 kg/m2  Body mass index is 23.86 kg/(m^2).    Neck was supple without adenopathy or thyromegaly her carotids were normal without bruits  Chest clear to auscultation and percussion  Cardiovascular S1 and S2 are irregularly irregular with a well controlled ventricular response without murmurs or gallops  Abdomen bowel sounds were normal.  There is no palpable mass or organomegaly  Extremities nontender without any edema  Pulses pedal pulses are as described otherwise his pulses are bilaterally symmetrical throughout without bruits  Feet were clean and dry  Skin without significant abnormality, she has multiple seborrheic keratoses, she has two on the left clavicle, one with hyperkeratotic top the other is large and hyperkeratotic    Procedure:    Liquid nitrogen x2 was applied to the two clavicular seborrheic keratoses.  Diagnostic Test Results:  Results for orders placed or performed in visit on 05/29/18 (from the past 24 hour(s))   Hemoglobin A1c   Result Value Ref Range    Hemoglobin A1C 6.5 (H) 0 - 5.6 %       ASSESSMENT/PLAN:     1. Paroxysmal atrial fibrillation (H)  - Basic metabolic panel  - atenolol (TENORMIN) 25 MG tablet; Take 1 tablet (25 mg) by mouth 2 times daily  - propafenone (RYTHMOL) 150 MG TABS tablet; Take 1 tablet in morning, 0.5 tablet in afternoon, and 1 tablet in evening    2. Type 2 diabetes mellitus without complication, without long-term " current use of insulin (H)  - Hemoglobin A1c    3. Benign essential hypertension  - Basic metabolic panel  - digoxin (DIGOX) 250 MCG tablet; Take 0.5 tablets (125 mcg) by mouth daily    4. Malignant neoplasm of breast in female, estrogen receptor positive, unspecified laterality, unspecified site of breast (H)    5. Acute diastolic congestive heart failure (H)    6. Adjustment disorder with anxious mood    7. Hyperlipidemia LDL goal <100    8. CKD (chronic kidney disease) stage 3, GFR 30-59 ml/min  - Basic metabolic panel    9. Seborrheic keratoses  They are most likely standard keratoses less likely to be squamous cell cancers.  -She will return in a month for follow    -Patient declines a cognitive test at her physical.    Ian Tracy MD  [facility]    The information in this document, created by the medical scribe for me, accurately reflects the services I personally performed and the decisions made by me. I have reviewed and approved this document for accuracy prior to leaving the patient care area.  Ian Tracy MD  12:09 PM, 05/29/18

## 2018-05-30 LAB
ANION GAP SERPL CALCULATED.3IONS-SCNC: 11 MMOL/L (ref 3–14)
BUN SERPL-MCNC: 19 MG/DL (ref 7–30)
CALCIUM SERPL-MCNC: 9.2 MG/DL (ref 8.5–10.1)
CHLORIDE SERPL-SCNC: 105 MMOL/L (ref 94–109)
CO2 SERPL-SCNC: 25 MMOL/L (ref 20–32)
CREAT SERPL-MCNC: 1.21 MG/DL (ref 0.52–1.04)
GFR SERPL CREATININE-BSD FRML MDRD: 43 ML/MIN/1.7M2
GLUCOSE SERPL-MCNC: 115 MG/DL (ref 70–99)
POTASSIUM SERPL-SCNC: 5.1 MMOL/L (ref 3.4–5.3)
SODIUM SERPL-SCNC: 141 MMOL/L (ref 133–144)

## 2018-06-08 DIAGNOSIS — I10 BENIGN ESSENTIAL HYPERTENSION: ICD-10-CM

## 2018-06-08 DIAGNOSIS — E78.5 HYPERLIPIDEMIA LDL GOAL <100: ICD-10-CM

## 2018-06-08 NOTE — TELEPHONE ENCOUNTER
"atorvastatin (LIPITOR) 10 MG tablet 90 tablet 3 4/20/2017     Sig: Take 1/2 tablet by mouth daily.      Last Written Prescription Date:  04/20/2017  Last Fill Quantity: 90,  # refills: 3   IF TAKING HALF TAB SHOULD QNTY CHANGE TO 45?   Last office visit: 5/29/2018 with prescribing provider:     Future Office Visit:   Next 5 appointments (look out 90 days)     Jul 09, 2018  9:00 AM CDT   PHYSICAL with Ian Tracy MD   [facility] ([facility]    [address] [address]   [phone]                   lisinopril (PRINIVIL/ZESTRIL) 40 MG tablet 90 tablet 0 2/26/2018     Sig: TAKE 1 TABLET BY MOUTH EVERY DAY      Last Written Prescription Date:  02/26/2018  Last Fill Quantity: 90,  # refills: 0   Last office visit: 5/29/2018 with prescribing provider:     Future Office Visit: 7-9-2018        Requested Prescriptions   Pending Prescriptions Disp Refills     lisinopril (PRINIVIL/ZESTRIL) 40 MG tablet [Pharmacy Med Name: LISINOPRIL 40MG TABLETS] 90 tablet 0     Sig: TAKE 1 TABLET BY MOUTH EVERY DAY    ACE Inhibitors (Including Combos) Protocol Failed    6/8/2018 12:01 PM       Failed - Normal serum creatinine on file in past 12 months    Recent Labs   Lab Test  05/29/18   1209   CR  1.21*            Passed - Blood pressure under 140/90 in past 12 months    BP Readings from Last 3 Encounters:   05/29/18 116/62   10/23/17 124/62   10/10/17 113/66                Passed - Recent (12 mo) or future (30 days) visit within the authorizing provider's specialty    Patient had office visit in the last 12 months or has a visit in the next 30 days with authorizing provider or within the authorizing provider's specialty.  See \"Patient Info\" tab in inbasket, or \"Choose Columns\" in Meds & Orders section of the refill encounter.           Passed - Patient is age 18 or older       Passed - No active pregnancy on record       Passed - Normal serum potassium on file in past 12 months    Recent " "Labs   Lab Test  05/29/18   1209   POTASSIUM  5.1            Passed - No positive pregnancy test in past 12 months        atorvastatin (LIPITOR) 10 MG tablet [Pharmacy Med Name: ATORVASTATIN 10MG TABLETS] 90 tablet 0     Sig: TAKE ONE-HALF TABLET BY MOUTH ONCE DAILY    Statins Protocol Passed    6/8/2018 12:01 PM       Passed - LDL on file in past 12 months    Recent Labs   Lab Test  06/28/17   1244   LDL  63            Passed - No abnormal creatine kinase in past 12 months    No lab results found.            Passed - Recent (12 mo) or future (30 days) visit within the authorizing provider's specialty    Patient had office visit in the last 12 months or has a visit in the next 30 days with authorizing provider or within the authorizing provider's specialty.  See \"Patient Info\" tab in inbasket, or \"Choose Columns\" in Meds & Orders section of the refill encounter.           Passed - Patient is age 18 or older       Passed - No active pregnancy on record       Passed - No positive pregnancy test in past 12 months          "

## 2018-06-11 RX ORDER — LISINOPRIL 40 MG/1
TABLET ORAL
Qty: 90 TABLET | Refills: 0 | Status: SHIPPED | OUTPATIENT
Start: 2018-06-11 | End: 2018-09-04

## 2018-06-11 RX ORDER — ATORVASTATIN CALCIUM 10 MG/1
TABLET, FILM COATED ORAL
Qty: 45 TABLET | Refills: 0 | Status: SHIPPED | OUTPATIENT
Start: 2018-06-11 | End: 2018-10-17

## 2018-06-13 ENCOUNTER — TELEPHONE (OUTPATIENT)
Dept: FAMILY MEDICINE | Facility: CLINIC | Age: 73
End: 2018-06-13

## 2018-06-13 DIAGNOSIS — I48.0 PAROXYSMAL ATRIAL FIBRILLATION (H): ICD-10-CM

## 2018-06-13 NOTE — TELEPHONE ENCOUNTER
Pending Prescriptions:                       Disp   Refills    atenolol (TENORMIN) 25 MG tablet          180 ta*1            Sig: Take 1 tablet (25 mg) by mouth 2 times daily          Last Written Prescription Date:  historical  Last Fill Quantity: -,   # refills: -  Last Office Visit: 5-29-18 [place]  Future Office visit:    Next 5 appointments (look out 90 days)     Jul 09, 2018  9:00 AM CDT   PHYSICAL with Ian Tracy MD   [facility]    [address] [address]   [phone]                   Routing refill request to provider for review/approval because:  Medication is reported/historical    RT Riya (R)

## 2018-06-14 NOTE — TELEPHONE ENCOUNTER
Patient has been waiting for a week and the pharmacy called to check on the process   Patient is completely out

## 2018-06-14 NOTE — TELEPHONE ENCOUNTER
Per last OV note, pt was on 25 mg bid, but last prescribed 50 mg bid. This is historical in system pended as such already routed to pcp.  Left pt a voicemail and mychart to verify what her current dosing is.    Madhuri Edge RN

## 2018-06-15 DIAGNOSIS — I48.0 PAROXYSMAL ATRIAL FIBRILLATION (H): ICD-10-CM

## 2018-06-15 RX ORDER — ATENOLOL 25 MG/1
25 TABLET ORAL 2 TIMES DAILY
Qty: 180 TABLET | Refills: 1 | Status: SHIPPED | OUTPATIENT
Start: 2018-06-15 | End: 2018-06-15

## 2018-06-15 RX ORDER — ATENOLOL 25 MG/1
25 TABLET ORAL 2 TIMES DAILY
Qty: 180 TABLET | Refills: 3 | Status: SHIPPED | OUTPATIENT
Start: 2018-06-15 | End: 2019-11-27

## 2018-06-15 NOTE — TELEPHONE ENCOUNTER
Routing to DOD to review as patient is requesting Rx today - Rx pended with requested directions     Thank you    Maylin HOWARD RN

## 2018-06-15 NOTE — TELEPHONE ENCOUNTER
"Requested Prescriptions   Pending Prescriptions Disp Refills     atenolol (TENORMIN) 25 MG tablet 180 tablet 1     Sig: Take 1 tablet (25 mg) by mouth 2 times daily    Beta-Blockers Protocol Passed    6/15/2018  1:36 PM       Passed - Blood pressure under 140/90 in past 12 months    BP Readings from Last 3 Encounters:   05/29/18 116/62   10/23/17 124/62   10/10/17 113/66                Passed - Patient is age 6 or older       Passed - Recent (12 mo) or future (30 days) visit within the authorizing provider's specialty    Patient had office visit in the last 12 months or has a visit in the next 30 days with authorizing provider or within the authorizing provider's specialty.  See \"Patient Info\" tab in inbasket, or \"Choose Columns\" in Meds & Orders section of the refill encounter.              "

## 2018-06-21 ENCOUNTER — ANTICOAGULATION THERAPY VISIT (OUTPATIENT)
Dept: NURSING | Facility: CLINIC | Age: 73
End: 2018-06-21
Payer: MEDICARE

## 2018-06-21 DIAGNOSIS — I48.0 PAROXYSMAL ATRIAL FIBRILLATION (H): ICD-10-CM

## 2018-06-21 DIAGNOSIS — Z79.01 LONG-TERM (CURRENT) USE OF ANTICOAGULANTS: ICD-10-CM

## 2018-06-21 LAB — INR POINT OF CARE: 2.6 (ref 0.86–1.14)

## 2018-06-21 PROCEDURE — 36416 COLLJ CAPILLARY BLOOD SPEC: CPT

## 2018-06-21 PROCEDURE — 99207 ZZC NO CHARGE NURSE ONLY: CPT

## 2018-06-21 PROCEDURE — 85610 PROTHROMBIN TIME: CPT | Mod: QW

## 2018-06-21 NOTE — MR AVS SNAPSHOT
Catalina Marie   6/21/2018 1:45 PM   Anticoagulation Therapy Visit    Description:  72 year old female   Provider:   ANTICOAGULATION CLINIC   Department:  [facility] Nurse           INR as of 6/21/2018     Today's INR 2.6      Anticoagulation Summary as of 6/21/2018     INR goal 2.0-3.0   Today's INR 2.6   Full warfarin instructions 2.5 mg on Tue, Fri; 5 mg all other days   Next INR check 7/19/2018    Indications   Long-term (current) use of anticoagulants [Z79.01] [Z79.01]  Atrial fibrillation (H) [I48.91] [I48.91]         Your next Anticoagulation Clinic appointment(s)     Jul 19, 2018 11:30 AM CDT   Anticoagulation Visit with  ANTICOAGULATION CLINIC   [facility] [place] ([facility])    [address]  [place] [address]   [phone]              Contact Numbers     Clinic Number:         June 2018 Details    Sun Mon Tue Wed Thu Fri Sat          1               2                 3               4               5               6               7               8               9                 10               11               12               13               14               15               16                 17               18               19               20               21      5 mg   See details      22      2.5 mg         23      5 mg           24      5 mg         25      5 mg         26      2.5 mg         27      5 mg         28      5 mg         29      2.5 mg         30      5 mg          Date Details   06/21 This INR check               How to take your warfarin dose     To take:  2.5 mg Take 0.5 of a 5 mg tablet.    To take:  5 mg Take 1 of the 5 mg tablets.           July 2018 Details    Sun Mon Tue Wed Thu Fri Sat     1      5 mg         2      5 mg         3      2.5 mg         4      5 mg         5      5 mg         6      2.5 mg         7      5 mg           8      5 mg         9      5 mg         10      2.5 mg         11      5 mg         12      5 mg         13       2.5 mg         14      5 mg           15      5 mg         16      5 mg         17      2.5 mg         18      5 mg         19            20               21                 22               23               24               25               26               27               28                 29               30               31                    Date Details   No additional details    Date of next INR:  7/19/2018         How to take your warfarin dose     To take:  2.5 mg Take 0.5 of a 5 mg tablet.    To take:  5 mg Take 1 of the 5 mg tablets.

## 2018-06-21 NOTE — PROGRESS NOTES
ANTICOAGULATION FOLLOW-UP CLINIC VISIT    Patient Name:  Catalina Marie  Date:  6/21/2018  Contact Type:  Face to Face    SUBJECTIVE:        OBJECTIVE    INR Protime   Date Value Ref Range Status   06/21/2018 2.6 (A) 0.86 - 1.14 Final       ASSESSMENT / PLAN  No question data found.  Anticoagulation Summary as of 6/21/2018     INR goal 2.0-3.0   Today's INR 2.6   Warfarin maintenance plan 2.5 mg (5 mg x 0.5) on Tue, Fri; 5 mg (5 mg x 1) all other days   Full warfarin instructions 2.5 mg on Tue, Fri; 5 mg all other days   Weekly warfarin total 30 mg   No change documented Elodia Campbell RN   Plan last modified Elodia Campbell RN (1/25/2018)   Next INR check 7/19/2018   Priority INR   Target end date     Indications   Long-term (current) use of anticoagulants [Z79.01] [Z79.01]  Atrial fibrillation (H) [I48.91] [I48.91]         Anticoagulation Episode Summary     INR check location     Preferred lab     Send INR reminders to [facility] ANT[facility]AG[facility]ATION    Comments Expect FAXED INR results from [facility] [employer] in [place], [place]. Phone: [phone].  General Results#: [phone]  Call pt's cell in [place] with results/plan: [phone]      Anticoagulation Care Providers     Provider Role Specialty Phone number    Ian Tracy MD Referring Internal Medicine [phone]            See the Encounter Report to view Anticoagulation Flowsheet and Dosing Calendar (Go to Encounters tab in chart review, and find the Anticoagulation Therapy Visit)    Dosage adjustment made based on physician directed care plan.    Elodia Campbell RN

## 2018-07-06 DIAGNOSIS — I10 ESSENTIAL HYPERTENSION: ICD-10-CM

## 2018-07-06 NOTE — TELEPHONE ENCOUNTER
"furosemide (LASIX) 20 MG tablet 180 tablet 0 4/3/2018  No      Sig: TAKE 1 TABLET(20 MG) BY MOUTH TWICE DAILY     Last Written Prescription Date:  4-3-2018  Last Fill Quantity: 180,  # refills: 0   Last office visit: 5/29/2018 with prescribing provider:     Future Office Visit:   Next 5 appointments (look out 90 days)     Jul 09, 2018  9:00 AM CDT   [facility] Ian Tracy MD   [facility]    [address] [address] [phone]                 Requested Prescriptions   Pending Prescriptions Disp Refills     furosemide (LASIX) 20 MG tablet [Pharmacy Med Name: FUROSEMIDE 20MG TABLETS] 180 tablet 0     Sig: TAKE 1 TABLET(20 MG) BY MOUTH TWICE DAILY    Diuretics (Including Combos) Protocol Failed    7/6/2018 12:55 PM       Failed - Normal serum creatinine on file in past 12 months    Recent Labs   Lab Test  05/29/18   1209   CR  1.21*             Passed - Blood pressure under 140/90 in past 12 months    BP Readings from Last 3 Encounters:   05/29/18 116/62   10/23/17 124/62   10/10/17 113/66                Passed - Recent (12 mo) or future (30 days) visit within the authorizing provider's specialty    Patient had office visit in the last 12 months or has a visit in the next 30 days with authorizing provider or within the authorizing provider's specialty.  See \"Patient Info\" tab in inbasket, or \"Choose Columns\" in Meds & Orders section of the refill encounter.           Passed - Patient is age 18 or older       Passed - No active pregancy on record       Passed - Normal serum potassium on file in past 12 months    Recent Labs   Lab Test  05/29/18   1209   POTASSIUM  5.1                   Passed - Normal serum sodium on file in past 12 months    Recent Labs   Lab Test  05/29/18   1209   NA  141             Passed - No positive pregnancy test in past 12 months        "

## 2018-07-09 ENCOUNTER — OFFICE VISIT (OUTPATIENT)
Dept: FAMILY MEDICINE | Facility: CLINIC | Age: 73
End: 2018-07-09
Payer: MEDICARE

## 2018-07-09 VITALS
SYSTOLIC BLOOD PRESSURE: 121 MMHG | DIASTOLIC BLOOD PRESSURE: 73 MMHG | OXYGEN SATURATION: 97 % | TEMPERATURE: 97.9 F | HEIGHT: 64 IN | WEIGHT: 137 LBS | HEART RATE: 83 BPM | BODY MASS INDEX: 23.39 KG/M2

## 2018-07-09 DIAGNOSIS — J30.0 CHRONIC VASOMOTOR RHINITIS: ICD-10-CM

## 2018-07-09 DIAGNOSIS — I48.0 PAROXYSMAL ATRIAL FIBRILLATION (H): Primary | ICD-10-CM

## 2018-07-09 DIAGNOSIS — I10 ESSENTIAL HYPERTENSION: ICD-10-CM

## 2018-07-09 DIAGNOSIS — N18.30 CKD (CHRONIC KIDNEY DISEASE) STAGE 3, GFR 30-59 ML/MIN (H): ICD-10-CM

## 2018-07-09 DIAGNOSIS — Z17.0 MALIGNANT NEOPLASM OF BREAST IN FEMALE, ESTROGEN RECEPTOR POSITIVE, UNSPECIFIED LATERALITY, UNSPECIFIED SITE OF BREAST (H): ICD-10-CM

## 2018-07-09 DIAGNOSIS — E78.5 HYPERLIPIDEMIA LDL GOAL <100: ICD-10-CM

## 2018-07-09 DIAGNOSIS — I10 BENIGN ESSENTIAL HYPERTENSION: ICD-10-CM

## 2018-07-09 DIAGNOSIS — E04.2 NONTOXIC MULTINODULAR GOITER: ICD-10-CM

## 2018-07-09 DIAGNOSIS — E11.9 TYPE 2 DIABETES MELLITUS WITHOUT COMPLICATION, WITHOUT LONG-TERM CURRENT USE OF INSULIN (H): ICD-10-CM

## 2018-07-09 DIAGNOSIS — E55.9 VITAMIN D DEFICIENCY: ICD-10-CM

## 2018-07-09 DIAGNOSIS — C50.919 MALIGNANT NEOPLASM OF BREAST IN FEMALE, ESTROGEN RECEPTOR POSITIVE, UNSPECIFIED LATERALITY, UNSPECIFIED SITE OF BREAST (H): ICD-10-CM

## 2018-07-09 DIAGNOSIS — F43.22 ADJUSTMENT DISORDER WITH ANXIOUS MOOD: ICD-10-CM

## 2018-07-09 DIAGNOSIS — I50.31 ACUTE DIASTOLIC CONGESTIVE HEART FAILURE (H): ICD-10-CM

## 2018-07-09 LAB
ALBUMIN SERPL-MCNC: 4.1 G/DL (ref 3.4–5)
ALBUMIN UR-MCNC: NEGATIVE MG/DL
ALP SERPL-CCNC: 96 U/L (ref 40–150)
ALT SERPL W P-5'-P-CCNC: 28 U/L (ref 0–50)
ANION GAP SERPL CALCULATED.3IONS-SCNC: 12 MMOL/L (ref 3–14)
APPEARANCE UR: CLEAR
AST SERPL W P-5'-P-CCNC: 22 U/L (ref 0–45)
BILIRUB SERPL-MCNC: 0.6 MG/DL (ref 0.2–1.3)
BILIRUB UR QL STRIP: NEGATIVE
BUN SERPL-MCNC: 22 MG/DL (ref 7–30)
CALCIUM SERPL-MCNC: 9.1 MG/DL (ref 8.5–10.1)
CHLORIDE SERPL-SCNC: 106 MMOL/L (ref 94–109)
CHOLEST SERPL-MCNC: 142 MG/DL
CO2 SERPL-SCNC: 23 MMOL/L (ref 20–32)
COLOR UR AUTO: YELLOW
CREAT SERPL-MCNC: 1.14 MG/DL (ref 0.52–1.04)
CREAT UR-MCNC: 29 MG/DL
DIGOXIN SERPL-MCNC: 0.1 UG/L (ref 0.5–2)
ERYTHROCYTE [DISTWIDTH] IN BLOOD BY AUTOMATED COUNT: 13.1 % (ref 10–15)
GFR SERPL CREATININE-BSD FRML MDRD: 46 ML/MIN/1.7M2
GLUCOSE SERPL-MCNC: 136 MG/DL (ref 70–99)
GLUCOSE UR STRIP-MCNC: NEGATIVE MG/DL
HBA1C MFR BLD: 6.4 % (ref 0–5.6)
HCT VFR BLD AUTO: 44.7 % (ref 35–47)
HDLC SERPL-MCNC: 33 MG/DL
HGB BLD-MCNC: 14.7 G/DL (ref 11.7–15.7)
HGB UR QL STRIP: NEGATIVE
KETONES UR STRIP-MCNC: NEGATIVE MG/DL
LDLC SERPL CALC-MCNC: 56 MG/DL
LEUKOCYTE ESTERASE UR QL STRIP: NEGATIVE
MCH RBC QN AUTO: 30.9 PG (ref 26.5–33)
MCHC RBC AUTO-ENTMCNC: 32.9 G/DL (ref 31.5–36.5)
MCV RBC AUTO: 94 FL (ref 78–100)
MICROALBUMIN UR-MCNC: 7 MG/L
MICROALBUMIN/CREAT UR: 23.93 MG/G CR (ref 0–25)
NITRATE UR QL: NEGATIVE
NONHDLC SERPL-MCNC: 109 MG/DL
NT-PROBNP SERPL-MCNC: 911 PG/ML (ref 0–125)
PH UR STRIP: 6 PH (ref 5–7)
PLATELET # BLD AUTO: 262 10E9/L (ref 150–450)
POTASSIUM SERPL-SCNC: 3.9 MMOL/L (ref 3.4–5.3)
PROT SERPL-MCNC: 7.8 G/DL (ref 6.8–8.8)
RBC # BLD AUTO: 4.75 10E12/L (ref 3.8–5.2)
SODIUM SERPL-SCNC: 141 MMOL/L (ref 133–144)
SOURCE: NORMAL
SP GR UR STRIP: 1.01 (ref 1–1.03)
TRIGL SERPL-MCNC: 263 MG/DL
TSH SERPL DL<=0.005 MIU/L-ACNC: 1.7 MU/L (ref 0.4–4)
UROBILINOGEN UR STRIP-ACNC: 0.2 EU/DL (ref 0.2–1)
WBC # BLD AUTO: 7.2 10E9/L (ref 4–11)

## 2018-07-09 PROCEDURE — 85027 COMPLETE CBC AUTOMATED: CPT | Performed by: INTERNAL MEDICINE

## 2018-07-09 PROCEDURE — 83036 HEMOGLOBIN GLYCOSYLATED A1C: CPT | Performed by: INTERNAL MEDICINE

## 2018-07-09 PROCEDURE — 99207 C FOOT EXAM  NO CHARGE: CPT | Mod: 25 | Performed by: INTERNAL MEDICINE

## 2018-07-09 PROCEDURE — 99213 OFFICE O/P EST LOW 20 MIN: CPT | Mod: 25 | Performed by: INTERNAL MEDICINE

## 2018-07-09 PROCEDURE — 80162 ASSAY OF DIGOXIN TOTAL: CPT | Performed by: INTERNAL MEDICINE

## 2018-07-09 PROCEDURE — G0439 PPPS, SUBSEQ VISIT: HCPCS | Performed by: INTERNAL MEDICINE

## 2018-07-09 PROCEDURE — 80053 COMPREHEN METABOLIC PANEL: CPT | Performed by: INTERNAL MEDICINE

## 2018-07-09 PROCEDURE — 81003 URINALYSIS AUTO W/O SCOPE: CPT | Performed by: INTERNAL MEDICINE

## 2018-07-09 PROCEDURE — 82306 VITAMIN D 25 HYDROXY: CPT | Performed by: INTERNAL MEDICINE

## 2018-07-09 PROCEDURE — 83880 ASSAY OF NATRIURETIC PEPTIDE: CPT | Performed by: INTERNAL MEDICINE

## 2018-07-09 PROCEDURE — 82043 UR ALBUMIN QUANTITATIVE: CPT | Performed by: INTERNAL MEDICINE

## 2018-07-09 PROCEDURE — 36415 COLL VENOUS BLD VENIPUNCTURE: CPT | Performed by: INTERNAL MEDICINE

## 2018-07-09 PROCEDURE — 80061 LIPID PANEL: CPT | Performed by: INTERNAL MEDICINE

## 2018-07-09 PROCEDURE — 84443 ASSAY THYROID STIM HORMONE: CPT | Performed by: INTERNAL MEDICINE

## 2018-07-09 RX ORDER — PROPAFENONE HYDROCHLORIDE 150 MG/1
TABLET, COATED ORAL
Qty: 270 TABLET | Refills: 3 | Status: SHIPPED | OUTPATIENT
Start: 2018-07-09 | End: 2018-07-19

## 2018-07-09 RX ORDER — FUROSEMIDE 20 MG
TABLET ORAL
Qty: 180 TABLET | Refills: 0 | Status: SHIPPED | OUTPATIENT
Start: 2018-07-09 | End: 2018-09-29

## 2018-07-09 RX ORDER — FUROSEMIDE 20 MG
TABLET ORAL
Start: 2018-07-09

## 2018-07-09 RX ORDER — IPRATROPIUM BROMIDE 42 UG/1
2 SPRAY, METERED NASAL 4 TIMES DAILY PRN
Qty: 3 BOX | Refills: 3 | Status: SHIPPED | OUTPATIENT
Start: 2018-07-09 | End: 2019-10-03

## 2018-07-09 NOTE — LETTER
[facility]  [address] [facility]  [address]  [place]  [address]  Tel: [phone]    August 27, 2018    Catalina DOV Frank  [address] [address]        Dear Ms. Marie,    I apologize for taking so long to get back to to let you know how good you are.  Enclosed are your lab reports from your recent wellness exam.    Vitamin D is important for bone health and your vitamin D level remains normal at 38.    As you are aware of the digoxin that you are taking is important for helping to prevent your heart from racing associated with your paroxysmal atrial fibrillation.  We periodically check the digoxin level to make sure that were not causing any toxicity.    The BNP test helps us to monitor your general fluid status.  Your lab reports suggest that you are at the upper limits of normal.    We had checked several lab reports to help monitor for diabetes.  Random urine albumin test is a sensitive indicator of kidney function and yours remains within normal limits.  Hemoglobin A1c shows us that your blood sugars are higher than normal but in a very good range at less than 6.5.  (If your blood sugars were on the average within the normal range hemoglobin A1c would be less than 6)    The comprehensive metabolic panel showed that your minerals and electrolytes and liver function tests were all normal.  Your kidney function test is better than it was a year ago and just slightly higher than normal which is nothing to worry about.    The lipid panel is similar to a year ago with some slight improvements in your triglycerides.  The total cholesterol was 142 better than 159 from a year ago.  The triglycerides were 263, better than 301 from a year ago and anything less than 150 is normal.  The triglycerides are intimately related to inherited tendencies and carbohydrates in her diet.  The HDL or good cholesterol was 33 not quite as good as 36 from a year ago and anything higher than 50 is  normal.  The HDL cholesterol is intimately related to inherited tendencies and regular aerobic activities.  The elevation of your triglycerides and the lower value of the HDL cholesterol are a known inherited tendency.  The take-home message is that you need to work harder to manage the day-to-day variables than most people because of this inherited tendency.  Most important factor is the LDL or bad cholesterol.  Our goal is to keep this under 100, anything less than 70 is passed and yours is 56 which is better than 63 from a year  ago.  Management of the triglycerides and HDL will always be a work in progress something that requires more due diligence reviewed and the average person.    The TSH is a sensitive indicator of thyroid function and I am happy to say that your value is in a very normal good range.  Your urinalysis was normal.  The CBC was normal as well showing no sign of low blood or anemia.    Implants share with you generally good reports.    Sincerely,    Ian Tracy MD/[facility]          Enclosure: Lab Results  Results for orders placed or performed in visit on 07/09/18   UA reflex to Microscopic and Culture   Result Value Ref Range    Color Urine Yellow     Appearance Urine Clear     Glucose Urine Negative NEG^Negative mg/dL    Bilirubin Urine Negative NEG^Negative    Ketones Urine Negative NEG^Negative mg/dL    Specific Gravity Urine 1.010 1.003 - 1.035    Blood Urine Negative NEG^Negative    pH Urine 6.0 5.0 - 7.0 pH    Protein Albumin Urine Negative NEG^Negative mg/dL    Urobilinogen Urine 0.2 0.2 - 1.0 EU/dL    Nitrite Urine Negative NEG^Negative    Leukocyte Esterase Urine Negative NEG^Negative    Source Midstream Urine    CBC with platelets   Result Value Ref Range    WBC 7.2 4.0 - 11.0 10e9/L    RBC Count 4.75 3.8 - 5.2 10e12/L    Hemoglobin 14.7 11.7 - 15.7 g/dL    Hematocrit 44.7 35.0 - 47.0 %    MCV 94 78 - 100 fl    MCH 30.9 26.5 - 33.0 pg    MCHC 32.9 31.5 - 36.5 g/dL    RDW 13.1 10.0 - 15.0  %    Platelet Count 262 150 - 450 10e9/L   Comprehensive metabolic panel   Result Value Ref Range    Sodium 141 133 - 144 mmol/L    Potassium 3.9 3.4 - 5.3 mmol/L    Chloride 106 94 - 109 mmol/L    Carbon Dioxide 23 20 - 32 mmol/L    Anion Gap 12 3 - 14 mmol/L    Glucose 136 (H) 70 - 99 mg/dL    Urea Nitrogen 22 7 - 30 mg/dL    Creatinine 1.14 (H) 0.52 - 1.04 mg/dL    GFR Estimate 46 (L) >60 mL/min/1.7m2    GFR Estimate If Black 56 (L) >60 mL/min/1.7m2    Calcium 9.1 8.5 - 10.1 mg/dL    Bilirubin Total 0.6 0.2 - 1.3 mg/dL    Albumin 4.1 3.4 - 5.0 g/dL    Protein Total 7.8 6.8 - 8.8 g/dL    Alkaline Phosphatase 96 40 - 150 U/L    ALT 28 0 - 50 U/L    AST 22 0 - 45 U/L   Lipid panel reflex to direct LDL Fasting   Result Value Ref Range    Cholesterol 142 <200 mg/dL    Triglycerides 263 (H) <150 mg/dL    HDL Cholesterol 33 (L) >49 mg/dL    LDL Cholesterol Calculated 56 <100 mg/dL    Non HDL Cholesterol 109 <130 mg/dL   TSH with free T4 reflex   Result Value Ref Range    TSH 1.70 0.40 - 4.00 mU/L   Vitamin D Deficiency   Result Value Ref Range    Vitamin D Deficiency screening 38 20 - 75 ug/L   Hemoglobin A1c   Result Value Ref Range    Hemoglobin A1C 6.4 (H) 0 - 5.6 %   Albumin Random Urine Quantitative with Creat Ratio   Result Value Ref Range    Creatinine Urine 29 mg/dL    Albumin Urine mg/L 7 mg/L    Albumin Urine mg/g Cr 23.93 0 - 25 mg/g Cr   BNP-N terminal pro   Result Value Ref Range    N-Terminal Pro Bnp 911 (H) 0 - 125 pg/mL   Digoxin level   Result Value Ref Range    Digoxin Level 0.1 (L) 0.5 - 2.0 ug/L

## 2018-07-09 NOTE — MR AVS SNAPSHOT
After Visit Summary   7/9/2018    Catalina Marie    MRN: [id number]           Patient Information     Date Of Birth          1945        Visit Information        Provider Department      7/9/2018 9:00 AM Ian Tracy MD [facility]        Today's Diagnoses     Paroxysmal atrial fibrillation (H)    -  1    Benign essential hypertension        Type 2 diabetes mellitus without complication, without long-term current use of insulin (H)        Acute diastolic congestive heart failure (H)        Malignant neoplasm of breast in female, estrogen receptor positive, unspecified laterality, unspecified site of breast (H)        Adjustment disorder with anxious mood        Hyperlipidemia LDL goal <100        CKD (chronic kidney disease) stage 3, GFR 30-59 ml/min        Nontoxic multinodular goiter        Vitamin D deficiency        Essential hypertension        Chronic vasomotor rhinitis          Care Instructions      Preventive Health Recommendations    Female Ages 65 +    Yearly exam:     See your health care provider every year in order to  o Review health changes.   o Discuss preventive care.    o Review your medicines if your doctor has prescribed any.      You no longer need a yearly Pap test unless you've had an abnormal Pap test in the past 10 years. If you have vaginal symptoms, such as bleeding or discharge, be sure to talk with your provider about a Pap test.      Every 1 to 2 years, have a mammogram.  If you are over 69, talk with your health care provider about whether or not you want to continue having screening mammograms.      Every 10 years, have a colonoscopy. Or, have a yearly FIT test (stool test). These exams will check for colon cancer.       Have a cholesterol test every 5 years, or more often if your doctor advises it.       Have a diabetes test (fasting glucose) every three years. If you are at risk for diabetes, you should have this test more often.       At age  65, have a bone density scan (DEXA) to check for osteoporosis (brittle bone disease).    Shots:    Get a flu shot each year.    Get a tetanus shot every 10 years.    Talk to your doctor about your pneumonia vaccines. There are now two you should receive - Pneumovax (PPSV 23) and Prevnar (PCV 13).    Talk to your pharmacist about the shingles vaccine.    Talk to your doctor about the hepatitis B vaccine.    Nutrition:     Eat at least 5 servings of fruits and vegetables each day.      Eat whole-grain bread, whole-wheat pasta and brown rice instead of white grains and rice.      Get adequate Calcium and Vitamin D.     Lifestyle    Exercise at least 150 minutes a week (30 minutes a day, 5 days a week). This will help you control your weight and prevent disease.      Limit alcohol to one drink per day.      No smoking.       Wear sunscreen to prevent skin cancer.       See your dentist twice a year for an exam and cleaning.      See your eye doctor every 1 to 2 years to screen for conditions such as glaucoma, macular degeneration and cataracts.          Follow-ups after your visit        Your next 10 appointments already scheduled     Jul 19, 2018 11:30 AM CDT   Anticoagulation Visit with  [facility] CLINIC   [facility]    [address]  [facility] [address] [phone]              Who to contact     If you have questions or need follow up information about today's clinic visit or your schedule please contact [facility] directly at [phone].  Normal or non-critical lab and imaging results will be communicated to you by MyChart, letter or phone within 4 business days after the clinic has received the results. If you do not hear from us within 7 days, please contact the clinic through MyChart or phone. If you have a critical or abnormal lab result, we will notify you by phone as soon as possible.  Submit refill requests through [facility] or call your pharmacy  "and they will forward the refill request to us. Please allow 3 business days for your refill to be completed.          Additional Information About Your Visit        [employer] Information     [employer] gives you secure access to your electronic health record. If you see a primary care provider, you can also send messages to your care team and make appointments. If you have questions, please call your primary care clinic.  If you do not have a primary care provider, please call [phone] and they will assist you.        Care EveryWhere ID     This is your Care EveryWhere ID. This could be used by other organizations to access your [facility] medical records  [id number]        Your Vitals Were     Pulse Temperature Height Pulse Oximetry Breastfeeding? BMI (Body Mass Index)    83 97.9  F (36.6  C) (Oral) 5' 4\" (1.626 m) 97% No 23.52 kg/m2       Blood Pressure from Last 3 Encounters:   07/09/18 121/73   05/29/18 116/62   10/23/17 124/62    Weight from Last 3 Encounters:   07/09/18 137 lb (62.1 kg)   05/29/18 139 lb (63 kg)   10/23/17 138 lb (62.6 kg)              We Performed the Following     Albumin Random Urine Quantitative with Creat Ratio     BNP-N terminal pro     C FOOT EXAM  NO CHARGE     CBC with platelets     Comprehensive metabolic panel     Digoxin level     Hemoglobin A1c     Lipid panel reflex to direct LDL Fasting     OFFICE/OUTPT VISIT,EST,LEVL III     TSH with free T4 reflex     UA reflex to Microscopic and Culture     Vitamin D Deficiency          Today's Medication Changes          These changes are accurate as of 7/9/18 11:59 PM.  If you have any questions, ask your nurse or doctor.               Start taking these medicines.        Dose/Directions    ipratropium 0.06 % spray   Commonly known as:  ATROVENT   Used for:  Chronic vasomotor rhinitis   Started by:  Ian Tracy MD        Dose:  2 spray   Spray 2 sprays into both nostrils 4 times daily as needed for rhinitis   Quantity:  3 Box "   Refills:  3         These medicines have changed or have updated prescriptions.        Dose/Directions    propafenone 150 MG Tabs tablet   Commonly known as:  RYTHMOL   This may have changed:  additional instructions   Used for:  Paroxysmal atrial fibrillation (H)   Changed by:  Ian Tracy MD        Take 1 tablet tid evening   Quantity:  270 tablet   Refills:  3            Where to get your medicines      These medications were sent to [facility] [address] [place] [address] [place], [place] - [address] AT [address] [place] [address] & [facility]  [address] [place] [place] [address]    Hours:  24-hours Phone:  [phone]     furosemide 20 MG tablet    propafenone 150 MG Tabs tablet         Some of these will need a paper prescription and others can be bought over the counter.  Ask your nurse if you have questions.     Bring a paper prescription for each of these medications     ipratropium 0.06 % spray                Primary Care Provider Office Phone # Fax #    Ian Tracy [phone] [address] [facility] [address]  [facility] [address]        Equal Access to Services     [facility]: Hadii aad ku hadasho Soomaali, waaxda luqadaha, qaybta kaalmada adeegyada, waxcathi sharma . So [facility] [phone].    ATENCIÓN: Si habla español, tiene a dahl disposición servicios gratNew Mexico Behavioral Health Institute at Las Vegasos de asistencia lingüística. [facility] [phone].    We comply with applicable federal civil rights laws and [place] laws. We do not discriminate on the basis of race, color, national origin, age, disability, sex, sexual orientation, or gender identity.            Thank you!     Thank you for choosing [facility]  for your care. Our goal is always to provide you with excellent care. Hearing back from our patients is one way we can continue to improve our services. Please take a few minutes to complete the written survey that you may receive in the mail after your visit with us. Thank you!             Your  Updated Medication List - Protect others around you: Learn how to safely use, store and throw away your medicines at www.disposemymeds.org.          This list is accurate as of 7/9/18 11:59 PM.  Always use your most recent med list.                   Brand Name Dispense Instructions for use Diagnosis    atenolol 25 MG tablet    TENORMIN    180 tablet    Take 1 tablet (25 mg) by mouth 2 times daily    Paroxysmal atrial fibrillation (H)       atorvastatin 10 MG tablet    LIPITOR    45 tablet    TAKE ONE-HALF TABLET BY MOUTH ONCE DAILY    Hyperlipidemia LDL goal <100       Biotin 3 MG Tabs      Take by mouth daily (unknown dose)        CALCIUM + D PO      Take by mouth daily        carboxymethylcellulose 1 % ophthalmic solution    CELLUVISC/REFRESH LIQUIGEL     Place 1 drop into both eyes 4 times daily as needed for dry eyes        DIGOX 250 MCG tablet   Generic drug:  digoxin      Take 0.5 tablets (125 mcg) by mouth daily    Benign essential hypertension       felodipine ER 5 MG 24 hr tablet    PLENDIL    180 tablet    Take 1 tablet (5 mg) by mouth 2 times daily    Benign essential hypertension       fenofibrate 160 MG tablet     90 tablet    TAKE 1 TABLET BY MOUTH EVERY DAY    Benign essential hypertension       furosemide 20 MG tablet    LASIX    180 tablet    TAKE 1 TABLET(20 MG) BY MOUTH TWICE DAILY    Essential hypertension       ipratropium 0.06 % spray    ATROVENT    3 Box    Spray 2 sprays into both nostrils 4 times daily as needed for rhinitis    Chronic vasomotor rhinitis       * lisinopril 40 MG tablet    PRINIVIL/ZESTRIL    90 tablet    TAKE 1 TABLET BY MOUTH EVERY DAY    Benign essential hypertension       * lisinopril 40 MG tablet    PRINIVIL/ZESTRIL    90 tablet    TAKE 1 TABLET BY MOUTH EVERY DAY    Benign essential hypertension       propafenone 150 MG Tabs tablet    RYTHMOL    270 tablet    Take 1 tablet tid evening    Paroxysmal atrial fibrillation (H)       sertraline 100 MG tablet    ZOLOFT    90  tablet    Take 1 tablet (100 mg) by mouth daily    Adjustment disorder with anxious mood       verapamil 240 MG Cp24 24 hr capsule    VERELAN    90 capsule    TAKE 1 CAPSULE BY MOUTH EVERY DAY    Benign essential hypertension       warfarin 5 MG tablet    COUMADIN    90 tablet    TAKE 1/2 TO 1 TABLET BY MOUTH DAILY OR AS DIRECTED BY INR CLINIC    Paroxysmal atrial fibrillation (H)       * Notice:  This list has 2 medication(s) that are the same as other medications prescribed for you. Read the directions carefully, and ask your doctor or other care provider to review them with you.

## 2018-07-09 NOTE — PROGRESS NOTES
SUBJECTIVE:   Catalina Marie is a 72 year old female who presents for Preventive Visit.    Are you in the first 12 months of your Medicare Part B coverage?  No    Healthy Habits:    Do you get at least three servings of calcium containing foods daily (dairy, green leafy vegetables, etc.)? yes    Amount of exercise or daily activities, outside of work: 7 day(s) per week    Problems taking medications regularly No    Medication side effects: No    Have you had an eye exam in the past two years? yes    Do you see a dentist twice per year? yes    Do you have sleep apnea, excessive snoring or daytime drowsiness?no    Ability to successfully perform activities of daily living: Yes, no assistance needed    Home safety:  none identified     Hearing impairment: Yes,     Fall risk:     COGNITIVE SCREEN  1) Repeat 3 items (Leader, Season, Table)    2) Clock draw: NORMAL  3) 3 item recall: Recalls 3 objects  Results: 3 items recalled: COGNITIVE IMPAIRMENT LESS LIKELY    Mini-CogTM Copyright S Devan. Licensed by the author for use in [facility]; reprinted with permission ([email or web address]). All rights reserved.      Patient presents to the clinic for an annual wellness visit. She has been checking her blood pressure and heart rate at home and recording the readings. Her heart rate has generally been in the 60's or 70's, which is an improvement from previously being in the 90's. She is usually able to tell when she is in a-fib and states that this has now improved as well. Her blood pressure tends to fluctuate a bit. At 8:40 am on 7/4 her systolic pressure was 142. She has noticed that her blood pressure starts to rise when it gets closer to the time she is due to take her medications. She takes her medications around 5 pm. On 7/6 her BP was 144/100 around 5:30 pm. Notes that she was at the [employer].     She is taking atenolol 25 mg twice daily, lasix 20 mg twice daily, verapamil 240 mg daily, and  "propafenone 150 mg 3x daily. She is also currently on atorvastatin 10 mg, fenofibrate 160 mg daily, and digoxin 125 mg daily. She is only taking a quarter of a tablet of felodipine at night and notes that her BP will be low the following morning, around 85/60.     Patient relates that she has self diagnosed herself with IBS because she is either constipated or has diarrhea. \"I never know what to expect\". If she is constipated she may go 2 days without a bowel movement, but will then go often. Notes that last week her stools were normal up until she had lunch at which point she got diarrhea. Denies any blood in stool, abdominal cramping, or abdominal discomfort. She does get bloated at night when lying in bed. This including belching and passing gas. She has noticed that fried food tends to exacerbate symptoms. She has been trying to stay on a low fat diet. She will occasionally experience indigestion. Notes that she has always had difficulty swallowing, particularly when eating a baguette. It does not necessarily get stuck but she has had an issue with choking on the food a few times.     Per patient, she has a deviated septum. She tried the nasal strips last year but they provided no relief. The ongoing symptoms really bother her, she notes ongoing and persistent rhinitis and post nasal drip. It does affect her voice and makes her cough. She has tried an OTC nasal drops, but does not recall what exactly they were. They did not help improve symptoms.     When she is walking she has to really concentrate to walk straight otherwise she tends to veer towards either the left or right. She has bought walking shoes but they did not help. In the mornings she will wake up a little dizzy. Denies being light headed or near faint.     She is going to the [employer], which has an elevation of 8,000 ft. She has gone to the [employer] in [place] in the past with little issues when she was younger. She is able to go to 5,000 " ft just fine. For exercise she runs up and down 3 flights of stairs, walks for 30 minutes a day, and does 25 minutes of weights every other day.     Patient is currently taking 5,000 units of biotin for thinning hair and hair loss. She wishes to increase the dosage to 10,000 units. She notes a red lesion on her chest.     Reviewed and updated as needed this visit by clinical staff  Tobacco  Allergies  Meds  Problems  Med Hx  Surg Hx  Fam Hx  Soc Hx          Reviewed and updated as needed this visit by Provider        Social History   Substance Use Topics     Smoking status: Former Smoker     Smokeless tobacco: Never Used     Alcohol use 0.0 oz/week     0 Standard drinks or equivalent per week       If you drink alcohol do you typically have >3 drinks per day or >7 drinks per week? No                        Today's PHQ-2 Score:   PHQ-2 ( 1999 Pfizer) 5/29/2018 10/23/2017   Q1: Little interest or pleasure in doing things 0 0   Q2: Feeling down, depressed or hopeless 0 -   PHQ-2 Score 0 -     Do you feel safe in your environment - Yes    Do you have a Health Care Directive?: Yes: Patient states has Advance Directive and will bring in a copy to clinic.    Current providers sharing in care for this patient include:   Patient Care Team:  Ian Tracy MD as PCP - General (Internal Medicine)    The following health maintenance items are reviewed in Epic and correct as of today:  Health Maintenance   Topic Date Due     HF ACTION PLAN Q3 YR  1945     EYE EXAM Q1 YEAR  09/26/1946     ADVANCE DIRECTIVE PLANNING Q5 YRS  09/26/1998     OP ANNUAL INR REFERRAL  08/16/2017     ALT Q1 YR  06/28/2018     LIPID MONITORING Q1 YEAR  06/28/2018     MICROALBUMIN Q1 YEAR  06/28/2018     CBC Q1 YR  06/28/2018     INFLUENZA VACCINE (1) 09/01/2018     DIGOXIN LEVEL Q1 YR  09/21/2018     FOOT EXAM Q1 YEAR  10/10/2018     TETANUS IMMUNIZATION (SYSTEM ASSIGNED)  10/24/2018     BMP Q6 MOS  11/29/2018     A1C Q6 MO  11/29/2018      CREATININE Q1 YEAR  05/29/2019     FALL RISK ASSESSMENT  05/29/2019     PHQ-2 Q1 YR  05/29/2019     TSH W/ FREE T4 REFLEX Q2 YEAR  06/28/2019     COLON CANCER SCREEN (SYSTEM ASSIGNED)  05/06/2024     DEXA SCAN SCREENING (SYSTEM ASSIGNED)  Completed     PNEUMOCOCCAL  Completed     Current Outpatient Prescriptions   Medication Sig Dispense Refill     atenolol (TENORMIN) 25 MG tablet Take 1 tablet (25 mg) by mouth 2 times daily 180 tablet 3     atorvastatin (LIPITOR) 10 MG tablet TAKE ONE-HALF TABLET BY MOUTH ONCE DAILY 45 tablet 0     Biotin 3 MG TABS Take by mouth daily (unknown dose)       Calcium Carbonate-Vitamin D (CALCIUM + D PO) Take by mouth daily       carboxymethylcellulose (CELLUVISC/REFRESH LIQUIGEL) 1 % ophthalmic solution Place 1 drop into both eyes 4 times daily as needed for dry eyes       digoxin (DIGOX) 250 MCG tablet Take 0.5 tablets (125 mcg) by mouth daily       felodipine ER (PLENDIL) 5 MG 24 hr tablet Take 1 tablet (5 mg) by mouth 2 times daily 180 tablet 3     fenofibrate 160 MG tablet TAKE 1 TABLET BY MOUTH EVERY DAY 90 tablet 0     furosemide (LASIX) 20 MG tablet TAKE 1 TABLET(20 MG) BY MOUTH TWICE DAILY 180 tablet 0     ipratropium (ATROVENT) 0.06 % spray Spray 2 sprays into both nostrils 4 times daily as needed for rhinitis 3 Box 3     lisinopril (PRINIVIL/ZESTRIL) 40 MG tablet TAKE 1 TABLET BY MOUTH EVERY DAY 90 tablet 0     lisinopril (PRINIVIL/ZESTRIL) 40 MG tablet TAKE 1 TABLET BY MOUTH EVERY DAY 90 tablet 0     propafenone (RYTHMOL) 150 MG TABS tablet Take 1 tablet tid evening 270 tablet 3     sertraline (ZOLOFT) 100 MG tablet Take 1 tablet (100 mg) by mouth daily 90 tablet 3     verapamil (VERELAN) 240 MG CP24 24 hr capsule TAKE 1 CAPSULE BY MOUTH EVERY DAY 90 capsule 2     warfarin (COUMADIN) 5 MG tablet TAKE 1/2 TO 1 TABLET BY MOUTH DAILY OR AS DIRECTED BY INR CLINIC 90 tablet 0     [DISCONTINUED] furosemide (LASIX) 20 MG tablet TAKE 1 TABLET(20 MG) BY MOUTH TWICE DAILY 180 tablet 0  "    [DISCONTINUED] propafenone (RYTHMOL) 150 MG TABS tablet Take 1 tablet in morning, 0.5 tablet in afternoon, and 1 tablet in evening       Allergies   Allergen Reactions     Penicillins      ROS:  CONSTITUTIONAL: NEGATIVE for fever, chills, change in weight  INTEGUMENTARY/SKIN: NEGATIVE for worrisome rashes, moles or lesions  EYES: NEGATIVE for vision changes or irritation  ENT/MOUTH: NEGATIVE for ear, mouth and throat problems  RESP: NEGATIVE for significant cough or SOB  BREAST: NEGATIVE for masses, tenderness or discharge  CV: NEGATIVE for chest pain, palpitations or peripheral edema  GI: NEGATIVE for nausea, abdominal pain, heartburn, or change in bowel habits  : NEGATIVE for frequency, dysuria, or hematuria  MUSCULOSKELETAL: NEGATIVE for significant arthralgias or myalgia  NEURO: NEGATIVE for weakness, dizziness or paresthesias  ENDOCRINE: NEGATIVE for temperature intolerance, skin/hair changes  HEME: NEGATIVE for bleeding problems  PSYCHIATRIC: NEGATIVE for changes in mood or affect    This document serves as a record of the services and decisions personally performed and made by Ian Tracy MD. It was created on his behalf by Patty Hernández, a trained medical scribe. The creation of this document is based on the provider's statements to the medical scribe.  Patty Hernández July 9, 2018 9:18 AM    OBJECTIVE:   /73  Pulse 83  Temp 97.9  F (36.6  C) (Oral)  Ht 1.626 m (5' 4\")  Wt 62.1 kg (137 lb)  SpO2 97%  Breastfeeding? No  BMI 23.52 kg/m2 Estimated body mass index is 23.52 kg/(m^2) as calculated from the following:    Height as of this encounter: 1.626 m (5' 4\").    Weight as of this encounter: 62.1 kg (137 lb).     EXAM:   GENERAL APPEARANCE: healthy, alert and no distress  EYES: Eyes grossly normal to inspection, PERRL and conjunctivae and sclerae normal  HENT: ear canals and TM's normal, nose and mouth without ulcers or lesions, oropharynx clear and oral mucous membranes moist. ali nasi " were clear bilaterally.   NECK: no adenopathy, no asymmetry, masses, or scars and thyroid was enlarged, right lobe greater than the left with nodularity noted.   RESP: lungs clear to auscultation - no rales, rhonchi or wheezes  BREAST:  no palpable axillary masses or adenopathy. Implants were non-tender without any nodularity   CV: regular rate and rhythm, normal S1 S2, no S3 or S4, no murmur, click or rub, no peripheral edema and peripheral pulses strong  ABDOMEN: soft, nontender, no hepatosplenomegaly, no masses and bowel sounds normal  MS: no musculoskeletal defects are noted and gait is age appropriate without ataxia  SKIN: no suspicious lesions or rashes. Multiple SK's. One area on center of upper chest that appears like an excoriated SK.   Feet: clean and dry. Filament was normal.   NEURO: Normal strength and tone, sensory exam grossly normal, mentation intact and speech normal. Gait was normal without any imbalance noted. Heel to toe showed random falling, left greater than right.   PSYCH: mentation appears normal and affect normal/bright    Diagnostic Test Results:  Results for orders placed or performed in visit on 07/09/18 (from the past 24 hour(s))   UA reflex to Microscopic and Culture   Result Value Ref Range    Color Urine Yellow     Appearance Urine Clear     Glucose Urine Negative NEG^Negative mg/dL    Bilirubin Urine Negative NEG^Negative    Ketones Urine Negative NEG^Negative mg/dL    Specific Gravity Urine 1.010 1.003 - 1.035    Blood Urine Negative NEG^Negative    pH Urine 6.0 5.0 - 7.0 pH    Protein Albumin Urine Negative NEG^Negative mg/dL    Urobilinogen Urine 0.2 0.2 - 1.0 EU/dL    Nitrite Urine Negative NEG^Negative    Leukocyte Esterase Urine Negative NEG^Negative    Source Midstream Urine    CBC with platelets   Result Value Ref Range    WBC 7.2 4.0 - 11.0 10e9/L    RBC Count 4.75 3.8 - 5.2 10e12/L    Hemoglobin 14.7 11.7 - 15.7 g/dL    Hematocrit 44.7 35.0 - 47.0 %    MCV 94 78 - 100 fl     MCH 30.9 26.5 - 33.0 pg    MCHC 32.9 31.5 - 36.5 g/dL    RDW 13.1 10.0 - 15.0 %    Platelet Count 262 150 - 450 10e9/L   Hemoglobin A1c   Result Value Ref Range    Hemoglobin A1C 6.4 (H) 0 - 5.6 %       ASSESSMENT / PLAN:   1. Paroxysmal atrial fibrillation (H)  - propafenone (RYTHMOL) 150 MG TABS tablet; Take 1 tablet tid evening  Dispense: 270 tablet; Refill: 3    2. Benign essential hypertension  Improving, BP now appears to be satisfactorily controlled. Continue current therapies.   - UA reflex to Microscopic and Culture  - CBC with platelets  - Comprehensive metabolic panel    3. Type 2 diabetes mellitus without complication, without long-term current use of insulin (H)  - Lipid panel reflex to direct LDL Fasting  - TSH with free T4 reflex  - Hemoglobin A1c  - Albumin Random Urine Quantitative with Creat Ratio  - C FOOT EXAM  NO CHARGE    4. Acute diastolic congestive heart failure (H)  - BNP-N terminal pro  - Digoxin level    5. Malignant neoplasm of breast in female, estrogen receptor positive, unspecified laterality, unspecified site of breast (H)  - Comprehensive metabolic panel    6. Adjustment disorder with anxious mood    7. Hyperlipidemia LDL goal <100  - Lipid panel reflex to direct LDL Fasting    8. CKD (chronic kidney disease) stage 3, GFR 30-59 ml/min  - Comprehensive metabolic panel    9. Nontoxic multinodular goiter  - TSH with free T4 reflex    10. Vitamin D deficiency  - Vitamin D Deficiency    11. Essential hypertension  - furosemide (LASIX) 20 MG tablet; TAKE 1 TABLET(20 MG) BY MOUTH TWICE DAILY  Dispense: 180 tablet; Refill: 0    12. Chronic vasomotor rhinitis  Starting atrovent  - ipratropium (ATROVENT) 0.06 % spray; Spray 2 sprays into both nostrils 4 times daily as needed for rhinitis  Dispense: 3 Box; Refill: 3    End of Life Planning:  Patient currently has an advanced directive: Yes.  Practitioner is supportive of decision.    COUNSELING:  Reviewed preventive health counseling, as  "reflected in patient instructions       Regular exercise       Healthy diet/nutrition    BP Readings from Last 1 Encounters:   07/09/18 121/73     Estimated body mass index is 23.52 kg/(m^2) as calculated from the following:    Height as of this encounter: 1.626 m (5' 4\").    Weight as of this encounter: 62.1 kg (137 lb).   reports that she has quit smoking. She has never used smokeless tobacco.    Appropriate preventive services were discussed with this patient, including applicable screening as appropriate for cardiovascular disease, diabetes, osteopenia/osteoporosis, and glaucoma.  As appropriate for age/gender, discussed screening for colorectal cancer, prostate cancer, breast cancer, and cervical cancer. Checklist reviewing preventive services available has been given to the patient.    Reviewed patients plan of care and provided an AVS. The Basic Care Plan (routine screening as documented in Health Maintenance) for Catalina meets the Care Plan requirement. This Care Plan has been established and reviewed with the Patient.    Counseling Resources:  ATP IV Guidelines  Pooled Cohorts Equation Calculator  Breast Cancer Risk Calculator  FRAX Risk Assessment  ICSI Preventive Guidelines  Dietary Guidelines for Americans, 2010  USDA's MyPlate  ASA Prophylaxis  Lung CA Screening    The information in this document, created by the medical scribe for me, accurately reflects the services I personally performed and the decisions made by me. I have reviewed and approved this document for accuracy prior to leaving the patient care area.  July 9, 2018 12:11 PM    Ian Tracy MD  [facility]  "

## 2018-07-10 LAB — DEPRECATED CALCIDIOL+CALCIFEROL SERPL-MC: 38 UG/L (ref 20–75)

## 2018-07-19 ENCOUNTER — ANTICOAGULATION THERAPY VISIT (OUTPATIENT)
Dept: NURSING | Facility: CLINIC | Age: 73
End: 2018-07-19
Payer: MEDICARE

## 2018-07-19 DIAGNOSIS — I48.0 PAROXYSMAL ATRIAL FIBRILLATION (H): ICD-10-CM

## 2018-07-19 DIAGNOSIS — Z79.01 LONG-TERM (CURRENT) USE OF ANTICOAGULANTS: ICD-10-CM

## 2018-07-19 LAB — INR POINT OF CARE: 2.3 (ref 0.86–1.14)

## 2018-07-19 PROCEDURE — 99207 ZZC NO CHARGE NURSE ONLY: CPT

## 2018-07-19 PROCEDURE — 85610 PROTHROMBIN TIME: CPT | Mod: QW

## 2018-07-19 PROCEDURE — 36416 COLLJ CAPILLARY BLOOD SPEC: CPT

## 2018-07-19 RX ORDER — PROPAFENONE HYDROCHLORIDE 150 MG/1
TABLET, COATED ORAL
Qty: 270 TABLET | Refills: 3 | Status: SHIPPED | OUTPATIENT
Start: 2018-07-19 | End: 2019-01-17

## 2018-07-19 NOTE — PROGRESS NOTES
ANTICOAGULATION FOLLOW-UP CLINIC VISIT    Patient Name:  Catalina Marie  Date:  7/19/2018  Contact Type:  Face to Face    SUBJECTIVE:     Patient Findings     Positives No Problem Findings           OBJECTIVE    INR Protime   Date Value Ref Range Status   07/19/2018 2.3 (A) 0.86 - 1.14 Corrected       ASSESSMENT / PLAN  INR assessment THER    Recheck INR In: 4 WEEKS    INR Location Clinic      Anticoagulation Summary as of 7/19/2018     INR goal 2.0-3.0   Today's INR 2.3   Warfarin maintenance plan 2.5 mg (5 mg x 0.5) on Tue, Fri; 5 mg (5 mg x 1) all other days   Full warfarin instructions 2.5 mg on Tue, Fri; 5 mg all other days   Weekly warfarin total 30 mg   No change documented Elodia Campbell RN   Plan last modified Elodia Campbell RN (1/25/2018)   Next INR check 8/21/2018   Priority INR   Target end date     Indications   Long-term (current) use of anticoagulants [Z79.01] [Z79.01]  Atrial fibrillation (H) [I48.91] [I48.91]         Anticoagulation Episode Summary     INR check location     Preferred lab     Send INR reminders to [facility]    Comments Expect FAXED INR results from [facility] in [place], [place]. Phone: [phone].  General Results#: [phone]  Call pt's cell in [place] with results/plan: [phone]      Anticoagulation Care Providers     Provider Role Specialty Phone number    Ian Tracy MD Referring Internal Medicine [phone]            See the Encounter Report to view Anticoagulation Flowsheet and Dosing Calendar (Go to Encounters tab in chart review, and find the Anticoagulation Therapy Visit)    Dosage adjustment made based on physician directed care plan.    Elodia Campbell, RN

## 2018-07-19 NOTE — MR AVS SNAPSHOT
Catalina Marie   7/19/2018 11:30 AM   Anticoagulation Therapy Visit    Description:  72 year old female   Provider:  [facility]   Department:  [facility]           INR as of 7/19/2018     Today's INR 2.3      Anticoagulation Summary as of 7/19/2018     INR goal 2.0-3.0   Today's INR 2.3   Full warfarin instructions 2.5 mg on Tue, Fri; 5 mg all other days   Next INR check 8/21/2018    Indications   Long-term (current) use of anticoagulants [Z79.01] [Z79.01]  Atrial fibrillation (H) [I48.91] [I48.91]         Your next Anticoagulation Clinic appointment(s)     Aug 21, 2018 11:15 AM CDT   Anticoagulation Visit with  ANTICOAGULATION CLINIC   [facility] [place] ([facility])    [address]  [place] [address]   [phone]              Contact Numbers     Clinic Number:         July 2018 Details    Sun Mon Tue Wed Thu Fri Sat     1               2               3               4               5               6               7                 8               9               10               11               12               13               14                 15               16               17               18               19      5 mg   See details      20      2.5 mg         21      5 mg           22      5 mg         23      5 mg         24      2.5 mg         25      5 mg         26      5 mg         27      2.5 mg         28      5 mg           29      5 mg         30      5 mg         31      2.5 mg              Date Details   07/19 This INR check               How to take your warfarin dose     To take:  2.5 mg Take 0.5 of a 5 mg tablet.    To take:  5 mg Take 1 of the 5 mg tablets.           August 2018 Details    Sun Mon Tue Wed Thu Fri Sat        1      5 mg         2      5 mg         3      2.5 mg         4      5 mg           5      5 mg         6      5 mg         7      2.5 mg         8      5 mg         9      5 mg         10      2.5 mg         11      5 mg            12      5 mg         13      5 mg         14      2.5 mg         15      5 mg         16      5 mg         17      2.5 mg         18      5 mg           19      5 mg         20      5 mg         21            22               23               24               25                 26               27               28               29               30               31                 Date Details   No additional details    Date of next INR:  8/21/2018         How to take your warfarin dose     To take:  2.5 mg Take 0.5 of a 5 mg tablet.    To take:  5 mg Take 1 of the 5 mg tablets.

## 2018-07-19 NOTE — TELEPHONE ENCOUNTER
Pt stopped in to clinic today to report an error with her medication directions.  The current says:   propafenone (RYTHMOL) 150 MG TABS tablet 270 tablet 3 7/9/2018  No   Sig: Take 1 tablet tid evening   Class: E-Prescribe       It should be 150 mg three x a day (morning, dinner, bedtime)    Please adjust accordingly.

## 2018-07-19 NOTE — TELEPHONE ENCOUNTER
Requested Prescriptions   Pending Prescriptions Disp Refills     propafenone (RYTHMOL) 150 MG TABS tablet 270 tablet 3     Sig: Take 1 tablet three times daily at Morning, Dinner, Bedtime    There is no refill protocol information for this order        No protocol but per PCP should be tid Morn, dinner, bedtime.  Routing to pcp to send updated RX.

## 2018-07-30 DIAGNOSIS — I48.0 PAROXYSMAL ATRIAL FIBRILLATION (H): ICD-10-CM

## 2018-07-30 NOTE — TELEPHONE ENCOUNTER
"warfarin (COUMADIN) 5 MG tablet 90 tablet 0 5/1/2018     Last Written Prescription Date:  5/1/18  Last Fill Quantity: 90,  # refills: 0   Last office visit: 7/9/2018 with prescribing provider:  Rossana   Future Office Visit:   Next 5 appointments (look out 90 days)     Oct 04, 2018 11:00 AM CDT   [facility]    [address] [facility] [address] [phone]                 Requested Prescriptions   Pending Prescriptions Disp Refills     warfarin (COUMADIN) 5 MG tablet [Pharmacy Med Name: WARFARIN SOD 5MG TABLETS (PEACH)] 90 tablet 0     Sig: TAKE 1/2 TO 1 TABLET BY MOUTH DAILY OR AS DIRECTED BY INR CLINIC    Vitamin K Antagonists Failed    7/30/2018 10:40 AM       Failed - INR is within goal in the past 6 weeks    Confirm INR is within goal in the past 6 weeks.     Recent Labs   Lab Test 07/19/18   INR  2.3*                      Passed - Recent (12 mo) or future (30 days) visit within the authorizing provider's specialty    Patient had office visit in the last 12 months or has a visit in the next 30 days with authorizing provider or within the authorizing provider's specialty.  See \"Patient Info\" tab in inbasket, or \"Choose Columns\" in Meds & Orders section of the refill encounter.           Passed - Patient is 18 years of age or older       Passed - Patient is not pregnant       Passed - No positive pregnancy on file in past 12 months        No flowsheet data found.  "

## 2018-08-01 RX ORDER — WARFARIN SODIUM 5 MG/1
TABLET ORAL
Qty: 90 TABLET | Refills: 3 | Status: SHIPPED | OUTPATIENT
Start: 2018-08-01 | End: 2019-10-11

## 2018-08-01 NOTE — TELEPHONE ENCOUNTER
OV and Inr visits up to date.  Prescription approved per [facility] Refill Protocol.  Alyssa Valdovinos RN

## 2018-08-15 ENCOUNTER — MYC MEDICAL ADVICE (OUTPATIENT)
Dept: FAMILY MEDICINE | Facility: CLINIC | Age: 73
End: 2018-08-15

## 2018-08-15 NOTE — TELEPHONE ENCOUNTER
PCP see below,   Please advise   Noted last Digoxin low and last BNP lab elevated on 7/91/18    Maylin HOWARD RN

## 2018-08-21 ENCOUNTER — ANTICOAGULATION THERAPY VISIT (OUTPATIENT)
Dept: NURSING | Facility: CLINIC | Age: 73
End: 2018-08-21
Payer: MEDICARE

## 2018-08-21 DIAGNOSIS — I48.91 ATRIAL FIBRILLATION (H): ICD-10-CM

## 2018-08-21 DIAGNOSIS — Z79.01 LONG-TERM (CURRENT) USE OF ANTICOAGULANTS: ICD-10-CM

## 2018-08-21 LAB — INR POINT OF CARE: 3.3 (ref 0.86–1.14)

## 2018-08-21 PROCEDURE — 36416 COLLJ CAPILLARY BLOOD SPEC: CPT

## 2018-08-21 PROCEDURE — 99207 ZZC NO CHARGE NURSE ONLY: CPT

## 2018-08-21 PROCEDURE — 85610 PROTHROMBIN TIME: CPT | Mod: QW

## 2018-08-21 NOTE — MR AVS SNAPSHOT
Catalina Marie   8/21/2018 11:15 AM   Anticoagulation Therapy Visit    Description:  72 year old female   Provider:   ANTICOAGULATION CLINIC   Department:   Nurse           INR as of 8/21/2018     Today's INR 3.3!      Anticoagulation Summary as of 8/21/2018     INR goal 2.0-3.0   Today's INR 3.3!   Full warfarin instructions 2.5 mg on Tue, Fri; 5 mg all other days   Next INR check 8/27/2018    Indications   Long-term (current) use of anticoagulants [Z79.01] [Z79.01]  Atrial fibrillation (H) [I48.91] [I48.91]         Your next Anticoagulation Clinic appointment(s)     Sep 27, 2018  1:30 PM CDT   Anticoagulation Visit with  ANTICOAGULATION CLINIC   [facility] [place] ([facility])    [address]  [place] [address]   [phone]              Contact Numbers     Clinic Number:         August 2018 Details    Sun Mon Tue Wed Thu Fri Sat        1               2               3               4                 5               6               7               8               9               10               11                 12               13               14               15               16               17               18                 19               20               21      2.5 mg   See details      22      5 mg         23      5 mg         24      2.5 mg         25      5 mg           26      5 mg         27            28               29               30               31                 Date Details   08/21 This INR check       Date of next INR:  8/27/2018         How to take your warfarin dose     To take:  2.5 mg Take 0.5 of a 5 mg tablet.    To take:  5 mg Take 1 of the 5 mg tablets.

## 2018-08-21 NOTE — PROGRESS NOTES
ANTICOAGULATION FOLLOW-UP CLINIC VISIT    Patient Name:  Catalina Marie  Date:  8/21/2018  Contact Type:  Face to Face    SUBJECTIVE:     Patient Findings     Positives Other complaints (Increased stress past 3-4 days)           OBJECTIVE    INR Protime   Date Value Ref Range Status   08/21/2018 3.3 (A) 0.86 - 1.14 Final       ASSESSMENT / PLAN  INR assessment SUPRA    Recheck INR In: 6 DAYS    INR Location Clinic      Anticoagulation Summary as of 8/21/2018     INR goal 2.0-3.0   Today's INR 3.3!   Warfarin maintenance plan 2.5 mg (5 mg x 0.5) on Tue, Fri; 5 mg (5 mg x 1) all other days   Full warfarin instructions 2.5 mg on Tue, Fri; 5 mg all other days   Weekly warfarin total 30 mg   No change documented Maylin Altamirano RN   Plan last modified Elodia Campbell RN (1/25/2018)   Next INR check 8/27/2018   Priority INR   Target end date     Indications   Long-term (current) use of anticoagulants [Z79.01] [Z79.01]  Atrial fibrillation (H) [I48.91] [I48.91]         Anticoagulation Episode Summary     INR check location     Preferred lab     Send INR reminders to [facility]    Comments Expect FAXED INR results from [facility] in [place], [place]. Phone: [phone].  General Results#: [phone]  Call pt's cell in [place] with results/plan: [phone]      Anticoagulation Care Providers     Provider Role Specialty Phone number    Ian Tracy MD Referring Internal Medicine [phone]            See the Encounter Report to view Anticoagulation Flowsheet and Dosing Calendar (Go to Encounters tab in chart review, and find the Anticoagulation Therapy Visit)    Increased stress related to upcoming travels out of town. Also states BP readings have been elevated recently from stress- plans to call or send [employer] message with update on readings. Denies other concerns or changes. Will plan to continue same warfarin dosing but requests to check INR on Monday (will plan to go through lab as she wants to check early  morning) before heading out of town.     Maylin Altamirano RN

## 2018-08-27 ENCOUNTER — TELEPHONE (OUTPATIENT)
Dept: FAMILY MEDICINE | Facility: CLINIC | Age: 73
End: 2018-08-27

## 2018-08-27 ENCOUNTER — ANTICOAGULATION THERAPY VISIT (OUTPATIENT)
Dept: FAMILY MEDICINE | Facility: CLINIC | Age: 73
End: 2018-08-27

## 2018-08-27 DIAGNOSIS — I48.91 ATRIAL FIBRILLATION (H): ICD-10-CM

## 2018-08-27 DIAGNOSIS — Z79.01 LONG-TERM (CURRENT) USE OF ANTICOAGULANTS: ICD-10-CM

## 2018-08-27 LAB — INR PPP: 2.4 (ref 0.86–1.14)

## 2018-08-27 PROCEDURE — 85610 PROTHROMBIN TIME: CPT | Performed by: INTERNAL MEDICINE

## 2018-08-27 PROCEDURE — 99207 ZZC NO CHARGE NURSE ONLY: CPT | Performed by: INTERNAL MEDICINE

## 2018-08-27 PROCEDURE — 36415 COLL VENOUS BLD VENIPUNCTURE: CPT | Performed by: INTERNAL MEDICINE

## 2018-08-27 NOTE — MR AVS SNAPSHOT
Catalina Marie   8/27/2018   Anticoagulation Therapy Visit    Description:  72 year old female   Provider:  Ian Tracy MD   Department:  [facility]           INR as of 8/27/2018     Today's INR 2.40      Anticoagulation Summary as of 8/27/2018     INR goal 2.0-3.0   Today's INR 2.40   Full warfarin instructions 2.5 mg on Tue, Fri; 5 mg all other days   Next INR check 9/27/2018    Indications   Long-term (current) use of anticoagulants [Z79.01] [Z79.01]  Atrial fibrillation (H) [I48.91] [I48.91]         Your next Anticoagulation Clinic appointment(s)     Sep 27, 2018  1:30 PM CDT   An[facility]  [facility]IN[facility]    [address]  [place] [address]   [phone]              August 2018 Details    Sun Mon Tue Wed Thu Fri Sat        1               2               3               4                 5               6               7               8               9               10               11                 12               13               14               15               16               17               18                 19               20               21               22               23               24               25                 26               27      5 mg   See details      28      2.5 mg         29      5 mg         30      5 mg         31      2.5 mg           Date Details   08/27 This INR check               How to take your warfarin dose     To take:  2.5 mg Take 0.5 of a 5 mg tablet.    To take:  5 mg Take 1 of the 5 mg tablets.           September 2018 Details    Sun Mon Tue Wed Thu Fri Sat           1      5 mg           2      5 mg         3      5 mg         4      2.5 mg         5      5 mg         6      5 mg         7      2.5 mg         8      5 mg           9      5 mg         10      5 mg         11      2.5 mg         12      5 mg         13      5 mg         14      2.5 mg          15      5 mg           16      5 mg         17      5 mg         18      2.5 mg         19      5 mg         20      5 mg         21      2.5 mg         22      5 mg           23      5 mg         24      5 mg         25      2.5 mg         26      5 mg         27            28               29                 30                      Date Details   No additional details    Date of next INR:  9/27/2018         How to take your warfarin dose     To take:  2.5 mg Take 0.5 of a 5 mg tablet.    To take:  5 mg Take 1 of the 5 mg tablets.

## 2018-08-27 NOTE — TELEPHONE ENCOUNTER
Patient called back   Given updated warfarin dosing directions and INR recheck date    Maylin HOWARD RN

## 2018-08-27 NOTE — PROGRESS NOTES
ANTICOAGULATION FOLLOW-UP CLINIC VISIT    Patient Name:  Catalina Marie  Date:  8/27/2018  Contact Type:  Telephone    SUBJECTIVE:     Patient Findings     Positives No Problem Findings           OBJECTIVE    INR   Date Value Ref Range Status   08/27/2018 2.40 (H) 0.86 - 1.14 Final     Comment:     This test is intended for monitoring Coumadin therapy.  Results are not   accurate in patients with prolonged INR due to factor deficiency.  Performed at Point of Care         ASSESSMENT / PLAN  INR assessment THER    Recheck INR In: 4 WEEKS    INR Location Clinic      Anticoagulation Summary as of 8/27/2018     INR goal 2.0-3.0   Today's INR 2.40   Warfarin maintenance plan 2.5 mg (5 mg x 0.5) on Tue, Fri; 5 mg (5 mg x 1) all other days   Full warfarin instructions 2.5 mg on Tue, Fri; 5 mg all other days   Weekly warfarin total 30 mg   No change documented Maylin Altamirano RN   Plan last modified Elodia Campbell RN (1/25/2018)   Next INR check 9/27/2018   Priority INR   Target end date     Indications   Long-term (current) use of anticoagulants [Z79.01] [Z79.01]  Atrial fibrillation (H) [I48.91] [I48.91]         Anticoagulation Episode Summary     INR check location     Preferred lab     Send INR reminders to [facility]    Comments Expect FAXED INR results from [facility] in [place], [place]. Phone: [phone].  General Results#: [phone]  Call pt's cell in [place] with results/plan: [phone]      Anticoagulation Care Providers     Provider Role Specialty Phone number    Ian Tracy MD Referring Internal Medicine [phone]            See the Encounter Report to view Anticoagulation Flowsheet and Dosing Calendar (Go to Encounters tab in chart review, and find the Anticoagulation Therapy Visit)    No changes in meds/diet. No missed/extra warfarin doses. No unusual bruising/bleeding or other changes. Pt will continue same warfarin dose and recheck INR in 4 weeks.     Pt aware if signs of clotting (pain,  tenderness, swelling, color change in leg or arm, SOB) and bleeding occur (blood in stool, urine, large bruising, bleeding gums, nosebleeds) to have INR check sooner. If sx severe report to ER or concerned for stroke call 911. If general questions or concerns arise, call clinic.    Maylin Altamirano RN

## 2018-08-27 NOTE — TELEPHONE ENCOUNTER
See open [facility] encounter     INR result today from lab 2.4    Left message asking patient to call back     Maylin HOWARD RN

## 2018-09-04 DIAGNOSIS — I10 BENIGN ESSENTIAL HYPERTENSION: ICD-10-CM

## 2018-09-04 NOTE — TELEPHONE ENCOUNTER
"Last Written Prescription Date:  6/11/18  Last Fill Quantity: 90 tablet,  # refills: 0   Last office visit: 7/9/2018 with prescribing provider:  Rossana   Future Office Visit:   Next 5 appointments (look out 90 days)     Oct 04, 2018 11:00 AM CDT   [facility]    [address] [facility] [address] [phone]                 Requested Prescriptions   Pending Prescriptions Disp Refills     lisinopril (PRINIVIL/ZESTRIL) 40 MG tablet [Pharmacy Med Name: LISINOPRIL 40MG TABLETS] 90 tablet 0     Sig: TAKE 1 TABLET BY MOUTH EVERY DAY    ACE Inhibitors (Including Combos) Protocol Failed    9/4/2018 11:52 AM       Failed - Normal serum creatinine on file in past 12 months    Recent Labs   Lab Test  07/09/18   0859   CR  1.14*            Passed - Blood pressure under 140/90 in past 12 months    BP Readings from Last 3 Encounters:   07/09/18 121/73   05/29/18 116/62   10/23/17 124/62                Passed - Recent (12 mo) or future (30 days) visit within the authorizing provider's specialty    Patient had office visit in the last 12 months or has a visit in the next 30 days with authorizing provider or within the authorizing provider's specialty.  See \"Patient Info\" tab in inbasket, or \"Choose Columns\" in Meds & Orders section of the refill encounter.           Passed - Patient is age 18 or older       Passed - No active pregnancy on record       Passed - Normal serum potassium on file in past 12 months    Recent Labs   Lab Test  07/09/18   0859   POTASSIUM  3.9            Passed - No positive pregnancy test in past 12 months          "

## 2018-09-05 NOTE — TELEPHONE ENCOUNTER
Routing refill request to provider for review/approval because:  Labs out of range - last creatinine elevated     Maylin HOWARD RN

## 2018-09-06 RX ORDER — LISINOPRIL 40 MG/1
TABLET ORAL
Qty: 90 TABLET | Refills: 3 | Status: SHIPPED | OUTPATIENT
Start: 2018-09-06 | End: 2019-09-06

## 2018-09-27 ENCOUNTER — ANTICOAGULATION THERAPY VISIT (OUTPATIENT)
Dept: NURSING | Facility: CLINIC | Age: 73
End: 2018-09-27
Payer: MEDICARE

## 2018-09-27 ENCOUNTER — TELEPHONE (OUTPATIENT)
Dept: FAMILY MEDICINE | Facility: CLINIC | Age: 73
End: 2018-09-27

## 2018-09-27 DIAGNOSIS — Z79.01 LONG TERM CURRENT USE OF ANTICOAGULANT THERAPY: Primary | ICD-10-CM

## 2018-09-27 DIAGNOSIS — Z79.01 LONG-TERM (CURRENT) USE OF ANTICOAGULANTS: ICD-10-CM

## 2018-09-27 DIAGNOSIS — I48.20 CHRONIC ATRIAL FIBRILLATION (H): ICD-10-CM

## 2018-09-27 LAB — INR POINT OF CARE: 4.4 (ref 0.86–1.14)

## 2018-09-27 PROCEDURE — 36416 COLLJ CAPILLARY BLOOD SPEC: CPT

## 2018-09-27 PROCEDURE — 99207 ZZC NO CHARGE NURSE ONLY: CPT

## 2018-09-27 PROCEDURE — 85610 PROTHROMBIN TIME: CPT | Mod: QW

## 2018-09-27 NOTE — MR AVS SNAPSHOT
Catalina Marie   9/27/2018 1:30 PM   Anticoagulation Therapy Visit    Description:  73 year old female   Provider:   ANTICOAGULATION CLINIC   Department:  [facility]           INR as of 9/27/2018     Today's INR 4.4!      Anticoagulation Summary as of 9/27/2018     INR goal 2.0-3.0   Today's INR 4.4!   Full warfarin instructions 9/27: Hold; Otherwise 2.5 mg on Tue, Fri; 5 mg all other days   Next INR check 10/4/2018    Indications   Long-term (current) use of anticoagulants [Z79.01] [Z79.01]  Atrial fibrillation (H) [I48.91] [I48.91]         Your next Anticoagulation Clinic appointment(s)     Oct 04, 2018 10:30 AM CDT   Anticoagulation Visit with  ANTICOAGULATION CLINIC   [facility]    [address]  [place] [address]   [phone]              Contact Numbers     Clinic Number:         September 2018 Details    Sun Mon Tue Wed Thu Fri Sat           1                 2               3               4               5               6               7               8                 9               10               11               12               13               14               15                 16               17               18               19               20               21               22                 23               24               25               26               27      Hold   See details      28      2.5 mg         29      5 mg           30      5 mg                Date Details   09/27 This INR check               How to take your warfarin dose     To take:  2.5 mg Take 0.5 of a 5 mg tablet.    To take:  5 mg Take 1 of the 5 mg tablets.    Hold Do not take your warfarin dose. See the Details table to the right for additional instructions.                October 2018 Details    Sun Mon Tue Wed Thu Fri Sat      1      5 mg         2      2.5 mg         3      5 mg         4            5               6                 7               8                9               10               11               12               13                 14               15               16               17               18               19               20                 21               22               23               24               25               26               27                 28               29               30               31                   Date Details   No additional details    Date of next INR:  10/4/2018         How to take your warfarin dose     To take:  2.5 mg Take 0.5 of a 5 mg tablet.    To take:  5 mg Take 1 of the 5 mg tablets.

## 2018-09-27 NOTE — TELEPHONE ENCOUNTER
Please sign annual INR Referral--pended.   Please CLOSE encounter after completed.     Thank you,  Elodia MENG RN,BSN

## 2018-09-27 NOTE — PROGRESS NOTES
ANTICOAGULATION FOLLOW-UP CLINIC VISIT    Patient Name:  Catalina Marie  Date:  9/27/2018  Contact Type:  Face to Face    SUBJECTIVE:     Patient Findings     Comments Recent oral surgery with abx.  (stopped abx x 4 days ago and patient reports tooth issue seems resolved)           OBJECTIVE    INR Protime   Date Value Ref Range Status   09/27/2018 4.4 (A) 0.86 - 1.14 Final       ASSESSMENT / PLAN  INR assessment SUPRA    Recheck INR In: 1 WEEK    INR Location Clinic      Anticoagulation Summary as of 9/27/2018     INR goal 2.0-3.0   Today's INR 4.4!   Warfarin maintenance plan 2.5 mg (5 mg x 0.5) on Tue, Fri; 5 mg (5 mg x 1) all other days   Full warfarin instructions 9/27: Hold; Otherwise 2.5 mg on Tue, Fri; 5 mg all other days   Weekly warfarin total 30 mg   Plan last modified Elodia Campbell RN (1/25/2018)   Next INR check 10/4/2018   Priority INR   Target end date     Indications   Long-term (current) use of anticoagulants [Z79.01] [Z79.01]  Atrial fibrillation (H) [I48.91] [I48.91]         Anticoagulation Episode Summary     INR check location     Preferred lab     Send INR reminders to [facility]    Comments Expect FAXED INR results from [facility] in [place], [place]. Phone: [phone].  General Results#: [phone]  Call pt's cell in [place] with results/plan: [phone]      Anticoagulation Care Providers     Provider Role Specialty Phone number    Ian Tracy MD Referring Internal Medicine [phone]            See the Encounter Report to view Anticoagulation Flowsheet and Dosing Calendar (Go to Encounters tab in chart review, and find the Anticoagulation Therapy Visit)    Dosage adjustment made based on physician directed care plan.    Elodia Campbell, RN

## 2018-09-29 DIAGNOSIS — I10 ESSENTIAL HYPERTENSION: ICD-10-CM

## 2018-09-29 DIAGNOSIS — I10 BENIGN ESSENTIAL HYPERTENSION: ICD-10-CM

## 2018-09-29 NOTE — TELEPHONE ENCOUNTER
"Requested Prescriptions   Pending Prescriptions Disp Refills     furosemide (LASIX) 20 MG tablet [Pharmacy Med Name: FUROSEMIDE 20MG TABLETS] 180 tablet 0     Sig: TAKE 1 TABLET(20 MG) BY MOUTH TWICE DAILY    Diuretics (Including Combos) Protocol Failed    9/29/2018  3:31 PM       Failed - Normal serum creatinine on file in past 12 months    Recent Labs   Lab Test  07/09/18   0859   CR  1.14*             Passed - Blood pressure under 140/90 in past 12 months    BP Readings from Last 3 Encounters:   07/09/18 121/73   05/29/18 116/62   10/23/17 124/62                Passed - Recent (12 mo) or future (30 days) visit within the authorizing provider's specialty    Patient had office visit in the last 12 months or has a visit in the next 30 days with authorizing provider or within the authorizing provider's specialty.  See \"Patient Info\" tab in inbasket, or \"Choose Columns\" in Meds & Orders section of the refill encounter.           Passed - Patient is age 18 or older       Passed - No active pregancy on record       Passed - Normal serum potassium on file in past 12 months    Recent Labs   Lab Test  07/09/18   0859   POTASSIUM  3.9                   Passed - Normal serum sodium on file in past 12 months    Recent Labs   Lab Test  07/09/18   0859   NA  141             Passed - No positive pregnancy test in past 12 months       Last Written Prescription Date:  7/09/18  Last Fill Quantity: 180 tablet,  # refills: 0   Last office visit: 7/9/2018 with prescribing provider:  Rossana   Future Office Visit:          DIGOX 250 MCG tablet [Pharmacy Med Name: DIGOXIN 0.25MG TABLETS (WHITE)] 90 tablet 0     Sig: TAKE 1 TABLET BY MOUTH EVERY DAY    Cardiac Glycoside Agents Protocol Failed    9/29/2018  3:31 PM       Failed - Normal digoxin level on file in past 12 mos    Recent Labs   Lab Test  07/09/18   0859   DIGOXIN  0.1*            Failed - Normal serum creatinine on file in past 12 mos    Recent Labs   Lab Test  07/09/18   0859 " "  CR  1.14*            Passed - Patient is 18 years of age or older       Passed - Patient is not pregnant       Passed - No positive pregnancy test on file in past 12 mos       Passed - Recent (6 mo) or future (30 days) visit within the authorizing provider's specialty    Patient had office visit in the last 6 months or has a visit in the next 30 days with authorizing provider or within the authorizing provider's specialty.  See \"Patient Info\" tab in inbasket, or \"Choose Columns\" in Meds & Orders section of the refill encounter.           digoxin (DIGOX) 250 MCG tablet      Last Written Prescription Date:  -  Last Fill Quantity: -,   # refills: -  Last Office Visit: 7/9/2018 (Rossana)  Future Office visit:    Next 5 appointments (look out 90 days)     Oct 04, 2018 11:00 AM CDT   [facility]    [address] [facility] [address] [phone]                   Routing refill request to provider for review/approval because:  Medication is reported/historical           "

## 2018-10-01 RX ORDER — DIGOXIN 250 UG/1
TABLET ORAL
Qty: 90 TABLET | Refills: 0 | Status: SHIPPED | OUTPATIENT
Start: 2018-10-01 | End: 2019-04-01

## 2018-10-01 RX ORDER — FUROSEMIDE 20 MG
TABLET ORAL
Qty: 180 TABLET | Refills: 1 | Status: SHIPPED | OUTPATIENT
Start: 2018-10-01 | End: 2019-04-01

## 2018-10-01 NOTE — TELEPHONE ENCOUNTER
Reviewed lab note.  Prescription approved per [facility] Refill Protocol.  Madhuri Edge RN

## 2018-10-04 ENCOUNTER — OFFICE VISIT (OUTPATIENT)
Dept: FAMILY MEDICINE | Facility: CLINIC | Age: 73
End: 2018-10-04
Payer: MEDICARE

## 2018-10-04 ENCOUNTER — ANTICOAGULATION THERAPY VISIT (OUTPATIENT)
Dept: NURSING | Facility: CLINIC | Age: 73
End: 2018-10-04
Payer: MEDICARE

## 2018-10-04 VITALS
TEMPERATURE: 97.2 F | SYSTOLIC BLOOD PRESSURE: 135 MMHG | DIASTOLIC BLOOD PRESSURE: 85 MMHG | OXYGEN SATURATION: 99 % | BODY MASS INDEX: 23.56 KG/M2 | RESPIRATION RATE: 16 BRPM | WEIGHT: 138 LBS | HEART RATE: 61 BPM | HEIGHT: 64 IN

## 2018-10-04 DIAGNOSIS — I48.0 PAROXYSMAL ATRIAL FIBRILLATION (H): Primary | ICD-10-CM

## 2018-10-04 DIAGNOSIS — F43.22 ADJUSTMENT DISORDER WITH ANXIOUS MOOD: ICD-10-CM

## 2018-10-04 DIAGNOSIS — N18.30 CKD (CHRONIC KIDNEY DISEASE) STAGE 3, GFR 30-59 ML/MIN (H): ICD-10-CM

## 2018-10-04 DIAGNOSIS — E78.5 HYPERLIPIDEMIA LDL GOAL <100: ICD-10-CM

## 2018-10-04 DIAGNOSIS — I10 BENIGN ESSENTIAL HYPERTENSION: ICD-10-CM

## 2018-10-04 DIAGNOSIS — Z17.0 MALIGNANT NEOPLASM OF BREAST IN FEMALE, ESTROGEN RECEPTOR POSITIVE, UNSPECIFIED LATERALITY, UNSPECIFIED SITE OF BREAST (H): ICD-10-CM

## 2018-10-04 DIAGNOSIS — E11.9 TYPE 2 DIABETES MELLITUS WITHOUT COMPLICATION, WITHOUT LONG-TERM CURRENT USE OF INSULIN (H): ICD-10-CM

## 2018-10-04 DIAGNOSIS — E04.2 NONTOXIC MULTINODULAR GOITER: ICD-10-CM

## 2018-10-04 DIAGNOSIS — C50.919 MALIGNANT NEOPLASM OF BREAST IN FEMALE, ESTROGEN RECEPTOR POSITIVE, UNSPECIFIED LATERALITY, UNSPECIFIED SITE OF BREAST (H): ICD-10-CM

## 2018-10-04 DIAGNOSIS — I50.31 ACUTE DIASTOLIC CONGESTIVE HEART FAILURE (H): ICD-10-CM

## 2018-10-04 LAB
INR POINT OF CARE: 2.3 (ref 0.86–1.14)
NT-PROBNP SERPL-MCNC: 1074 PG/ML (ref 0–450)

## 2018-10-04 PROCEDURE — 99214 OFFICE O/P EST MOD 30 MIN: CPT | Mod: 25 | Performed by: INTERNAL MEDICINE

## 2018-10-04 PROCEDURE — 83880 ASSAY OF NATRIURETIC PEPTIDE: CPT | Performed by: INTERNAL MEDICINE

## 2018-10-04 PROCEDURE — 90471 IMMUNIZATION ADMIN: CPT | Performed by: INTERNAL MEDICINE

## 2018-10-04 PROCEDURE — 80048 BASIC METABOLIC PNL TOTAL CA: CPT | Performed by: INTERNAL MEDICINE

## 2018-10-04 PROCEDURE — 90714 TD VACC NO PRESV 7 YRS+ IM: CPT | Performed by: INTERNAL MEDICINE

## 2018-10-04 PROCEDURE — 85610 PROTHROMBIN TIME: CPT | Mod: QW

## 2018-10-04 PROCEDURE — 36416 COLLJ CAPILLARY BLOOD SPEC: CPT

## 2018-10-04 NOTE — MR AVS SNAPSHOT
After Visit Summary   10/4/2018    Catalina Marie    MRN: [id number]           Patient Information     Date Of Birth          1945        Visit Information        Provider Department      10/4/2018 11:00 AM Ian Tracy MD [facility]        Today's Diagnoses     Paroxysmal atrial fibrillation (H)    -  1    Benign essential hypertension        Type 2 diabetes mellitus without complication, without long-term current use of insulin (H)        Acute diastolic congestive heart failure (H)        CKD (chronic kidney disease) stage 3, GFR 30-59 ml/min (H)        Adjustment disorder with anxious mood        Hyperlipidemia LDL goal <100        Malignant neoplasm of breast in female, estrogen receptor positive, unspecified laterality, unspecified site of breast (H)        Nontoxic multinodular goiter           Follow-ups after your visit        Your next 10 appointments already scheduled     Oct 25, 2018 11:15 AM CDT   Anticoagulation [facility] with  [facility] CLINIC   [facility]    [address]  [place] [address]   [phone]              Who to contact     If you have questions or need follow up information about today's clinic visit or your schedule please contact [facility] directly at [phone].  Normal or non-critical lab and imaging results will be communicated to you by MyChart, letter or phone within 4 business days after the clinic has received the results. If you do not hear from us within 7 days, please contact the clinic through [employer] or phone. If you have a critical or abnormal lab result, we will notify you by phone as soon as possible.  Submit refill requests through [employer] or call your pharmacy and they will forward the refill request to us. Please allow 3 business days for your refill to be completed.          Additional Information About Your Visit        MyChart Information     [employer] gives you  "secure access to your electronic health record. If you see a primary care provider, you can also send messages to your care team and make appointments. If you have questions, please call your primary care clinic.  If you do not have a primary care provider, please call [phone] and they will assist you.        Care EveryWhere ID     This is your Care EveryWhere ID. This could be used by other organizations to access your [place] medical records  [id number]        Your Vitals Were     Pulse Temperature Respirations Height Pulse Oximetry BMI (Body Mass Index)    61 97.2  F (36.2  C) (Tympanic) 16 5' 4\" (1.626 m) 99% 23.69 kg/m2       Blood Pressure from Last 3 Encounters:   10/04/18 135/85   07/09/18 121/73   05/29/18 116/62    Weight from Last 3 Encounters:   10/04/18 138 lb (62.6 kg)   07/09/18 137 lb (62.1 kg)   05/29/18 139 lb (63 kg)              We Performed the Following     Basic metabolic panel     BNP-N terminal pro     TD PRESERV FREE, IM (7+ YRS)        Primary Care Provider Office Phone # Fax #    Ian Tracy [phone] [address] [address]        Equal Access to Services     ANJANA JAMES : Hadii aad ku hadasho Soomaali, waaxda luqadaha, qaybta kaalmada adeegyada, ed mills hayjordan sharma . So [facility] [phone].    ATENCIÓN: Si habla español, tiene a dahl disposición servicios gratuitos de asistencia lingüística. Llame al [phone].    We comply with applicable federal civil rights laws and [place] laws. We do not discriminate on the basis of race, color, national origin, age, disability, sex, sexual orientation, or gender identity.            Thank you!     Thank you for choosing [facility]  for your care. Our goal is always to provide you with excellent care. Hearing back from our patients is one way we can continue to improve our services. Please take a few minutes to complete the written survey that you may " receive in the mail after your visit with us. Thank you!             Your Updated Medication List - Protect others around you: Learn how to safely use, store and throw away your medicines at www.disposemymeds.org.          This list is accurate as of 10/4/18 11:59 PM.  Always use your most recent med list.                   Brand Name Dispense Instructions for use Diagnosis    atenolol 25 MG tablet    TENORMIN    180 tablet    Take 1 tablet (25 mg) by mouth 2 times daily    Paroxysmal atrial fibrillation (H)       atorvastatin 10 MG tablet    LIPITOR    45 tablet    TAKE ONE-HALF TABLET BY MOUTH ONCE DAILY    Hyperlipidemia LDL goal <100       Biotin 3 MG Tabs      Take by mouth daily (unknown dose)        CALCIUM + D PO      Take by mouth daily        carboxymethylcellulose 1 % ophthalmic solution    CELLUVISC/REFRESH LIQUIGEL     Place 1 drop into both eyes 4 times daily as needed for dry eyes        * DIGOX 250 MCG tablet   Generic drug:  digoxin      Take 0.5 tablets (125 mcg) by mouth daily    Benign essential hypertension       * DIGOX 250 MCG tablet   Generic drug:  digoxin     90 tablet    TAKE 1 TABLET BY MOUTH EVERY DAY    Benign essential hypertension       felodipine ER 5 MG 24 hr tablet    PLENDIL    180 tablet    Take 1 tablet (5 mg) by mouth 2 times daily    Benign essential hypertension       fenofibrate 160 MG tablet     90 tablet    TAKE 1 TABLET BY MOUTH EVERY DAY    Benign essential hypertension       furosemide 20 MG tablet    LASIX    180 tablet    TAKE 1 TABLET(20 MG) BY MOUTH TWICE DAILY    Essential hypertension       ipratropium 0.06 % spray    ATROVENT    3 Box    Spray 2 sprays into both nostrils 4 times daily as needed for rhinitis    Chronic vasomotor rhinitis       lisinopril 40 MG tablet    PRINIVIL/ZESTRIL    90 tablet    TAKE 1 TABLET BY MOUTH EVERY DAY    Benign essential hypertension       propafenone 150 MG Tabs tablet    RYTHMOL    270 tablet    Take 1 tablet three times daily  at Morning, Dinner, Bedtime    Paroxysmal atrial fibrillation (H)       sertraline 100 MG tablet    ZOLOFT    90 tablet    Take 1 tablet (100 mg) by mouth daily    Adjustment disorder with anxious mood       verapamil 240 MG Cp24 24 hr capsule    VERELAN    90 capsule    TAKE 1 CAPSULE BY MOUTH EVERY DAY    Benign essential hypertension       warfarin 5 MG tablet    COUMADIN    90 tablet    TAKE 1/2 TO 1 TABLET BY MOUTH DAILY OR AS DIRECTED BY INR CLINIC    Paroxysmal atrial fibrillation (H)       * Notice:  This list has 2 medication(s) that are the same as other medications prescribed for you. Read the directions carefully, and ask your doctor or other care provider to review them with you.

## 2018-10-04 NOTE — MR AVS SNAPSHOT
Catalina Marie   10/4/2018 10:30 AM   Anticoagulation Therapy Visit    Description:  73 year old female   Provider:   ANTICOAGULATION CLINIC   Department:   Nurse           INR as of 10/4/2018     Today's INR 2.3      Anticoagulation Summary as of 10/4/2018     INR goal 2.0-3.0   Today's INR 2.3   Full warfarin instructions 2.5 mg on Tue, Fri; 5 mg all other days   Next INR check 10/25/2018    Indications   Long-term (current) use of anticoagulants [Z79.01] [Z79.01]  Atrial fibrillation (H) [I48.91] [I48.91]         Your next Anticoagulation Clinic appointment(s)     Oct 25, 2018 11:15 AM CDT   Anticoagulation Visit with  ANTICOAGULATION CLINIC   [facility] ([facility])    [address]  [place] [address]   [phone]              Contact Numbers     Clinic Number:         October 2018 Details    Sun Mon Tue Wed Thu Fri Sat      1               2               3               4      5 mg   See details      5      2.5 mg         6      5 mg           7      5 mg         8      5 mg         9      2.5 mg         10      5 mg         11      5 mg         12      2.5 mg         13      5 mg           14      5 mg         15      5 mg         16      2.5 mg         17      5 mg         18      5 mg         19      2.5 mg         20      5 mg           21      5 mg         22      5 mg         23      2.5 mg         24      5 mg         25            26               27                 28               29               30               31                   Date Details   10/04 This INR check       Date of next INR:  10/25/2018         How to take your warfarin dose     To take:  2.5 mg Take 0.5 of a 5 mg tablet.    To take:  5 mg Take 1 of the 5 mg tablets.

## 2018-10-04 NOTE — PROGRESS NOTES
ANTICOAGULATION FOLLOW-UP CLINIC VISIT    Patient Name:  Catalina Marie  Date:  10/4/2018  Contact Type:  Face to Face    SUBJECTIVE:     Patient Findings     Positives No Problem Findings           OBJECTIVE    INR Protime   Date Value Ref Range Status   10/04/2018 2.3 (A) 0.86 - 1.14 Final       ASSESSMENT / PLAN  INR assessment THER    Recheck INR In: 3 WEEKS    INR Location Clinic      Anticoagulation Summary as of 10/4/2018     INR goal 2.0-3.0   Today's INR 2.3   Warfarin maintenance plan 2.5 mg (5 mg x 0.5) on Tue, Fri; 5 mg (5 mg x 1) all other days   Full warfarin instructions 2.5 mg on Tue, Fri; 5 mg all other days   Weekly warfarin total 30 mg   No change documented Elodia Campbell RN   Plan last modified Elodia Campbell RN (1/25/2018)   Next INR check 10/25/2018   Priority INR   Target end date     Indications   Long-term (current) use of anticoagulants [Z79.01] [Z79.01]  Atrial fibrillation (H) [I48.91] [I48.91]         Anticoagulation Episode Summary     INR check location     Preferred lab     Send INR reminders to [facility]    Comments Expect FAXED INR results from [facility] in [place], [place]. Phone: [phone].  General Results#: [phone]  Call pt's cell in [place] with results/plan: [phone]      Anticoagulation Care Providers     Provider Role Specialty Phone number    Ian Tracy MD Referring Internal Medicine [phone]            See the Encounter Report to view Anticoagulation Flowsheet and Dosing Calendar (Go to Encounters tab in chart review, and find the Anticoagulation Therapy Visit)    Dosage adjustment made based on physician directed care plan.    Elodia Campbell, RN

## 2018-10-04 NOTE — PROGRESS NOTES
SUBJECTIVE:   Catalina Marie is a 73 year old female who presents to clinic today for the following health issues:    Follow up from physical    She reports that she has been feeling healthy. She used to have issues with her digestion system, however, she resolved that issue on her own by switching yogurt. She has a small bulge in her right abdomen where she feels slight discomfort but the other side is okay. She checks her blood pressure at home. When she over sleeps her B/P increases.    Had an infection in her tooth which was treated with Clindamycin. She thinks a side effect of this medication could have been raising her INR.     Exercise/diet  She has inconsistent regular exercise. She can walk 30 minutes with no SOB, because she walks at a slow pace. She also does weights. She eats well balanced diet most of the time and watches her carbohydrates. She limits her sugar intake. She doesn't eat a lot of pasta.     Dizzy  When she gets up from bed or stands from sitting she feels dizzy. When that occurs she is imbalanced and feels like the room is spinning.    Skin  She reported that her skin on her chest has been bothering her. She said she might have some type of fungal infection.       Problem list and histories reviewed & adjusted, as indicated.  Additional history: as documented    Current Outpatient Prescriptions   Medication Sig Dispense Refill     atenolol (TENORMIN) 25 MG tablet Take 1 tablet (25 mg) by mouth 2 times daily 180 tablet 3     atorvastatin (LIPITOR) 10 MG tablet TAKE ONE-HALF TABLET BY MOUTH ONCE DAILY 45 tablet 0     Biotin 3 MG TABS Take by mouth daily (unknown dose)       Calcium Carbonate-Vitamin D (CALCIUM + D PO) Take by mouth daily       carboxymethylcellulose (CELLUVISC/REFRESH LIQUIGEL) 1 % ophthalmic solution Place 1 drop into both eyes 4 times daily as needed for dry eyes       DIGOX 250 MCG tablet TAKE 1 TABLET BY MOUTH EVERY DAY 90 tablet 0     digoxin (DIGOX) 250 MCG  "tablet Take 0.5 tablets (125 mcg) by mouth daily       felodipine ER (PLENDIL) 5 MG 24 hr tablet Take 1 tablet (5 mg) by mouth 2 times daily 180 tablet 3     fenofibrate 160 MG tablet TAKE 1 TABLET BY MOUTH EVERY DAY 90 tablet 3     furosemide (LASIX) 20 MG tablet TAKE 1 TABLET(20 MG) BY MOUTH TWICE DAILY 180 tablet 1     ipratropium (ATROVENT) 0.06 % spray Spray 2 sprays into both nostrils 4 times daily as needed for rhinitis 3 Box 3     lisinopril (PRINIVIL/ZESTRIL) 40 MG tablet TAKE 1 TABLET BY MOUTH EVERY DAY 90 tablet 3     propafenone (RYTHMOL) 150 MG TABS tablet Take 1 tablet three times daily at Morning, Dinner, Bedtime 270 tablet 3     sertraline (ZOLOFT) 100 MG tablet Take 1 tablet (100 mg) by mouth daily 90 tablet 3     verapamil (VERELAN) 240 MG CP24 24 hr capsule TAKE 1 CAPSULE BY MOUTH EVERY DAY 90 capsule 2     warfarin (COUMADIN) 5 MG tablet TAKE 1/2 TO 1 TABLET BY MOUTH DAILY OR AS DIRECTED BY INR CLINIC 90 tablet 3     Allergies   Allergen Reactions     Penicillins        Reviewed and updated as needed this visit by clinical staff       Reviewed and updated as needed this visit by Provider         ROS:  Constitutional, HEENT, cardiovascular, pulmonary, gi and gu systems are negative, except as otherwise noted.    This document serves as a record of the services and decisions personally performed and made by Ian Tracy MD. It was created on his behalf by Prudence Stewart, a trained medical scribe. The creation of this document is based on the observations of the medical scribe, and the provider's statements to the medical scribe.  Prudence Stewart October 4, 2018 11:19 AM    OBJECTIVE:     /85 (BP Location: Left arm, Patient Position: Sitting, Cuff Size: Adult Regular)  Pulse 61  Temp 97.2  F (36.2  C) (Tympanic)  Resp 16  Ht 1.626 m (5' 4\")  Wt 62.1 kg (137 lb)  SpO2 99%  BMI 23.52 kg/m2  Body mass index is 23.52 kg/(m^2).   Supine /80  Siting and standing /76  Exam  HEENT " funduscopic exam was within normal limits tympanic membranes are normal nose and throat were clear  Neck was supple without adenopathy thyromegaly or masses  Chest clear to auscultation and percussion  Cardiovascular S1 and S2  irregularly irregular with a well controled ventricular response   Abdomen bowel sounds are normal there are no palpable masses organomegaly or tenderness  Extremities were nontender trace of pre tibil edema  Deep tendon reflexes were intact  Skin; Her anterior chest has multiple seborrheic keratosis and sun damaged skin with 2 superior interanl area of AK  Feet were clean and dry  Pulses were two over 4+ bilaterally symmetrical throughout  Breasts showed lymph nodes were negative; patient has a prominent skin fold on the left side breast without nodularity or masses       Diagnostic Test Results:  Results for orders placed or performed in visit on 10/04/18 (from the past 24 hour(s))   INR point of care   Result Value Ref Range    INR Protime 2.3 (A) 0.86 - 1.14       ASSESSMENT/PLAN:     1. Paroxysmal atrial fibrillation (H)  Reevalute her fluid status and anticipate her trip to the [facility]    2. Benign essential hypertension    - Basic metabolic panel  - TD PRESERV FREE, IM (7+ YRS)    3. Type 2 diabetes mellitus without complication, without long-term current use of insulin (H)    - Hemoglobin A1c    4. Acute diastolic congestive heart failure (H)    5. CKD (chronic kidney disease) stage 3, GFR 30-59 ml/min (H)      6. Adjustment disorder with anxious mood      7. Hyperlipidemia LDL goal <100    - Lipid panel reflex to direct LDL Fasting    8. Malignant neoplasm of breast in female, estrogen receptor positive, unspecified laterality, unspecified site of breast (H)    9. Nontoxic multinodular goiter      10. Actinic Keratosis  Coming back electively to treat her actinic keratosis       There are no Patient Instructions on file for this visit.    The information in this document,  created by the medical scribe for me, accurately reflects the services I personally performed and the decisions made by me. I have reviewed and approved this document for accuracy prior to leaving the patient care area.  Ian Tracy MD  2:22 PM October 4, 2018    Ian Tracy MD  [facility]

## 2018-10-05 LAB
ANION GAP SERPL CALCULATED.3IONS-SCNC: 7 MMOL/L (ref 3–14)
BUN SERPL-MCNC: 20 MG/DL (ref 7–30)
CALCIUM SERPL-MCNC: 8.7 MG/DL (ref 8.5–10.1)
CHLORIDE SERPL-SCNC: 108 MMOL/L (ref 94–109)
CO2 SERPL-SCNC: 27 MMOL/L (ref 20–32)
CREAT SERPL-MCNC: 1.16 MG/DL (ref 0.52–1.04)
GFR SERPL CREATININE-BSD FRML MDRD: 46 ML/MIN/1.7M2
GLUCOSE SERPL-MCNC: 98 MG/DL (ref 70–99)
POTASSIUM SERPL-SCNC: 4.8 MMOL/L (ref 3.4–5.3)
SODIUM SERPL-SCNC: 142 MMOL/L (ref 133–144)

## 2018-10-05 NOTE — TELEPHONE ENCOUNTER
Rossana Ross, Pt needs her INR renewal/referral signed by you. Can you please sign the referral? Thanks

## 2018-10-17 DIAGNOSIS — E78.5 HYPERLIPIDEMIA LDL GOAL <100: ICD-10-CM

## 2018-10-18 RX ORDER — ATORVASTATIN CALCIUM 10 MG/1
TABLET, FILM COATED ORAL
Qty: 45 TABLET | Refills: 2 | Status: SHIPPED | OUTPATIENT
Start: 2018-10-18 | End: 2019-01-16

## 2018-10-18 NOTE — TELEPHONE ENCOUNTER
Routing refill request to provider for review/approval because:  Medium drug interaction warning:  Drug-Drug: verapamil and atorvastatin  Plasma concentrations and pharmacologic effects of statins (atorvastatin, lovastatin and simvastatin) may be increased by verapamil. Toxicity, characterized by muscle injury, may occur. Specific maximum dose recommendations exist for simvastatin and lovastatin when coadministered with verapamil in official package labeling. Additionally, limited data indicates that atorvastatin and lovastatin may cause an increased exposure to verapamil.     Maylin HOWARD RN

## 2018-10-18 NOTE — TELEPHONE ENCOUNTER
"Requested Prescriptions   Pending Prescriptions Disp Refills     atorvastatin (LIPITOR) 10 MG tablet [Pharmacy Med Name: ATORVASTATIN 10MG TABLETS]  Last Written Prescription Date:  06/11/2018  Last Fill Quantity: 45,  # refills: 0   Last office visit: 10/4/2018 with prescribing provider:   ANGIE  Future Office Visit:   Next 5 appointments (look out 90 days)     Oct 19, 2018 11:00 AM CDT   Nurse Only with [facility]   [facility]    [address]  [place] [address]   [phone]                  45 tablet 0     Sig: TAKE 1/2 TABLET BY MOUTH EVERY DAY    Statins Protocol Passed    10/17/2018  1:02 PM       Passed - LDL on file in past 12 months    Recent Labs   Lab Test  07/09/18   0859   LDL  56            Passed - No abnormal creatine kinase in past 12 months    No lab results found.            Passed - Recent (12 mo) or future (30 days) visit within the authorizing provider's specialty    Patient had office visit in the last 12 months or has a visit in the next 30 days with authorizing provider or within the authorizing provider's specialty.  See \"Patient Info\" tab in inbasket, or \"Choose Columns\" in Meds & Orders section of the refill encounter.             Passed - Patient is age 18 or older       Passed - No active pregnancy on record       Passed - No positive pregnancy test in past 12 months          "

## 2018-10-19 ENCOUNTER — ALLIED HEALTH/NURSE VISIT (OUTPATIENT)
Dept: NURSING | Facility: CLINIC | Age: 73
End: 2018-10-19
Payer: MEDICARE

## 2018-10-19 DIAGNOSIS — Z23 NEED FOR PROPHYLACTIC VACCINATION AND INOCULATION AGAINST INFLUENZA: Primary | ICD-10-CM

## 2018-10-19 PROCEDURE — 99207 ZZC NO CHARGE NURSE ONLY: CPT

## 2018-10-19 PROCEDURE — 90471 IMMUNIZATION ADMIN: CPT

## 2018-10-19 PROCEDURE — G0008 ADMIN INFLUENZA VIRUS VAC: HCPCS

## 2018-10-19 PROCEDURE — 90662 IIV NO PRSV INCREASED AG IM: CPT

## 2018-10-19 NOTE — MR AVS SNAPSHOT
After Visit Summary   10/19/2018    Catalina Marie    MRN: [id number]           Patient Information     Date Of Birth          1945        Visit Information        Provider Department      10/19/2018 11:00 AM [facility]        Today's Diagnoses     Need for prophylactic vaccination and inoculation against influenza    -  1       Follow-ups after your visit        Your next 10 appointments already scheduled     Oct 25, 2018 11:15 AM CDT   Anticoagulation Visit with  ANTICOAGULATION CLINIC   [facility] ([facility])    [address]  [place] [address] [phone]              Who to contact     If you have questions or need follow up information about today's clinic visit or your schedule please contact [facility] directly at [phone].  Normal or non-critical lab and imaging results will be communicated to you by MyChart, letter or phone within 4 business days after the clinic has received the results. If you do not hear from us within 7 days, please contact the clinic through MyChart or phone. If you have a critical or abnormal lab result, we will notify you by phone as soon as possible.  Submit refill requests through [employer] or call your pharmacy and they will forward the refill request to us. Please allow 3 business days for your refill to be completed.          Additional Information About Your Visit        MyChart Information     [employer] gives you secure access to your electronic health record. If you see a primary care provider, you can also send messages to your care team and make appointments. If you have questions, please call your primary care clinic.  If you do not have a primary care provider, please call [phone] and they will assist you.        Care EveryWhere ID     This is your Care EveryWhere ID. This could be used by other organizations to access your [place] medical records  [id number]          Blood Pressure from Last 3 Encounters:   10/04/18 135/85   07/09/18 121/73   05/29/18 116/62    Weight from Last 3 Encounters:   10/04/18 138 lb (62.6 kg)   07/09/18 137 lb (62.1 kg)   05/29/18 139 lb (63 kg)              We Performed the Following     ADMIN INFLUENZA (For MEDICARE Patients ONLY) []     FLU VACCINE, INCREASED ANTIGEN, PRESV FREE, AGE 65+ [05393]     Vaccine Administration, Initial [20407]        Primary Care Provider Office Phone # Fax #    Ian Tracy [phone] [address] [address]        Equal Access to Services     ANJANA JAMES : Hadii brandyn fischer hadasho Soadolfo, waaxda luqadaha, qaybta kaalmada adeegyada, ed sharma . So [facility] [phone].    ATENCIÓN: Si habla español, tiene a dahl disposición servicios gratuitos de asistencia lingüística. [facility] [phone].    We comply with applicable federal civil rights laws and [place] laws. We do not discriminate on the basis of race, color, national origin, age, disability, sex, sexual orientation, or gender identity.            Thank you!     Thank you for choosing [facility]  for your care. Our goal is always to provide you with excellent care. Hearing back from our patients is one way we can continue to improve our services. Please take a few minutes to complete the written survey that you may receive in the mail after your visit with us. Thank you!             Your Updated Medication List - Protect others around you: Learn how to safely use, store and throw away your medicines at www.disposemymeds.org.          This list is accurate as of 10/19/18 11:09 AM.  Always use your most recent med list.                   Brand Name Dispense Instructions for use Diagnosis    atenolol 25 MG tablet    TENORMIN    180 tablet    Take 1 tablet (25 mg) by mouth 2 times daily    Paroxysmal atrial fibrillation (H)       atorvastatin 10 MG tablet    LIPITOR    45  tablet    TAKE 1/2 TABLET BY MOUTH EVERY DAY    Hyperlipidemia LDL goal <100       Biotin 3 MG Tabs      Take by mouth daily (unknown dose)        CALCIUM + D PO      Take by mouth daily        carboxymethylcellulose 1 % ophthalmic solution    CELLUVISC/REFRESH LIQUIGEL     Place 1 drop into both eyes 4 times daily as needed for dry eyes        * DIGOX 250 MCG tablet   Generic drug:  digoxin      Take 0.5 tablets (125 mcg) by mouth daily    Benign essential hypertension       * DIGOX 250 MCG tablet   Generic drug:  digoxin     90 tablet    TAKE 1 TABLET BY MOUTH EVERY DAY    Benign essential hypertension       felodipine ER 5 MG 24 hr tablet    PLENDIL    180 tablet    Take 1 tablet (5 mg) by mouth 2 times daily    Benign essential hypertension       fenofibrate 160 MG tablet     90 tablet    TAKE 1 TABLET BY MOUTH EVERY DAY    Benign essential hypertension       furosemide 20 MG tablet    LASIX    180 tablet    TAKE 1 TABLET(20 MG) BY MOUTH TWICE DAILY    Essential hypertension       ipratropium 0.06 % spray    ATROVENT    3 Box    Spray 2 sprays into both nostrils 4 times daily as needed for rhinitis    Chronic vasomotor rhinitis       lisinopril 40 MG tablet    PRINIVIL/ZESTRIL    90 tablet    TAKE 1 TABLET BY MOUTH EVERY DAY    Benign essential hypertension       propafenone 150 MG Tabs tablet    RYTHMOL    270 tablet    Take 1 tablet three times daily at Morning, Dinner, Bedtime    Paroxysmal atrial fibrillation (H)       sertraline 100 MG tablet    ZOLOFT    90 tablet    Take 1 tablet (100 mg) by mouth daily    Adjustment disorder with anxious mood       verapamil 240 MG Cp24 24 hr capsule    VERELAN    90 capsule    TAKE 1 CAPSULE BY MOUTH EVERY DAY    Benign essential hypertension       warfarin 5 MG tablet    COUMADIN    90 tablet    TAKE 1/2 TO 1 TABLET BY MOUTH DAILY OR AS DIRECTED BY INR CLINIC    Paroxysmal atrial fibrillation (H)       * Notice:  This list has 2 medication(s) that are the same as other  medications prescribed for you. Read the directions carefully, and ask your doctor or other care provider to review them with you.

## 2018-10-19 NOTE — PROGRESS NOTES

## 2018-10-25 ENCOUNTER — ANTICOAGULATION THERAPY VISIT (OUTPATIENT)
Dept: NURSING | Facility: CLINIC | Age: 73
End: 2018-10-25
Payer: MEDICARE

## 2018-10-25 DIAGNOSIS — I48.91 ATRIAL FIBRILLATION (H): ICD-10-CM

## 2018-10-25 DIAGNOSIS — Z79.01 LONG TERM CURRENT USE OF ANTICOAGULANT THERAPY: ICD-10-CM

## 2018-10-25 LAB — INR POINT OF CARE: 2.6 (ref 0.86–1.14)

## 2018-10-25 PROCEDURE — 99207 ZZC NO CHARGE NURSE ONLY: CPT

## 2018-10-25 PROCEDURE — 36416 COLLJ CAPILLARY BLOOD SPEC: CPT

## 2018-10-25 PROCEDURE — 85610 PROTHROMBIN TIME: CPT | Mod: QW

## 2018-10-25 NOTE — PROGRESS NOTES
ANTICOAGULATION FOLLOW-UP CLINIC VISIT    Patient Name:  Catalina Marie  Date:  10/25/2018  Contact Type:  Face to Face    SUBJECTIVE:     Patient Findings     Positives No Problem Findings           OBJECTIVE    INR Protime   Date Value Ref Range Status   10/25/2018 2.6 (A) 0.86 - 1.14 Final       ASSESSMENT / PLAN  INR assessment THER    Recheck INR In: 4 WEEKS    INR Location Clinic      Anticoagulation Summary as of 10/25/2018     INR goal 2.0-3.0   Today's INR 2.6   Warfarin maintenance plan 2.5 mg (5 mg x 0.5) on Tue, Fri; 5 mg (5 mg x 1) all other days   Full warfarin instructions 2.5 mg on Tue, Fri; 5 mg all other days   Weekly warfarin total 30 mg   No change documented Alyssa Valdovinos RN   Plan last modified Elodia Campbell RN (1/25/2018)   Next INR check 11/29/2018   Priority INR   Target end date     Indications   Long term current use of anticoagulant therapy [Z79.01]  Atrial fibrillation (H) [I48.91] [I48.91]         Anticoagulation Episode Summary     INR check location     Preferred lab     Send INR reminders to [facility] ANTICOAG[facility]ATION    Comments Expect FAXED INR results from [facility] in [place], [place]. Phone: [phone].  General Results#: [phone]  Call pt's cell in [place] with results/plan: [phone]      Anticoagulation Care Providers     Provider Role Specialty Phone number    Ian Tracy MD Referring Internal Medicine [phone]            See the Encounter Report to view Anticoagulation Flowsheet and Dosing Calendar (Go to Encounters tab in chart review, and find the Anticoagulation Therapy Visit)    Dosage adjustment made based on physician directed care plan. INR 2.6.  Continue same.  She is leaving for [place] and was given INR lab order to take to [facility].    Alyssa Valdovinos, RN

## 2018-10-25 NOTE — MR AVS SNAPSHOT
Catalina Marie   10/25/2018 11:15 AM   Anticoagulation Therapy Visit    Description:  73 year old female   Provider:  [facility]   Department:  [facility]           INR as of 10/25/2018     Today's INR 2.6      Anticoagulation Summary as of 10/25/2018     INR goal 2.0-3.0   Today's INR 2.6   Full warfarin instructions 2.5 mg on Tue, Fri; 5 mg all other days   Next INR check 11/29/2018    Indications   Long term current use of anticoagulant therapy [Z79.01]  Atrial fibrillation (H) [I48.91] [I48.91]         Contact Numbers     Clinic Number:         October 2018 Details    Sun Mon Tue Wed Thu Fri Sat      1               2               3               4               5               6                 7               8               9               10               11               12               13                 14               15               16               17               18               19               20                 21               22               23               24               25      5 mg   See details      26      2.5 mg         27      5 mg           28      5 mg         29      5 mg         30      2.5 mg         31      5 mg             Date Details   10/25 This INR check               How to take your warfarin dose     To take:  2.5 mg Take 0.5 of a 5 mg tablet.    To take:  5 mg Take 1 of the 5 mg tablets.           November 2018 Details    Sun Mon Tue Wed Thu Fri Sat         1      5 mg         2      2.5 mg         3      5 mg           4      5 mg         5      5 mg         6      2.5 mg         7      5 mg         8      5 mg         9      2.5 mg         10      5 mg           11      5 mg         12      5 mg         13      2.5 mg         14      5 mg         15      5 mg         16      2.5 mg         17      5 mg           18      5 mg         19      5 mg         20      2.5 mg         21      5 mg         22      5 mg         23      2.5 mg          24      5 mg           25      5 mg         26      5 mg         27      2.5 mg         28      5 mg         29            30                 Date Details   No additional details    Date of next INR:  11/29/2018         How to take your warfarin dose     To take:  2.5 mg Take 0.5 of a 5 mg tablet.    To take:  5 mg Take 1 of the 5 mg tablets.

## 2018-10-29 DIAGNOSIS — I10 BENIGN ESSENTIAL HYPERTENSION: ICD-10-CM

## 2018-10-29 NOTE — TELEPHONE ENCOUNTER
"Last Written Prescription Date:  2/15/18  Last Fill Quantity: 90 capsule,  # refills: 2   Last office visit: 10/4/2018 with prescribing provider:  Rossana   Future Office Visit:      Requested Prescriptions   Pending Prescriptions Disp Refills     verapamil (VERELAN) 240 MG CP24 24 hr capsule [Pharmacy Med Name: VERAPAMIL 240MG ER CAPSULES] 90 capsule 0     Sig: TAKE 1 CAPSULE BY MOUTH EVERY DAY    Calcium Channel Blockers Protocol  Failed    10/29/2018  2:10 PM       Failed - Normal serum creatinine on file in past 12 months    Recent Labs   Lab Test  10/04/18   1214   CR  1.16*            Passed - Blood pressure under 140/90 in past 12 months    BP Readings from Last 3 Encounters:   10/04/18 135/85   07/09/18 121/73   05/29/18 116/62                Passed - Normal ALT in past 12 months    Recent Labs   Lab Test  07/09/18   0859   ALT  28            Passed - Recent (12 mo) or future (30 days) visit within the authorizing provider's specialty    Patient had office visit in the last 12 months or has a visit in the next 30 days with authorizing provider or within the authorizing provider's specialty.  See \"Patient Info\" tab in inbasket, or \"Choose Columns\" in Meds & Orders section of the refill encounter.             Passed - Patient is age 18 or older       Passed - No active pregnancy on record       Passed - No positive pregnancy test in past 12 months          "

## 2018-10-31 RX ORDER — VERAPAMIL HYDROCHLORIDE 240 MG/1
CAPSULE, EXTENDED RELEASE ORAL
Qty: 90 CAPSULE | Refills: 1 | Status: SHIPPED | OUTPATIENT
Start: 2018-10-31 | End: 2019-02-16

## 2018-11-20 ENCOUNTER — ANTICOAGULATION THERAPY VISIT (OUTPATIENT)
Dept: FAMILY MEDICINE | Facility: CLINIC | Age: 73
End: 2018-11-20
Payer: MEDICARE

## 2018-11-20 ENCOUNTER — TRANSFERRED RECORDS (OUTPATIENT)
Dept: HEALTH INFORMATION MANAGEMENT | Facility: CLINIC | Age: 73
End: 2018-11-20

## 2018-11-20 DIAGNOSIS — Z79.01 LONG TERM CURRENT USE OF ANTICOAGULANT THERAPY: ICD-10-CM

## 2018-11-20 DIAGNOSIS — I48.91 ATRIAL FIBRILLATION (H): ICD-10-CM

## 2018-11-20 LAB — INR PPP: 2.9

## 2018-11-20 PROCEDURE — 99207 ZZC NO CHARGE NURSE ONLY: CPT | Performed by: INTERNAL MEDICINE

## 2018-11-20 NOTE — MR AVS SNAPSHOT
Catalina Marie   11/20/2018   Anticoagulation Therapy Visit    Description:  73 year old female   Provider:  Ian Tracy MD   Department:  [facility]           INR as of 11/20/2018     Today's INR 2.9      Anticoagulation Summary as of 11/20/2018     INR goal 2.0-3.0   Today's INR 2.9   Full warfarin instructions 2.5 mg on Tue, Fri; 5 mg all other days   Next INR check 12/28/2018    Indications   Long term current use of anticoagulant therapy [Z79.01]  Atrial fibrillation (H) [I48.91] [I48.91]         November 2018 Details    Sun Mon Tue Wed Thu Fri Sat         1               2               3                 4               5               6               7               8               9               10                 11               12               13               14               15               16               17                 18               19               20      2.5 mg   See details      21      5 mg         22      5 mg         23      2.5 mg         24      5 mg           25      5 mg         26      5 mg         27      2.5 mg         28      5 mg         29      5 mg         30      2.5 mg           Date Details   11/20 This INR check               How to take your warfarin dose     To take:  2.5 mg Take 0.5 of a 5 mg tablet.    To take:  5 mg Take 1 of the 5 mg tablets.           December 2018 Details    Sun Mon Tue Wed Thu Fri Sat           1      5 mg           2      5 mg         3      5 mg         4      2.5 mg         5      5 mg         6      5 mg         7      2.5 mg         8      5 mg           9      5 mg         10      5 mg         11      2.5 mg         12      5 mg         13      5 mg         14      2.5 mg         15      5 mg           16      5 mg         17      5 mg         18      2.5 mg         19      5 mg         20      5 mg         21      2.5 mg         22      5 mg           23      5 mg         24      5 mg         25      2.5 mg          26      5 mg         27      5 mg         28            29                 30               31                     Date Details   No additional details    Date of next INR:  12/28/2018         How to take your warfarin dose     To take:  2.5 mg Take 0.5 of a 5 mg tablet.    To take:  5 mg Take 1 of the 5 mg tablets.

## 2018-11-20 NOTE — PROGRESS NOTES
ANTICOAGULATION FOLLOW-UP CLINIC VISIT    Patient Name:  Catalina Marie  Date:  11/20/2018  Contact Type:  fax from [facility] lab    SUBJECTIVE:     Patient Findings     Positives No Problem Findings           OBJECTIVE    INR   Date Value Ref Range Status   11/20/2018 2.9  Final       ASSESSMENT / PLAN  INR assessment THER    Recheck INR In: 4 WEEKS    INR Location Outside lab      Anticoagulation Summary as of 11/20/2018     INR goal 2.0-3.0   Today's INR 2.9   Warfarin maintenance plan 2.5 mg (5 mg x 0.5) on Tue, Fri; 5 mg (5 mg x 1) all other days   Full warfarin instructions 2.5 mg on Tue, Fri; 5 mg all other days   Weekly warfarin total 30 mg   Plan last modified Elodia Campbell RN (1/25/2018)   Next INR check    Priority INR   Target end date     Indications   Long term current use of anticoagulant therapy [Z79.01]  Atrial fibrillation (H) [I48.91] [I48.91]         Anticoagulation Episode Summary     INR check location     Preferred lab     Send INR reminders to [facility] ANTICOAGULATION    Comments Expect FAXED INR results from [facility] in [place], [place]. Phone: [phone].  General Results#: [phone]  Call pt's cell in [place] with results/plan: [phone]      Anticoagulation Care Providers     Provider Role Specialty Phone number    Ian Tracy MD Referring Internal Medicine [phone]            See the Encounter Report to view Anticoagulation Flowsheet and Dosing Calendar (Go to Encounters tab in chart review, and find the Anticoagulation Therapy Visit)    Dosage adjustment made based on physician directed care plan. Fax received from [facility] lab in [place] AND "[employer] message from patient.  INR 2.9.  Sent message to continue same dosing and recheck INR in 4 weeks.    Alyssa Valdovinos RN

## 2018-12-26 ENCOUNTER — OFFICE VISIT (OUTPATIENT)
Dept: FAMILY MEDICINE | Facility: CLINIC | Age: 73
End: 2018-12-26
Payer: MEDICARE

## 2018-12-26 VITALS
HEART RATE: 58 BPM | WEIGHT: 141 LBS | BODY MASS INDEX: 24.07 KG/M2 | TEMPERATURE: 98.7 F | SYSTOLIC BLOOD PRESSURE: 136 MMHG | OXYGEN SATURATION: 96 % | DIASTOLIC BLOOD PRESSURE: 70 MMHG | HEIGHT: 64 IN

## 2018-12-26 DIAGNOSIS — F43.22 ADJUSTMENT DISORDER WITH ANXIOUS MOOD: ICD-10-CM

## 2018-12-26 DIAGNOSIS — I50.31 ACUTE DIASTOLIC CONGESTIVE HEART FAILURE (H): ICD-10-CM

## 2018-12-26 DIAGNOSIS — E04.2 NONTOXIC MULTINODULAR GOITER: ICD-10-CM

## 2018-12-26 DIAGNOSIS — I10 BENIGN ESSENTIAL HYPERTENSION: Primary | ICD-10-CM

## 2018-12-26 DIAGNOSIS — E78.5 HYPERLIPIDEMIA LDL GOAL <100: ICD-10-CM

## 2018-12-26 DIAGNOSIS — L82.0 INFLAMED SEBORRHEIC KERATOSIS: ICD-10-CM

## 2018-12-26 DIAGNOSIS — E11.9 TYPE 2 DIABETES MELLITUS WITHOUT COMPLICATION, WITHOUT LONG-TERM CURRENT USE OF INSULIN (H): ICD-10-CM

## 2018-12-26 DIAGNOSIS — N18.30 CKD (CHRONIC KIDNEY DISEASE) STAGE 3, GFR 30-59 ML/MIN (H): ICD-10-CM

## 2018-12-26 DIAGNOSIS — I48.0 PAROXYSMAL ATRIAL FIBRILLATION (H): ICD-10-CM

## 2018-12-26 LAB
ANION GAP SERPL CALCULATED.3IONS-SCNC: 6 MMOL/L (ref 3–14)
BUN SERPL-MCNC: 21 MG/DL (ref 7–30)
CALCIUM SERPL-MCNC: 8.8 MG/DL (ref 8.5–10.1)
CHLORIDE SERPL-SCNC: 107 MMOL/L (ref 94–109)
CHOLEST SERPL-MCNC: 129 MG/DL
CO2 SERPL-SCNC: 27 MMOL/L (ref 20–32)
CREAT SERPL-MCNC: 1.09 MG/DL (ref 0.52–1.04)
GFR SERPL CREATININE-BSD FRML MDRD: 50 ML/MIN/{1.73_M2}
GLUCOSE SERPL-MCNC: 128 MG/DL (ref 70–99)
HBA1C MFR BLD: 6.4 % (ref 0–5.6)
HDLC SERPL-MCNC: 34 MG/DL
INR PPP: 2.84 (ref 0.86–1.14)
LDLC SERPL CALC-MCNC: 57 MG/DL
NONHDLC SERPL-MCNC: 95 MG/DL
NT-PROBNP SERPL-MCNC: 542 PG/ML (ref 0–450)
POTASSIUM SERPL-SCNC: 3.7 MMOL/L (ref 3.4–5.3)
SODIUM SERPL-SCNC: 140 MMOL/L (ref 133–144)
TRIGL SERPL-MCNC: 190 MG/DL
TSH SERPL DL<=0.005 MIU/L-ACNC: 1.28 MU/L (ref 0.4–4)

## 2018-12-26 PROCEDURE — 99214 OFFICE O/P EST MOD 30 MIN: CPT | Mod: 25 | Performed by: INTERNAL MEDICINE

## 2018-12-26 PROCEDURE — 80061 LIPID PANEL: CPT | Performed by: INTERNAL MEDICINE

## 2018-12-26 PROCEDURE — 80048 BASIC METABOLIC PNL TOTAL CA: CPT | Performed by: INTERNAL MEDICINE

## 2018-12-26 PROCEDURE — 36415 COLL VENOUS BLD VENIPUNCTURE: CPT | Performed by: INTERNAL MEDICINE

## 2018-12-26 PROCEDURE — 84443 ASSAY THYROID STIM HORMONE: CPT | Performed by: INTERNAL MEDICINE

## 2018-12-26 PROCEDURE — 85610 PROTHROMBIN TIME: CPT | Performed by: INTERNAL MEDICINE

## 2018-12-26 PROCEDURE — 17110 DESTRUCTION B9 LES UP TO 14: CPT | Performed by: INTERNAL MEDICINE

## 2018-12-26 PROCEDURE — 83880 ASSAY OF NATRIURETIC PEPTIDE: CPT | Performed by: INTERNAL MEDICINE

## 2018-12-26 PROCEDURE — 83036 HEMOGLOBIN GLYCOSYLATED A1C: CPT | Performed by: INTERNAL MEDICINE

## 2018-12-26 ASSESSMENT — MIFFLIN-ST. JEOR: SCORE: 1119.57

## 2018-12-26 NOTE — PROGRESS NOTES
SUBJECTIVE:   Catalina Marie is a 73 year old female who presents to clinic today for the following health issues:    Follow-up cough which she has had for the last 20 years occurring postprandially and associated with dryness    Indigestion improved with activia, approximately 2 bowel movements per day without diarrhea   inflamed seborrheic keratosesX3 on her back    Cardiovascular having more frequent episodes of atrial fibrillation however the episodes are not as symptomatic as they have been in the past.  Hypertension is well controlled and she is using felodipine at different doses depending on her blood pressure  Recheck medications        Problem list and histories reviewed & adjusted, as indicated.  Additional history: as documented    Current Outpatient Medications   Medication Sig Dispense Refill     atenolol (TENORMIN) 25 MG tablet Take 1 tablet (25 mg) by mouth 2 times daily 180 tablet 3     Biotin 3 MG TABS Take by mouth daily (unknown dose)       Calcium Carbonate-Vitamin D (CALCIUM + D PO) Take by mouth daily       carboxymethylcellulose (CELLUVISC/REFRESH LIQUIGEL) 1 % ophthalmic solution Place 1 drop into both eyes 4 times daily as needed for dry eyes       DIGOX 250 MCG tablet TAKE 1 TABLET BY MOUTH EVERY DAY 90 tablet 0     digoxin (DIGOX) 250 MCG tablet Take 0.5 tablets (125 mcg) by mouth daily       felodipine ER (PLENDIL) 5 MG 24 hr tablet Take 1 tablet (5 mg) by mouth 2 times daily 180 tablet 3     fenofibrate 160 MG tablet TAKE 1 TABLET BY MOUTH EVERY DAY 90 tablet 3     furosemide (LASIX) 20 MG tablet TAKE 1 TABLET(20 MG) BY MOUTH TWICE DAILY 180 tablet 1     ipratropium (ATROVENT) 0.06 % spray Spray 2 sprays into both nostrils 4 times daily as needed for rhinitis 3 Box 3     lisinopril (PRINIVIL/ZESTRIL) 40 MG tablet TAKE 1 TABLET BY MOUTH EVERY DAY 90 tablet 3     sertraline (ZOLOFT) 100 MG tablet Take 1 tablet (100 mg) by mouth daily 90 tablet 3     verapamil (VERELAN) 240 MG CP24  "24 hr capsule TAKE 1 CAPSULE BY MOUTH EVERY DAY 90 capsule 1     warfarin (COUMADIN) 5 MG tablet TAKE 1/2 TO 1 TABLET BY MOUTH DAILY OR AS DIRECTED BY INR CLINIC 90 tablet 3     atorvastatin (LIPITOR) 10 MG tablet Take 0.5 tablets (5 mg) by mouth daily 45 tablet 0     propafenone (RYTHMOL) 150 MG TABS tablet Take 1 tablet three times daily at Morning, Dinner, Bedtime 270 tablet 3       Reviewed and updated as needed this visit by clinical staff       Reviewed and updated as needed this visit by Provider         ROS:  Constitutional, HEENT, cardiovascular, pulmonary, GI, , musculoskeletal, neuro, skin, endocrine and psych systems are negative, except as otherwise noted.    OBJECTIVE:     /70   Pulse 58   Temp 98.7  F (37.1  C) (Tympanic)   Ht 1.626 m (5' 4\")   Wt 64 kg (141 lb)   SpO2 96%   BMI 24.20 kg/m    Body mass index is 24.2 kg/m .  GENERAL: healthy, alert and no distress  NECK: no adenopathy, no asymmetry, masses, or scars and thyroid normal to palpation  RESP: lungs clear to auscultation - no rales, rhonchi or wheezes  CV: regular rate and rhythm, normal S1 S2, no S3 or S4, no murmur, click or rub, no peripheral edema and peripheral pulses strong  ABDOMEN: soft, nontender, no hepatosplenomegaly, no masses and bowel sounds normal  MS: no gross musculoskeletal defects noted, no edema  Skin: She has innumerable seborrheic keratoses #4 hyperkeratotic areas it were being irritated by her clothing on her back    Procedure: For seborrheic keratoses on her back being irritated by her clothing were treated with liquid nitrogen,N2x2      ASSESSMENT/PLAN:             1. Benign essential hypertension    - Basic metabolic panel    2. Acute diastolic congestive heart failure (H)    - Basic metabolic panel  - BNP-N terminal pro    3. Paroxysmal atrial fibrillation (H)    - INR    4. Type 2 diabetes mellitus without complication, without long-term current use of insulin (H)    - Hemoglobin A1c    5. CKD " (chronic kidney disease) stage 3, GFR 30-59 ml/min (H)    - Basic metabolic panel    6. Nontoxic multinodular goiter    - TSH with free T4 reflex    7. Adjustment disorder with anxious mood      8. Hyperlipidemia LDL goal <100    - Lipid panel reflex to direct LDL Fasting    9.  Inflamed seborrheic keratoses  Liquid nitrogen as above  Ian Tracy MD  [facility]

## 2018-12-31 NOTE — RESULT ENCOUNTER NOTE
I called the patient and informed of results. No changes made to treatment. Patient voices understanding and will contact me with any further questions.     Ian Tracy MD

## 2019-01-16 ENCOUNTER — TELEPHONE (OUTPATIENT)
Dept: FAMILY MEDICINE | Facility: CLINIC | Age: 74
End: 2019-01-16

## 2019-01-16 DIAGNOSIS — I48.0 PAROXYSMAL ATRIAL FIBRILLATION (H): ICD-10-CM

## 2019-01-16 DIAGNOSIS — E78.5 HYPERLIPIDEMIA LDL GOAL <100: ICD-10-CM

## 2019-01-16 RX ORDER — ATORVASTATIN CALCIUM 10 MG/1
5 TABLET, FILM COATED ORAL DAILY
Qty: 45 TABLET | Refills: 0 | Status: SHIPPED | OUTPATIENT
Start: 2019-01-16 | End: 2019-09-27

## 2019-01-16 NOTE — TELEPHONE ENCOUNTER
Reason for Call:  Medication or medication refill:    Do you use a [facility]?  Name of the pharmacy and phone number for the current request:       [facility]/PHARMACY #[id number] - [place], [place] - [address] AT [facility]    Name of the medication requested: RYTHMOL    Other request: PT  called/ having trouble transferring script to [facility] please call to advise    Can we leave a detailed message on this number? YES    Phone number patient can be reached at: Home number on file [phone] (home)    Best Time:     Call taken on 1/16/2019 at 4:53 PM by Parish Colmenares

## 2019-01-16 NOTE — TELEPHONE ENCOUNTER
Pharmacist from [facility] in [facility] states patient is requesting medication.  Needs refill of Atorvastatin.  Current Rx was filled at [facility].    Verbal given; also sent electronically so we have the pharmacy information for future requests.    Alyssa Valdovinos RN

## 2019-01-16 NOTE — TELEPHONE ENCOUNTER
Reason for Call:  Medication or medication refill:    Do you use a [facility]?  Name of the pharmacy and phone number for the current request:     [facility] [place] [address] # [phone]    Name of the medication requested: atorvastatin (LIPITOR) 10 MG tablet    Other request: The patient switched pharmacies per insurance and they need a whole new RX for  This    Can we leave a detailed message on this number? Not Applicable   Transferred to RN    Call taken on 1/16/2019 at 3:28 PM by Trista Karimi

## 2019-01-17 RX ORDER — PROPAFENONE HYDROCHLORIDE 150 MG/1
TABLET, COATED ORAL
Qty: 270 TABLET | Refills: 3 | Status: SHIPPED | OUTPATIENT
Start: 2019-01-17 | End: 2020-03-10

## 2019-01-17 NOTE — TELEPHONE ENCOUNTER
Pending Prescriptions:                       Disp   Refills    propafenone (RYTHMOL) 150 MG TABS tablet  270 ta*3            Sig: Take 1 tablet three times daily at Morning,           Dinner, Bedtime    Last Written Prescription Date:  07/19/2018  Last Fill Quantity: 270,  # refills: 3   Last office visit: 12/26/2018 with prescribing provider:     Future Office Visit:        Routing refill request to provider for review/approval because:  Drug not on the [facility] or [facility] refill protocol or controlled substance

## 2019-01-17 NOTE — TELEPHONE ENCOUNTER
Routing refill request to provider for review/approval because:  Drug not on the [facility] refill protocol     Maylin HOWARD RN

## 2019-01-21 ENCOUNTER — ANTICOAGULATION THERAPY VISIT (OUTPATIENT)
Dept: NURSING | Facility: CLINIC | Age: 74
End: 2019-01-21
Payer: MEDICARE

## 2019-01-21 DIAGNOSIS — I48.0 PAROXYSMAL ATRIAL FIBRILLATION (H): ICD-10-CM

## 2019-01-21 DIAGNOSIS — Z79.01 LONG TERM CURRENT USE OF ANTICOAGULANT THERAPY: ICD-10-CM

## 2019-01-21 LAB — INR POINT OF CARE: 2.7 (ref 0.86–1.14)

## 2019-01-21 PROCEDURE — 36416 COLLJ CAPILLARY BLOOD SPEC: CPT

## 2019-01-21 PROCEDURE — 99207 ZZC NO CHARGE NURSE ONLY: CPT

## 2019-01-21 PROCEDURE — 85610 PROTHROMBIN TIME: CPT | Mod: QW

## 2019-01-21 NOTE — PROGRESS NOTES
ANTICOAGULATION FOLLOW-UP CLINIC VISIT    Patient Name:  Catalina Marie  Date:  2019  Contact Type:  Face to Face    SUBJECTIVE:     Patient Findings     Positives:   No Problem Findings           OBJECTIVE    INR Protime   Date Value Ref Range Status   2019 2.7 (A) 0.86 - 1.14 Final       ASSESSMENT / PLAN  INR assessment THER    Recheck INR In: 4 WEEKS    INR Location Clinic      Anticoagulation Summary  As of 2019    INR goal:   2.0-3.0   TTR:   70.8 % (2.3 y)   INR used for dosin.7 (2019)   Warfarin maintenance plan:   2.5 mg (5 mg x 0.5) every Tue, Fri; 5 mg (5 mg x 1) all other days   Full warfarin instructions:   2.5 mg every Tue, Fri; 5 mg all other days   Weekly warfarin total:   30 mg   No change documented:   Kami Recio RN   Plan last modified:   Elodia Campbell RN (2018)   Next INR check:   2019   Priority:   INR   Target end date:       Indications    Long term current use of anticoagulant therapy [Z79.01]  Atrial fibrillation (H) [I48.91] [I48.91]             Anticoagulation Episode Summary     INR check location:       Preferred lab:       Send INR reminders to:    ANTICOAGULATION    Comments:   Expect FAXED INR results from [facility] in [place], [place]. Phone: [phone].  General Results#: [phone]  Call pt's cell in [place] with results/plan: [phone]      Anticoagulation Care Providers     Provider Role Specialty Phone number    Ian Tracy MD Referring Internal Medicine [phone]            See the Encounter Report to view Anticoagulation Flowsheet and Dosing Calendar (Go to Encounters tab in chart review, and find the Anticoagulation Therapy Visit)    Dosage adjustment made based on physician directed care plan.    Kami Recio RN

## 2019-01-24 ENCOUNTER — OFFICE VISIT (OUTPATIENT)
Dept: FAMILY MEDICINE | Facility: CLINIC | Age: 74
End: 2019-01-24
Payer: MEDICARE

## 2019-01-24 VITALS
TEMPERATURE: 97 F | HEART RATE: 100 BPM | SYSTOLIC BLOOD PRESSURE: 115 MMHG | OXYGEN SATURATION: 95 % | HEIGHT: 64 IN | BODY MASS INDEX: 24.2 KG/M2 | DIASTOLIC BLOOD PRESSURE: 67 MMHG

## 2019-01-24 DIAGNOSIS — Z51.89 VISIT FOR WOUND CARE: ICD-10-CM

## 2019-01-24 DIAGNOSIS — T14.8XXA ABRASION: Primary | ICD-10-CM

## 2019-01-24 PROCEDURE — 99213 OFFICE O/P EST LOW 20 MIN: CPT | Performed by: NURSE PRACTITIONER

## 2019-01-24 NOTE — PROGRESS NOTES
SUBJECTIVE:   Catalina Marie is a 73 year old female who presents to clinic today for the following health issues:        Concerned about possible skin infection.  About a week ago Jennifer skinned her left shin and thinks that is taking a long time to heal.  Still somewhat tender, no pus, surrounding erythema. Using bacitracin and bandaid       Problem list and histories reviewed & adjusted, as indicated.  Additional history: as documented    Patient Active Problem List   Diagnosis     Long term current use of anticoagulant therapy     Atrial fibrillation (H) [I48.91]     Benign essential hypertension     Type 2 diabetes mellitus without complication, without long-term current use of insulin (H)     Adjustment disorder with anxious mood     Hyperlipidemia LDL goal <100     CKD (chronic kidney disease) stage 3, GFR 30-59 ml/min (H)     Overdose, accidental or unintentional, initial encounter     Malignant neoplasm of breast in female, estrogen receptor positive, unspecified laterality, unspecified site of breast (H)     H/O bilateral mastectomy     Acute diastolic congestive heart failure (H)     Nontoxic multinodular goiter     Past Surgical History:   Procedure Laterality Date     BREAST SURGERY       COLONOSCOPY       EYE SURGERY       HEAD & NECK SURGERY      brain tumor resection     PHACOEMULSIFICATION CLEAR CORNEA WITH STANDARD INTRAOCULAR LENS IMPLANT Right 7/6/2016    Procedure: PHACOEMULSIFICATION CLEAR CORNEA WITH STANDARD INTRAOCULAR LENS IMPLANT;  Surgeon: Aiden Lee MD;  Location: [facility]       Social History     Tobacco Use     Smoking status: Former Smoker     Smokeless tobacco: Never Used   Substance Use Topics     Alcohol use: Yes     Alcohol/week: 0.0 oz     No family history on file.      Current Outpatient Medications   Medication Sig Dispense Refill     atenolol (TENORMIN) 25 MG tablet Take 1 tablet (25 mg) by mouth 2 times daily 180 tablet 3     atorvastatin (LIPITOR) 10 MG tablet Take  "0.5 tablets (5 mg) by mouth daily 45 tablet 0     Biotin 3 MG TABS Take by mouth daily (unknown dose)       Calcium Carbonate-Vitamin D (CALCIUM + D PO) Take by mouth daily       carboxymethylcellulose (CELLUVISC/REFRESH LIQUIGEL) 1 % ophthalmic solution Place 1 drop into both eyes 4 times daily as needed for dry eyes       DIGOX 250 MCG tablet TAKE 1 TABLET BY MOUTH EVERY DAY 90 tablet 0     digoxin (DIGOX) 250 MCG tablet Take 0.5 tablets (125 mcg) by mouth daily       felodipine ER (PLENDIL) 5 MG 24 hr tablet Take 1 tablet (5 mg) by mouth 2 times daily 180 tablet 3     fenofibrate 160 MG tablet TAKE 1 TABLET BY MOUTH EVERY DAY 90 tablet 3     furosemide (LASIX) 20 MG tablet TAKE 1 TABLET(20 MG) BY MOUTH TWICE DAILY 180 tablet 1     ipratropium (ATROVENT) 0.06 % spray Spray 2 sprays into both nostrils 4 times daily as needed for rhinitis 3 Box 3     lisinopril (PRINIVIL/ZESTRIL) 40 MG tablet TAKE 1 TABLET BY MOUTH EVERY DAY 90 tablet 3     propafenone (RYTHMOL) 150 MG TABS tablet Take 1 tablet three times daily at Morning, Dinner, Bedtime 270 tablet 3     sertraline (ZOLOFT) 100 MG tablet Take 1 tablet (100 mg) by mouth daily 90 tablet 3     verapamil (VERELAN) 240 MG CP24 24 hr capsule TAKE 1 CAPSULE BY MOUTH EVERY DAY 90 capsule 1     warfarin (COUMADIN) 5 MG tablet TAKE 1/2 TO 1 TABLET BY MOUTH DAILY OR AS DIRECTED BY INR CLINIC 90 tablet 3     Allergies   Allergen Reactions     Penicillins        Reviewed and updated as needed this visit by clinical staff  Tobacco  Allergies  Meds       Reviewed and updated as needed this visit by Provider         ROS:  Constitutional, HEENT, cardiovascular, pulmonary, gi and gu systems are negative, except as otherwise noted.    OBJECTIVE:     /67 (BP Location: Left arm, Cuff Size: Adult Regular)   Pulse 100   Temp 97  F (36.1  C)   Ht 1.626 m (5' 4\")   SpO2 95%   BMI 24.20 kg/m    Body mass index is 24.2 kg/m .  GENERAL: healthy, alert and no distress  MS: no " gross musculoskeletal defects noted, no edema  SKIN: the superficial 2cm wound appears clean, no purulence, minimal surrounding inflammation, no edema;  It is gently debrided, cleanse with antiseptic, covered with bandaid   PSYCH: mentation appears normal, affect normal/bright    Diagnostic Test Results:  none     ASSESSMENT/PLAN:       ICD-10-CM    1. Abrasion T14.8XXA    2. Visit for wound care Z51.89        She will continue to keep clean with warm soapy water, bacitracin and bandaid and RTC if infection develops    OLENA Hitchcock [facility]

## 2019-02-14 ENCOUNTER — TELEPHONE (OUTPATIENT)
Dept: FAMILY MEDICINE | Facility: CLINIC | Age: 74
End: 2019-02-14

## 2019-02-14 NOTE — TELEPHONE ENCOUNTER
I haven't heard anything about verapamil being on back order, so as starting point I'd recommend she check with other pharmacies to see if they have it.  If not, diltiazem would be the most similar.      Alley Mills, PharmD, [facility]  Medication Therapy Management Provider  Pager: [phone]

## 2019-02-14 NOTE — TELEPHONE ENCOUNTER
Alley,    Are you able to review this in Dr. Tracy's absence for a recommendation for DOD?  Madhuri Edge RN

## 2019-02-14 NOTE — TELEPHONE ENCOUNTER
"Reason for Call:  Other call back    Detailed comments: patient calling requesting a call back Regarding her Rx for Verapamil\" is on Back Order and is in [place] and she wants to know what else Dr Tracy thinks she should be Taking?     Phone Number Patient can be reached at: Home number on file [phone] (home)    Best Time: today    Can we leave a detailed message on this number? YES    Call taken on 2/14/2019 at 12:11 PM by Christin Yap      "

## 2019-02-14 NOTE — TELEPHONE ENCOUNTER
"Spoke with patient     Has about 1 week remaining of verapamil and she's getting nervous about running out     She checked with multiple pharmacies in the area but none carry verapamil - spent the past week checking to see if anyone carries it     The pharmacist there told her they rarely see it prescribed and \"it's becoming archaic\"     Pt would like to try Diltiazem - requesting Rx to:    [facility]/PHARMACY #[id number] - [place], [place] - [facility] [address] AT [facility]    Maylin HOWARD RN    "

## 2019-02-15 NOTE — TELEPHONE ENCOUNTER
Will defer to PCP as I have not seen this pt so cannot send in an Rx.    Kerri BrinkD, [facility]  Medication Therapy Management Provider  Pager: [phone]

## 2019-02-16 ENCOUNTER — NURSE TRIAGE (OUTPATIENT)
Dept: NURSING | Facility: CLINIC | Age: 74
End: 2019-02-16

## 2019-02-16 ENCOUNTER — TELEPHONE (OUTPATIENT)
Dept: FAMILY MEDICINE | Facility: CLINIC | Age: 74
End: 2019-02-16

## 2019-02-16 DIAGNOSIS — I10 BENIGN ESSENTIAL HYPERTENSION: Primary | ICD-10-CM

## 2019-02-16 RX ORDER — VERAPAMIL HYDROCHLORIDE 120 MG/1
120 CAPSULE, EXTENDED RELEASE ORAL AT BEDTIME
Qty: 90 CAPSULE | Refills: 3 | Status: SHIPPED | OUTPATIENT
Start: 2019-02-16 | End: 2019-06-03

## 2019-02-16 RX ORDER — VERAPAMIL HYDROCHLORIDE 120 MG/1
240 CAPSULE, EXTENDED RELEASE ORAL AT BEDTIME
Qty: 180 CAPSULE | Refills: 0 | Status: SHIPPED | OUTPATIENT
Start: 2019-02-16 | End: 2019-06-03

## 2019-02-16 NOTE — TELEPHONE ENCOUNTER
Jennifer called asking if her verapamil  mg capsules, one capsule at every bedtime could be changed to verapamil  mg capsules, two capsules at every bedtime.  The pharmacy doesn't have the 240 mg capsules and doesn't know when they will get any. The pharmacist said they have enough now to fill for 90 days if a provider will send it in now.       I paged the on call provider, Dr Arriaga with this request. Jennifer did not want to wait until Monday. She didn't want the pharmacy to run out before her order is filled.  Dr Arriaga gave an okay to change this.     The change was sent to the pharmacy in [place] Jennifer requested it be sent to.     I called Jennifer. She didn't answer. I left a message on her voicemail telling her this change had been made. I told her to call back with questions.     Josefina MULTANI RN [facility]

## 2019-02-16 NOTE — TELEPHONE ENCOUNTER
Jennifer called asking if her verapamil  mg capsules, one capsule at every bedtime could be changed to verapamil  mg capsules, two capsules at every bedtime.  The pharmacy doesn't have the 240 mg capsules and doesn't know when they will get any. The pharmacist said they have enough now to fill for 90 days if a provider will send it in now.      I paged the on call provider, Dr Arriaga with this request. Jennifer did not want to wait until Monday. She didn't want the pharmacy to run out before her order is filled.  Dr Arriaga gave an okay to change this.    The change was sent to the pharmacy in [place] Jennifer requested it be sent to.    I called Jennifer. She didn't answer. I left a message on her voicemail telling her this change had been made. I told her to call back with questions.    In addition to sending this in eprescribed, I also called it in to the pharmacy.    Josefina MULTANI RN [facility]

## 2019-02-18 NOTE — TELEPHONE ENCOUNTER
Patient was able to get her verapamil from her local [place] drugstore using 2 120 mg tablets daily

## 2019-02-20 ENCOUNTER — ANTICOAGULATION THERAPY VISIT (OUTPATIENT)
Dept: FAMILY MEDICINE | Facility: CLINIC | Age: 74
End: 2019-02-20
Payer: MEDICARE

## 2019-02-20 DIAGNOSIS — I48.91 ATRIAL FIBRILLATION (H): ICD-10-CM

## 2019-02-20 DIAGNOSIS — F43.22 ADJUSTMENT DISORDER WITH ANXIOUS MOOD: ICD-10-CM

## 2019-02-20 DIAGNOSIS — Z79.01 LONG TERM CURRENT USE OF ANTICOAGULANT THERAPY: ICD-10-CM

## 2019-02-20 LAB — INR PPP: 3.3

## 2019-02-20 PROCEDURE — 99207 ZZC NO CHARGE NURSE ONLY: CPT | Performed by: INTERNAL MEDICINE

## 2019-02-20 NOTE — PROGRESS NOTES
ANTICOAGULATION FOLLOW-UP CLINIC VISIT    Patient Name:  Catalina Marie  Date:  2/20/2019  Contact Type:  Telephone    SUBJECTIVE:     Patient Findings     Positives:   No Problem Findings           OBJECTIVE    INR   Date Value Ref Range Status   02/20/2019 3.3  Final       ASSESSMENT / PLAN  INR assessment SUPRA    Recheck INR In: 4 WEEKS    INR Location Outside lab      Anticoagulation Summary  As of 2/20/2019    INR goal:   2.0-3.0   TTR:   70.0 % (2.4 y)   INR used for dosing:   3.3! (2/20/2019)   Warfarin maintenance plan:   2.5 mg (5 mg x 0.5) every Tue, Fri; 5 mg (5 mg x 1) all other days   Full warfarin instructions:   2.5 mg every Tue, Fri; 5 mg all other days   Weekly warfarin total:   30 mg   No change documented:   Alyssa Valdovinos RN   Plan last modified:   Elodia Campbell RN (1/25/2018)   Next INR check:      Priority:   INR   Target end date:       Indications    Long term current use of anticoagulant therapy [Z79.01]  Atrial fibrillation (H) [I48.91] [I48.91]             Anticoagulation Episode Summary     INR check location:       Preferred lab:       Send INR reminders to:    ANTICOAGULATION    Comments:   Expect FAXED INR results from [facility] in [place], [place]. Phone: [phone].  General Results#: [phone]  Call pt's cell in [place] with results/plan: [phone]      Anticoagulation Care Providers     Provider Role Specialty Phone number    Ian Tracy MD Referring Internal Medicine [phone]            See the Encounter Report to view Anticoagulation Flowsheet and Dosing Calendar (Go to Encounters tab in chart review, and find the Anticoagulation Therapy Visit)    Dosage adjustment made based on physician directed care plan. Fax from [facility], [place].  INR 3.3.  Called patient and left detailed VM to continue same dosing and eat extra greens today.  Call back if any health changes, concerns.    Alyssa Valdovinos RN

## 2019-02-20 NOTE — TELEPHONE ENCOUNTER
"Sertraline 100 mg    Last Written Prescription Date:  02/27/18  Last Fill Quantity: 90 tablets,  # refills: 3   Last office visit: 1/24/2019 with prescribing provider:  Karen Penaloza   Future Office Visit:      Requested Prescriptions   Pending Prescriptions Disp Refills     sertraline (ZOLOFT) 100 MG tablet 90 tablet 3     Sig: Take 1 tablet (100 mg) by mouth daily    SSRIs Protocol Passed - 2/20/2019 12:42 PM       Passed - Recent (12 mo) or future (30 days) visit within the authorizing provider's specialty    Patient had office visit in the last 12 months or has a visit in the next 30 days with authorizing provider or within the authorizing provider's specialty.  See \"Patient Info\" tab in inbasket, or \"Choose Columns\" in Meds & Orders section of the refill encounter.             Passed - Medication is active on med list       Passed - Patient is age 18 or older       Passed - No active pregnancy on record       Passed - No positive pregnancy test in last 12 months          "

## 2019-02-21 RX ORDER — SERTRALINE HYDROCHLORIDE 100 MG/1
100 TABLET, FILM COATED ORAL DAILY
Qty: 90 TABLET | Refills: 2 | Status: SHIPPED | OUTPATIENT
Start: 2019-02-21 | End: 2019-11-07

## 2019-03-19 LAB — INR PPP: 3.3

## 2019-03-20 ENCOUNTER — ANTICOAGULATION THERAPY VISIT (OUTPATIENT)
Dept: FAMILY MEDICINE | Facility: CLINIC | Age: 74
End: 2019-03-20
Payer: MEDICARE

## 2019-03-20 DIAGNOSIS — Z79.01 LONG TERM CURRENT USE OF ANTICOAGULANT THERAPY: ICD-10-CM

## 2019-03-20 DIAGNOSIS — I48.91 ATRIAL FIBRILLATION (H): ICD-10-CM

## 2019-03-20 PROCEDURE — 99207 ZZC NO CHARGE NURSE ONLY: CPT | Performed by: INTERNAL MEDICINE

## 2019-03-20 NOTE — PROGRESS NOTES
ANTICOAGULATION FOLLOW-UP CLINIC VISIT    Patient Name:  Catalina Marie  Date:  3/20/2019  Contact Type:  Telephone    SUBJECTIVE:     Patient Findings            OBJECTIVE    INR   Date Value Ref Range Status   03/19/2019 3.3  Final       ASSESSMENT / PLAN  INR assessment SUPRA    Recheck INR In: 2 WEEKS    INR Location Outside lab      Anticoagulation Summary  As of 3/20/2019    INR goal:   2.0-3.0   TTR:   68.0 % (2.5 y)   INR used for dosing:   3.3! (3/19/2019)   Warfarin maintenance plan:   2.5 mg (5 mg x 0.5) every Sun, Tue, Fri; 5 mg (5 mg x 1) all other days   Full warfarin instructions:   2.5 mg every Sun, Tue, Fri; 5 mg all other days   Weekly warfarin total:   27.5 mg   Plan last modified:   Maylin Altamirano RN (3/20/2019)   Next INR check:   4/2/2019   Priority:   INR   Target end date:       Indications    Long term current use of anticoagulant therapy [Z79.01]  Atrial fibrillation (H) [I48.91] [I48.91]             Anticoagulation Episode Summary     INR check location:       Preferred lab:       Send INR reminders to:    ANTICOAGULATION    Comments:   Expect FAXED INR results from [facility] in [place], [place]. Phone: [phone].  General Results#: [phone]  Call pt's cell in [place] with results/plan: [phone]      Anticoagulation Care Providers     Provider Role Specialty Phone number    Ian Tracy MD Referring Internal Medicine [phone]            See the Encounter Report to view Anticoagulation Flowsheet and Dosing Calendar (Go to Encounters tab in chart review, and find the Anticoagulation Therapy Visit)    Fax received today from [facility] with result date 3/19/19. Dosage adjustment made based on physician directed care plan. Last 2/2 INRs supra-therapeutic, so slight decrease in maintenance warfarin dosing today. Detailed message left on pt's mobile phone with warfarin dosing and next INR recheck (2 week recheck due to dose adjustment).     Maylin Altamirano, RN

## 2019-03-28 DIAGNOSIS — I10 BENIGN ESSENTIAL HYPERTENSION: ICD-10-CM

## 2019-03-28 NOTE — TELEPHONE ENCOUNTER
Reason for Call:  Medication or medication refill:    Do you use a [facility]?  Name of the pharmacy and phone number for the current request:       [id number] - [place], [place] - [address] AT [facility]    Name of the medication requested:   DIGOX 250 MCG tablet 90 tablet 0 10/1/2018  No   Sig: TAKE 1 TABLET BY MOUTH EVERY DAY     Can we leave a detailed message on this number? YES    Phone number patient can be reached at: Cell number on file:    Telephone Information:   Mobile [phone]     Best Time: Any    Call taken on 3/28/2019 at 6:08 PM by Kenisha Pena

## 2019-03-29 NOTE — TELEPHONE ENCOUNTER
"  Pending Prescriptions:                       Disp   Refills    digoxin (DIGOX) 250 MCG tablet                                Sig: Take 0.5 tablets (125 mcg) by mouth daily    Last Written Prescription Date:  10/01/2018  Last Fill Quantity: 90,  # refills: 0   Last office visit: 12/26/2018 with prescribing provider:     Future Office Visit:    Requested Prescriptions   Pending Prescriptions Disp Refills     digoxin (DIGOX) 250 MCG tablet       Sig: Take 0.5 tablets (125 mcg) by mouth daily    Cardiac Glycoside Agents Protocol Failed - 3/29/2019  9:12 AM       Failed - Normal digoxin level on file in past 12 mos    Recent Labs   Lab Test 07/09/18  0859   DIGOXIN 0.1*            Failed - Normal serum creatinine on file in past 12 mos    Recent Labs   Lab Test 12/26/18  0828   CR 1.09*            Passed - Medication is active on med list       Passed - Patient is 18 years of age or older       Passed - Patient is not pregnant       Passed - No positive pregnancy test on file in past 12 mos       Passed - Recent (6 mo) or future (30 days) visit within the authorizing provider's specialty    Patient had office visit in the last 6 months or has a visit in the next 30 days with authorizing provider or within the authorizing provider's specialty.  See \"Patient Info\" tab in inbasket, or \"Choose Columns\" in Meds & Orders section of the refill encounter.              "

## 2019-03-29 NOTE — TELEPHONE ENCOUNTER
Spoke with patient:     She takes 125mg (1/2 tab)    Routing refill request to provider for review/approval because:  Labs out of range:  Creatinine   DIGOXIN LEVEL   Lab Test 07/09/18  0859   DIGOXIN 0.1*      Please review and authorize if appropriate,     Thank you,   Amrita VERA RN

## 2019-03-30 ENCOUNTER — MYC MEDICAL ADVICE (OUTPATIENT)
Dept: FAMILY MEDICINE | Facility: CLINIC | Age: 74
End: 2019-03-30

## 2019-04-01 DIAGNOSIS — I10 ESSENTIAL HYPERTENSION: ICD-10-CM

## 2019-04-01 RX ORDER — DIGOXIN 250 MCG
125 TABLET ORAL DAILY
Qty: 45 TABLET | Refills: 0 | Status: SHIPPED | OUTPATIENT
Start: 2019-04-01 | End: 2019-07-08

## 2019-04-02 ENCOUNTER — TELEPHONE (OUTPATIENT)
Dept: FAMILY MEDICINE | Facility: CLINIC | Age: 74
End: 2019-04-02

## 2019-04-02 NOTE — TELEPHONE ENCOUNTER
Prior Authorization Retail Medication Request    Medication/Dose: Digoxin 250 mcg  ICD code (if different than what is on RX):  I10  Previously Tried and Failed:  None  Rationale:  Patient stable on current medication    Insurance Name:  [facility]  Insurance ID:  [id number]      Pharmacy Information (if different than what is on RX)  Name:  [id number]  Phone:  [phone]

## 2019-04-02 NOTE — TELEPHONE ENCOUNTER
"Last Written Prescription Date:  10/01/18  Last Fill Quantity: 180 tablet,  # refills: 1   Last office visit: 12/26/2018 with prescribing provider:  Rossana   Future Office Visit:      Requested Prescriptions   Pending Prescriptions Disp Refills     furosemide (LASIX) 20 MG tablet 180 tablet 1     Sig: TAKE 1 TABLET(20 MG) BY MOUTH TWICE DAILY    Diuretics (Including Combos) Protocol Failed - 4/1/2019  6:07 PM       Failed - Normal serum creatinine on file in past 12 months    Recent Labs   Lab Test 12/26/18  0828   CR 1.09*             Passed - Blood pressure under 140/90 in past 12 months    BP Readings from Last 3 Encounters:   01/24/19 115/67   12/26/18 136/70   10/04/18 135/85                Passed - Recent (12 mo) or future (30 days) visit within the authorizing provider's specialty    Patient had office visit in the last 12 months or has a visit in the next 30 days with authorizing provider or within the authorizing provider's specialty.  See \"Patient Info\" tab in inbasket, or \"Choose Columns\" in Meds & Orders section of the refill encounter.             Passed - Medication is active on med list       Passed - Patient is age 18 or older       Passed - No active pregancy on record       Passed - Normal serum potassium on file in past 12 months    Recent Labs   Lab Test 12/26/18  0828   POTASSIUM 3.7                   Passed - Normal serum sodium on file in past 12 months    Recent Labs   Lab Test 12/26/18  0828                Passed - No positive pregnancy test in past 12 months          "

## 2019-04-03 ENCOUNTER — TELEPHONE (OUTPATIENT)
Dept: FAMILY MEDICINE | Facility: CLINIC | Age: 74
End: 2019-04-03

## 2019-04-03 DIAGNOSIS — I48.91 ATRIAL FIBRILLATION (H): Primary | ICD-10-CM

## 2019-04-03 NOTE — TELEPHONE ENCOUNTER
Reason for Call:  Other patient request    Detailed comments: patient usually has INR checked monthly while she is in [place], at [employer].  She is supposed to have it rechecked today, after 2 weeks.     [employer] told patient they need an OK from doctors office faxed to them, because she is only approved for one time per month.    Fax #:  [phone]    Patient is currently at [employer], and would like to wait for the fax to come in.    Phone Number Patient can be reached at: Cell number on file:    Telephone Information:   Mobile [phone]       Best Time: asap    Can we leave a detailed message on this number? YES    Call taken on 4/3/2019 at 1:14 PM by Sultana Cardoso  ..

## 2019-04-03 NOTE — TELEPHONE ENCOUNTER
Pt called and said [facility] told her they cannot fill Digoxin     [facility]ed [facility] - they said this requires a PA    Routing to CS prior auth    Maylin HOWARD RN

## 2019-04-04 LAB — INR PPP: 2.4

## 2019-04-04 RX ORDER — FUROSEMIDE 20 MG
TABLET ORAL
Qty: 60 TABLET | Refills: 0 | Status: SHIPPED | OUTPATIENT
Start: 2019-04-04 | End: 2019-04-29

## 2019-04-04 NOTE — TELEPHONE ENCOUNTER
CENTRAL PRIOR AUTHORIZATION  [phone]    PA Initiation    Medication: Digoxin 250 mcg - INITIATED 04/04/2019  Insurance Company: [employer] - Phone [phone] Fax [phone]  Pharmacy [facility]illing the [id number] [place], [place] - [address] AT [facility]  Filling Pharmacy Phone: [phone]  Filling Pharmacy Fax:    Start Date: 4/4/2019

## 2019-04-04 NOTE — TELEPHONE ENCOUNTER
Prior authorization is started in separate telephone encounter.    Severo Marvin CMA on 4/4/2019 at 2:46 PM

## 2019-04-04 NOTE — TELEPHONE ENCOUNTER
Routing refill request to provider for review/approval because:  Labs out of range:  Creatinine    Please review and authorize if appropriate.    Thank you,   Arturo RANKIN RN

## 2019-04-05 NOTE — TELEPHONE ENCOUNTER
Prior Authorization Approval    Authorization Effective Date: 4/1/2019  Authorization Expiration Date: 4/5/2045  Medication: Digoxin 250 mcg - APPROVED 04/05/2019  Approved Dose/Quantity:   Reference #: TICKET # [id number]   Insurance Company: [employer] - Phone [phone] Fax [phone]  Expected CoPay:       CoPay Card Available:      Foundation Assistance Needed:    Which Pharmacy is filling the prescription (Not needed for infusion/clinic administered): [id number] [place], [place] - [address] AT [facility]  Pharmacy Notified: Yes  Patient Notified: Yes

## 2019-04-08 ENCOUNTER — ANTICOAGULATION THERAPY VISIT (OUTPATIENT)
Dept: FAMILY MEDICINE | Facility: CLINIC | Age: 74
End: 2019-04-08
Payer: MEDICARE

## 2019-04-08 DIAGNOSIS — I48.91 ATRIAL FIBRILLATION (H): ICD-10-CM

## 2019-04-08 DIAGNOSIS — Z79.01 LONG TERM CURRENT USE OF ANTICOAGULANT THERAPY: ICD-10-CM

## 2019-04-08 PROCEDURE — 99207 ZZC NO CHARGE NURSE ONLY: CPT | Performed by: INTERNAL MEDICINE

## 2019-04-08 NOTE — PROGRESS NOTES
ANTICOAGULATION FOLLOW-UP CLINIC VISIT    Patient Name:  Catalina Marie  Date:  2019  Contact Type:  Telephone    SUBJECTIVE:     Patient Findings            OBJECTIVE    INR   Date Value Ref Range Status   2019 2.4  Final       ASSESSMENT / PLAN  INR assessment THER    Recheck INR In: 4 WEEKS    INR Location Outside lab      Anticoagulation Summary  As of 2019    INR goal:   2.0-3.0   TTR:   67.9 % (2.5 y)   INR used for dosin.4 (2019)   Warfarin maintenance plan:   2.5 mg (5 mg x 0.5) every Sun, Tue, Fri; 5 mg (5 mg x 1) all other days   Full warfarin instructions:   2.5 mg every Sun, Tue, Fri; 5 mg all other days   Weekly warfarin total:   27.5 mg   No change documented:   Maylin Altamirano RN   Plan last modified:   Maylin Altamirano RN (3/20/2019)   Next INR check:   2019   Priority:   INR   Target end date:       Indications    Long term current use of anticoagulant therapy [Z79.01]  Atrial fibrillation (H) [I48.91] [I48.91]             Anticoagulation Episode Summary     INR check location:       Preferred lab:       Send INR reminders to:    ANTICOAGULATION    Comments:   Expect FAXED INR results from [facility] in [place], [place]. Phone: [phone].  General Results#: [phone]  Call pt's cell in [place] with results/plan: [phone]      Anticoagulation Care Providers     Provider Role Specialty Phone number    Ian Tracy MD Referring Internal Medicine [phone]            See the Encounter Report to view Anticoagulation Flowsheet and Dosing Calendar (Go to Encounters tab in chart review, and find the Anticoagulation Therapy Visit)    Faxed results from [place] [facility]. Called patient. No changes in meds/diet. No missed/extra warfarin doses. No unusual bruising/bleeding or other changes. Pt will continue same warfarin dose and recheck INR in 4 week(s), or sooner if concerns.     Pt aware if signs of clotting (pain, tenderness, swelling, color change in leg or arm, SOB) and  bleeding occur (blood in stool, urine, large bruising, bleeding gums, nosebleeds) to have INR check sooner. If sx severe report to ER or concerned for stroke call 911. If general questions or concerns arise, call clinic.    Maylin Altamirano RN

## 2019-04-09 NOTE — TELEPHONE ENCOUNTER
Patient informed about approval of prior authorization for Digoxin. Will contact her pharmacy to  medication.    Severo Marvin CMA on 4/9/2019 at 3:24 PM

## 2019-04-09 NOTE — TELEPHONE ENCOUNTER
Pt called and wanted an update on this medication form status. She almost medication and needs a call back.    (0) independent (2) assistive person

## 2019-04-14 DIAGNOSIS — I10 BENIGN ESSENTIAL HYPERTENSION: ICD-10-CM

## 2019-04-15 NOTE — TELEPHONE ENCOUNTER
"Pending Prescriptions:                       Disp   Refills    digoxin (LANOXIN) 250 MCG tablet [Pharmac*45 tab*0            Sig: TAKE 1/2 TABLET (125 MCG) BY MOUTH EVERY DAY    Last Written Prescription Date:  04/01/2019  Last Fill Quantity: 45,  # refills: 0   Last office visit: 12/26/2018 with prescribing provider:     Future Office Visit:   Next 5 appointments (look out 90 days)    Jul 10, 2019  9:00 AM CDT  PHYSICAL with Ian Tracy MD  [facility] ([facility] [address] [address]  [phone]         Requested Prescriptions   Pending Prescriptions Disp Refills     digoxin (LANOXIN) 250 MCG tablet [Pharmacy Med Name: DIGOXIN 250 MCG TABLET] 45 tablet 0     Sig: TAKE 1/2 TABLET (125 MCG) BY MOUTH EVERY DAY       Cardiac Glycoside Agents Protocol Failed - 4/14/2019  7:32 AM        Failed - Normal digoxin level on file in past 12 mos     Recent Labs   Lab Test 07/09/18  0859   DIGOXIN 0.1*             Failed - Normal serum creatinine on file in past 12 mos     Recent Labs   Lab Test 12/26/18  0828   CR 1.09*             Passed - Medication is active on med list        Passed - Patient is 18 years of age or older        Passed - Patient is not pregnant        Passed - No positive pregnancy test on file in past 12 mos        Passed - Recent (6 mo) or future (30 days) visit within the authorizing provider's specialty     Patient had office visit in the last 6 months or has a visit in the next 30 days with authorizing provider or within the authorizing provider's specialty.  See \"Patient Info\" tab in inbasket, or \"Choose Columns\" in Meds & Orders section of the refill encounter.              "

## 2019-04-16 RX ORDER — DIGOXIN 250 MCG
TABLET ORAL
Qty: 45 TABLET | Refills: 0 | OUTPATIENT
Start: 2019-04-16

## 2019-04-25 DIAGNOSIS — I10 ESSENTIAL HYPERTENSION: ICD-10-CM

## 2019-04-26 NOTE — TELEPHONE ENCOUNTER
"furosemide (LASIX) 20 MG tablet    Last Written Prescription Date:  04/04/2019  Last Fill Quantity: 60,  # refills: 0   Last office visit: 1/24/2019 with prescribing provider:  Ca   Future Office Visit:   Next 5 appointments (look out 90 days)    Jun 03, 2019 11:30 AM CDT  [facility] Visit with [facility]  [facility] [address] [address]  [phone]   Jul 10, 2019  9:00 AM CDT  PHYSICAL with Ian Tracy MD  [facility] ([facility]) [address] [address]  [phone]         Requested Prescriptions   Pending Prescriptions Disp Refills     furosemide (LASIX) 20 MG tablet 60 tablet 0     Sig: TAKE 1 TABLET(20 MG) BY MOUTH TWICE DAILY       Diuretics (Including Combos) Protocol Failed - 4/26/2019 11:47 AM        Failed - Normal serum creatinine on file in past 12 months     Recent Labs   Lab Test 12/26/18  0828   CR 1.09*              Passed - Blood pressure under 140/90 in past 12 months     BP Readings from Last 3 Encounters:   01/24/19 115/67   12/26/18 136/70   10/04/18 135/85                 Passed - Recent (12 mo) or future (30 days) visit within the authorizing provider's specialty     Patient had office visit in the last 12 months or has a visit in the next 30 days with authorizing provider or within the authorizing provider's specialty.  See \"Patient Info\" tab in inbasket, or \"Choose Columns\" in Meds & Orders section of the refill encounter.              Passed - Medication is active on med list        Passed - Patient is age 18 or older        Passed - No active pregancy on record        Passed - Normal serum potassium on file in past 12 months     Recent Labs   Lab Test 12/26/18  0828   POTASSIUM 3.7                    Passed - Normal serum sodium on file in past 12 months     Recent Labs   Lab Test 12/26/18  0828                 Passed - No positive pregnancy test in past 12 months    "

## 2019-04-29 RX ORDER — FUROSEMIDE 20 MG
TABLET ORAL
Qty: 60 TABLET | Refills: 0 | Status: SHIPPED | OUTPATIENT
Start: 2019-04-29 | End: 2019-05-01

## 2019-04-30 RX ORDER — FUROSEMIDE 20 MG
TABLET ORAL
Qty: 180 TABLET | Refills: 0 | Status: CANCELLED | OUTPATIENT
Start: 2019-04-30

## 2019-04-30 NOTE — TELEPHONE ENCOUNTER
To PCP:     [facility] is requesting 90 day supply of Lasix. OK to fill? Pended. Please deny if not appropriate.     Thank you,   Amrita FLOWERS RN

## 2019-04-30 NOTE — TELEPHONE ENCOUNTER
[facility] is requesting 3 months of refills    Ramírez with [facility]- [phone]

## 2019-05-01 ENCOUNTER — TELEPHONE (OUTPATIENT)
Dept: FAMILY MEDICINE | Facility: CLINIC | Age: 74
End: 2019-05-01

## 2019-05-01 DIAGNOSIS — I10 ESSENTIAL HYPERTENSION: ICD-10-CM

## 2019-05-01 RX ORDER — FUROSEMIDE 20 MG
TABLET ORAL
Qty: 60 TABLET | Refills: 0 | Status: SHIPPED | OUTPATIENT
Start: 2019-05-01 | End: 2019-05-24

## 2019-05-01 NOTE — TELEPHONE ENCOUNTER
Reason for Call:  Other prescription    Detailed comments: Patient called and said she went to [facility] today to get rx for furosemide (LASIX) 20 MG tablet and was told they did not have anything. rx was sent on 4/29.   Can the pharmacy be called for this so patient can  rx?     Phone Number Patient can be reached at: Home number on file [phone] (home)    Best Time: any    Can we leave a detailed message on this number? YES    Call taken on 5/1/2019 at 1:40 PM by Sammie Rojo

## 2019-05-06 DIAGNOSIS — I10 BENIGN ESSENTIAL HYPERTENSION: ICD-10-CM

## 2019-05-06 NOTE — TELEPHONE ENCOUNTER
"verapamil ER (VERELAN) 120 MG 24 hr capsule 90 capsule 3 2/16/2019 2/16/2020     Last Written Prescription Date:  2/16/19  Last Fill Quantity: 90,  # refills: 3   Last office visit: 1/24/2019 with prescribing provider:  Rossana   Future Office Visit:   Next 5 appointments (look out 90 days)    Jun 03, 2019 11:30 AM CDT  [facility] Visit with Ian Tracy MD  [facility] [address] [facility] [place] [facility] [address]  [phone]   Jul 10, 2019  9:00 AM CDT  PHYSICAL with Ian Tracy MD  [facility] ([facility] [address] [facility] [place] [facility] [address]  [phone]         Requested Prescriptions   Pending Prescriptions Disp Refills     verapamil ER (VERELAN) 120 MG 24 hr capsule [Pharmacy Med Name: VERAPAMIL  MG CAPSULE] 180 capsule 0     Sig: TAKE 2 CAPSULES (240 MG) BY MOUTH AT BEDTIME       Calcium Channel Blockers Protocol  Failed - 5/6/2019  1:55 PM        Failed - Normal serum creatinine on file in past 12 months     Recent Labs   Lab Test 12/26/18  0828   CR 1.09*             Passed - Blood pressure under 140/90 in past 12 months     BP Readings from Last 3 Encounters:   01/24/19 115/67   12/26/18 136/70   10/04/18 135/85                 Passed - Normal ALT in past 12 months     Recent Labs   Lab Test 07/09/18  0859   ALT 28             Passed - Recent (12 mo) or future (30 days) visit within the authorizing provider's specialty     Patient had office visit in the last 12 months or has a visit in the next 30 days with authorizing provider or within the authorizing provider's specialty.  See \"Patient Info\" tab in inbasket, or \"Choose Columns\" in Meds & Orders section of the refill encounter.              Passed - Medication is active on med list        Passed - Patient is age 18 or older        Passed - No active pregnancy on record        Passed - No positive pregnancy test in past 12 months        No flowsheet data found.    "

## 2019-05-07 ENCOUNTER — ANTICOAGULATION THERAPY VISIT (OUTPATIENT)
Dept: FAMILY MEDICINE | Facility: CLINIC | Age: 74
End: 2019-05-07
Payer: MEDICARE

## 2019-05-07 DIAGNOSIS — Z79.01 LONG TERM CURRENT USE OF ANTICOAGULANT THERAPY: ICD-10-CM

## 2019-05-07 LAB — INR PPP: 2.5

## 2019-05-07 PROCEDURE — 99207 ZZC NO CHARGE NURSE ONLY: CPT | Performed by: INTERNAL MEDICINE

## 2019-05-07 RX ORDER — VERAPAMIL HYDROCHLORIDE 120 MG/1
240 CAPSULE, EXTENDED RELEASE ORAL AT BEDTIME
Qty: 1 CAPSULE | Refills: 0 | OUTPATIENT
Start: 2019-05-07 | End: 2019-08-05

## 2019-05-07 NOTE — PROGRESS NOTES
ANTICOAGULATION FOLLOW-UP CLINIC VISIT    Patient Name:  Catalina Marie  Date:  2019  Contact Type:  Telephone    SUBJECTIVE:     Patient Findings     Comments:   The patient was assessed for diet, medication, and activity level changes, missed or extra doses, bruising or bleeding, with no problem findings.             OBJECTIVE    INR   Date Value Ref Range Status   2019 2.5  Final       ASSESSMENT / PLAN  INR assessment THER    Recheck INR In: 4 WEEKS    INR Location Outside lab      Anticoagulation Summary  As of 2019    INR goal:   2.0-3.0   TTR:   69.0 % (2.6 y)   INR used for dosin.5 (2019)   Warfarin maintenance plan:   2.5 mg (5 mg x 0.5) every Sun, Tue, Fri; 5 mg (5 mg x 1) all other days   Full warfarin instructions:   2.5 mg every Sun, Tue, Fri; 5 mg all other days   Weekly warfarin total:   27.5 mg   No change documented:   Alyssa Valdovinos RN   Plan last modified:   Maylin Altamirano RN (3/20/2019)   Next INR check:   6/3/2019   Priority:   INR   Target end date:       Indications    Long term current use of anticoagulant therapy [Z79.01]  Atrial fibrillation (H) [I48.91] [I48.91]             Anticoagulation Episode Summary     INR check location:       Preferred lab:       Send INR reminders to:    ANTICOAGULATION    Comments:   Expect FAXED INR results from [facility] in [place], [place]. Phone: [phone].  General Results#: [phone]  Call pt's cell in [place] with results/plan: [phone]      Anticoagulation Care Providers     Provider Role Specialty Phone number    Ian Tracy MD Referring Internal Medicine [phone]            See the Encounter Report to view Anticoagulation Flowsheet and Dosing Calendar (Go to Encounters tab in chart review, and find the Anticoagulation Therapy Visit)    Dosage adjustment made based on physician directed care plan.  INR fax from [facility] in [place]:  2.5.  Reached patient on mobile phone.  Continue same dosing and recheck in clinic  upon return to [place] 6/3/19.      Alyssa Valdovinos RN

## 2019-05-24 DIAGNOSIS — I10 ESSENTIAL HYPERTENSION: ICD-10-CM

## 2019-05-24 NOTE — TELEPHONE ENCOUNTER
"furosemide (LASIX) 20 MG tablet 60 tablet 0 5/1/2019  No   Sig: TAKE 1 TABLET(20 MG) BY MOUTH TWICE DAILY     Last Written Prescription Date:  05/01/2019  Last Fill Quantity: 60,  # refills: 0   Last office visit: 1/24/2019 with prescribing provider:    Future Office Visit:   Next 5 appointments (look out 90 days)    Jun 03, 2019 11:30 AM CDT  Office Visit with Ian Tracy MD  [facility]) [address] [facility] [address]  [phone]   Jul 10, 2019  9:00 AM CDT  PHYSICAL with Ian Tracy MD  [facility] [address] [address]  [phone]         Requested Prescriptions   Pending Prescriptions Disp Refills     furosemide (LASIX) 20 MG tablet [Pharmacy Med Name: FUROSEMIDE 20 MG TABLET] 60 tablet 0     Sig: TAKE 1 TABLET(20 MG) BY MOUTH TWICE DAILY       Diuretics (Including Combos) Protocol Failed - 5/24/2019 12:33 PM        Failed - Normal serum creatinine on file in past 12 months     Recent Labs   Lab Test 12/26/18  0828   CR 1.09*              Passed - Blood pressure under 140/90 in past 12 months     BP Readings from Last 3 Encounters:   01/24/19 115/67   12/26/18 136/70   10/04/18 135/85                 Passed - Recent (12 mo) or future (30 days) visit within the authorizing provider's specialty     Patient had office visit in the last 12 months or has a visit in the next 30 days with authorizing provider or within the authorizing provider's specialty.  See \"Patient Info\" tab in inbasket, or \"Choose Columns\" in Meds & Orders section of the refill encounter.              Passed - Medication is active on med list        Passed - Patient is age 18 or older        Passed - No active pregancy on record        Passed - Normal serum potassium on file in past 12 months     Recent Labs   Lab Test 12/26/18  0828   POTASSIUM 3.7                    Passed - Normal serum sodium on file in past 12 months     " Recent Labs   Lab Test 12/26/18  0828                 Passed - No positive pregnancy test in past 12 months

## 2019-05-28 NOTE — TELEPHONE ENCOUNTER
Routing refill request to provider for review/approval because:  Labs out of range:  Creat.  Please authorize if appropriate.  Thanks,  Eufemia Rodriguez RN

## 2019-05-29 RX ORDER — FUROSEMIDE 20 MG
TABLET ORAL
Qty: 60 TABLET | Refills: 11 | Status: SHIPPED | OUTPATIENT
Start: 2019-05-29 | End: 2019-10-03

## 2019-06-03 ENCOUNTER — OFFICE VISIT (OUTPATIENT)
Dept: FAMILY MEDICINE | Facility: CLINIC | Age: 74
End: 2019-06-03
Payer: MEDICARE

## 2019-06-03 ENCOUNTER — ANTICOAGULATION THERAPY VISIT (OUTPATIENT)
Dept: NURSING | Facility: CLINIC | Age: 74
End: 2019-06-03
Payer: MEDICARE

## 2019-06-03 VITALS
HEART RATE: 57 BPM | HEIGHT: 64 IN | TEMPERATURE: 98 F | WEIGHT: 141 LBS | DIASTOLIC BLOOD PRESSURE: 82 MMHG | BODY MASS INDEX: 24.07 KG/M2 | SYSTOLIC BLOOD PRESSURE: 145 MMHG | OXYGEN SATURATION: 100 %

## 2019-06-03 DIAGNOSIS — N18.30 CKD (CHRONIC KIDNEY DISEASE) STAGE 3, GFR 30-59 ML/MIN (H): ICD-10-CM

## 2019-06-03 DIAGNOSIS — Z17.0 MALIGNANT NEOPLASM OF BREAST IN FEMALE, ESTROGEN RECEPTOR POSITIVE, UNSPECIFIED LATERALITY, UNSPECIFIED SITE OF BREAST (H): ICD-10-CM

## 2019-06-03 DIAGNOSIS — E04.2 NONTOXIC MULTINODULAR GOITER: ICD-10-CM

## 2019-06-03 DIAGNOSIS — I50.31 ACUTE DIASTOLIC CONGESTIVE HEART FAILURE (H): ICD-10-CM

## 2019-06-03 DIAGNOSIS — E11.9 TYPE 2 DIABETES MELLITUS WITHOUT COMPLICATION, WITHOUT LONG-TERM CURRENT USE OF INSULIN (H): ICD-10-CM

## 2019-06-03 DIAGNOSIS — C50.919 MALIGNANT NEOPLASM OF BREAST IN FEMALE, ESTROGEN RECEPTOR POSITIVE, UNSPECIFIED LATERALITY, UNSPECIFIED SITE OF BREAST (H): ICD-10-CM

## 2019-06-03 DIAGNOSIS — Z79.01 LONG TERM CURRENT USE OF ANTICOAGULANT THERAPY: ICD-10-CM

## 2019-06-03 DIAGNOSIS — Z90.13 H/O BILATERAL MASTECTOMY: ICD-10-CM

## 2019-06-03 DIAGNOSIS — E78.5 HYPERLIPIDEMIA LDL GOAL <100: ICD-10-CM

## 2019-06-03 DIAGNOSIS — I10 BENIGN ESSENTIAL HYPERTENSION: ICD-10-CM

## 2019-06-03 DIAGNOSIS — F43.22 ADJUSTMENT DISORDER WITH ANXIOUS MOOD: ICD-10-CM

## 2019-06-03 DIAGNOSIS — I48.91 ATRIAL FIBRILLATION (H): ICD-10-CM

## 2019-06-03 DIAGNOSIS — I48.20 CHRONIC ATRIAL FIBRILLATION (H): Primary | ICD-10-CM

## 2019-06-03 LAB
ERYTHROCYTE [DISTWIDTH] IN BLOOD BY AUTOMATED COUNT: 13.4 % (ref 10–15)
HBA1C MFR BLD: 6.5 % (ref 0–5.6)
HCT VFR BLD AUTO: 44.8 % (ref 35–47)
HGB BLD-MCNC: 14.9 G/DL (ref 11.7–15.7)
INR POINT OF CARE: 2.1 (ref 0.86–1.14)
MCH RBC QN AUTO: 32.1 PG (ref 26.5–33)
MCHC RBC AUTO-ENTMCNC: 33.3 G/DL (ref 31.5–36.5)
MCV RBC AUTO: 97 FL (ref 78–100)
PLATELET # BLD AUTO: 249 10E9/L (ref 150–450)
RBC # BLD AUTO: 4.64 10E12/L (ref 3.8–5.2)
WBC # BLD AUTO: 8.8 10E9/L (ref 4–11)

## 2019-06-03 PROCEDURE — 36416 COLLJ CAPILLARY BLOOD SPEC: CPT

## 2019-06-03 PROCEDURE — 85027 COMPLETE CBC AUTOMATED: CPT | Performed by: INTERNAL MEDICINE

## 2019-06-03 PROCEDURE — 85610 PROTHROMBIN TIME: CPT | Mod: QW

## 2019-06-03 PROCEDURE — 80053 COMPREHEN METABOLIC PANEL: CPT | Performed by: INTERNAL MEDICINE

## 2019-06-03 PROCEDURE — 99214 OFFICE O/P EST MOD 30 MIN: CPT | Performed by: INTERNAL MEDICINE

## 2019-06-03 PROCEDURE — 99207 ZZC NO CHARGE NURSE ONLY: CPT

## 2019-06-03 PROCEDURE — 83036 HEMOGLOBIN GLYCOSYLATED A1C: CPT | Performed by: INTERNAL MEDICINE

## 2019-06-03 RX ORDER — VERAPAMIL HYDROCHLORIDE 240 MG/1
TABLET, FILM COATED, EXTENDED RELEASE ORAL DAILY
COMMUNITY
End: 2019-10-03

## 2019-06-03 ASSESSMENT — MIFFLIN-ST. JEOR: SCORE: 1119.57

## 2019-06-03 NOTE — LETTER
[place] [address] [facility]  [address]  [place]  [address]  Tel: [phone]    June 5, 2019    Catalina Marie  [address] [facility] [address]      JenniferDeepa are your lab reports from your recent office exam.     Comprehensive metabolic panel showed that your minerals and electrolytes kidney function and liver function tests are all essentially normal.   The hemoglobin A1c remains stable at 6.5, similar to 6.4 from 5 months ago.   The CBC showed that your white blood cell count was normal, no sign of serious infection and your hemoglobin was equally as good no sign of low blood or anemia.     I am glad to report that your initial lab studies were normal and were both waiting anxiously to see how the dietary experiment is going.  I will anticipate hearing back from you and I see back for your annual wellness exam in July.     If you have any further questions or problems, please contact our office.      Sincerely,    Ian Tracy MD / faizan    Resulted Orders   CBC with platelets   Result Value Ref Range    WBC 8.8 4.0 - 11.0 10e9/L    RBC Count 4.64 3.8 - 5.2 10e12/L    Hemoglobin 14.9 11.7 - 15.7 g/dL    Hematocrit 44.8 35.0 - 47.0 %    MCV 97 78 - 100 fl    MCH 32.1 26.5 - 33.0 pg    MCHC 33.3 31.5 - 36.5 g/dL    RDW 13.4 10.0 - 15.0 %    Platelet Count 249 150 - 450 10e9/L   Comprehensive metabolic panel   Result Value Ref Range    Sodium 139 133 - 144 mmol/L    Potassium 4.7 3.4 - 5.3 mmol/L    Chloride 107 94 - 109 mmol/L    Carbon Dioxide 29 20 - 32 mmol/L    Anion Gap 3 3 - 14 mmol/L    Glucose 103 (H) 70 - 99 mg/dL    Urea Nitrogen 18 7 - 30 mg/dL    Creatinine 1.13 (H) 0.52 - 1.04 mg/dL    GFR Estimate 47 (L) >60 mL/min/[1.73_m2]      Comment:      Non  GFR Calc  Starting 12/18/2018, serum creatinine based estimated GFR (eGFR) will be   calculated using the Chronic Kidney Disease Epidemiology Collaboration   (CKD-EPI) equation.      GFR Estimate If  Black 55 (L) >60 mL/min/[1.73_m2]      Comment:       GFR Calc  Starting 12/18/2018, serum creatinine based estimated GFR (eGFR) will be   calculated using the Chronic Kidney Disease Epidemiology Collaboration   (CKD-EPI) equation.      Calcium 9.1 8.5 - 10.1 mg/dL    Bilirubin Total 0.5 0.2 - 1.3 mg/dL    Albumin 4.3 3.4 - 5.0 g/dL    Protein Total 8.0 6.8 - 8.8 g/dL    Alkaline Phosphatase 93 40 - 150 U/L    ALT 37 0 - 50 U/L    AST 30 0 - 45 U/L   Hemoglobin A1c   Result Value Ref Range    Hemoglobin A1C 6.5 (H) 0 - 5.6 %      Comment:      Normal <5.7% Prediabetes 5.7-6.4%  Diabetes 6.5% or higher - adopted from ADA   consensus guidelines.

## 2019-06-03 NOTE — PROGRESS NOTES
"Subjective     Catalina Marie is a 73 year old female who presents to clinic today for the following health issues:    HPI   GI issues  Catalina complains of having digestive issues and difficulty with bowel movements.She first noticed a lot of discomfort and bloating at night around the belly.  She has had 5 episodes of continuous dirrhea during the night time, where she notes that she should have been drinking more water. She notes of problems with maldigestion and malabsorption. On a daily basis she notes that she uses the restroom 4-5 x daily because of her bowel movements, she reports that her movements vary from watery to soft to pellets to constipation.   She has tried citrocell and that helped for 2-3 months and then her symptoms resurfaced, she was taking the citrocell once every few days because otherwise she would become constipated.   Constantly using the bathroom 4-5   Watery to soft to pellets   First a lot of discofmrt and bloating at night around bell button   Tried citrocell and it worked well for 2-3 months, then her symptoms reoccured   Fried food induces it immediately  Trying to figure out what foods or pills are cusing these symptos  A lot headaches aobut medications this winter   Verapammil didfficulty getting at the pharmancy and expensive   Getting up and disorientated, wakes \"wobbly, like shes drunk\" She has has nece  Puts her had and up and   Its not like veritgo, momentary, catchers her off balance   Exercise: strength training and walkin  Starts up at night, dirrhea in the middle of the night,   Patient Active Problem List   Diagnosis     Long term current use of anticoagulant therapy     Atrial fibrillation (H) [I48.91]     Benign essential hypertension     Type 2 diabetes mellitus without complication, without long-term current use of insulin (H)     Adjustment disorder with anxious mood     Hyperlipidemia LDL goal <100     CKD (chronic kidney disease) stage 3, GFR 30-59 ml/min " (H)     Overdose, accidental or unintentional, initial encounter     Malignant neoplasm of breast in female, estrogen receptor positive, unspecified laterality, unspecified site of breast (H)     H/O bilateral mastectomy     Acute diastolic congestive heart failure (H)     Nontoxic multinodular goiter     Past Surgical History:   Procedure Laterality Date     BREAST SURGERY       COLONOSCOPY       EYE SURGERY       HEAD & NECK SURGERY      brain tumor resection     PHACOEMULSIFICATION CLEAR CORNEA WITH STANDARD INTRAOCULAR LENS IMPLANT Right 7/6/2016    Procedure: PHACOEMULSIFICATION CLEAR CORNEA WITH STANDARD INTRAOCULAR LENS IMPLANT;  Surgeon: Aiden Lee MD;  Location: [facility]       Social History     Tobacco Use     Smoking status: Former Smoker     Smokeless tobacco: Never Used   Substance Use Topics     Alcohol use: Yes     Alcohol/week: 0.0 oz     History reviewed. No pertinent family history.      Current Outpatient Medications   Medication Sig Dispense Refill     atenolol (TENORMIN) 25 MG tablet Take 1 tablet (25 mg) by mouth 2 times daily 180 tablet 3     atorvastatin (LIPITOR) 10 MG tablet Take 0.5 tablets (5 mg) by mouth daily 45 tablet 0     Biotin 3 MG TABS Take by mouth daily (unknown dose)       Calcium Carbonate-Vitamin D (CALCIUM + D PO) Take by mouth daily       carboxymethylcellulose (CELLUVISC/REFRESH LIQUIGEL) 1 % ophthalmic solution Place 1 drop into both eyes 4 times daily as needed for dry eyes       digoxin (DIGOX) 250 MCG tablet Take 0.5 tablets (125 mcg) by mouth daily 45 tablet 0     fenofibrate 160 MG tablet TAKE 1 TABLET BY MOUTH EVERY DAY 90 tablet 3     furosemide (LASIX) 20 MG tablet TAKE 1 TABLET(20 MG) BY MOUTH TWICE DAILY 60 tablet 11     ipratropium (ATROVENT) 0.06 % spray Spray 2 sprays into both nostrils 4 times daily as needed for rhinitis 3 Box 3     lisinopril (PRINIVIL/ZESTRIL) 40 MG tablet TAKE 1 TABLET BY MOUTH EVERY DAY 90 tablet 3     propafenone (RYTHMOL) 150 MG  TABS tablet Take 1 tablet three times daily at Morning, Dinner, Bedtime 270 tablet 3     sertraline (ZOLOFT) 100 MG tablet Take 1 tablet (100 mg) by mouth daily 90 tablet 2     verapamil ER (CALAN-SR) 240 MG CR tablet Take by mouth daily       warfarin (COUMADIN) 5 MG tablet TAKE 1/2 TO 1 TABLET BY MOUTH DAILY OR AS DIRECTED BY INR CLINIC 90 tablet 3     Allergies   Allergen Reactions     Penicillins      BP Readings from Last 3 Encounters:   06/03/19 145/82   01/24/19 115/67   12/26/18 136/70    Wt Readings from Last 3 Encounters:   06/03/19 64 kg (141 lb)   12/26/18 64 kg (141 lb)   10/04/18 62.6 kg (138 lb)                    Reviewed and updated as needed this visit by Provider         Review of Systems   ROS COMP: CONSTITUTIONAL: NEGATIVE for fever, chills, change in weight  INTEGUMENTARY/SKIN: NEGATIVE for worrisome rashes, moles or lesions  EYES: NEGATIVE for vision changes or irritation  ENT/MOUTH: NEGATIVE for ear, mouth and throat problems  RESP: NEGATIVE for significant cough or SOB  BREAST: NEGATIVE for masses, tenderness or discharge  CV: NEGATIVE for chest pain, palpitations or peripheral edema  GI: NEGATIVE for nausea, abdominal pain, heartburn, or change in bowel habits  : NEGATIVE for frequency, dysuria, or hematuria  MUSCULOSKELETAL: NEGATIVE for significant arthralgias or myalgia  NEURO: NEGATIVE for weakness, dizziness or paresthesias  ENDOCRINE: NEGATIVE for temperature intolerance, skin/hair changes  HEME: NEGATIVE for bleeding problems  PSYCHIATRIC: NEGATIVE for changes in mood or affect      Objective    There were no vitals taken for this visit.  There is no height or weight on file to calculate BMI.  Physical Exam   Neck was supple without adenopathy or thyromegaly her carotids were normal without bruits  Chest clear to auscultation and percussion  Cardiovascular irregular regula with well controlled ventricular response   Abdomen was flat,  bowel sounds were normal.  There is no palpable   Tenderness, mass or organomegaly  Extremities nontender without any edema  Pulses pedal pulses are as described otherwise his pulses are bilaterally symmetrical throughout without bruits  Skin without significant abnormality  Romberg was negative   Bloating at night with perambicical pain   Some episodes of constipation with   Imbalance assocated with some disorientation   headachse with her medications of verapamil switchin to the 120s to 2 a day  alternationg dirrhea and constipation couldn't realted to food iteams or other assocations   She was fearful that we would recommened stopping her cheese and bread   Appeared she was swaying right hip, limping on right  Appeared that her medial aspect of her right knee was angulated, she didn't believe she was limping so we asked her to ask her .   We are not sure if she has malabsorption, mal digestion, or irritable bowel   recommened stopping gluten foods to see if it would stop dirreha, dairy products next, reports back in 2 weeks.         Assessment & Plan     Catalina was seen today for gastrointestinal problem.    Diagnoses and all orders for this visit:    Chronic atrial fibrillation (H)  Well managed with rate control and Coumadin therapy  Type 2 diabetes mellitus without complication, without long-term current use of insulin (H)  Managing diet weight and activities well  Benign essential hypertension  Well-controlled on current therapy  Adjustment disorder with anxious mood    CKD (chronic kidney disease) stage 3, GFR 30-59 ml/min (H)  Renal function stable with current medications  Hyperlipidemia LDL goal <100  Well-controlled and has had no problems with her statin therapy  Malignant neoplasm of breast in female, estrogen receptor positive, unspecified laterality, unspecified site of breast (H)    H/O bilateral mastectomy    Acute diastolic congestive heart failure (H)  Well-controlled with current therapy  Nontoxic multinodular goiter  Continue with  current therapy and reevaluate with wellness exam    Advised to slowly get rid of glueten and dairy to see if changes occur     The information in this document, created by the medical scribe for me, accurately reflects the services I personally performed and the decisions made by me. I have reviewed and approved this document for accuracy prior to leaving the patient care area.  Haley 3, 2019 11:41 AM    Ian Tracy MD  [facility]

## 2019-06-03 NOTE — PROGRESS NOTES
ANTICOAGULATION FOLLOW-UP CLINIC VISIT    Patient Name:  Catalina Marie  Date:  6/3/2019  Contact Type:  Face to Face    SUBJECTIVE:  Patient Findings         Clinical Outcomes     Negatives:   Major bleeding event, Thromboembolic event, Anticoagulation-related hospital admission, Anticoagulation-related ED visit, Anticoagulation-related fatality           OBJECTIVE    INR Protime   Date Value Ref Range Status   2019 2.1 (A) 0.86 - 1.14 Final       ASSESSMENT / PLAN  INR assessment THER    Recheck INR In: 4 WEEKS    INR Location Clinic      Anticoagulation Summary  As of 6/3/2019    INR goal:   2.0-3.0   TTR:   69.9 % (2.7 y)   INR used for dosin.1 (6/3/2019)   Warfarin maintenance plan:   2.5 mg (5 mg x 0.5) every Sun, Tue, Fri; 5 mg (5 mg x 1) all other days   Full warfarin instructions:   2.5 mg every Sun, Tue, Fri; 5 mg all other days   Weekly warfarin total:   27.5 mg   No change documented:   Maylin Altamirano RN   Plan last modified:   Maylin Altamirano RN (3/20/2019)   Next INR check:   2019   Priority:   INR   Target end date:       Indications    Long term current use of anticoagulant therapy [Z79.01]  Atrial fibrillation (H) [I48.91] [I48.91]             Anticoagulation Episode Summary     INR check location:       Preferred lab:       Send INR reminders to:    ANTICOAGULATION    Comments:   Winter: FAXED INR results from [facility] in [place], [place]. Phone: [phone].  General Results#: [phone]  Call pt's cell in [place] with results/plan: [phone]      Anticoagulation Care Providers     Provider Role Specialty Phone number    Ian Tracy MD Referring Internal Medicine [phone]            See the Encounter Report to view Anticoagulation Flowsheet and Dosing Calendar (Go to Encounters tab in chart review, and find the Anticoagulation Therapy Visit)    No changes in meds/diet. No missed/extra warfarin doses. No unusual bruising/bleeding or other changes. Pt will continue same  warfarin dose and recheck INR in 4 week(s), or sooner if concerns.     Pt aware if signs of clotting (pain, tenderness, swelling, color change in leg or arm, SOB) and bleeding occur (blood in stool, urine, large bruising, bleeding gums, nosebleeds) to have INR check sooner. If sx severe report to ER or concerned for stroke call 911. If general questions or concerns arise, call clinic.    Maylin Altamirano RN

## 2019-06-04 LAB
ALBUMIN SERPL-MCNC: 4.3 G/DL (ref 3.4–5)
ALP SERPL-CCNC: 93 U/L (ref 40–150)
ALT SERPL W P-5'-P-CCNC: 37 U/L (ref 0–50)
ANION GAP SERPL CALCULATED.3IONS-SCNC: 3 MMOL/L (ref 3–14)
AST SERPL W P-5'-P-CCNC: 30 U/L (ref 0–45)
BILIRUB SERPL-MCNC: 0.5 MG/DL (ref 0.2–1.3)
BUN SERPL-MCNC: 18 MG/DL (ref 7–30)
CALCIUM SERPL-MCNC: 9.1 MG/DL (ref 8.5–10.1)
CHLORIDE SERPL-SCNC: 107 MMOL/L (ref 94–109)
CO2 SERPL-SCNC: 29 MMOL/L (ref 20–32)
CREAT SERPL-MCNC: 1.13 MG/DL (ref 0.52–1.04)
GFR SERPL CREATININE-BSD FRML MDRD: 47 ML/MIN/{1.73_M2}
GLUCOSE SERPL-MCNC: 103 MG/DL (ref 70–99)
POTASSIUM SERPL-SCNC: 4.7 MMOL/L (ref 3.4–5.3)
PROT SERPL-MCNC: 8 G/DL (ref 6.8–8.8)
SODIUM SERPL-SCNC: 139 MMOL/L (ref 133–144)

## 2019-06-06 DIAGNOSIS — I10 ESSENTIAL HYPERTENSION: ICD-10-CM

## 2019-06-06 RX ORDER — FUROSEMIDE 20 MG
TABLET ORAL
Refills: 0
Start: 2019-06-06

## 2019-06-06 NOTE — TELEPHONE ENCOUNTER
"Last Written Prescription Date:  5/29/19  Last Fill Quantity: 60 tablet,  # refills: 11   Last office visit: 6/3/2019 with prescribing provider:  Rossana   Future Office Visit:   Next 5 appointments (look out 90 days)    Jul 10, 2019  9:00 AM CDT  PHYSICAL with Ian Tracy MD  [facility] [address] [facility] [id number] [phone]         Requested Prescriptions   Pending Prescriptions Disp Refills     furosemide (LASIX) 20 MG tablet 60 tablet 11       Diuretics (Including Combos) Protocol Failed - 6/6/2019  8:22 AM        Failed - Blood pressure under 140/90 in past 12 months     BP Readings from Last 3 Encounters:   06/03/19 145/82   01/24/19 115/67   12/26/18 136/70                 Failed - Normal serum creatinine on file in past 12 months     Recent Labs   Lab Test 06/03/19  1130   CR 1.13*              Passed - Recent (12 mo) or future (30 days) visit within the authorizing provider's specialty     Patient had office visit in the last 12 months or has a visit in the next 30 days with authorizing provider or within the authorizing provider's specialty.  See \"Patient Info\" tab in inbasket, or \"Choose Columns\" in Meds & Orders section of the refill encounter.              Passed - Medication is active on med list        Passed - Patient is age 18 or older        Passed - No active pregancy on record        Passed - Normal serum potassium on file in past 12 months     Recent Labs   Lab Test 06/03/19  1130   POTASSIUM 4.7                    Passed - Normal serum sodium on file in past 12 months     Recent Labs   Lab Test 06/03/19  1130                 Passed - No positive pregnancy test in past 12 months          "

## 2019-06-06 NOTE — TELEPHONE ENCOUNTER
Previously sent to [facility] in [place], request coming from [facility] in [place]

## 2019-07-01 ENCOUNTER — TELEPHONE (OUTPATIENT)
Dept: FAMILY MEDICINE | Facility: CLINIC | Age: 74
End: 2019-07-01

## 2019-07-01 ENCOUNTER — ANTICOAGULATION THERAPY VISIT (OUTPATIENT)
Dept: NURSING | Facility: CLINIC | Age: 74
End: 2019-07-01
Payer: MEDICARE

## 2019-07-01 LAB — INR POINT OF CARE: 3.7 (ref 0.86–1.14)

## 2019-07-01 PROCEDURE — 99207 ZZC NO CHARGE NURSE ONLY: CPT

## 2019-07-01 PROCEDURE — 85610 PROTHROMBIN TIME: CPT | Mod: QW

## 2019-07-01 PROCEDURE — 36416 COLLJ CAPILLARY BLOOD SPEC: CPT

## 2019-07-01 NOTE — PROGRESS NOTES
ANTICOAGULATION FOLLOW-UP CLINIC VISIT    Patient Name:  Catalina Marie  Date:  7/1/2019  Contact Type:  Face to Face    SUBJECTIVE:  Patient Findings     Positives:   Change in health    Comments:   Pt has an URI with cough and congestion. Pt denies fever or shortness of breath        Clinical Outcomes     Comments:   Pt has an URI with cough and congestion. Pt denies fever or shortness of breath           OBJECTIVE    INR Protime   Date Value Ref Range Status   07/01/2019 3.7 (A) 0.86 - 1.14 Final       ASSESSMENT / PLAN  INR assessment SUPRA    Recheck INR In: 2 WEEKS    INR Location Clinic      Anticoagulation Summary  As of 7/1/2019    INR goal:   2.0-3.0   TTR:   69.5 % (2.8 y)   INR used for dosing:   3.7! (7/1/2019)   Warfarin maintenance plan:   2.5 mg (5 mg x 0.5) every Sun, Tue, Fri; 5 mg (5 mg x 1) all other days   Full warfarin instructions:   7/1: 2.5 mg; 7/13: 2.5 mg; Otherwise 2.5 mg every Sun, Tue, Fri; 5 mg all other days   Weekly warfarin total:   27.5 mg   Plan last modified:   Maylin Altamirano, RN (3/20/2019)   Next INR check:   7/15/2019   Priority:   INR   Target end date:       Indications    Long term current use of anticoagulant therapy [Z79.01]  Atrial fibrillation (H) [I48.91] [I48.91]             Anticoagulation Episode Summary     INR check location:       Preferred lab:       Send INR reminders to:   DEMOND FRANCO    Comments:   Winter: FAXED INR results from [facility] [employer] in [place], [place]. Phone: [phone].  General Results#: [phone]  Call pt's cell in [place] with results/plan: [phone]      Anticoagulation Care Providers     Provider Role Specialty Phone number    Ian Tracy MD Referring Internal Medicine [phone]            See the Encounter Report to view Anticoagulation Flowsheet and Dosing Calendar (Go to Encounters tab in chart review, and find the Anticoagulation Therapy Visit)    Pt INR is 3.7 today. Pt presented today with an URI. Pt denies shortness of breath or  fever. Pt advised to monitor and if sxs worsen she should call the clinic or go to an Urgent Care. Pt advised to take 2.5 mg today 7/1/19 and next week on 7/13/19 otherwise 2.5 mg on Sundays, Tuesdays, Fridays and 5 mg all the other days. Recheck in 2 weeks. Jennifer aware if signs of clotting (pain, tenderness, swelling, color change in leg or arm, SOB) and bleeding occur (blood in stool, urine, large bruising, bleeding gums, nosebleeds) to have INR check sooner. If sx severe report to ER or concerned for stroke call 911. If general questions or concerns arise, call clinic.         Nayeli Davison RN

## 2019-07-01 NOTE — TELEPHONE ENCOUNTER
Patient calling back. Spoke to patient.    INR   Date Value Ref Range Status   05/07/2019 2.5  Final     INR Protime   Date Value Ref Range Status   06/03/2019 2.1 (A) 0.86 - 1.14 Final      She states she has had symptoms of a head cold over the weekend and wondering if her INR will be affected.    States her appetite is not affected and she has no other symptoms of concern.    Advised that INR can be affected by illness, no way to predict this without checking. As last 2 INRs were fine advised that she can postpone the appointment if she doesn't feel up to coming in to clinic, but otherwise could come in today to assess if warfarin dose needs adjusting.    Patient verbalized understanding and had no other concerns or questions, states she will keep the appointment.

## 2019-07-01 NOTE — TELEPHONE ENCOUNTER
Reason for Call:  Other     Detailed comments: pt has a cold and wonders if she should come in for her INR    Phone Number Patient can be reached at: Cell number on file:    Telephone Information:   Mobile [phone]       Best Time: any    Can we leave a detailed message on this number? YES    Call taken on 7/1/2019 at 8:23 AM by Jenn Garcia

## 2019-07-05 DIAGNOSIS — I10 BENIGN ESSENTIAL HYPERTENSION: ICD-10-CM

## 2019-07-05 NOTE — TELEPHONE ENCOUNTER
"  Pending Prescriptions:                       Disp   Refills    digoxin (DIGOX) 250 MCG tablet            45 tab*0            Sig: Take 0.5 tablets (125 mcg) by mouth daily      Last Written Prescription Date:  04/01/2019  Last Fill Quantity: 45,  # refills: 0   Last office visit: 6/3/2019 with prescribing provider:     Future Office Visit:   Next 5 appointments (look out 90 days)    Jul 15, 2019  2:30 PM CDT  PHYSICAL with Ian Tracy MD  [facility] ([facility]) [address] [facility] [address]  [phone]         Requested Prescriptions   Pending Prescriptions Disp Refills     digoxin (DIGOX) 250 MCG tablet 45 tablet 0     Sig: Take 0.5 tablets (125 mcg) by mouth daily       Cardiac Glycoside Agents Protocol Failed - 7/5/2019  4:25 PM        Failed - Normal digoxin level on file in past 12 mos     Recent Labs   Lab Test 07/09/18  0859   DIGOXIN 0.1*             Failed - Normal serum creatinine on file in past 12 mos     Recent Labs   Lab Test 06/03/19  1130   CR 1.13*             Passed - Medication is active on med list        Passed - Patient is 18 years of age or older        Passed - Patient is not pregnant        Passed - No positive pregnancy test on file in past 12 mos        Passed - Recent (6 mo) or future (30 days) visit within the authorizing provider's specialty     Patient had office visit in the last 6 months or has a visit in the next 30 days with authorizing provider or within the authorizing provider's specialty.  See \"Patient Info\" tab in inbasket, or \"Choose Columns\" in Meds & Orders section of the refill encounter.              "

## 2019-07-05 NOTE — TELEPHONE ENCOUNTER
Routing refill request to provider for review/approval because:  Labs out of range:  See below.     Please review and complete RX request.     PATIENT RUNNING LOW ON THIS MED.     Elodia MENG RN,BSN

## 2019-07-05 NOTE — TELEPHONE ENCOUNTER
Reason for Call:  Medication or medication refill:    Do you use a [facility]?  Name of the pharmacy and phone number for the current request:     [facility] [address] - freestanding, not in [employer]  Ph: [phone]    Name of the medication requested: digoxin (DIGOX) 250 MCG tablet    Other request: pt calling requesting refill, has contacted pharmacy multiplte times, pharmacy states no response from PCP.  Pt has 5 1/2 tabs left for 5 days, needs refill    Can we leave a detailed message on this number? YES    Phone number patient can be reached at: Cell number on file:    Telephone Information:   Mobile [phone]       Best Time: any    Call taken on 7/5/2019 at 4:16 PM by Valentina Benson

## 2019-07-08 RX ORDER — DIGOXIN 250 MCG
125 TABLET ORAL DAILY
Qty: 45 TABLET | Refills: 0 | Status: SHIPPED | OUTPATIENT
Start: 2019-07-08 | End: 2019-09-27

## 2019-07-08 NOTE — TELEPHONE ENCOUNTER
Pt called and stated that [facility] [place] [facility]  Please refill

## 2019-07-15 ENCOUNTER — OFFICE VISIT (OUTPATIENT)
Dept: FAMILY MEDICINE | Facility: CLINIC | Age: 74
End: 2019-07-15
Payer: MEDICARE

## 2019-07-15 ENCOUNTER — ANTICOAGULATION THERAPY VISIT (OUTPATIENT)
Dept: NURSING | Facility: CLINIC | Age: 74
End: 2019-07-15
Payer: MEDICARE

## 2019-07-15 VITALS
SYSTOLIC BLOOD PRESSURE: 157 MMHG | OXYGEN SATURATION: 98 % | HEIGHT: 64 IN | TEMPERATURE: 97.2 F | BODY MASS INDEX: 23.9 KG/M2 | DIASTOLIC BLOOD PRESSURE: 92 MMHG | WEIGHT: 140 LBS | HEART RATE: 93 BPM

## 2019-07-15 DIAGNOSIS — E11.9 TYPE 2 DIABETES MELLITUS WITHOUT COMPLICATION, WITHOUT LONG-TERM CURRENT USE OF INSULIN (H): ICD-10-CM

## 2019-07-15 DIAGNOSIS — Z00.00 ROUTINE GENERAL MEDICAL EXAMINATION AT A HEALTH CARE FACILITY: Primary | ICD-10-CM

## 2019-07-15 DIAGNOSIS — E04.2 NONTOXIC MULTINODULAR GOITER: ICD-10-CM

## 2019-07-15 DIAGNOSIS — L82.0 INFLAMED SEBORRHEIC KERATOSIS: ICD-10-CM

## 2019-07-15 DIAGNOSIS — I10 ESSENTIAL HYPERTENSION: ICD-10-CM

## 2019-07-15 DIAGNOSIS — L20.89 OTHER ATOPIC DERMATITIS: ICD-10-CM

## 2019-07-15 DIAGNOSIS — I50.31 ACUTE DIASTOLIC CONGESTIVE HEART FAILURE (H): ICD-10-CM

## 2019-07-15 DIAGNOSIS — Z12.11 SPECIAL SCREENING FOR MALIGNANT NEOPLASMS, COLON: ICD-10-CM

## 2019-07-15 DIAGNOSIS — N18.30 CKD (CHRONIC KIDNEY DISEASE) STAGE 3, GFR 30-59 ML/MIN (H): ICD-10-CM

## 2019-07-15 DIAGNOSIS — I48.20 CHRONIC ATRIAL FIBRILLATION (H): ICD-10-CM

## 2019-07-15 DIAGNOSIS — E78.5 HYPERLIPIDEMIA LDL GOAL <100: ICD-10-CM

## 2019-07-15 DIAGNOSIS — Z90.13 H/O BILATERAL MASTECTOMY: ICD-10-CM

## 2019-07-15 DIAGNOSIS — C50.919 MALIGNANT NEOPLASM OF BREAST IN FEMALE, ESTROGEN RECEPTOR POSITIVE, UNSPECIFIED LATERALITY, UNSPECIFIED SITE OF BREAST (H): ICD-10-CM

## 2019-07-15 DIAGNOSIS — I10 BENIGN ESSENTIAL HYPERTENSION: ICD-10-CM

## 2019-07-15 DIAGNOSIS — Z17.0 MALIGNANT NEOPLASM OF BREAST IN FEMALE, ESTROGEN RECEPTOR POSITIVE, UNSPECIFIED LATERALITY, UNSPECIFIED SITE OF BREAST (H): ICD-10-CM

## 2019-07-15 DIAGNOSIS — E55.9 VITAMIN D DEFICIENCY: ICD-10-CM

## 2019-07-15 DIAGNOSIS — F43.22 ADJUSTMENT DISORDER WITH ANXIOUS MOOD: ICD-10-CM

## 2019-07-15 LAB
ALBUMIN UR-MCNC: NEGATIVE MG/DL
APPEARANCE UR: CLEAR
BILIRUB UR QL STRIP: NEGATIVE
COLOR UR AUTO: YELLOW
ERYTHROCYTE [DISTWIDTH] IN BLOOD BY AUTOMATED COUNT: 13.4 % (ref 10–15)
GLUCOSE UR STRIP-MCNC: NEGATIVE MG/DL
HCT VFR BLD AUTO: 45.2 % (ref 35–47)
HGB BLD-MCNC: 15 G/DL (ref 11.7–15.7)
HGB UR QL STRIP: NEGATIVE
INR POINT OF CARE: 2.1 (ref 0.86–1.14)
KETONES UR STRIP-MCNC: NEGATIVE MG/DL
LEUKOCYTE ESTERASE UR QL STRIP: ABNORMAL
MCH RBC QN AUTO: 31.4 PG (ref 26.5–33)
MCHC RBC AUTO-ENTMCNC: 33.2 G/DL (ref 31.5–36.5)
MCV RBC AUTO: 95 FL (ref 78–100)
NITRATE UR QL: NEGATIVE
NT-PROBNP SERPL-MCNC: 1093 PG/ML (ref 0–450)
PH UR STRIP: 5.5 PH (ref 5–7)
PLATELET # BLD AUTO: 238 10E9/L (ref 150–450)
RBC # BLD AUTO: 4.77 10E12/L (ref 3.8–5.2)
RBC #/AREA URNS AUTO: NORMAL /HPF
SOURCE: ABNORMAL
SP GR UR STRIP: 1.02 (ref 1–1.03)
UROBILINOGEN UR STRIP-ACNC: 0.2 EU/DL (ref 0.2–1)
WBC # BLD AUTO: 8.4 10E9/L (ref 4–11)
WBC #/AREA URNS AUTO: NORMAL /HPF

## 2019-07-15 PROCEDURE — 83880 ASSAY OF NATRIURETIC PEPTIDE: CPT | Performed by: INTERNAL MEDICINE

## 2019-07-15 PROCEDURE — 82274 ASSAY TEST FOR BLOOD FECAL: CPT | Performed by: INTERNAL MEDICINE

## 2019-07-15 PROCEDURE — 99213 OFFICE O/P EST LOW 20 MIN: CPT | Mod: 25 | Performed by: INTERNAL MEDICINE

## 2019-07-15 PROCEDURE — 85610 PROTHROMBIN TIME: CPT | Mod: QW

## 2019-07-15 PROCEDURE — 84443 ASSAY THYROID STIM HORMONE: CPT | Performed by: INTERNAL MEDICINE

## 2019-07-15 PROCEDURE — 82043 UR ALBUMIN QUANTITATIVE: CPT | Performed by: INTERNAL MEDICINE

## 2019-07-15 PROCEDURE — 80061 LIPID PANEL: CPT | Performed by: INTERNAL MEDICINE

## 2019-07-15 PROCEDURE — 85027 COMPLETE CBC AUTOMATED: CPT | Performed by: INTERNAL MEDICINE

## 2019-07-15 PROCEDURE — 80053 COMPREHEN METABOLIC PANEL: CPT | Performed by: INTERNAL MEDICINE

## 2019-07-15 PROCEDURE — G0439 PPPS, SUBSEQ VISIT: HCPCS | Performed by: INTERNAL MEDICINE

## 2019-07-15 PROCEDURE — 82306 VITAMIN D 25 HYDROXY: CPT | Performed by: INTERNAL MEDICINE

## 2019-07-15 PROCEDURE — 99207 ZZC NO CHARGE NURSE ONLY: CPT

## 2019-07-15 PROCEDURE — 36416 COLLJ CAPILLARY BLOOD SPEC: CPT

## 2019-07-15 PROCEDURE — 99207 C FOOT EXAM  NO CHARGE: CPT | Performed by: INTERNAL MEDICINE

## 2019-07-15 PROCEDURE — 81001 URINALYSIS AUTO W/SCOPE: CPT | Performed by: INTERNAL MEDICINE

## 2019-07-15 ASSESSMENT — ACTIVITIES OF DAILY LIVING (ADL): CURRENT_FUNCTION: NO ASSISTANCE NEEDED

## 2019-07-15 ASSESSMENT — MIFFLIN-ST. JEOR: SCORE: 1115.04

## 2019-07-15 NOTE — PROGRESS NOTES
ANTICOAGULATION FOLLOW-UP CLINIC VISIT    Patient Name:  Catalina Marie  Date:  7/15/2019  Contact Type:  Face to Face    SUBJECTIVE:  Patient Findings     Comments:   Pt states that before 19 [facility] appointment she had been taking 2.5 mg on Tuesdays, Fridays and 5 mg all the other days for months. Pt thought that her maintenance dose was changed on 19 to 2.5 mg on Sundays, ,  and 5 mg all the other days.Maintenance dose was changed on 3/20/19 but pt was not following that.        Clinical Outcomes     Comments:   Pt states that before 19 [facility] appointment she had been taking 2.5 mg on Tuesdays, Fridays and 5 mg all the other days for months. Pt thought that her maintenance dose was changed on 19 to 2.5 mg on Sundays, ,  and 5 mg all the other days.Maintenance dose was changed on 3/20/19 but pt was not following that.           OBJECTIVE    INR Protime   Date Value Ref Range Status   07/15/2019 2.1 (A) 0.86 - 1.14 Final       ASSESSMENT / PLAN  INR assessment THER    Recheck INR In: 1 WEEK    INR Location Clinic      Anticoagulation Summary  As of 7/15/2019    INR goal:   2.0-3.0   TTR:   69.3 % (2.8 y)   INR used for dosin.1 (7/15/2019)   Warfarin maintenance plan:   2.5 mg (5 mg x 0.5) every Sun, Tue, Fri; 5 mg (5 mg x 1) all other days   Full warfarin instructions:   2.5 mg every Sun, Tue, Fri; 5 mg all other days   Weekly warfarin total:   27.5 mg   No change documented:   Nayeli Davison RN   Plan last modified:   Maylin Altamirano RN (3/20/2019)   Next INR check:      Priority:   INR   Target end date:       Indications    Long term current use of anticoagulant therapy [Z79.01]  Atrial fibrillation (H) [I48.91] [I48.91]             Anticoagulation Episode Summary     INR check location:       Preferred lab:       Send INR reminders to:   DEMOND FRANCO    Comments:   Winter: FAXED INR results from [facility] in [place], [place]. Phone: [phone].  General Results#:  [phone]  Call pt's cell in [place] with results/plan: [phone]      Anticoagulation Care Providers     Provider Role Specialty Phone number    Ian Tracy MD Referring Internal Medicine [phone]            See the Encounter Report to view Anticoagulation Flowsheet and Dosing Calendar (Go to Encounters tab in chart review, and find the Anticoagulation Therapy Visit)    Pt INR is 2.1 today. Pt states that she has been taking 2.5 mg on Tuesdays, Fridays and 5 mg all the other days for months. According to Epic, pt was advised to take 2.5 mg on Sundays, Tuesdays, Thursdays and 5 mg all the other days on March 20, 2019. Pt advised to take 2.5 mg on Sundays, Tuesdays, Thursdays and 5 mg all the other days. Pt would like to recheck in 1 week because she is concerned that 2.1 is too low even though it is in range. Pt advised that we will continue to monitor and if the maintenance dose needs to be changed back to 2.5 mg on Tuesdays and Fridays and 5 mg all the other days, we will do that. Jennifer aware if signs of clotting (pain, tenderness, swelling, color change in leg or arm, SOB) and bleeding occur (blood in stool, urine, large bruising, bleeding gums, nosebleeds) to have INR check sooner. If sx severe report to ER or concerned for stroke call 911. If general questions or concerns arise, call clinic.         Nayeli Davison RN

## 2019-07-15 NOTE — PROGRESS NOTES
"SUBJECTIVE:   Catalina Marie is a 73 year old female who presents for Preventive Visit.    Are you in the first 12 months of your Medicare coverage?  No    Healthy Habits:     In general, how would you rate your overall health?  Good    Frequency of exercise:  6-7 days/week    Duration of exercise:  30-45 minutes (walking and weights)    Do you usually eat at least 4 servings of fruit and vegetables a day, include whole grains    & fiber and avoid regularly eating high fat or \"junk\" foods?  Yes    Taking medications regularly:  Yes    Barriers to taking medications:  None    Medication side effects:  None    Ability to successfully perform activities of daily living:  No assistance needed    Home Safety:  No safety concerns identified    Hearing Impairment:  Need to ask people to speak up or repeat themselves, difficulty following a conversation in a noisy restaurant or crowded room and difficult to understand a speaker at a public meeting or [employer] service    In the past 6 months, have you been bothered by leaking of urine?  No    In general, how would you rate your overall mental or emotional health?  Good      PHQ-2 Total Score: 0    Additional concerns today:  No    Do you feel safe in your environment? YES    Do you have a Health Care Directive? Yes: Patient states has Advance Directive and will bring in a copy to clinic.    Fall risk  Fallen 2 or more times in the past year?: No  Any fall with injury in the past year?: No    Cognitive Screening   Patient refused  Mini-CogTM Copyright LEFTY Hartman. Licensed by the author for use in [facility]; reprinted with permission ([email or web address]). All rights reserved.      Do you have sleep apnea, excessive snoring or daytime drowsiness?: no     Patient present today for her annual physical. She has multiple concerns, including a list of 14 separate topics    Patient is worried about a dark spot on her her right foot. Notes that she wears rubber sandals " often.  Symptoms first started with what appeared to be fluid filled blisters. The area is dry. Her left foot is fine and unaffected.    Over the past 2 weeks she has been taking warfarin 2.5 mg Sundays, Tuesday's, and Fridays and 5 mg the other days. Prior to that she was only taking 2.5 mg Tuesdays and Fridays. Her INR went from 2.1 to 3.7 on 7/1. Today her INR was 2.1 and she has another appointment next week, no dosage changes were made today.     Patient presents with a home log of BP and HR readings. BP has ranged 114-136/77 and averaged 117/77. Her HR has ranged 55-99 bpm. Notes that she is asymptomatic with her a-fib.     She is using Atrovent once daily which does not always help with the rhinitis. She tries not to use it too often because it causes excessive dryness and hoarseness of the voice.     While in [place] her dentist recommended she follow up with a periodontist due to her gum disease. She did this, but she was not able to completely follow through with treatment recommendations due to timing (now back in [place]). She inquires if she needs to hold warfarin for any dental procedures.     Jennifer reports having decreased hearing and has an appointment for a hearing check scheduled next month at [facility]. Her  is going to get hearing aids tomorrow at [facility].     She wonders why she has varicose veins mostly on her left leg compared to the right leg. Notes that she was having bouts and flare ups of dry scaly skin, which cleared up with moisturizing skin.     Patient has a hx of SK's but shares that she has a tendency to rub and pick them off. She will put a band-aid on them but will be unable to help herself once she takes the band-aid off.     She has been working on reducing her gluten consumption which has helped with her IBS symptoms.    Patient's last colonoscopy was on 5/6/2014. Inquires if she needs a repeat colonoscopy.     Jennifer denies any headaches, vision changes, back or neck pain,  dyspnea, chest pain, palpitations, bowel changes, hematochezia, urinary issues, nocturia, or musculoskeletal issues.    For regular exercise she walks 5x a week.     Reviewed and updated as needed this visit by clinical staff  Tobacco  Allergies  Meds         Reviewed and updated as needed this visit by Provider        Social History     Tobacco Use     Smoking status: Former Smoker     Smokeless tobacco: Never Used   Substance Use Topics     Alcohol use: Yes     Alcohol/week: 0.0 oz     If you drink alcohol do you typically have >3 drinks per day or >7 drinks per week? No    Alcohol Use 7/15/2019   Prescreen: >3 drinks/day or >7 drinks/week? No     Current providers sharing in care for this patient include:   Patient Care Team:  Ian Tracy MD as PCP - General (Internal Medicine)  Ian Tracy MD as Assigned PCP    The following health maintenance items are reviewed in Epic and correct as of today:  Health Maintenance   Topic Date Due     HF ACTION PLAN  1945     ADVANCE CARE PLANNING  1945     EYE EXAM  1945     ZOSTER IMMUNIZATION (2 of 3) 05/19/2013     MICROALBUMIN  07/09/2019     DIGOXIN  07/09/2019     DIABETIC FOOT EXAM  07/09/2019     MEDICARE ANNUAL WELLNESS VISIT  07/09/2019     INFLUENZA VACCINE (1) 09/01/2019     OP ANNUAL INR REFERRAL  09/27/2019     A1C  12/03/2019     BMP  12/03/2019     LIPID  12/26/2019     ALT  06/03/2020     FALL RISK ASSESSMENT  06/03/2020     CBC  06/03/2020     TSH W/FREE T4 REFLEX  12/26/2020     COLONOSCOPY  05/06/2024     DTAP/TDAP/TD IMMUNIZATION (3 - Td) 10/04/2028     DEXA  Completed     PHQ-2  Completed     IPV IMMUNIZATION  Aged Out     MENINGITIS IMMUNIZATION  Aged Out     Patient Active Problem List   Diagnosis     Long term current use of anticoagulant therapy     Atrial fibrillation (H) [I48.91]     Benign essential hypertension     Type 2 diabetes mellitus without complication, without long-term current use of insulin (H)      Adjustment disorder with anxious mood     Hyperlipidemia LDL goal <100     CKD (chronic kidney disease) stage 3, GFR 30-59 ml/min (H)     Overdose, accidental or unintentional, initial encounter     Malignant neoplasm of breast in female, estrogen receptor positive, unspecified laterality, unspecified site of breast (H)     H/O bilateral mastectomy     Acute diastolic congestive heart failure (H)     Nontoxic multinodular goiter     Past Surgical History:   Procedure Laterality Date     BREAST SURGERY       COLONOSCOPY       EYE SURGERY       HEAD & NECK SURGERY      brain tumor resection     PHACOEMULSIFICATION CLEAR CORNEA WITH STANDARD INTRAOCULAR LENS IMPLANT Right 7/6/2016    Procedure: PHACOEMULSIFICATION CLEAR CORNEA WITH STANDARD INTRAOCULAR LENS IMPLANT;  Surgeon: Aiden Lee MD;  Location: [facility]       Social History     Tobacco Use     Smoking status: Former Smoker     Smokeless tobacco: Never Used   Substance Use Topics     Alcohol use: Yes     Alcohol/week: 0.0 oz     No family history on file.      Current Outpatient Medications   Medication Sig Dispense Refill     atenolol (TENORMIN) 25 MG tablet Take 1 tablet (25 mg) by mouth 2 times daily 180 tablet 3     atorvastatin (LIPITOR) 10 MG tablet Take 0.5 tablets (5 mg) by mouth daily 45 tablet 0     Biotin 3 MG TABS Take by mouth daily (unknown dose)       Calcium Carbonate-Vitamin D (CALCIUM + D PO) Take by mouth daily       carboxymethylcellulose (CELLUVISC/REFRESH LIQUIGEL) 1 % ophthalmic solution Place 1 drop into both eyes 4 times daily as needed for dry eyes       digoxin (DIGOX) 250 MCG tablet Take 0.5 tablets (125 mcg) by mouth daily 45 tablet 0     fenofibrate 160 MG tablet TAKE 1 TABLET BY MOUTH EVERY DAY 90 tablet 3     furosemide (LASIX) 20 MG tablet TAKE 1 TABLET(20 MG) BY MOUTH TWICE DAILY 60 tablet 11     ipratropium (ATROVENT) 0.06 % spray Spray 2 sprays into both nostrils 4 times daily as needed for rhinitis 3 Box 3     lisinopril  (PRINIVIL/ZESTRIL) 40 MG tablet TAKE 1 TABLET BY MOUTH EVERY DAY 90 tablet 3     propafenone (RYTHMOL) 150 MG TABS tablet Take 1 tablet three times daily at Morning, Dinner, Bedtime 270 tablet 3     sertraline (ZOLOFT) 100 MG tablet Take 1 tablet (100 mg) by mouth daily 90 tablet 2     verapamil ER (CALAN-SR) 240 MG CR tablet Take by mouth daily       warfarin (COUMADIN) 5 MG tablet TAKE 1/2 TO 1 TABLET BY MOUTH DAILY OR AS DIRECTED BY INR CLINIC 90 tablet 3     Allergies   Allergen Reactions     Penicillins        Review of Systems  CONSTITUTIONAL: NEGATIVE for fever, chills, change in weight  INTEGUMENTARY/SKIN: POSITIVE for varicose veins,  spot on right foot, and hx of SK's. NEGATIVE for worrisome rashes, moles or lesions  EYES: NEGATIVE for vision changes or irritation  ENT/MOUTH: POSITIVE for hearing loss and chronic rhinitis NEGATIVE for mouth and throat problems  RESP: NEGATIVE for significant cough or SOB  BREAST: NEGATIVE for masses, tenderness or discharge  CV: NEGATIVE for chest pain, palpitations or peripheral edema  GI: NEGATIVE for nausea, abdominal pain, heartburn, or change in bowel habits  : NEGATIVE for frequency, dysuria, or hematuria  MUSCULOSKELETAL: NEGATIVE for significant arthralgias or myalgia  NEURO: NEGATIVE for weakness, dizziness or paresthesias  ENDOCRINE: POSITIVE for hair loss NEGATIVE for temperature intolerance, skin/hair changes  HEME: NEGATIVE for bleeding problems  PSYCHIATRIC: NEGATIVE for changes in mood or affect    This document serves as a record of the services and decisions personally performed and made by Ian Tracy MD. It was created on his behalf by Patty Hernández, a trained medical scribe. The creation of this document is based on the provider's statements to the medical scribe.  Patty Hernández July 15, 2019 2:50 PM    OBJECTIVE:   BP (!) 157/92 (BP Location: Right arm, Patient Position: Sitting, Cuff Size: Adult Regular)   Pulse 93   Temp 97.2  F (36.2  C)  "(Tympanic)   Ht 1.626 m (5' 4\")   Wt 63.5 kg (140 lb)   SpO2 98%   Breastfeeding? No   BMI 24.03 kg/m   Estimated body mass index is 24.03 kg/m  as calculated from the following:    Height as of this encounter: 1.626 m (5' 4\").    Weight as of this encounter: 63.5 kg (140 lb).     Recheck BP: 154/90    Physical Exam  GENERAL APPEARANCE: healthy, alert and no distress  EYES: Eyes grossly normal to inspection, PERRL and conjunctivae and sclerae normal  HENT: ear canals and TM's normal, nose and mouth without ulcers or lesions, oropharynx clear and oral mucous membranes moist. At least 4 teeth with excessive receeding gums which will require peridontal surgery.   NECK: no adenopathy, no asymmetry, masses, or scars and thyroid is asymmetric with right lobe larger than the left without significant change from a year ago.    RESP: lungs clear to auscultation - no rales, rhonchi or wheezes  BREAST: implants are in place. There are no palpable masses. Her incision is unchanged and the skin is otherwise normal except for the innumerable SK's.  no palpable axillary masses or adenopathy  CV: irregularly irregular rhythm, normal S1 S2, no S3 or S4, no murmur, click or rub, no peripheral edema and peripheral pulses strong  ABDOMEN: soft, nontender, no hepatosplenomegaly, no masses and bowel sounds normal  MS: no musculoskeletal defects are noted and gait is age appropriate without ataxia. Superficial varicosities with spider veins, left greater than right.   SKIN: no suspicious lesions or rashes. Multiple SK's without abnormalities noted. Right lateral foot she has a contact dermatitis from her sandals with lichenification.   NEURO: Normal strength and tone, sensory exam grossly normal, mentation intact and speech normal  PSYCH: mentation appears normal and affect normal/bright  Her feet were clean and dry. Filament was negative.     Diagnostic Test Results:  Labs reviewed in Epic  Results for orders placed or performed in " visit on 07/15/19 (from the past 24 hour(s))   INR point of care   Result Value Ref Range    INR Protime 2.1 (A) 0.86 - 1.14       ASSESSMENT / PLAN:   Routine general medical examination at a health care facility    - UA reflex to Microscopic and Culture  - CBC with platelets  - Comprehensive metabolic panel  - Lipid panel reflex to direct LDL Fasting  - TSH with free T4 reflex  - Vitamin D Deficiency  - Albumin Random Urine Quantitative with Creat Ratio  - C FOOT EXAM  NO CHARGE    CKD (chronic kidney disease) stage 3, GFR 30-59 ml/min (H)    - CBC with platelets  - Comprehensive metabolic panel    Chronic atrial fibrillation (H)  Rate controlled. On chronic coumadin therapy with good compliance.     Type 2 diabetes mellitus without complication, without long-term current use of insulin (H)  Well controlled with diet and activity.   - Albumin Random Urine Quantitative with Creat Ratio  - C FOOT EXAM  NO CHARGE    Benign essential hypertension  Rechecking BP in 1 week.   - UA reflex to Microscopic and Culture  - CBC with platelets  - Comprehensive metabolic panel    H/O bilateral mastectomy      Malignant neoplasm of breast in female, estrogen receptor positive, unspecified laterality, unspecified site of breast (H)      Vitamin D deficiency    - Vitamin D Deficiency    Adjustment disorder with anxious mood      Acute diastolic congestive heart failure (H)    - BNP-N terminal pro    Essential hypertension  Rechecking BP in 1 week    Hyperlipidemia LDL goal <100    - Lipid panel reflex to direct LDL Fasting    Inflamed seborrheic keratosis  Discussed that excessive rubbing and picking of SK's will cause discoloration and encouraged her to avoid doing so if able. Suspect contact dermatitis for rash on right foot. Recommended patient apply band-aid with steroid cream to affected area at bed time.     Nontoxic multinodular goiter  - TSH with free T4 reflex    Special screening for malignant neoplasms, colon    - Fecal  "colorectal cancer screen (FIT)    Vasomotor rhinitis: Recommended using Atrovent once daily and adding in an extra dose if needed.     Follow up in 1 week for BP check with nurse only appointment      End of Life Planning:  Patient currently has an advanced directive: Yes.  Practitioner is supportive of decision.    COUNSELING:  Reviewed preventive health counseling, as reflected in patient instructions       Regular exercise       Healthy diet/nutrition       Hearing screening       Colon cancer screening    Estimated body mass index is 24.03 kg/m  as calculated from the following:    Height as of this encounter: 1.626 m (5' 4\").    Weight as of this encounter: 63.5 kg (140 lb).   reports that she has quit smoking. She has never used smokeless tobacco.    Appropriate preventive services were discussed with this patient, including applicable screening as appropriate for cardiovascular disease, diabetes, osteopenia/osteoporosis, and glaucoma.  As appropriate for age/gender, discussed screening for colorectal cancer, prostate cancer, breast cancer, and cervical cancer. Checklist reviewing preventive services available has been given to the patient.    Reviewed patients plan of care and provided an AVS. The Basic Care Plan (routine screening as documented in Health Maintenance) for Catalina meets the Care Plan requirement. This Care Plan has been established and reviewed with the Patient.      Return to clinic in 3 months  Counseling Resources:  ATP IV Guidelines  Pooled Cohorts Equation Calculator  Breast Cancer Risk Calculator  FRAX Risk Assessment  ICSI Preventive Guidelines  Dietary Guidelines for Americans, 2010  USDA's MyPlate  ASA Prophylaxis  Lung CA Screening    The information in this document, created by the medical scribe for me, accurately reflects the services I personally performed and the decisions made by me. I have reviewed and approved this document for accuracy prior to leaving the patient care " area.  July 15, 2019 3:53 PM    Ian Tracy MD  [facility]    Identified Health Risks:

## 2019-07-16 LAB
ALBUMIN SERPL-MCNC: 4.1 G/DL (ref 3.4–5)
ALP SERPL-CCNC: 91 U/L (ref 40–150)
ALT SERPL W P-5'-P-CCNC: 32 U/L (ref 0–50)
ANION GAP SERPL CALCULATED.3IONS-SCNC: 10 MMOL/L (ref 3–14)
AST SERPL W P-5'-P-CCNC: 25 U/L (ref 0–45)
BILIRUB SERPL-MCNC: 0.6 MG/DL (ref 0.2–1.3)
BUN SERPL-MCNC: 21 MG/DL (ref 7–30)
CALCIUM SERPL-MCNC: 8.6 MG/DL (ref 8.5–10.1)
CHLORIDE SERPL-SCNC: 107 MMOL/L (ref 94–109)
CHOLEST SERPL-MCNC: 140 MG/DL
CO2 SERPL-SCNC: 24 MMOL/L (ref 20–32)
CREAT SERPL-MCNC: 1.07 MG/DL (ref 0.52–1.04)
CREAT UR-MCNC: 123 MG/DL
DEPRECATED CALCIDIOL+CALCIFEROL SERPL-MC: 36 UG/L (ref 20–75)
GFR SERPL CREATININE-BSD FRML MDRD: 50 ML/MIN/{1.73_M2}
GLUCOSE SERPL-MCNC: 87 MG/DL (ref 70–99)
HDLC SERPL-MCNC: 38 MG/DL
LDLC SERPL CALC-MCNC: 55 MG/DL
MICROALBUMIN UR-MCNC: 28 MG/L
MICROALBUMIN/CREAT UR: 22.52 MG/G CR (ref 0–25)
NONHDLC SERPL-MCNC: 102 MG/DL
POTASSIUM SERPL-SCNC: 4.1 MMOL/L (ref 3.4–5.3)
PROT SERPL-MCNC: 7.7 G/DL (ref 6.8–8.8)
SODIUM SERPL-SCNC: 141 MMOL/L (ref 133–144)
TRIGL SERPL-MCNC: 233 MG/DL
TSH SERPL DL<=0.005 MIU/L-ACNC: 0.66 MU/L (ref 0.4–4)

## 2019-07-16 RX ORDER — TRIAMCINOLONE ACETONIDE 1 MG/G
CREAM TOPICAL 2 TIMES DAILY
Qty: 30 G | Refills: 1 | Status: SHIPPED | OUTPATIENT
Start: 2019-07-16 | End: 2023-07-03

## 2019-07-18 DIAGNOSIS — J30.0 CHRONIC VASOMOTOR RHINITIS: Primary | ICD-10-CM

## 2019-07-18 NOTE — TELEPHONE ENCOUNTER
Reason for Call:  Medication or medication refill:    Do you use a [facility]?  Name of the pharmacy and phone number for the current request:       [facility]Y #[id number] - [place], [place] - [address]      Name of the medication requested: ipratropium (ATROVENT) 0.06 % spray    Other request: Pharmacy called with the request, they have never filled the Rx      Ph. [phone]    Call taken on 7/18/2019 at 2:26 PM by Lia Rubio

## 2019-07-18 NOTE — TELEPHONE ENCOUNTER
ipratropium (ATROVENT) 0.06 % spray 3 Box 3 7/9/2018  --   Sig - Route: Spray 2 sprays into both nostrils 4 times daily as needed for rhinitis - Both Nostrils     Last Written Prescription Date:  07/09/2018  Last Fill Quantity: 3 b,  # refills: 3   Last office visit: 7/15/2019 with prescribing provider:     Future Office Visit:   Next 5 appointments (look out 90 days)    Jul 22, 2019 11:00 AM CDT  Nurse Only with [facility] NURSE  [facility] [address]  [place] [address]  [phone]                 Routing refill request to provider for review/approval because:  Drug not on the [facility], [facility] or [facility] refill protocol or controlled substance

## 2019-07-21 LAB — HEMOCCULT STL QL IA: NEGATIVE

## 2019-07-22 ENCOUNTER — ALLIED HEALTH/NURSE VISIT (OUTPATIENT)
Dept: NURSING | Facility: CLINIC | Age: 74
End: 2019-07-22
Payer: MEDICARE

## 2019-07-22 ENCOUNTER — ANTICOAGULATION THERAPY VISIT (OUTPATIENT)
Dept: NURSING | Facility: CLINIC | Age: 74
End: 2019-07-22
Payer: MEDICARE

## 2019-07-22 VITALS — HEART RATE: 79 BPM | DIASTOLIC BLOOD PRESSURE: 85 MMHG | SYSTOLIC BLOOD PRESSURE: 123 MMHG

## 2019-07-22 DIAGNOSIS — I10 BENIGN ESSENTIAL HYPERTENSION: Primary | ICD-10-CM

## 2019-07-22 LAB — INR POINT OF CARE: 2.7 (ref 0.86–1.14)

## 2019-07-22 PROCEDURE — 99207 ZZC NO CHARGE NURSE ONLY: CPT

## 2019-07-22 PROCEDURE — 85610 PROTHROMBIN TIME: CPT | Mod: QW

## 2019-07-22 PROCEDURE — 36416 COLLJ CAPILLARY BLOOD SPEC: CPT

## 2019-07-22 RX ORDER — IPRATROPIUM BROMIDE 42 UG/1
2 SPRAY, METERED NASAL 4 TIMES DAILY
Qty: 45 ML | Refills: 3 | Status: SHIPPED | OUTPATIENT
Start: 2019-07-22 | End: 2021-03-10

## 2019-07-22 NOTE — PROGRESS NOTES
Catalina Marie is a 73 year old patient who comes in today for a Blood Pressure check.  Initial BP:  /85 (BP Location: Left arm, Cuff Size: Adult Regular)   Pulse 79   Breastfeeding? No      79  Disposition: follow-up as previously indicated by provider and results routed to provider

## 2019-07-22 NOTE — PROGRESS NOTES
ANTICOAGULATION FOLLOW-UP CLINIC VISIT    Patient Name:  Catalina Marie  Date:  2019  Contact Type:  Face to Face    SUBJECTIVE:  Patient Findings     Comments:   The patient was assessed for diet, medication, and activity level changes, missed or extra doses, bruising or bleeding, with no problem findings.        Clinical Outcomes     Negatives:   Major bleeding event, Thromboembolic event, Anticoagulation-related hospital admission, Anticoagulation-related ED visit, Anticoagulation-related fatality    Comments:   The patient was assessed for diet, medication, and activity level changes, missed or extra doses, bruising or bleeding, with no problem findings.           OBJECTIVE    INR Protime   Date Value Ref Range Status   2019 2.7 (A) 0.86 - 1.14 Final       ASSESSMENT / PLAN  INR assessment THER    Recheck INR In: 4 WEEKS    INR Location Clinic      Anticoagulation Summary  As of 2019    INR goal:   2.0-3.0   TTR:   69.5 % (2.8 y)   INR used for dosin.7 (2019)   Warfarin maintenance plan:   2.5 mg (5 mg x 0.5) every Sun, Tue, Fri; 5 mg (5 mg x 1) all other days   Full warfarin instructions:   2.5 mg every Sun, Tue, Fri; 5 mg all other days   Weekly warfarin total:   27.5 mg   No change documented:   Nayeli Davison RN   Plan last modified:   Maylin Altamirano RN (3/20/2019)   Next INR check:   2019   Priority:   INR   Target end date:       Indications    Long term current use of anticoagulant therapy [Z79.01]  Atrial fibrillation (H) [I48.91] [I48.91]             Anticoagulation Episode Summary     INR check location:       Preferred lab:       Send INR reminders to:   DEMOND FRANCO    Comments:   Winter: FAXED INR results from [facility] in [place], [place]. Phone: [phone].  General Results#: [phone]  Call pt's cell in [place] with results/plan: [phone]      Anticoagulation Care Providers     Provider Role Specialty Phone number    Ian Tracy MD Referring Internal  Medicine [phone]            See the Encounter Report to view Anticoagulation Flowsheet and Dosing Calendar (Go to Encounters tab in chart review, and find the Anticoagulation Therapy Visit)    Pt INR is 2.7 today. Pt advised to continue 2.5 mg on Sundays, Tuesdays, Fridays and 5 mg all the other days. Recheck INR is 4 weeks. Jennifer aware if signs of clotting (pain, tenderness, swelling, color change in leg or arm, SOB) and bleeding occur (blood in stool, urine, large bruising, bleeding gums, nosebleeds) to have INR check sooner. If sx severe report to ER or concerned for stroke call 911. If general questions or concerns arise, call clinic.         Nayeli Davisno RN

## 2019-08-16 ENCOUNTER — ANTICOAGULATION THERAPY VISIT (OUTPATIENT)
Dept: NURSING | Facility: CLINIC | Age: 74
End: 2019-08-16
Payer: MEDICARE

## 2019-08-16 DIAGNOSIS — I10 BENIGN ESSENTIAL HYPERTENSION: ICD-10-CM

## 2019-08-16 LAB — INR POINT OF CARE: 3.1 (ref 0.86–1.14)

## 2019-08-16 PROCEDURE — 36416 COLLJ CAPILLARY BLOOD SPEC: CPT

## 2019-08-16 PROCEDURE — 85610 PROTHROMBIN TIME: CPT | Mod: QW

## 2019-08-16 RX ORDER — FENOFIBRATE 160 MG/1
TABLET ORAL
Qty: 90 TABLET | Refills: 3 | Status: SHIPPED | OUTPATIENT
Start: 2019-08-16 | End: 2020-08-03

## 2019-08-16 NOTE — TELEPHONE ENCOUNTER
Routing refill request to provider for review/approval because:  Drug interaction warning (warfarin/fenofibrate)     Please review and authorize if appropriate,     Thank you,   Amrita VERA RN

## 2019-08-16 NOTE — TELEPHONE ENCOUNTER
"Pending Prescriptions:                       Disp   Refills    fenofibrate (TRIGLIDE/LOFIBRA) 160 MG tab*90 tab*0            Sig: TAKE 1 TABLET BY MOUTH EVERY DAY    Last Written Prescription Date:  8/14/18  Last Fill Quantity: 90,  # refills: 3   Last office visit: 7/15/2019 with prescribing provider:     Future Office Visit:   Next 5 appointments (look out 90 days)    Oct 03, 2019  9:00 AM CDT  Office Visit with Ian Tracy MD  [facility] [address] [facility] [address]  [phone]         Requested Prescriptions   Pending Prescriptions Disp Refills     fenofibrate (TRIGLIDE/LOFIBRA) 160 MG tablet [Pharmacy Med Name: FENOFIBRATE 160 MG TABLET] 90 tablet 0     Sig: TAKE 1 TABLET BY MOUTH EVERY DAY       Fibrates Passed - 8/16/2019  2:23 PM        Passed - Lipid panel on file in past 12 months     Recent Labs   Lab Test 07/15/19  1600   CHOL 140   TRIG 233*   HDL 38*   LDL 55   NHDL 102               Passed - No abnormal creatine kinase in past 12 months     No lab results found.             Passed - Recent (12 mo) or future (30 days) visit within the authorizing provider's specialty     Patient had office visit in the last 12 months or has a visit in the next 30 days with authorizing provider or within the authorizing provider's specialty.  See \"Patient Info\" tab in inbasket, or \"Choose Columns\" in Meds & Orders section of the refill encounter.              Passed - Medication is active on med list        Passed - Patient is age 18 or older        Passed - No active pregnancy on record        Passed - No positive pregnancy test in past 12 months          "

## 2019-08-16 NOTE — PROGRESS NOTES
ANTICOAGULATION FOLLOW-UP CLINIC VISIT    Patient Name:  Catalina Marie  Date:  8/16/2019  Contact Type:  Face to Face    SUBJECTIVE:  Patient Findings     Positives:   Change in diet/appetite (The only change in diet is pt usually has green salad daily that is leafy and cntains spinach, past week, she has not purchased spinach, just had greens. )    Comments:   The patient was assessed for diet, medication, and activity level changes, missed or extra doses, bruising or bleeding, with no problem findings.          Clinical Outcomes     Comments:   The patient was assessed for diet, medication, and activity level changes, missed or extra doses, bruising or bleeding, with no problem findings.             OBJECTIVE    INR Protime   Date Value Ref Range Status   08/16/2019 3.1 (A) 0.86 - 1.14 Final       ASSESSMENT / PLAN  INR assessment SUPRA    Recheck INR In: 2 WEEKS    INR Location Clinic      Anticoagulation Summary  As of 8/16/2019    INR goal:   2.0-3.0   TTR:   69.7 % (2.9 y)   INR used for dosing:   3.1! (8/16/2019)   Warfarin maintenance plan:   2.5 mg (5 mg x 0.5) every Sun, Tue, Fri; 5 mg (5 mg x 1) all other days   Full warfarin instructions:   8/17: 2.5 mg; Otherwise 2.5 mg every Sun, Tue, Fri; 5 mg all other days   Weekly warfarin total:   27.5 mg   Plan last modified:   Maylin Altamirano, RN (3/20/2019)   Next INR check:   8/30/2019   Priority:   INR   Target end date:       Indications    Long term current use of anticoagulant therapy [Z79.01]  Atrial fibrillation (H) [I48.91] [I48.91]             Anticoagulation Episode Summary     INR check location:       Preferred lab:       Send INR reminders to:   DEMOND FRANCO    Comments:   Winter: FAXED INR results from [employer] in [place], [place]. Phone: [phone].  General Results#: [phone]  Call pt's cell in [place] with results/plan: [phone]      Anticoagulation Care Providers     Provider Role Specialty Phone number    Ian Tracy MD Referring  Internal Medicine [phone]            See the Encounter Report to view Anticoagulation Flowsheet and Dosing Calendar (Go to Encounters tab in chart review, and find the Anticoagulation Therapy Visit)    Dosage adjustment made based on physician directed care plan.    INR today 3.1, dose decreased 2 visits ago by 2.5mg. Possible elevation due to pt not having spinach past week when usually has a salad daily that contains spinach. Pt will take 2.5mg less 8/17, then resume previous dose of 2.5mg Tu Fri Sun, and 5mg all other days. Check 2 weeks, if still elevated and has been having spinach, will decrease maintenance dose. Pt in agreement.     Ijeoma Antonio RN

## 2019-08-30 ENCOUNTER — ANTICOAGULATION THERAPY VISIT (OUTPATIENT)
Dept: NURSING | Facility: CLINIC | Age: 74
End: 2019-08-30
Payer: MEDICARE

## 2019-08-30 DIAGNOSIS — I48.91 ATRIAL FIBRILLATION (H): Primary | ICD-10-CM

## 2019-08-30 LAB — INR POINT OF CARE: 3.3 (ref 0.86–1.14)

## 2019-08-30 PROCEDURE — 36416 COLLJ CAPILLARY BLOOD SPEC: CPT

## 2019-08-30 PROCEDURE — 99207 ZZC NO CHARGE NURSE ONLY: CPT

## 2019-08-30 PROCEDURE — 85610 PROTHROMBIN TIME: CPT | Mod: QW

## 2019-08-30 NOTE — PROGRESS NOTES
ANTICOAGULATION FOLLOW-UP CLINIC VISIT    Patient Name:  Catalina Marie  Date:  8/30/2019  Contact Type:  Face to Face    SUBJECTIVE:  Patient Findings     Positives:   Change in diet/appetite (Pt has had increase in greens in past 2 weeks)    Comments:   Patient denies any identifiable changes that caused the supratherapeutic INR.         Clinical Outcomes     Comments:   Patient denies any identifiable changes that caused the supratherapeutic INR.            OBJECTIVE    INR Protime   Date Value Ref Range Status   08/30/2019 3.3 (A) 0.86 - 1.14 Final       ASSESSMENT / PLAN  INR assessment SUPRA    Recheck INR In: 2 WEEKS    INR Location Clinic      Anticoagulation Summary  As of 8/30/2019    INR goal:   2.0-3.0   TTR:   68.8 % (2.9 y)   INR used for dosing:   3.3! (8/30/2019)   Warfarin maintenance plan:   5 mg (5 mg x 1) every Mon, Wed, Fri; 2.5 mg (5 mg x 0.5) all other days   Full warfarin instructions:   8/30: 2.5 mg; Otherwise 5 mg every Mon, Wed, Fri; 2.5 mg all other days   Weekly warfarin total:   25 mg   Plan last modified:   Ijeoma Antonio RN (8/30/2019)   Next INR check:   9/13/2019   Priority:   INR   Target end date:       Indications    Long term current use of anticoagulant therapy [Z79.01]  Atrial fibrillation (H) [I48.91] [I48.91]             Anticoagulation Episode Summary     INR check location:       Preferred lab:       Send INR reminders to:   DEMOND FRANCO    Comments:   Winter: FAXED INR results from [employer] [facility] in [place], [place]. Phone: [phone].  General Results#: [phone]  Call pt's cell in [place] with results/plan: [phone]      Anticoagulation Care Providers     Provider Role Specialty Phone number    Ian Tracy MD Referring Internal Medicine [phone]            See the Encounter Report to view Anticoagulation Flowsheet and Dosing Calendar (Go to Encounters tab in chart review, and find the Anticoagulation Therapy Visit)    Dosage adjustment made based on  physician directed care plan.    INR 3.3, pt attempted to have more greens past week, spinach, green salads.  Dose adjustment made, Today 2.5mg, then change maintenance 5mg MWF, 2.5mg al other days. Pt aware s/s of bleeding and when to seek medical care.    Ijeoma Antonio RN

## 2019-09-06 DIAGNOSIS — I10 BENIGN ESSENTIAL HYPERTENSION: ICD-10-CM

## 2019-09-06 RX ORDER — LISINOPRIL 40 MG/1
TABLET ORAL
Qty: 90 TABLET | Refills: 0 | Status: SHIPPED | OUTPATIENT
Start: 2019-09-06 | End: 2019-11-27

## 2019-09-06 NOTE — TELEPHONE ENCOUNTER
Routing refill request to provider for review/approval because:  Labs out of range:  FRIDA GarciaN, RN  [employer] [facility]

## 2019-09-06 NOTE — TELEPHONE ENCOUNTER
"Last Written Prescription Date:  9/06/18  Last Fill Quantity: 90 tablet,  # refills: 3   Last office visit: 7/15/2019 with prescribing provider:  Rossana   Future Office Visit:   Next 5 appointments (look out 90 days)    Oct 03, 2019  9:00 AM CDT  Office Visit with Ian Tracy MD  [facility] ([facility]) [address] [facility] [address] [phone]         Requested Prescriptions   Pending Prescriptions Disp Refills     lisinopril (PRINIVIL/ZESTRIL) 40 MG tablet [Pharmacy Med Name: LISINOPRIL 40 MG TABLET] 90 tablet 0     Sig: TAKE 1 TABLET BY MOUTH EVERY DAY       ACE Inhibitors (Including Combos) Protocol Failed - 9/6/2019  2:56 PM        Failed - Normal serum creatinine on file in past 12 months     Recent Labs   Lab Test 07/15/19  1600   CR 1.07*             Passed - Blood pressure under 140/90 in past 12 months     BP Readings from Last 3 Encounters:   07/22/19 123/85   07/15/19 (!) 157/92   06/03/19 145/82                 Passed - Recent (12 mo) or future (30 days) visit within the authorizing provider's specialty     Patient had office visit in the last 12 months or has a visit in the next 30 days with authorizing provider or within the authorizing provider's specialty.  See \"Patient Info\" tab in inbasket, or \"Choose Columns\" in Meds & Orders section of the refill encounter.              Passed - Medication is active on med list        Passed - Patient is age 18 or older        Passed - No active pregnancy on record        Passed - Normal serum potassium on file in past 12 months     Recent Labs   Lab Test 07/15/19  1600   POTASSIUM 4.1             Passed - No positive pregnancy test within past 12 months          "

## 2019-09-13 ENCOUNTER — ANTICOAGULATION THERAPY VISIT (OUTPATIENT)
Dept: NURSING | Facility: CLINIC | Age: 74
End: 2019-09-13
Payer: MEDICARE

## 2019-09-13 DIAGNOSIS — Z79.01 LONG TERM CURRENT USE OF ANTICOAGULANT THERAPY: ICD-10-CM

## 2019-09-13 DIAGNOSIS — I48.20 CHRONIC ATRIAL FIBRILLATION (H): ICD-10-CM

## 2019-09-13 LAB — INR POINT OF CARE: 3 (ref 0.86–1.14)

## 2019-09-13 PROCEDURE — 36416 COLLJ CAPILLARY BLOOD SPEC: CPT

## 2019-09-13 PROCEDURE — 85610 PROTHROMBIN TIME: CPT | Mod: QW

## 2019-09-13 PROCEDURE — 99207 ZZC NO CHARGE NURSE ONLY: CPT

## 2019-09-13 NOTE — PROGRESS NOTES
ANTICOAGULATION FOLLOW-UP CLINIC VISIT    Patient Name:  Catalina Marie  Date:  9/13/2019  Contact Type:  Face to Face    SUBJECTIVE:  Patient Findings     Comments:   Bleeding Signs/Symptoms: NO  Thromboembolic Signs/Symptoms: NO     Medication Changes:  NO  Dietary Changes:  NO  Bacterial/Viral Infection: NO     Missed Coumadin Doses: NO  Other Concerns:  Patient read that increase intake in almonds and water together will thin the blood and is wondering if that is why her INR has been elevated. She eats a lot of almonds and has started to increase her water intake           Clinical Outcomes     Negatives:   Major bleeding event, Thromboembolic event, Anticoagulation-related hospital admission, Anticoagulation-related ED visit, Anticoagulation-related fatality    Comments:   Bleeding Signs/Symptoms: NO  Thromboembolic Signs/Symptoms: NO     Medication Changes:  NO  Dietary Changes:  NO  Bacterial/Viral Infection: NO     Missed Coumadin Doses: NO  Other Concerns:  Patient read that increase intake in almonds and water together will thin the blood and is wondering if that is why her INR has been elevated. She eats a lot of almonds and has started to increase her water intake              OBJECTIVE    INR Protime   Date Value Ref Range Status   09/13/2019 3.0 (A) 0.86 - 1.14 Final       ASSESSMENT / PLAN  No question data found.  Anticoagulation Summary  As of 9/13/2019    INR goal:   2.0-3.0   TTR:   67.9 % (3 y)   INR used for dosing:   3.0 (9/13/2019)   Warfarin maintenance plan:   5 mg (5 mg x 1) every Mon, Wed, Fri; 2.5 mg (5 mg x 0.5) all other days   Full warfarin instructions:   5 mg every Mon, Wed, Fri; 2.5 mg all other days   Weekly warfarin total:   25 mg   No change documented:   Kenisha Mcgrath RN   Plan last modified:   Ijeoma Antonio RN (8/30/2019)   Next INR check:   9/27/2019   Priority:   INR   Target end date:       Indications    Long term current use of anticoagulant therapy  [Z79.01]  Atrial fibrillation (H) [I48.91] [I48.91]             Anticoagulation Episode Summary     INR check location:       Preferred lab:       Send INR reminders to:   DEMOND FRANCO    Comments:   Winter: FAXED INR results from [employer] in [place], [place]. Phone: [phone].  General Results#: [phone]  Call pt's cell in [place] with results/plan: [phone]      Anticoagulation Care Providers     Provider Role Specialty Phone number    Ian Tracy MD Referring Internal Medicine [phone]            See the Encounter Report to view Anticoagulation Flowsheet and Dosing Calendar (Go to Encounters tab in chart review, and find the Anticoagulation Therapy Visit)        Kenisha Mcgrath RN

## 2019-09-13 NOTE — PATIENT INSTRUCTIONS
Anticoagulation Clinic:  Please call [phone] with any problems or questions regarding your Warfarin therapy.

## 2019-09-27 ENCOUNTER — ANTICOAGULATION THERAPY VISIT (OUTPATIENT)
Dept: NURSING | Facility: CLINIC | Age: 74
End: 2019-09-27
Payer: MEDICARE

## 2019-09-27 DIAGNOSIS — I10 BENIGN ESSENTIAL HYPERTENSION: ICD-10-CM

## 2019-09-27 DIAGNOSIS — E78.5 HYPERLIPIDEMIA LDL GOAL <100: ICD-10-CM

## 2019-09-27 LAB — INR POINT OF CARE: 3 (ref 0.86–1.14)

## 2019-09-27 PROCEDURE — 99207 ZZC NO CHARGE NURSE ONLY: CPT

## 2019-09-27 PROCEDURE — 36416 COLLJ CAPILLARY BLOOD SPEC: CPT

## 2019-09-27 PROCEDURE — 85610 PROTHROMBIN TIME: CPT | Mod: QW

## 2019-09-27 NOTE — LETTER
[facility]  ANTICOAGULATION CLINIC  [address] [address]  (Appt. Line)  [phone]  (Nurse Line) [phone]        DATE:11/20/19   TO:  Whom It May Concern     RE:   Catalina Marie              1945      Please be advised that Catalina Marie is on anticoagulation therapy with warfarin with a diagnosis of Atrial Fibrillation (148.91).      Because of this ongoing anticoagulation therapy, the above patient will need periodic INR evaluations1-4 times per month for 6 months. Please call results to [phone] or Fax results to [phone]   Once the results are received, we will contact the patient with warfarin dose and advise next date for INR check.   The patient may be given her INR results directly.      Thank you for your assistance.  Please call the above telephone number with any questions.    Regards,          Dr. Ian Tracy    NP1# [id number]

## 2019-09-27 NOTE — PROGRESS NOTES
ANTICOAGULATION FOLLOW-UP CLINIC VISIT    Patient Name:  Catalina Marie  Date:  9/27/2019  Contact Type:  Face to Face    SUBJECTIVE:  Patient Findings     Positives:   Change in diet/appetite (t eats greens most days.)    Comments:   The patient was assessed for diet, medication, and activity level changes, missed or extra doses, bruising or bleeding, with no problem findings.          Clinical Outcomes     Comments:   The patient was assessed for diet, medication, and activity level changes, missed or extra doses, bruising or bleeding, with no problem findings.             OBJECTIVE    INR Protime   Date Value Ref Range Status   09/27/2019 3.0 (A) 0.86 - 1.14 Final       ASSESSMENT / PLAN  INR assessment THER    Recheck INR In: 2 WEEKS    INR Location Clinic      Anticoagulation Summary  As of 9/27/2019    INR goal:   2.0-3.0   TTR:   68.3 % (3 y)   INR used for dosing:   3.0 (9/27/2019)   Warfarin maintenance plan:   5 mg (5 mg x 1) every Mon, Wed, Fri; 2.5 mg (5 mg x 0.5) all other days   Full warfarin instructions:   5 mg every Mon, Wed, Fri; 2.5 mg all other days   Weekly warfarin total:   25 mg   Plan last modified:   Ijeoma Antonio RN (8/30/2019)   Next INR check:   10/11/2019   Priority:   INR   Target end date:       Indications    Long term current use of anticoagulant therapy [Z79.01]  Atrial fibrillation (H) [I48.91] [I48.91]             Anticoagulation Episode Summary     INR check location:       Preferred lab:       Send INR reminders to:   DEMOND FRANCO    Comments:   Winter: FAXED INR results from [employer] in [place], [place]. Phone: [phone].  General Results#: [phone]  Call pt's cell in [place] with results/plan: [phone]      Anticoagulation Care Providers     Provider Role Specialty Phone number    Ian Tracy MD Referring Internal Medicine [phone]            See the Encounter Report to view Anticoagulation Flowsheet and Dosing Calendar (Go to Encounters tab in chart  review, and find the Anticoagulation Therapy Visit)    Dosage adjustment made based on physician directed care plan.    INR 3.0, dose adjustment made 5mg Mon/Fri, 2.5mg all other days. Pt has been eating green daily walks daily. Will check INR 2 weeks. Pt will be leaving for [place] end of Oct for 6 months.    Ijeoma Antonio RN

## 2019-09-28 NOTE — TELEPHONE ENCOUNTER
"atorvastatin (LIPITOR) 10 MG tablet   Last Written Prescription Date:  1/16/19  Last Fill Quantity: 45 tablet,  # refills: 0   Last office visit: 7/15/2019 with prescribing provider:  Rossana   Future Office Visit:     digoxin (LANOXIN) 250 MCG tablet  Last Written Prescription Date:  7/08/19  Last Fill Quantity: 45 tablet,  # refills: 0   Last office visit: 7/15/2019 with prescribing provider:  Rossana   Future Office Visit:   Next 5 appointments (look out 90 days)    Oct 03, 2019  9:00 AM CDT  Office Visit with Ian Tracy MD  [facility] ([facility]) [address] [facility] [address] [phone]           Requested Prescriptions   Pending Prescriptions Disp Refills     atorvastatin (LIPITOR) 10 MG tablet [Pharmacy Med Name: ATORVASTATIN 10 MG TABLET] 45 tablet 0     Sig: TAKE 1/2 TABLET BY MOUTH EVERY DAY       Statins Protocol Passed - 9/27/2019 12:14 PM        Passed - LDL on file in past 12 months     Recent Labs   Lab Test 07/15/19  1600   LDL 55             Passed - No abnormal creatine kinase in past 12 months     No lab results found.             Passed - Recent (12 mo) or future (30 days) visit within the authorizing provider's specialty     Patient has had an office visit with the authorizing provider or a provider within the authorizing providers department within the previous 12 mos or has a future within next 30 days. See \"Patient Info\" tab in inbasket, or \"Choose Columns\" in Meds & Orders section of the refill encounter.              Passed - Medication is active on med list        Passed - Patient is age 18 or older        Passed - No active pregnancy on record        Passed - No positive pregnancy test in past 12 months        digoxin (LANOXIN) 250 MCG tablet [Pharmacy Med Name: DIGOXIN 250 MCG TABLET] 45 tablet 0     Sig: TAKE 0.5 TABLETS (125 MCG) BY MOUTH DAILY       Cardiac Glycoside Agents Protocol Failed - 9/27/2019 12:14 PM        Failed - Normal digoxin " "level on file in past 12 mos     Recent Labs   Lab Test 07/09/18  0859   DIGOXIN 0.1*             Failed - Normal serum creatinine on file in past 12 mos     Recent Labs   Lab Test 07/15/19  1600   CR 1.07*             Passed - Medication is active on med list        Passed - Patient is 18 years of age or older        Passed - Patient is not pregnant        Passed - No positive pregnancy test on file in past 12 mos        Passed - Recent (6 mo) or future (30 days) visit within the authorizing provider's specialty     Patient had office visit in the last 6 months or has a visit in the next 30 days with authorizing provider or within the authorizing provider's specialty.  See \"Patient Info\" tab in inbasket, or \"Choose Columns\" in Meds & Orders section of the refill encounter.              "

## 2019-10-02 RX ORDER — DIGOXIN 250 MCG
125 TABLET ORAL DAILY
Qty: 45 TABLET | Refills: 0 | Status: SHIPPED | OUTPATIENT
Start: 2019-10-02 | End: 2019-10-03

## 2019-10-02 RX ORDER — ATORVASTATIN CALCIUM 10 MG/1
TABLET, FILM COATED ORAL
Qty: 45 TABLET | Refills: 0 | Status: SHIPPED | OUTPATIENT
Start: 2019-10-02 | End: 2019-10-03

## 2019-10-03 ENCOUNTER — OFFICE VISIT (OUTPATIENT)
Dept: URGENT CARE | Facility: URGENT CARE | Age: 74
End: 2019-10-03
Payer: MEDICARE

## 2019-10-03 ENCOUNTER — OFFICE VISIT (OUTPATIENT)
Dept: FAMILY MEDICINE | Facility: CLINIC | Age: 74
End: 2019-10-03
Payer: MEDICARE

## 2019-10-03 VITALS
HEART RATE: 64 BPM | WEIGHT: 134 LBS | SYSTOLIC BLOOD PRESSURE: 144 MMHG | BODY MASS INDEX: 23 KG/M2 | TEMPERATURE: 97.9 F | DIASTOLIC BLOOD PRESSURE: 90 MMHG | OXYGEN SATURATION: 96 %

## 2019-10-03 VITALS
TEMPERATURE: 97.9 F | BODY MASS INDEX: 23 KG/M2 | WEIGHT: 134 LBS | DIASTOLIC BLOOD PRESSURE: 86 MMHG | HEART RATE: 72 BPM | SYSTOLIC BLOOD PRESSURE: 128 MMHG | OXYGEN SATURATION: 96 %

## 2019-10-03 DIAGNOSIS — C50.919 MALIGNANT NEOPLASM OF BREAST IN FEMALE, ESTROGEN RECEPTOR POSITIVE, UNSPECIFIED LATERALITY, UNSPECIFIED SITE OF BREAST (H): ICD-10-CM

## 2019-10-03 DIAGNOSIS — Z17.0 MALIGNANT NEOPLASM OF BREAST IN FEMALE, ESTROGEN RECEPTOR POSITIVE, UNSPECIFIED LATERALITY, UNSPECIFIED SITE OF BREAST (H): ICD-10-CM

## 2019-10-03 DIAGNOSIS — E04.2 NONTOXIC MULTINODULAR GOITER: ICD-10-CM

## 2019-10-03 DIAGNOSIS — F43.22 ADJUSTMENT DISORDER WITH ANXIOUS MOOD: ICD-10-CM

## 2019-10-03 DIAGNOSIS — L82.0 INFLAMED SEBORRHEIC KERATOSIS: ICD-10-CM

## 2019-10-03 DIAGNOSIS — Z23 NEED FOR PROPHYLACTIC VACCINATION AND INOCULATION AGAINST INFLUENZA: ICD-10-CM

## 2019-10-03 DIAGNOSIS — I48.20 CHRONIC ATRIAL FIBRILLATION (H): ICD-10-CM

## 2019-10-03 DIAGNOSIS — Z90.13 H/O BILATERAL MASTECTOMY: ICD-10-CM

## 2019-10-03 DIAGNOSIS — I10 BENIGN ESSENTIAL HYPERTENSION: ICD-10-CM

## 2019-10-03 DIAGNOSIS — E11.9 TYPE 2 DIABETES MELLITUS WITHOUT COMPLICATION, WITHOUT LONG-TERM CURRENT USE OF INSULIN (H): Primary | ICD-10-CM

## 2019-10-03 DIAGNOSIS — I50.31 ACUTE DIASTOLIC CONGESTIVE HEART FAILURE (H): ICD-10-CM

## 2019-10-03 DIAGNOSIS — S81.811A LACERATION OF RIGHT LOWER EXTREMITY, INITIAL ENCOUNTER: Primary | ICD-10-CM

## 2019-10-03 DIAGNOSIS — N18.30 CKD (CHRONIC KIDNEY DISEASE) STAGE 3, GFR 30-59 ML/MIN (H): ICD-10-CM

## 2019-10-03 DIAGNOSIS — I10 ESSENTIAL HYPERTENSION: ICD-10-CM

## 2019-10-03 DIAGNOSIS — E78.5 HYPERLIPIDEMIA LDL GOAL <100: ICD-10-CM

## 2019-10-03 LAB
ANION GAP SERPL CALCULATED.3IONS-SCNC: 7 MMOL/L (ref 3–14)
BUN SERPL-MCNC: 19 MG/DL (ref 7–30)
CALCIUM SERPL-MCNC: 8.9 MG/DL (ref 8.5–10.1)
CHLORIDE SERPL-SCNC: 106 MMOL/L (ref 94–109)
CO2 SERPL-SCNC: 28 MMOL/L (ref 20–32)
CREAT SERPL-MCNC: 1.05 MG/DL (ref 0.52–1.04)
ERYTHROCYTE [DISTWIDTH] IN BLOOD BY AUTOMATED COUNT: 13.6 % (ref 10–15)
GFR SERPL CREATININE-BSD FRML MDRD: 52 ML/MIN/{1.73_M2}
GLUCOSE SERPL-MCNC: 127 MG/DL (ref 70–99)
HBA1C MFR BLD: 6.1 % (ref 0–5.6)
HCT VFR BLD AUTO: 45.2 % (ref 35–47)
HGB BLD-MCNC: 15.3 G/DL (ref 11.7–15.7)
MCH RBC QN AUTO: 31.9 PG (ref 26.5–33)
MCHC RBC AUTO-ENTMCNC: 33.8 G/DL (ref 31.5–36.5)
MCV RBC AUTO: 94 FL (ref 78–100)
NT-PROBNP SERPL-MCNC: 1029 PG/ML (ref 0–450)
PLATELET # BLD AUTO: 283 10E9/L (ref 150–450)
POTASSIUM SERPL-SCNC: 4.1 MMOL/L (ref 3.4–5.3)
RBC # BLD AUTO: 4.8 10E12/L (ref 3.8–5.2)
SODIUM SERPL-SCNC: 141 MMOL/L (ref 133–144)
WBC # BLD AUTO: 7.5 10E9/L (ref 4–11)

## 2019-10-03 PROCEDURE — G0008 ADMIN INFLUENZA VIRUS VAC: HCPCS | Performed by: INTERNAL MEDICINE

## 2019-10-03 PROCEDURE — 83036 HEMOGLOBIN GLYCOSYLATED A1C: CPT | Performed by: INTERNAL MEDICINE

## 2019-10-03 PROCEDURE — 85027 COMPLETE CBC AUTOMATED: CPT | Performed by: INTERNAL MEDICINE

## 2019-10-03 PROCEDURE — 80048 BASIC METABOLIC PNL TOTAL CA: CPT | Performed by: INTERNAL MEDICINE

## 2019-10-03 PROCEDURE — 83880 ASSAY OF NATRIURETIC PEPTIDE: CPT | Performed by: INTERNAL MEDICINE

## 2019-10-03 PROCEDURE — 99214 OFFICE O/P EST MOD 30 MIN: CPT | Mod: 25 | Performed by: INTERNAL MEDICINE

## 2019-10-03 PROCEDURE — 36415 COLL VENOUS BLD VENIPUNCTURE: CPT | Performed by: INTERNAL MEDICINE

## 2019-10-03 PROCEDURE — 17110 DESTRUCTION B9 LES UP TO 14: CPT | Performed by: INTERNAL MEDICINE

## 2019-10-03 PROCEDURE — 99213 OFFICE O/P EST LOW 20 MIN: CPT | Performed by: FAMILY MEDICINE

## 2019-10-03 PROCEDURE — 90662 IIV NO PRSV INCREASED AG IM: CPT | Performed by: INTERNAL MEDICINE

## 2019-10-03 RX ORDER — ATORVASTATIN CALCIUM 10 MG/1
5 TABLET, FILM COATED ORAL DAILY
Qty: 45 TABLET | Refills: 3 | Status: SHIPPED | OUTPATIENT
Start: 2019-10-03 | End: 2020-12-22

## 2019-10-03 RX ORDER — FUROSEMIDE 20 MG
TABLET ORAL
Qty: 180 TABLET | Refills: 3 | Status: SHIPPED | OUTPATIENT
Start: 2019-10-03 | End: 2020-11-30

## 2019-10-03 RX ORDER — DIGOXIN 250 MCG
125 TABLET ORAL DAILY
Qty: 90 TABLET | Refills: 3 | Status: SHIPPED | OUTPATIENT
Start: 2019-10-03 | End: 2021-03-08

## 2019-10-03 NOTE — NURSING NOTE
Prior to immunization administration, verified patients identity using patient s name and date of birth. Please see Immunization Activity for additional information.     Screening Questionnaire for Adult Immunization    Are you sick today?   No   Do you have allergies to medications, food, a vaccine component or latex?   No   Have you ever had a serious reaction after receiving a vaccination?   No   Do you have a long-term health problem with heart disease, lung disease, asthma, kidney disease, metabolic disease (e.g. diabetes), anemia, or other blood disorder?   No   Do you have cancer, leukemia, HIV/AIDS, or any other immune system problem?   No   In the past 3 months, have you taken medications that affect  your immune system, such as prednisone, other steroids, or anticancer drugs; drugs for the treatment of rheumatoid arthritis, Crohn s disease, or psoriasis; or have you had radiation treatments?   No   Have you had a seizure, or a brain or other nervous system problem?   No   During the past year, have you received a transfusion of blood or blood     products, or been given immune (gamma) globulin or antiviral drug?   No   For women: Are you pregnant or is there a chance you could become        pregnant during the next month?   No   Have you received any vaccinations in the past 4 weeks?   No     Immunization questionnaire answers were all negative.        Per orders of Dr. Tracy, injection of Flu HD given by Sheila Peguero CMA. Patient instructed to remain in clinic for 15 minutes afterwards, and to report any adverse reaction to me immediately.     Sheila Peguero CMA on 10/3/2019 at 10:20 AM    Screening performed by Sheila Peguero CMA on 10/3/2019 at 10:20 AM.

## 2019-10-03 NOTE — PROGRESS NOTES
Subjective     Catalina Marie is a 74 year old female who presents to clinic today for the following health issues:    HPI   Patient presents to clinic for follow up. The pt notes that she is unhappy with her INR reports. She states that she is taking coumadin 5 mg on Monday, Wednesday and Friday and 2.5 mg the rest of the days.     The pt notes that she has a dental cleaning coming up in [place].     The pt inquires of her heart conditions. She states that the swelling in her LE have improved. She has also lost 6 lbs since LOV. She notes that she has cut out carbohydrates.     She inquires of her BP. She states that she has been drinking 8 glasses of beverage per day. She notes that he SBP have been 98 and lower at times.     Pt suspects that her hoarseness is due to her ipratropium nasal spray. She demonstrates that she she sprays the medication into both nostrils and then will tilt her head back.       The pt suspects a wart on her right forearm. She also notes theres one on her back and chest.     Patient Active Problem List   Diagnosis     Long term current use of anticoagulant therapy     Atrial fibrillation (H) [I48.91]     Benign essential hypertension     Type 2 diabetes mellitus without complication, without long-term current use of insulin (H)     Adjustment disorder with anxious mood     Hyperlipidemia LDL goal <100     CKD (chronic kidney disease) stage 3, GFR 30-59 ml/min (H)     Overdose, accidental or unintentional, initial encounter     Malignant neoplasm of breast in female, estrogen receptor positive, unspecified laterality, unspecified site of breast (H)     H/O bilateral mastectomy     Acute diastolic congestive heart failure (H)     Nontoxic multinodular goiter     Past Surgical History:   Procedure Laterality Date     BREAST SURGERY       COLONOSCOPY       EYE SURGERY       HEAD & NECK SURGERY      brain tumor resection     PHACOEMULSIFICATION CLEAR CORNEA WITH STANDARD INTRAOCULAR LENS  IMPLANT Right 7/6/2016    Procedure: PHACOEMULSIFICATION CLEAR CORNEA WITH STANDARD INTRAOCULAR LENS IMPLANT;  Surgeon: Aiden Lee MD;  Location: [facility]       Social History     Tobacco Use     Smoking status: Former Smoker     Smokeless tobacco: Never Used   Substance Use Topics     Alcohol use: Yes     Alcohol/week: 0.0 standard drinks     No family history on file.      Current Outpatient Medications   Medication Sig Dispense Refill     atenolol (TENORMIN) 25 MG tablet Take 1 tablet (25 mg) by mouth 2 times daily 180 tablet 3     atorvastatin (LIPITOR) 10 MG tablet TAKE 1/2 TABLET BY MOUTH EVERY DAY 45 tablet 0     Biotin 3 MG TABS Take by mouth daily (unknown dose)       Calcium Carbonate-Vitamin D (CALCIUM + D PO) Take by mouth daily       carboxymethylcellulose (CELLUVISC/REFRESH LIQUIGEL) 1 % ophthalmic solution Place 1 drop into both eyes 4 times daily as needed for dry eyes       digoxin (LANOXIN) 250 MCG tablet TAKE 0.5 TABLETS (125 MCG) BY MOUTH DAILY 45 tablet 0     fenofibrate (TRIGLIDE/LOFIBRA) 160 MG tablet TAKE 1 TABLET BY MOUTH EVERY DAY 90 tablet 3     furosemide (LASIX) 20 MG tablet TAKE 1 TABLET(20 MG) BY MOUTH TWICE DAILY 60 tablet 11     ipratropium (ATROVENT) 0.06 % nasal spray Spray 2 sprays into both nostrils 4 times daily 45 mL 3     lisinopril (PRINIVIL/ZESTRIL) 40 MG tablet TAKE 1 TABLET BY MOUTH EVERY DAY 90 tablet 0     propafenone (RYTHMOL) 150 MG TABS tablet Take 1 tablet three times daily at Morning, Dinner, Bedtime 270 tablet 3     sertraline (ZOLOFT) 100 MG tablet Take 1 tablet (100 mg) by mouth daily 90 tablet 2     triamcinolone (KENALOG) 0.1 % external cream Apply topically 2 times daily Sig: Apply sparingly to right lateral foot daily 30 g 1     verapamil ER (VERELAN) 240 MG 24 hr capsule TAKE ONE CAPSULE BY MOUTH DAILY 60 capsule 3     warfarin (COUMADIN) 5 MG tablet TAKE 1/2 TO 1 TABLET BY MOUTH DAILY OR AS DIRECTED BY INR CLINIC 90 tablet 3     Allergies   Allergen  Reactions     Penicillins        Reviewed and updated as needed this visit by Provider         Review of Systems   ROS COMP: Constitutional, HEENT, cardiovascular, pulmonary, gi and gu systems are negative, except as otherwise noted.    This document serves as a record of the services and decisions personally performed and made by Ian Tracy MD. It was created on his behalf by Giles Javier, a trained medical scribe. The creation of this document is based on the provider's statements to the medical scribe.  Giles Javier October 3, 2019 9:25 AM          Objective    /86 (BP Location: Right arm, Patient Position: Sitting, Cuff Size: Adult Regular)   Pulse 72   Temp 97.9  F (36.6  C) (Oral)   Wt 60.8 kg (134 lb)   SpO2 96%   Breastfeeding? No   BMI 23.00 kg/m    Body mass index is 23 kg/m .     Physical Exam   Neck thyroid gland has multiple nodules with minor thyromegaly, neck was supple without adenopathy  thyromegaly her carotids were normal without bruits  Chest clear to auscultation and percussion  Cardiovascular irregularly irregular S1 and S2 are physiologic without murmurs or gallops  Abdomen bowel sounds were normal.  There is no palpable mass or organomegaly  Extremities nontender without any edema  Pulses pedal pulses are as described otherwise his pulses are bilaterally symmetrical throughout without bruits  Skin multiple SK's with minor excoriations that were being irritated with clothing   Feet clean dry and scaly     Procedure Notes:   1. The pt's SK on her right forearm was treated with liquid nitrogen x3  2.The SK on the pt's left upper back was treated with liquid nitrogen x3   3. The pt had 2 SK's on her left superior anterior chest which were treated with liquid nitrogen x3    Diagnostic Test Results:  Labs reviewed in Epic  No results found for this or any previous visit (from the past 24 hour(s)).        Assessment & Plan     Type 2 diabetes mellitus without complication, without  long-term current use of insulin (H)  Rechecking A1c levels and the pt will be notified of her results   - Hemoglobin A1c    Acute diastolic congestive heart failure (H)  Reminded the pt to monitor her fluid intake. She should not have more than 8 glasses of beverage a day.   - Basic metabolic panel  - BNP-N terminal pro    Chronic atrial fibrillation  Will be scheduling the pt for an echocardiogram    H/O bilateral mastectomy      CKD (chronic kidney disease) stage 3, GFR 30-59 ml/min (H)    - Basic metabolic panel  - CBC with platelets    Malignant neoplasm of breast in female, estrogen receptor positive, unspecified laterality, unspecified site of breast (H)      Benign essential hypertension  Well controlled with current therapies  - Basic metabolic panel  - CBC with platelets  - digoxin (LANOXIN) 250 MCG tablet  Dispense: 90 tablet; Refill: 3    Adjustment disorder with anxious mood      Nontoxic multinodular goiter      Hyperlipidemia LDL goal <100    - atorvastatin (LIPITOR) 10 MG tablet  Dispense: 45 tablet; Refill: 3    Essential hypertension    - furosemide (LASIX) 20 MG tablet  Dispense: 180 tablet; Refill: 3    Need for prophylactic vaccination and inoculation against influenza    - INFLUENZA (HIGH DOSE) 3 VALENT VACCINE [54408]  - ADMIN INFLUENZA (For MEDICARE Patients ONLY) []           FUTURE APPOINTMENTS:       - Follow-up visit in 6 mo    No follow-ups on file.    The information in this document, created by the medical scribe for me, accurately reflects the services I personally performed and the decisions made by me. I have reviewed and approved this document for accuracy prior to leaving the patient care area.  October 3, 2019 10:31 AM    Ian Tracy MD  [facility]

## 2019-10-04 ENCOUNTER — TRANSFERRED RECORDS (OUTPATIENT)
Dept: HEALTH INFORMATION MANAGEMENT | Facility: CLINIC | Age: 74
End: 2019-10-04

## 2019-10-04 LAB — EJECTION FRACTION: 65

## 2019-10-04 NOTE — PROGRESS NOTES
SUBJECTIVE:     Chief Complaint   Patient presents with     Urgent Care     Laceration     left shin laceration,fell down bleachers.     Catalina Marie is a 74 year old female who presents to the clinic with a laceration on the right lower leg sustained 1 hour(s) ago.  This is a non-work related injury.    Mechanism of injury: fall down bleachers .    Associated symptoms: Denies numbness, weakness, or loss of function  Last tetanus booster within 10 years: yes    EXAM:   The patient appears today in alert,no apparent distress distress  VITALS: BP (!) 144/90   Pulse 64   Temp 97.9  F (36.6  C) (Oral)   Wt 60.8 kg (134 lb)   SpO2 96%   BMI 23.00 kg/m      Size of laceration: 2 centimeters superficial abrasion on the anterior aspect of the rt lower leg   Characteristics of the laceration: active bleeding  Tendon function intact: not applicable  Sensation to light touch intact: yes  Pulses intact: not applicable  Picture included in patient's chart: no    Assessment:  Laceration of right lower extremity, initial encounter    PLAN:  PROCEDURE NOTE::    Wound cleaned with HIBICLENS  Wound cleaned with sterile water  As there is no deep laceration the area was cleaned thoroughly and then nonadherent dressing was applied to the area   Wrapped with Coban after applying bacitracin ointment after care instructions:  Keep wound clean and dry for the next 24-48 hours  Signs of infection discussed today  Apply anti-bacterial ointment for 3 days  May return to work as long as wound is kept clean and dry  Discussed the probability of scarring  I did review with patient that I am not worried about any fracture of the bone at this point but if notices any worsening pain while walking with the next 3 to 4 days might consider doing an x-ray  Patient was given some supply of nonadherent dressing and paper tape to help with doing further dressing at home  Petra Castro MD

## 2019-10-10 ENCOUNTER — ANTICOAGULATION THERAPY VISIT (OUTPATIENT)
Dept: NURSING | Facility: CLINIC | Age: 74
End: 2019-10-10
Payer: MEDICARE

## 2019-10-10 ENCOUNTER — TELEPHONE (OUTPATIENT)
Dept: FAMILY MEDICINE | Facility: CLINIC | Age: 74
End: 2019-10-10

## 2019-10-10 DIAGNOSIS — I48.20 CHRONIC ATRIAL FIBRILLATION (H): ICD-10-CM

## 2019-10-10 DIAGNOSIS — I48.20 CHRONIC ATRIAL FIBRILLATION (H): Primary | ICD-10-CM

## 2019-10-10 DIAGNOSIS — Z79.01 LONG TERM CURRENT USE OF ANTICOAGULANT THERAPY: ICD-10-CM

## 2019-10-10 LAB — INR POINT OF CARE: 2.6 (ref 0.86–1.14)

## 2019-10-10 PROCEDURE — 85610 PROTHROMBIN TIME: CPT | Mod: QW

## 2019-10-10 PROCEDURE — 99207 ZZC NO CHARGE NURSE ONLY: CPT

## 2019-10-10 PROCEDURE — 36416 COLLJ CAPILLARY BLOOD SPEC: CPT

## 2019-10-10 NOTE — PROGRESS NOTES
ANTICOAGULATION FOLLOW-UP CLINIC VISIT    Patient Name:  Catalina Marie  Date:  10/10/2019  Contact Type:  Face to Face    SUBJECTIVE:  Patient Findings     Comments:   The patient was assessed for diet, medication, and activity level changes, missed or extra doses, bruising or bleeding, with no problem findings.          Clinical Outcomes     Negatives:   Major bleeding event, Thromboembolic event, Anticoagulation-related hospital admission, Anticoagulation-related ED visit, Anticoagulation-related fatality    Comments:   The patient was assessed for diet, medication, and activity level changes, missed or extra doses, bruising or bleeding, with no problem findings.             OBJECTIVE    INR Protime   Date Value Ref Range Status   10/10/2019 2.6 (A) 0.86 - 1.14 Final       ASSESSMENT / PLAN  No question data found.  Anticoagulation Summary  As of 10/10/2019    INR goal:   2.0-3.0   TTR:   68.7 % (3 y)   INR used for dosin.6 (10/10/2019)   Warfarin maintenance plan:   5 mg (5 mg x 1) every Mon, Wed, Fri; 2.5 mg (5 mg x 0.5) all other days   Full warfarin instructions:   5 mg every Mon, Wed, Fri; 2.5 mg all other days   Weekly warfarin total:   25 mg   Plan last modified:   Jazlyn Ward RN (10/10/2019)   Next INR check:   10/23/2019   Priority:   INR   Target end date:       Indications    Long term current use of anticoagulant therapy [Z79.01]  Atrial fibrillation (H) [I48.91] [I48.91]             Anticoagulation Episode Summary     INR check location:       Preferred lab:       Send INR reminders to:   DEMOND FRANCO    Comments:   Winter: FAXED INR results from [facility] in [place], [place]. Phone: [phone].  General Results#: [phone]  Call pt's cell in [place] with results/plan: [phone]      Anticoagulation Care Providers     Provider Role Specialty Phone number    Ian Tracy MD Referring Internal Medicine [phone]            See the Encounter Report to view Anticoagulation Flowsheet  and Dosing Calendar (Go to Encounters tab in chart review, and find the Anticoagulation Therapy Visit)    Patient did not decrease dose as advised at last visit.  Has remained on 5 MG M/W/F and 2.56 MG all other days.  Will keep dosing the same since patient is therapeutic.  Patient leaves for [place] on 10/25/19 and will check prior to leaving. Patient dose have a physician in [place] and is thinking of seeing them for monitoring while gone, will decide by next appointment.  Otherwise can send a snowbird letter with patient for monitoring.     Jazlyn Ward RN

## 2019-10-11 DIAGNOSIS — I48.0 PAROXYSMAL ATRIAL FIBRILLATION (H): ICD-10-CM

## 2019-10-11 RX ORDER — WARFARIN SODIUM 5 MG/1
TABLET ORAL
Qty: 90 TABLET | Refills: 0 | Status: SHIPPED | OUTPATIENT
Start: 2019-10-11

## 2019-10-11 NOTE — TELEPHONE ENCOUNTER
"     warfarin (COUMADIN) 5 MG tablet 90 tablet 3 8/1/2018         Last Written Prescription Date:  08/01/2018  Last Fill Quantity: 90,  # refills: 3   Last office visit: 10/3/2019 with prescribing provider:     Future Office Visit:  Unknown    Requested Prescriptions   Pending Prescriptions Disp Refills     warfarin ANTICOAGULANT (COUMADIN) 5 MG tablet [Pharmacy Med Name: WARFARIN SODIUM 5 MG TABLET] 90 tablet 1     Sig: TAKE 1/2 TO 1 TABLET EVERY DAY OR AS DIRECTED BY INR CLINIC       Vitamin K Antagonists Failed - 10/11/2019  1:33 AM        Failed - INR is within goal in the past 6 weeks     Confirm INR is within goal in the past 6 weeks.     Recent Labs   Lab Test 10/10/19   INR 2.6*                       Failed - Medication is active on med list        Passed - Recent (12 mo) or future (30 days) visit within the authorizing provider's specialty     Patient has had an office visit with the authorizing provider or a provider within the authorizing providers department within the previous 12 mos or has a future within next 30 days. See \"Patient Info\" tab in inbasket, or \"Choose Columns\" in Meds & Orders section of the refill encounter.              Passed - Patient is 18 years of age or older        Passed - Patient is not pregnant        Passed - No positive pregnancy on file in past 12 months          "

## 2019-10-11 NOTE — TELEPHONE ENCOUNTER
Prescription approved per [facility] Refill Protocol.  Maylin HOWARD RN

## 2019-10-16 ENCOUNTER — MYC MEDICAL ADVICE (OUTPATIENT)
Dept: FAMILY MEDICINE | Facility: CLINIC | Age: 74
End: 2019-10-16

## 2019-10-16 DIAGNOSIS — Z79.01 LONG TERM CURRENT USE OF ANTICOAGULANT THERAPY: Primary | ICD-10-CM

## 2019-10-16 DIAGNOSIS — I48.91 ATRIAL FIBRILLATION (H): ICD-10-CM

## 2019-10-16 NOTE — TELEPHONE ENCOUNTER
Routing to INR department to place standing order for INR to give to pt (see MyChart message below).    Arturo RANKIN RN

## 2019-10-16 NOTE — TELEPHONE ENCOUNTER
INR order placed and sent to Dr. Ram for co-sign, then INR office can fax to [facility], info in [facility] episode information.    Елена Llanos, PharmD BCACP  Anticoagulation Clinical Pharmacist

## 2019-10-17 ENCOUNTER — MYC MEDICAL ADVICE (OUTPATIENT)
Dept: FAMILY MEDICINE | Facility: CLINIC | Age: 74
End: 2019-10-17

## 2019-10-21 ENCOUNTER — TRANSFERRED RECORDS (OUTPATIENT)
Dept: HEALTH INFORMATION MANAGEMENT | Facility: CLINIC | Age: 74
End: 2019-10-21

## 2019-10-21 LAB — RETINOPATHY: NORMAL

## 2019-10-21 NOTE — TELEPHONE ENCOUNTER
INR order faxed to [email or web address] in [place], [place] on file in Jennifer's episode.    Елена Llanos, PharmD BCACP  Anticoagulation Clinical Pharmacist

## 2019-10-23 ENCOUNTER — ANTICOAGULATION THERAPY VISIT (OUTPATIENT)
Dept: NURSING | Facility: CLINIC | Age: 74
End: 2019-10-23
Payer: MEDICARE

## 2019-10-23 DIAGNOSIS — I48.20 CHRONIC ATRIAL FIBRILLATION (H): ICD-10-CM

## 2019-10-23 LAB — INR POINT OF CARE: 2.5 (ref 0.86–1.14)

## 2019-10-23 PROCEDURE — 36416 COLLJ CAPILLARY BLOOD SPEC: CPT

## 2019-10-23 PROCEDURE — 99207 ZZC NO CHARGE NURSE ONLY: CPT

## 2019-10-23 PROCEDURE — 85610 PROTHROMBIN TIME: CPT | Mod: QW

## 2019-10-23 NOTE — TELEPHONE ENCOUNTER
"verapamil ER (VERELAN) 240 MG 24 hr capsule 60 capsule 3 7/29/2019  No   Sig: TAKE ONE CAPSULE BY MOUTH DAILY     Last Written Prescription Date:  07/29/2019  Last Fill Quantity: 60,  # refills: 3   Last office visit: 10/3/2019 with prescribing provider:     Future Office Visit:   Next 5 appointments (look out 90 days)    Oct 24, 2019  7:30 AM CDT  [facility] [address] [address]  [phone]   Dec 26, 2019  1:30 PM CST  Office Visit with Ian Tracy MD  [facility] ([facility] [address] [address] [phone]         Requested Prescriptions   Pending Prescriptions Disp Refills     verapamil ER (VERELAN) 240 MG 24 hr capsule [Pharmacy Med Name: VERAPAMIL  MG CAPSULE] 60 capsule 3     Sig: TAKE ONE CAPSULE BY MOUTH DAILY       Calcium Channel Blockers Protocol  Failed - 10/23/2019  1:34 PM        Failed - Blood pressure under 140/90 in past 12 months     BP Readings from Last 3 Encounters:   10/03/19 (!) 144/90   10/03/19 128/86   07/22/19 123/85                 Failed - Normal serum creatinine on file in past 12 months     Recent Labs   Lab Test 10/03/19  1045   CR 1.05*             Passed - Normal ALT in past 12 months     Recent Labs   Lab Test 07/15/19  1600   ALT 32             Passed - Recent (12 mo) or future (30 days) visit within the authorizing provider's specialty     Patient has had an office visit with the authorizing provider or a provider within the authorizing providers department within the previous 12 mos or has a future within next 30 days. See \"Patient Info\" tab in inbasket, or \"Choose Columns\" in Meds & Orders section of the refill encounter.              Passed - Medication is active on med list        Passed - Patient is age 18 or older        Passed - No active pregnancy on record        Passed - No positive pregnancy test in past 12 months "

## 2019-10-23 NOTE — PROGRESS NOTES
ANTICOAGULATION FOLLOW-UP CLINIC VISIT    Patient Name:  Catalina Marie  Date:  10/23/2019  Contact Type:  Face to Face    SUBJECTIVE:  Patient Findings     Comments:   The patient was assessed for diet, medication, and activity level changes, missed or extra doses, bruising or bleeding, with no problem findings.        Clinical Outcomes     Negatives:   Major bleeding event, Thromboembolic event, Anticoagulation-related hospital admission, Anticoagulation-related ED visit, Anticoagulation-related fatality    Comments:   The patient was assessed for diet, medication, and activity level changes, missed or extra doses, bruising or bleeding, with no problem findings.           OBJECTIVE    INR Protime   Date Value Ref Range Status   10/23/2019 2.5 (A) 0.86 - 1.14 Final       ASSESSMENT / PLAN  INR assessment THER    Recheck INR In: 4 WEEKS    INR Location Clinic      Anticoagulation Summary  As of 10/23/2019    INR goal:   2.0-3.0   TTR:   69.0 % (3.1 y)   INR used for dosin.5 (10/23/2019)   Warfarin maintenance plan:   5 mg (5 mg x 1) every Mon, Wed, Fri; 2.5 mg (5 mg x 0.5) all other days   Full warfarin instructions:   5 mg every Mon, Wed, Fri; 2.5 mg all other days   Weekly warfarin total:   25 mg   No change documented:   Nayeli Davison RN   Plan last modified:   Jazlyn Ward RN (10/10/2019)   Next INR check:   2019   Priority:   INR   Target end date:       Indications    Long term current use of anticoagulant therapy [Z79.01]  Atrial fibrillation (H) [I48.91] [I48.91]             Anticoagulation Episode Summary     INR check location:       Preferred lab:       Send INR reminders to:   DEMOND FRANCO    Comments:   Winter: FAXED INR results from [facility] in [place], [place]. Phone: [phone].  General Results#: [phone]  Call pt's cell in [place] with results/plan: [phone]      Anticoagulation Care Providers     Provider Role Specialty Phone number    Ian Tracy MD Referring  Internal Medicine [phone]            See the Encounter Report to view Anticoagulation Flowsheet and Dosing Calendar (Go to Encounters tab in chart review, and find the Anticoagulation Therapy Visit)    Pt INR is 2.5 today. Pt advised to continue taking 5 mg on Mondays, Wednesdays, Fridays and 2.5 mg all the other days. Pt leaving for [place] on Friday for the winter. Pt given the standing INR lab order along with a letter if she decides to go through [facility] for INR monitoring. Results to be faxed to central INR for dosing. Pt states that she has a doctor() in [place] that might be able to do the INR monitoring. Pt advised how to proceed with either option. Pt will recheck her INR in about 4 weeks.  asked to see pt after the [facility] visit. Pt seeing him now. Jennifer aware if signs of clotting (pain, tenderness, swelling, color change in leg or arm, SOB) and bleeding occur (blood in stool, urine, large bruising, bleeding gums, nosebleeds) to have INR check sooner. If sx severe report to ER or concerned for stroke call 911. If general questions or concerns arise, call clinic.         Nayeli Davison RN

## 2019-10-24 ENCOUNTER — OFFICE VISIT (OUTPATIENT)
Dept: FAMILY MEDICINE | Facility: CLINIC | Age: 74
End: 2019-10-24
Payer: MEDICARE

## 2019-10-24 VITALS
HEIGHT: 64 IN | BODY MASS INDEX: 22.88 KG/M2 | DIASTOLIC BLOOD PRESSURE: 77 MMHG | SYSTOLIC BLOOD PRESSURE: 184 MMHG | WEIGHT: 134 LBS | HEART RATE: 54 BPM | TEMPERATURE: 97.6 F | OXYGEN SATURATION: 95 %

## 2019-10-24 DIAGNOSIS — L57.0 ACTINIC KERATOSIS: ICD-10-CM

## 2019-10-24 DIAGNOSIS — Z51.81 MEDICATION MONITORING ENCOUNTER: Primary | ICD-10-CM

## 2019-10-24 PROCEDURE — 99212 OFFICE O/P EST SF 10 MIN: CPT | Mod: 25 | Performed by: INTERNAL MEDICINE

## 2019-10-24 ASSESSMENT — MIFFLIN-ST. JEOR: SCORE: 1082.82

## 2019-10-24 NOTE — TELEPHONE ENCOUNTER
Routing refill request to provider for review/approval because:  Pt was in apt today but doesn't look like med was ordered.  Please authorize if appropriate.  Thanks,  Eufemia Rodriguez RN

## 2019-10-24 NOTE — PROGRESS NOTES
Subjective      Catalina Marie is a 74 year old female who presents to clinic today for the following health issues:    HPI   The pt presents to the clinic for a f/u on a facial lesion and other skin changes.       Patient Active Problem List   Diagnosis     Long term current use of anticoagulant therapy     Atrial fibrillation (H) [I48.91]     Benign essential hypertension     Type 2 diabetes mellitus without complication, without long-term current use of insulin (H)     Adjustment disorder with anxious mood     Hyperlipidemia LDL goal <100     CKD (chronic kidney disease) stage 3, GFR 30-59 ml/min (H)     Overdose, accidental or unintentional, initial encounter     Malignant neoplasm of breast in female, estrogen receptor positive, unspecified laterality, unspecified site of breast (H)     H/O bilateral mastectomy     Acute diastolic congestive heart failure (H)     Nontoxic multinodular goiter     Past Surgical History:   Procedure Laterality Date     BREAST SURGERY       COLONOSCOPY       EYE SURGERY       HEAD & NECK SURGERY      brain tumor resection     PHACOEMULSIFICATION CLEAR CORNEA WITH STANDARD INTRAOCULAR LENS IMPLANT Right 7/6/2016    Procedure: PHACOEMULSIFICATION CLEAR CORNEA WITH STANDARD INTRAOCULAR LENS IMPLANT;  Surgeon: Aiden Lee MD;  [place] [facility]       Social History     Tobacco Use     Smoking status: Former Smoker     Smokeless tobacco: Never Used   Substance Use Topics     Alcohol use: Yes     Alcohol/week: 0.0 standard drinks     No family history on file.      Current Outpatient Medications   Medication Sig Dispense Refill     atenolol (TENORMIN) 25 MG tablet Take 1 tablet (25 mg) by mouth 2 times daily 180 tablet 3     atorvastatin (LIPITOR) 10 MG tablet Take 0.5 tablets (5 mg) by mouth daily 45 tablet 3     Biotin 3 MG TABS Take by mouth daily (unknown dose)       Calcium Carbonate-Vitamin D (CALCIUM + D PO) Take by mouth daily       carboxymethylcellulose  "(CELLUVISC/REFRESH LIQUIGEL) 1 % ophthalmic solution Place 1 drop into both eyes 4 times daily as needed for dry eyes       digoxin (LANOXIN) 250 MCG tablet Take 0.5 tablets (125 mcg) by mouth daily 90 tablet 3     fenofibrate (TRIGLIDE/LOFIBRA) 160 MG tablet TAKE 1 TABLET BY MOUTH EVERY DAY 90 tablet 3     furosemide (LASIX) 20 MG tablet Sig 1 po bid 180 tablet 3     ipratropium (ATROVENT) 0.06 % nasal spray Spray 2 sprays into both nostrils 4 times daily 45 mL 3     lisinopril (PRINIVIL/ZESTRIL) 40 MG tablet TAKE 1 TABLET BY MOUTH EVERY DAY 90 tablet 0     propafenone (RYTHMOL) 150 MG TABS tablet Take 1 tablet three times daily at Morning, Dinner, Bedtime 270 tablet 3     sertraline (ZOLOFT) 100 MG tablet Take 1 tablet (100 mg) by mouth daily 90 tablet 2     triamcinolone (KENALOG) 0.1 % external cream Apply topically 2 times daily Sig: Apply sparingly to right lateral foot daily 30 g 1     verapamil ER (VERELAN) 240 MG 24 hr capsule TAKE ONE CAPSULE BY MOUTH DAILY 60 capsule 3     warfarin ANTICOAGULANT (COUMADIN) 5 MG tablet TAKE 1/2 TO 1 TABLET EVERY DAY OR AS DIRECTED BY INR CLINIC 90 tablet 0     Allergies   Allergen Reactions     Penicillins          Reviewed and updated as needed this visit by Provider         Review of Systems   ROS COMP: Constitutional, HEENT, cardiovascular, pulmonary, gi and gu systems are negative, except as otherwise noted.    This document serves as a record of the services and decisions personally performed and made by Ian Tracy MD. It was created on his behalf by Giles Javier, a trained medical scribe. The creation of this document is based on the provider's statements to the medical scribe.  Giles Javier October 24, 2019 7:38 AM          Objective    BP (!) 184/77 (BP Location: Left arm, Patient Position: Chair, Cuff Size: Adult Regular)   Pulse 54   Temp 97.6  F (36.4  C) (Tympanic)   Ht 1.626 m (5' 4\")   Wt 60.8 kg (134 lb)   SpO2 95%   BMI 23.00 kg/m    Body mass " index is 23 kg/m .     BP Recheck   130/70    Physical Exam   Face multiple lentigo's and on her left cheek there is an area of erythematous skin without ulcers or scaliness         Diagnostic Test Results:  Labs reviewed in Epic  No results found for this or any previous visit (from the past 24 hour(s)).        Assessment & Plan     Recommended that we continue to monitor the pt's facial skin changes and to reevaluate in 6 months.     Medication monitoring encounter    - Digoxin level  Actinic keratosis  Reevaluate in 2 months    FUTURE APPOINTMENTS:       - Follow-up visit in 2 months     Return in about 2 months (around 12/24/2019) for Follow Up.    The information in this document, created by the medical scribe for me, accurately reflects the services I personally performed and the decisions made by me. I have reviewed and approved this document for accuracy prior to leaving the patient care area.  October 24, 2019 8:06 AM    Ian Tracy MD  [facility]

## 2019-10-25 RX ORDER — VERAPAMIL HYDROCHLORIDE 240 MG/1
CAPSULE, EXTENDED RELEASE ORAL
Qty: 60 CAPSULE | Refills: 3 | Status: SHIPPED | OUTPATIENT
Start: 2019-10-25 | End: 2020-07-14

## 2019-10-27 ENCOUNTER — HEALTH MAINTENANCE LETTER (OUTPATIENT)
Age: 74
End: 2019-10-27

## 2019-11-07 DIAGNOSIS — F43.22 ADJUSTMENT DISORDER WITH ANXIOUS MOOD: ICD-10-CM

## 2019-11-07 NOTE — TELEPHONE ENCOUNTER
"sertraline (ZOLOFT) 100 MG tablet 90 tablet 2 2/21/2019     Last Written Prescription Date:  2/21/2019  Last Fill Quantity: 90,  # refills: 2   Last office visit: 10/24/2019 with prescribing provider: NERISSA Tracy    Future Office Visit:   Next 5 appointments (look out 90 days)    Dec 26, 2019  1:30 PM CST  [facility] [address] [facility] [id number] [phone]         Requested Prescriptions   Pending Prescriptions Disp Refills     sertraline (ZOLOFT) 100 MG tablet [Pharmacy Med Name: SERTRALINE  MG TABLET] 90 tablet 1     Sig: TAKE 1 TABLET BY MOUTH EVERY DAY       SSRIs Protocol Passed - 11/7/2019 12:59 AM        Passed - Recent (12 mo) or future (30 days) visit within the authorizing provider's specialty     Patient has had an office visit with the authorizing provider or a provider within the authorizing providers department within the previous 12 mos or has a future within next 30 days. See \"Patient Info\" tab in inbasket, or \"Choose Columns\" in Meds & Orders section of the refill encounter.              Passed - Medication is active on med list        Passed - Patient is age 18 or older        Passed - No active pregnancy on record        Passed - No positive pregnancy test in last 12 months          "

## 2019-11-08 RX ORDER — SERTRALINE HYDROCHLORIDE 100 MG/1
TABLET, FILM COATED ORAL
Qty: 90 TABLET | Refills: 0 | Status: SHIPPED | OUTPATIENT
Start: 2019-11-08 | End: 2020-02-06

## 2019-11-08 NOTE — TELEPHONE ENCOUNTER
Prescription approved per [facility] Refill Protocol.  Karuna Antonio RN

## 2019-11-21 ENCOUNTER — TRANSFERRED RECORDS (OUTPATIENT)
Dept: HEALTH INFORMATION MANAGEMENT | Facility: CLINIC | Age: 74
End: 2019-11-21

## 2019-11-21 LAB — INR PPP: 2.6 (ref 0.8–1.3)

## 2019-11-26 DIAGNOSIS — I48.0 PAROXYSMAL ATRIAL FIBRILLATION (H): ICD-10-CM

## 2019-11-27 ENCOUNTER — TRANSFERRED RECORDS (OUTPATIENT)
Dept: HEALTH INFORMATION MANAGEMENT | Facility: CLINIC | Age: 74
End: 2019-11-27

## 2019-11-27 DIAGNOSIS — I10 BENIGN ESSENTIAL HYPERTENSION: ICD-10-CM

## 2019-11-27 RX ORDER — ATENOLOL 25 MG/1
25 TABLET ORAL 2 TIMES DAILY
Qty: 180 TABLET | Refills: 1 | Status: SHIPPED | OUTPATIENT
Start: 2019-11-27 | End: 2020-05-19

## 2019-11-27 NOTE — TELEPHONE ENCOUNTER
"Last Written Prescription Date:  6/15/18  Last Fill Quantity: 180,  # refills: 3   Last office visit: 10/24/2019 with prescribing provider:  10/24/19   Future Office Visit:   Next 5 appointments (look out 90 days)    Dec 26, 2019  1:30 PM CST  [facility] Visit with [facility] MD  [facility] [address] [facility] [id number] [phone]         Requested Prescriptions   Pending Prescriptions Disp Refills     atenolol (TENORMIN) 25 MG tablet 180 tablet 3     Sig: Take 1 tablet (25 mg) by mouth 2 times daily       Beta-Blockers Protocol Failed - 11/26/2019  6:54 PM        Failed - Blood pressure under 140/90 in past 12 months     BP Readings from Last 3 Encounters:   10/24/19 (!) 184/77   10/03/19 (!) 144/90   10/03/19 128/86                 Passed - Patient is age 6 or older        Passed - Recent (12 mo) or future (30 days) visit within the authorizing provider's specialty     Patient has had an office visit with the authorizing provider or a provider within the authorizing providers department within the previous 12 mos or has a future within next 30 days. See \"Patient Info\" tab in inbasket, or \"Choose Columns\" in Meds & Orders section of the refill encounter.              Passed - Medication is active on med list          "

## 2019-11-27 NOTE — TELEPHONE ENCOUNTER
Last Written Prescription Date:  9/6/19  Last Fill Quantity: 90,  # refills: 0   Last office visit: 10/24/2019 with prescribing provider:     Future Office Visit:   Next 5 appointments (look out 90 days)    Dec 26, 2019  1:30 PM CST  Office Visit with Ian Tracy MD  [facility] [address] [facility] [address]  [phone]         Requested Prescriptions   Pending Prescriptions Disp Refills     lisinopril (PRINIVIL/ZESTRIL) 40 MG tablet 90 tablet 0     Sig: Take 1 tablet (40 mg) by mouth daily       There is no refill protocol information for this order

## 2019-11-29 RX ORDER — LISINOPRIL 40 MG/1
40 TABLET ORAL DAILY
Qty: 90 TABLET | Refills: 3 | Status: SHIPPED | OUTPATIENT
Start: 2019-11-29 | End: 2020-11-25

## 2019-11-29 NOTE — TELEPHONE ENCOUNTER
"Routing refill request to provider for review/approval because:  Recent BP readings high   Creatinine out of range but is stable per last lab notes     Maylin HOWARD RN      Requested Prescriptions   Pending Prescriptions Disp Refills     lisinopril (PRINIVIL/ZESTRIL) 40 MG tablet 90 tablet 0     Sig: Take 1 tablet (40 mg) by mouth daily       ACE Inhibitors (Including Combos) Protocol Failed - 11/27/2019  1:31 PM        Failed - Blood pressure under 140/90 in past 12 months     BP Readings from Last 3 Encounters:   10/24/19 (!) 184/77   10/03/19 (!) 144/90   10/03/19 128/86                 Failed - Normal serum creatinine on file in past 12 months     Recent Labs   Lab Test 10/03/19  1045   CR 1.05*             Passed - Recent (12 mo) or future (30 days) visit within the authorizing provider's specialty     Patient has had an office visit with the authorizing provider or a provider within the authorizing providers department within the previous 12 mos or has a future within next 30 days. See \"Patient Info\" tab in inbasket, or \"Choose Columns\" in Meds & Orders section of the refill encounter.              Passed - Medication is active on med list        Passed - Patient is age 18 or older        Passed - No active pregnancy on record        Passed - Normal serum potassium on file in past 12 months     Recent Labs   Lab Test 10/03/19  1045   POTASSIUM 4.1             Passed - No positive pregnancy test within past 12 months          "

## 2019-12-26 ENCOUNTER — OFFICE VISIT (OUTPATIENT)
Dept: FAMILY MEDICINE | Facility: CLINIC | Age: 74
End: 2019-12-26
Payer: MEDICARE

## 2019-12-26 VITALS
DIASTOLIC BLOOD PRESSURE: 80 MMHG | OXYGEN SATURATION: 98 % | SYSTOLIC BLOOD PRESSURE: 130 MMHG | BODY MASS INDEX: 23.05 KG/M2 | WEIGHT: 135 LBS | TEMPERATURE: 97 F | HEART RATE: 70 BPM | HEIGHT: 64 IN

## 2019-12-26 DIAGNOSIS — E11.22 TYPE 2 DIABETES MELLITUS WITH STAGE 3 CHRONIC KIDNEY DISEASE, WITHOUT LONG-TERM CURRENT USE OF INSULIN (H): ICD-10-CM

## 2019-12-26 DIAGNOSIS — Z17.0 MALIGNANT NEOPLASM OF BREAST IN FEMALE, ESTROGEN RECEPTOR POSITIVE, UNSPECIFIED LATERALITY, UNSPECIFIED SITE OF BREAST (H): ICD-10-CM

## 2019-12-26 DIAGNOSIS — I10 BENIGN ESSENTIAL HYPERTENSION: ICD-10-CM

## 2019-12-26 DIAGNOSIS — E11.9 TYPE 2 DIABETES MELLITUS WITHOUT COMPLICATION, WITHOUT LONG-TERM CURRENT USE OF INSULIN (H): ICD-10-CM

## 2019-12-26 DIAGNOSIS — F43.22 ADJUSTMENT DISORDER WITH ANXIOUS MOOD: ICD-10-CM

## 2019-12-26 DIAGNOSIS — C50.919 MALIGNANT NEOPLASM OF BREAST IN FEMALE, ESTROGEN RECEPTOR POSITIVE, UNSPECIFIED LATERALITY, UNSPECIFIED SITE OF BREAST (H): ICD-10-CM

## 2019-12-26 DIAGNOSIS — E78.5 HYPERLIPIDEMIA LDL GOAL <100: ICD-10-CM

## 2019-12-26 DIAGNOSIS — I50.31 ACUTE DIASTOLIC CONGESTIVE HEART FAILURE (H): Primary | ICD-10-CM

## 2019-12-26 DIAGNOSIS — N18.30 CKD (CHRONIC KIDNEY DISEASE) STAGE 3, GFR 30-59 ML/MIN (H): ICD-10-CM

## 2019-12-26 DIAGNOSIS — I48.0 PAROXYSMAL ATRIAL FIBRILLATION (H): ICD-10-CM

## 2019-12-26 DIAGNOSIS — E04.2 NONTOXIC MULTINODULAR GOITER: ICD-10-CM

## 2019-12-26 DIAGNOSIS — Z90.13 H/O BILATERAL MASTECTOMY: ICD-10-CM

## 2019-12-26 DIAGNOSIS — S46.011A STRAIN OF RIGHT ROTATOR CUFF CAPSULE, INITIAL ENCOUNTER: ICD-10-CM

## 2019-12-26 DIAGNOSIS — N18.30 TYPE 2 DIABETES MELLITUS WITH STAGE 3 CHRONIC KIDNEY DISEASE, WITHOUT LONG-TERM CURRENT USE OF INSULIN (H): ICD-10-CM

## 2019-12-26 LAB
ERYTHROCYTE [DISTWIDTH] IN BLOOD BY AUTOMATED COUNT: 13.4 % (ref 10–15)
HCT VFR BLD AUTO: 47.8 % (ref 35–47)
HGB BLD-MCNC: 15.7 G/DL (ref 11.7–15.7)
MCH RBC QN AUTO: 31.3 PG (ref 26.5–33)
MCHC RBC AUTO-ENTMCNC: 32.8 G/DL (ref 31.5–36.5)
MCV RBC AUTO: 95 FL (ref 78–100)
NT-PROBNP SERPL-MCNC: 904 PG/ML (ref 0–450)
PLATELET # BLD AUTO: 284 10E9/L (ref 150–450)
RBC # BLD AUTO: 5.02 10E12/L (ref 3.8–5.2)
WBC # BLD AUTO: 8.3 10E9/L (ref 4–11)

## 2019-12-26 PROCEDURE — 36415 COLL VENOUS BLD VENIPUNCTURE: CPT | Performed by: INTERNAL MEDICINE

## 2019-12-26 PROCEDURE — 99214 OFFICE O/P EST MOD 30 MIN: CPT | Performed by: INTERNAL MEDICINE

## 2019-12-26 PROCEDURE — 80048 BASIC METABOLIC PNL TOTAL CA: CPT | Performed by: INTERNAL MEDICINE

## 2019-12-26 PROCEDURE — 85027 COMPLETE CBC AUTOMATED: CPT | Performed by: INTERNAL MEDICINE

## 2019-12-26 PROCEDURE — 83880 ASSAY OF NATRIURETIC PEPTIDE: CPT | Performed by: INTERNAL MEDICINE

## 2019-12-26 ASSESSMENT — MIFFLIN-ST. JEOR: SCORE: 1087.36

## 2019-12-26 NOTE — PROGRESS NOTES
Subjective     Catalina Marie is a 74 year old female who presents to clinic today for the following health issues:    HPI   Medication Followup of Digoxin    Taking Medication as prescribed: yes    Side Effects:  None    Medication Helping Symptoms:  yes   Pt presents to the clinic for a medication f/u exam. The pt would like to have her ankles examined. She complains that she has spider veins on her ankles bilaterally.     She would also like to reevaluate her gluten-free diet. She notes that she was on a cruise and avoided all gluten products and had no bowel issues. When she returned home she was following a similar diet, but started experiencing diarrhea again. She notes that she found that almonds that she was eating had a warning label about being packaged in a factory with gluten products.      She complains of right shoulder pain. She states that she lifts light weights with no issues. She states that with forward reaching she experiences sharp pains in her shoulders.     She complains that she was having HA's over her left eye while she was on her cruise.     She notes that she walks 20-30 minutes 2-3x week. She notes that she is active in general.     Patient Active Problem List   Diagnosis     Long term current use of anticoagulant therapy     Atrial fibrillation (H) [I48.91]     Benign essential hypertension     Type 2 diabetes mellitus without complication, without long-term current use of insulin (H)     Adjustment disorder with anxious mood     Hyperlipidemia LDL goal <100     CKD (chronic kidney disease) stage 3, GFR 30-59 ml/min (H)     Overdose, accidental or unintentional, initial encounter     Malignant neoplasm of breast in female, estrogen receptor positive, unspecified laterality, unspecified site of breast (H)     H/O bilateral mastectomy     Acute diastolic congestive heart failure (H)     Nontoxic multinodular goiter     Past Surgical History:   Procedure Laterality Date     BREAST  SURGERY       COLONOSCOPY       EYE SURGERY       HEAD & NECK SURGERY      brain tumor resection     PHACOEMULSIFICATION CLEAR CORNEA WITH STANDARD INTRAOCULAR LENS IMPLANT Right 7/6/2016    Procedure: PHACOEMULSIFICATION CLEAR CORNEA WITH STANDARD INTRAOCULAR LENS IMPLANT;  Surgeon: Aiden Lee MD;  Location: [facility]       Social History     Tobacco Use     Smoking status: Former Smoker     Smokeless tobacco: Never Used   Substance Use Topics     Alcohol use: Yes     Alcohol/week: 0.0 standard drinks     No family history on file.      Current Outpatient Medications   Medication Sig Dispense Refill     atenolol (TENORMIN) 25 MG tablet Take 1 tablet (25 mg) by mouth 2 times daily 180 tablet 1     atorvastatin (LIPITOR) 10 MG tablet Take 0.5 tablets (5 mg) by mouth daily 45 tablet 3     Biotin 3 MG TABS Take by mouth daily (unknown dose)       Calcium Carbonate-Vitamin D (CALCIUM + D PO) Take by mouth daily       carboxymethylcellulose (CELLUVISC/REFRESH LIQUIGEL) 1 % ophthalmic solution Place 1 drop into both eyes 4 times daily as needed for dry eyes       digoxin (LANOXIN) 250 MCG tablet Take 0.5 tablets (125 mcg) by mouth daily 90 tablet 3     fenofibrate (TRIGLIDE/LOFIBRA) 160 MG tablet TAKE 1 TABLET BY MOUTH EVERY DAY 90 tablet 3     furosemide (LASIX) 20 MG tablet Sig 1 po bid 180 tablet 3     ipratropium (ATROVENT) 0.06 % nasal spray Spray 2 sprays into both nostrils 4 times daily 45 mL 3     lisinopril (PRINIVIL/ZESTRIL) 40 MG tablet Take 1 tablet (40 mg) by mouth daily 90 tablet 3     propafenone (RYTHMOL) 150 MG TABS tablet Take 1 tablet three times daily at Morning, Dinner, Bedtime 270 tablet 3     sertraline (ZOLOFT) 100 MG tablet TAKE 1 TABLET BY MOUTH EVERY DAY 90 tablet 0     triamcinolone (KENALOG) 0.1 % external cream Apply topically 2 times daily Sig: Apply sparingly to right lateral foot daily 30 g 1     verapamil ER (VERELAN) 240 MG 24 hr capsule TAKE ONE CAPSULE BY MOUTH DAILY 60 capsule 3  "    warfarin ANTICOAGULANT (COUMADIN) 5 MG tablet TAKE 1/2 TO 1 TABLET EVERY DAY OR AS DIRECTED BY INR CLINIC 90 tablet 0     Allergies   Allergen Reactions     Penicillins        Reviewed and updated as needed this visit by Provider         Review of Systems   ROS COMP: Constitutional, HEENT, cardiovascular, pulmonary, gi and gu systems are negative, except as otherwise noted.    This document serves as a record of the services and decisions personally performed and made by Ian Tracy MD. It was created on his behalf by Giles Javier, a trained medical scribe. The creation of this document is based on the provider's statements to the medical scribe.  Giles Javier December 26, 2019 12:44 PM          Objective    /80 (BP Location: Left arm, Patient Position: Sitting, Cuff Size: Adult Regular)   Pulse 70   Temp 97  F (36.1  C) (Oral)   Ht 1.626 m (5' 4\")   Wt 61.2 kg (135 lb)   SpO2 98%   Breastfeeding No   BMI 23.17 kg/m    Body mass index is 23.17 kg/m .     BP Recheck   130-134/70-80      Physical Exam   HEENT has a small white plaque that's ~1mm in diameter at the area of her left frontal tooth, probably created by rubbing on her front teeth.   Neck was supple without adenopathy, multi nodular goiter is unchanged, her carotids were normal without bruits  Chest clear to auscultation and percussion  Cardiovascular S1 and S2 are irregularly irregular without murmurs or gallops  Abdomen bowel sounds were normal.  There is no palpable mass or organomegaly  Extremities nontender with trace to 1+ pretibial edema with spider veins in thighs and ankles, right shoulder has good ROM and her tendons were not tender  Pulses pedal pulses are as described otherwise his pulses are bilaterally symmetrical throughout without bruits  Skin without significant abnormality  Neck veins flat     Diagnostic Test Results:  Labs reviewed in Epic        Assessment & Plan   Recommended that the pt avoid almonds and other nuts. "     Acute diastolic congestive heart failure (H)    - BNP-N terminal pro  - Basic metabolic panel  - CBC with platelets    Type 2 diabetes mellitus without complication, without long-term current use of insulin (H)      CKD (chronic kidney disease) stage 3, GFR 30-59 ml/min (H)      Paroxysmal atrial fibrillation (H)      Malignant neoplasm of breast in female, estrogen receptor positive, unspecified laterality, unspecified site of breast (H)      H/O bilateral mastectomy      Adjustment disorder with anxious mood      Benign essential hypertension  Advised the pt to check her BP daily and to record the levels.  - Basic metabolic panel  - CBC with platelets    Hyperlipidemia LDL goal <100      Nontoxic multinodular goiter      Strain of right rotator cuff capsule, initial encounter  Referred the pt to PT for her right shoulder pain.   - [facility] AND [facility] REFERRAL             FUTURE APPOINTMENTS:       - Follow-up visit in 6 months     Return in about 6 months (around 6/26/2020) for Follow Up.    The information in this document, created by the medical scribe for me, accurately reflects the services I personally performed and the decisions made by me. I have reviewed and approved this document for accuracy prior to leaving the patient care area.  December 26, 2019 1:31 PM  30 minutes were spent during this evaluation with greater than 50% of the time spent in counseling and coordinating care  Ian Tracy MD  [facility]

## 2019-12-27 LAB
ANION GAP SERPL CALCULATED.3IONS-SCNC: 6 MMOL/L (ref 3–14)
BUN SERPL-MCNC: 19 MG/DL (ref 7–30)
CALCIUM SERPL-MCNC: 9.3 MG/DL (ref 8.5–10.1)
CHLORIDE SERPL-SCNC: 103 MMOL/L (ref 94–109)
CO2 SERPL-SCNC: 29 MMOL/L (ref 20–32)
CREAT SERPL-MCNC: 1.01 MG/DL (ref 0.52–1.04)
GFR SERPL CREATININE-BSD FRML MDRD: 54 ML/MIN/{1.73_M2}
GLUCOSE SERPL-MCNC: 119 MG/DL (ref 70–99)
POTASSIUM SERPL-SCNC: 4 MMOL/L (ref 3.4–5.3)
SODIUM SERPL-SCNC: 138 MMOL/L (ref 133–144)

## 2019-12-30 ENCOUNTER — THERAPY VISIT (OUTPATIENT)
Dept: PHYSICAL THERAPY | Facility: CLINIC | Age: 74
End: 2019-12-30
Payer: MEDICARE

## 2019-12-30 DIAGNOSIS — S46.011D STRAIN OF RIGHT ROTATOR CUFF CAPSULE, SUBSEQUENT ENCOUNTER: ICD-10-CM

## 2019-12-30 PROCEDURE — 97161 PT EVAL LOW COMPLEX 20 MIN: CPT | Mod: GP | Performed by: PHYSICAL THERAPIST

## 2019-12-30 PROCEDURE — 97110 THERAPEUTIC EXERCISES: CPT | Mod: GP | Performed by: PHYSICAL THERAPIST

## 2019-12-30 NOTE — PROGRESS NOTES
[facility] Initial Evaluation  Subjective:      General health as reported by patient is fair. Pertinent medical history includes:  Cancer, heart problems and high blood pressure. Other medical history details: anxiety; gluten.  Medical allergies: other. Other medical allergies details: penicillin.  Surgeries include:  Cancer surgery and other. Other surgery history details: breast; brain.  Current medications:  Anti-depressants, heparin/coumadin and high blood pressure medication.    Other job/home tasks details: cleaning, ?, gardening, exercising.           Patient is retired.     Red flags:  Changes in bowel/bladder.     C/C: Patient is a 74-year-old woman with complaints of intermittent right lateral shoulder pain.  Pain does not radiate below the elbow.  Patient states intensity is 5/10.  Is aggravated by reaching overhead and and forward.  Some discomfort with laying on right shoulder.  Is relieved by avoidance of provocative activities.  Denies any numbness or tingling.  Hx:  6/2019-patient reports onset of current symptoms.  Can recall no specific injury or causative factor.  Was seen by MD and referred to PT.  Has had no imaging.  PMH: There is no history of previous shoulder problems.  Does have a history of breast cancer, high blood pressure, anxiety.  Had removal of a schwannoma at age 46.  General health: Fair.  Patient has stopped lifting weights due to shoulder pain.                 Objective:  Standing Alignment:      Shoulder/UE:  Protracted scapula R                  Flexibility/Screens:   Negative screens: Cervical                              Shoulder Evaluation:  ROM:  AROM:    Flexion:  Left:  145    Right:  110                      Extension/Internal Rotation:  Left:  To L3    Right:  To L3    PROM:    Flexion:  Left:  150    Right: 112      Abduction:  Left:  90    Right:  80      External Rotation:  Left:  55    Right:  45                    Strength:        Abduction:   Left: 5/5   Pain:-    Right: 5/5      Pain:-  Adduction:  Left: 5/5     Pain:-    Right: 5/5      Pain:-  Internal Rotation:  Left:5/5     Pain:    Right: 5/5      Pain:-  External Rotation:   Left:5/5      Pain:-   Right:5-/5      Pain:-/+      Horizontal Adduction:  Right:/5     Pain:-  Elbow Flexion:  Left:5/5      Pain:-    Right:5/5      Pain:-  Elbow Extension:  Left:5/5      Pain:-    Right:5/5      Pain:-                                                                                Musculoskeletal:        Arms:        ROS    Assessment/Plan:    Patient is a 74 year old female with right side shoulder complaints.    Patient has the following significant findings with corresponding treatment plan.                Diagnosis 1:  Right shoulder pain  Pain -  self management, directional preference exercise and home program  Decreased ROM/flexibility - manual therapy and therapeutic exercise  Decreased joint mobility - manual therapy and therapeutic exercise  Decreased strength - therapeutic exercise and therapeutic activities  Impaired muscle performance - neuro re-education  Decreased function - therapeutic activities  Impaired posture - neuro re-education    Therapy Evaluation Codes:   Cumulative Therapy Evaluation is: Low complexity.    Previous and current functional limitations:  (See Goal Flow Sheet for this information)    Short term and Long term goals: (See Goal Flow Sheet for this information)     Communication ability:  Patient appears to be able to clearly communicate and understand verbal and written communication and follow directions correctly.  Treatment Explanation - The following has been discussed with the patient:   RX ordered/plan of care  Anticipated outcomes  Possible risks and side effects  This patient would benefit from PT intervention to resume normal activities.   Rehab potential is excellent.    Frequency:  1 X week, once daily  Duration:  for 3 weeks  Discharge Plan:  Achieve all  LTG.  Independent in home treatment program.  Reach maximal therapeutic benefit.  Pt is leaving for [place] this week.  To contact therapist with questions or follow up with MD in [place].    Please refer to the daily flowsheet for treatment today, total treatment time and time spent performing 1:1 timed codes.

## 2019-12-30 NOTE — LETTER
DEPARTMENT OF HEALTH AND HUMAN SERVICES  CENTERS FOR MEDICARE & MEDICAID SERVICES    PLAN/UPDATED PLAN OF PROGRESS FOR OUTPATIENT REHABILITATION    PATIENTS NAME:  Catalina Marie     : 1945    PROVIDER NUMBER:    [id number]    HICN:  [id number]     PROVIDER NAME: [facility]    MEDICAL RECORD NUMBER: [id number]     START OF CARE DATE:  SOC Date: 19   TYPE:  PT    PRIMARY/TREATMENT DIAGNOSIS: (Pertinent Medical Diagnosis)  Strain of right rotator cuff capsule, subsequent encounter    VISITS FROM START OF CARE:  Rxs Used: 1     [facility] Initial Evaluation  Subjective:  General health as reported by patient is fair. Pertinent medical history includes:  Cancer, heart problems and high blood pressure. Other medical history details: anxiety; gluten.  Medical allergies: other. Other medical allergies details: penicillin.  Surgeries include:  Cancer surgery and other. Other surgery history details: breast; brain.  Current medications:  Anti-depressants, heparin/coumadin and high blood pressure medication.  Other job/home tasks details: cleaning, ?, gardening, exercising.  Patient is retired.   Red flags:  Changes in bowel/bladder. C/C: Patient is a 74-year-old woman with complaints of intermittent right lateral shoulder pain.  Pain does not radiate below the elbow.  Patient states intensity is 5/10.  Is aggravated by reaching overhead and and forward.  Some discomfort with laying on right shoulder.  Is relieved by avoidance of provocative activities.  Denies any numbness or tingling. Hx:  2019-patient reports onset of current symptoms.  Can recall no specific injury or causative factor.  Was seen by MD and referred to PT.  Has had no imaging. PMH: There is no history of previous shoulder problems.  Does have a history of breast cancer, high blood pressure, anxiety.  Had removal of a schwannoma at age 46. General health: Fair.  Patient  has stopped lifting weights due to shoulder pain.                 Objective:  Standing Alignment:    Shoulder/UE:  Protracted scapula R  Flexibility/Screens:   Negative screens: Cervical   Shoulder Evaluation:  ROM:  AROM:    Flexion:  Left:  145    Right:  110  Extension/Internal Rotation:  Left:  To L3    Right:  To L3    PROM:    Flexion:  Left:  150    Right: 112    PATIENTS NAME:  Catalina Marie   : 1945 pg 2    Abduction:  Left:  90    Right:  80  External Rotation:  Left:  55    Right:  45  Strength:    Abduction:  Left: 5/5   Pain:-    Right: 5/5      Pain:-  Adduction:  Left: 5/5     Pain:-    Right: 5/5      Pain:-  Internal Rotation:  Left:5/5     Pain:    Right: 5/5      Pain:-  External Rotation:   Left:55      Pain:-   Right:5-/5      Pain:-/+    Horizontal Adduction:  Right:/5     Pain:-  Elbow Flexion:  Left:5/      Pain:-    Right:5/      Pain:-  Elbow Extension:  Left:      Pain:-    Right:5      Pain:-                               Musculoskeletal:        Arms:      Assessment/Plan:    Patient is a 74 year old female with right side shoulder complaints.    Patient has the following significant findings with corresponding treatment plan.                Diagnosis 1:  Right shoulder pain  Pain -  self management, directional preference exercise and home program  Decreased ROM/flexibility - manual therapy and therapeutic exercise  Decreased joint mobility - manual therapy and therapeutic exercise  Decreased strength - therapeutic exercise and therapeutic activities  Impaired muscle performance - neuro re-education  Decreased function - therapeutic activities  Impaired posture - neuro re-education    Therapy Evaluation Codes:   Cumulative Therapy Evaluation is: Low complexity.    Previous and current functional limitations:  (See Goal Flow Sheet for this information)    Short term and Long term goals: (See Goal Flow Sheet for this information)     Communication ability:  Patient  "appears to be able to clearly communicate and understand verbal and written communication and follow directions correctly.  Treatment Explanation - The following has been discussed with the patient:   RX ordered/plan of care  Anticipated outcomes  PATIENTS NAME:  Catalina Marie   : 1945 pg 3    Possible risks and side effects  This patient would benefit from PT intervention to resume normal activities.   Rehab potential is excellent.    Frequency:  1 X week, once daily  Duration:  for 3 weeks  Discharge Plan:  Achieve all LTG.  Independent in home treatment program.  Reach maximal therapeutic benefit.  Pt is leaving for [place] this week.  To contact therapist with questions or follow up with MD in [place].        Caregiver Signature/Credentials _____________________________ Date ________       Treating Provider: Cat Thomas, PT   I have reviewed and certified the need for these services and plan of treatment while under my care.        PHYSICIAN'S SIGNATURE:   _____________________________________  Date___________     Ian Tracy MD    Certification period:  Beginning of Cert date period: 19 to  End of Cert period date: 20     Functional Level Progress Report: Please see attached \"Goal Flow sheet for Functional level.\"    ____X____ Continue Services or       ________ DC Services                Service dates: From  SOC Date: 19 date to present                         "

## 2020-02-05 DIAGNOSIS — F43.22 ADJUSTMENT DISORDER WITH ANXIOUS MOOD: ICD-10-CM

## 2020-02-05 NOTE — TELEPHONE ENCOUNTER
"Last Written Prescription Date:  11/8/19  Last Fill Quantity: 90,  # refills: 0   Last office visit: 12/26/2019 with prescribing provider:     Future Office Visit:    Requested Prescriptions   Pending Prescriptions Disp Refills     sertraline (ZOLOFT) 100 MG tablet [Pharmacy Med Name: SERTRALINE  MG TABLET] 90 tablet 0     Sig: TAKE 1 TABLET BY MOUTH EVERY DAY       SSRIs Protocol Passed - 2/5/2020 12:03 PM        Passed - Recent (12 mo) or future (30 days) visit within the authorizing provider's specialty     Patient has had an office visit with the authorizing provider or a provider within the authorizing providers department within the previous 12 mos or has a future within next 30 days. See \"Patient Info\" tab in inbasket, or \"Choose Columns\" in Meds & Orders section of the refill encounter.              Passed - Medication is active on med list        Passed - Patient is age 18 or older        Passed - No active pregnancy on record        Passed - No positive pregnancy test in last 12 months          "

## 2020-02-06 RX ORDER — SERTRALINE HYDROCHLORIDE 100 MG/1
TABLET, FILM COATED ORAL
Qty: 90 TABLET | Refills: 0 | Status: SHIPPED | OUTPATIENT
Start: 2020-02-06 | End: 2020-05-11

## 2020-02-06 NOTE — TELEPHONE ENCOUNTER
Prescription approved per [facility] [place]ill Protocol.  Karuna Antonio RN

## 2020-03-11 DIAGNOSIS — I48.0 PAROXYSMAL ATRIAL FIBRILLATION (H): ICD-10-CM

## 2020-03-12 ENCOUNTER — MYC MEDICAL ADVICE (OUTPATIENT)
Dept: NURSING | Facility: CLINIC | Age: 75
End: 2020-03-12

## 2020-03-12 RX ORDER — WARFARIN SODIUM 5 MG/1
TABLET ORAL
Qty: 30 TABLET | Refills: 0 | OUTPATIENT
Start: 2020-03-12

## 2020-03-12 NOTE — TELEPHONE ENCOUNTER
Patient will contact her physician in [place].  Jazlyn Ward RN  [facility] Anticoagulation [facility]

## 2020-03-12 NOTE — TELEPHONE ENCOUNTER
Last INR done 11/21/19 and patient is currently in [place].  Message sent to ask if has physician there that has been dosing patient and if so will need to contact their office for the refill.

## 2020-03-12 NOTE — TELEPHONE ENCOUNTER
"Requested Prescriptions   Pending Prescriptions Disp Refills     warfarin ANTICOAGULANT (COUMADIN) 5 MG tablet [Pharmacy Med Name: WARFARIN SODIUM 5 MG TABLET] 90 tablet 0     Sig: TAKE 1/2 TO 1 TABLET EVERY DAY OR AS DIRECTED BY INR CLINIC       Vitamin K Antagonists Failed - 3/12/2020 10:21 AM        Failed - INR is within goal in the past 6 weeks     Confirm INR is within goal in the past 6 weeks.     Recent Labs   Lab Test 11/21/19   INR 2.6*                       Passed - Recent (12 mo) or future (30 days) visit within the authorizing provider's specialty     Patient has had an office visit with the authorizing provider or a provider within the authorizing providers department within the previous 12 mos or has a future within next 30 days. See \"Patient Info\" tab in inbasket, or \"Choose Columns\" in Meds & Orders section of the refill encounter.              Passed - Medication is active on med list        Passed - Patient is 18 years of age or older        Passed - Patient is not pregnant        Passed - No positive pregnancy on file in past 12 months             "

## 2020-05-09 DIAGNOSIS — F43.22 ADJUSTMENT DISORDER WITH ANXIOUS MOOD: ICD-10-CM

## 2020-05-11 RX ORDER — SERTRALINE HYDROCHLORIDE 100 MG/1
TABLET, FILM COATED ORAL
Qty: 90 TABLET | Refills: 1 | Status: SHIPPED | OUTPATIENT
Start: 2020-05-11 | End: 2020-11-07

## 2020-05-11 NOTE — TELEPHONE ENCOUNTER
Prescription approved per [facility] Refill Protocol.    Amrita FLOWERS RN

## 2020-05-17 DIAGNOSIS — I48.0 PAROXYSMAL ATRIAL FIBRILLATION (H): ICD-10-CM

## 2020-05-19 RX ORDER — ATENOLOL 25 MG/1
TABLET ORAL
Qty: 180 TABLET | Refills: 0 | Status: SHIPPED | OUTPATIENT
Start: 2020-05-19 | End: 2020-08-12

## 2020-05-28 LAB — INR PPP: 2 (ref 0.9–1.1)

## 2020-06-01 ENCOUNTER — MYC MEDICAL ADVICE (OUTPATIENT)
Dept: NURSING | Facility: CLINIC | Age: 75
End: 2020-06-01

## 2020-06-11 LAB — INR PPP: 2 (ref 0.9–1.1)

## 2020-07-07 ENCOUNTER — ANTICOAGULATION THERAPY VISIT (OUTPATIENT)
Dept: FAMILY MEDICINE | Facility: CLINIC | Age: 75
End: 2020-07-07
Payer: MEDICARE

## 2020-07-07 ENCOUNTER — TELEPHONE (OUTPATIENT)
Dept: FAMILY MEDICINE | Facility: CLINIC | Age: 75
End: 2020-07-07

## 2020-07-07 DIAGNOSIS — I48.20 CHRONIC ATRIAL FIBRILLATION (H): ICD-10-CM

## 2020-07-07 DIAGNOSIS — I48.91 ATRIAL FIBRILLATION (H): ICD-10-CM

## 2020-07-07 DIAGNOSIS — Z79.01 LONG TERM CURRENT USE OF ANTICOAGULANT THERAPY: ICD-10-CM

## 2020-07-07 DIAGNOSIS — Z79.01 LONG TERM CURRENT USE OF ANTICOAGULANT THERAPY: Primary | ICD-10-CM

## 2020-07-07 LAB
CAPILLARY BLOOD COLLECTION: NORMAL
INR PPP: 2 (ref 0.86–1.14)

## 2020-07-07 PROCEDURE — 99207 ZZC NO CHARGE NURSE ONLY: CPT | Performed by: INTERNAL MEDICINE

## 2020-07-07 PROCEDURE — 36416 COLLJ CAPILLARY BLOOD SPEC: CPT | Performed by: INTERNAL MEDICINE

## 2020-07-07 PROCEDURE — 85610 PROTHROMBIN TIME: CPT | Performed by: INTERNAL MEDICINE

## 2020-07-07 NOTE — PROGRESS NOTES
Anticoagulation Management    Unable to reach Jennifer today.    Today's INR result of 2.0 is therapeutic (goal INR of 2.0-3.0).  Result received from: Clinic Lab    Follow up required to confirm warfarin dose taken and assess for changes    Last INR on fine from 10/23/19. New referral sent to Dr. Tracy to review and sign.  Need to confirm current dose and previous INR date/result.  LMTCB. If unable to call back before closing today she was instructed to continue her current dose and call back in the morning.    Anticoagulation clinic to follow up    Zoe Mata RN

## 2020-07-09 NOTE — PROGRESS NOTES
ANTICOAGULATION FOLLOW-UP CLINIC VISIT    Patient Name:  Catalina Marie  Date:  2020  Contact Type:  Telephone    SUBJECTIVE:  Patient Findings     Comments:   Was being managed in [place]. Recently moved back to [place].   Continue current dose. Recheck in 2 weeks after appointment with Dr. Tracy        Clinical Outcomes     Negatives:   Major bleeding event, Thromboembolic event, Anticoagulation-related hospital admission, Anticoagulation-related ED visit, Anticoagulation-related fatality    Comments:   Was being managed in [place]. Recently moved back to [place].   Continue current dose. Recheck in 2 weeks after appointment with Dr. Tracy           OBJECTIVE    Recent labs: (last 7 days)     20  1318   INR 2.00*       ASSESSMENT / PLAN  INR assessment THER    Recheck INR In: 2 WEEKS    INR Location Clinic      Anticoagulation Summary  As of 2020    INR goal:   2.0-3.0   TTR:   74.8 % (4.5 mo)   INR used for dosin.0 (2020)   Warfarin maintenance plan:   5 mg (5 mg x 1) every Mon, Fri; 2.5 mg (5 mg x 0.5) all other days   Full warfarin instructions:   5 mg every Mon, Fri; 2.5 mg all other days   Weekly warfarin total:   22.5 mg   Plan last modified:   Zoe Mata RN (2020)   Next INR check:   2020   Priority:   INR   Target end date:       Indications    Long term current use of anticoagulant therapy [Z79.01]  Atrial fibrillation (H) [I48.91] [I48.91]             Anticoagulation Episode Summary     INR check location:       Preferred lab:       Send INR reminders to:   DEMOND FRANCO    Comments:   Winter: FAXED INR results from [facility] in [place], [place]. Phone: [phone].  General Results#: [phone]  Call pt's cell in [place] with results/plan: [phone]      Anticoagulation Care Providers     Provider Role Specialty Phone number    Ian Tracy MD Referring Internal Medicine [phone]            See the Encounter Report to view Anticoagulation Flowsheet  and Dosing Calendar (Go to Encounters tab in chart review, and find the Anticoagulation Therapy Visit)        Zoe Mata RN

## 2020-07-10 PROBLEM — I48.20 CHRONIC ATRIAL FIBRILLATION (H): Status: ACTIVE | Noted: 2020-07-10

## 2020-07-14 DIAGNOSIS — I48.20 CHRONIC ATRIAL FIBRILLATION (H): ICD-10-CM

## 2020-07-16 NOTE — TELEPHONE ENCOUNTER
Routing refill request to provider for review/approval because:  Drug interaction warning  Please authorize if appropriate.  Thanks,  Eufemia Rodriguez RN

## 2020-07-17 RX ORDER — VERAPAMIL HYDROCHLORIDE 240 MG/1
240 CAPSULE, EXTENDED RELEASE ORAL DAILY
Qty: 90 CAPSULE | Refills: 3 | Status: SHIPPED | OUTPATIENT
Start: 2020-07-17 | End: 2021-01-18

## 2020-07-19 PROBLEM — J30.0 CHRONIC VASOMOTOR RHINITIS: Status: ACTIVE | Noted: 2020-07-19

## 2020-07-19 PROBLEM — L82.0 INFLAMED SEBORRHEIC KERATOSIS: Status: ACTIVE | Noted: 2020-07-19

## 2020-07-19 PROBLEM — Z00.00 ROUTINE GENERAL MEDICAL EXAMINATION AT A HEALTH CARE FACILITY: Status: ACTIVE | Noted: 2020-07-19

## 2020-07-20 ENCOUNTER — OFFICE VISIT (OUTPATIENT)
Dept: FAMILY MEDICINE | Facility: CLINIC | Age: 75
End: 2020-07-20
Payer: MEDICARE

## 2020-07-20 ENCOUNTER — ANTICOAGULATION THERAPY VISIT (OUTPATIENT)
Dept: ANTICOAGULATION | Facility: CLINIC | Age: 75
End: 2020-07-20
Payer: MEDICARE

## 2020-07-20 DIAGNOSIS — N18.30 CKD (CHRONIC KIDNEY DISEASE) STAGE 3, GFR 30-59 ML/MIN (H): ICD-10-CM

## 2020-07-20 DIAGNOSIS — C50.919 MALIGNANT NEOPLASM OF BREAST IN FEMALE, ESTROGEN RECEPTOR POSITIVE, UNSPECIFIED LATERALITY, UNSPECIFIED SITE OF BREAST (H): ICD-10-CM

## 2020-07-20 DIAGNOSIS — E04.2 NONTOXIC MULTINODULAR GOITER: ICD-10-CM

## 2020-07-20 DIAGNOSIS — I48.20 CHRONIC ATRIAL FIBRILLATION (H): ICD-10-CM

## 2020-07-20 DIAGNOSIS — J30.0 CHRONIC VASOMOTOR RHINITIS: ICD-10-CM

## 2020-07-20 DIAGNOSIS — Z00.00 ROUTINE GENERAL MEDICAL EXAMINATION AT A HEALTH CARE FACILITY: ICD-10-CM

## 2020-07-20 DIAGNOSIS — E55.9 VITAMIN D DEFICIENCY: ICD-10-CM

## 2020-07-20 DIAGNOSIS — I50.31 ACUTE DIASTOLIC CONGESTIVE HEART FAILURE (H): ICD-10-CM

## 2020-07-20 DIAGNOSIS — Z90.13 H/O BILATERAL MASTECTOMY: ICD-10-CM

## 2020-07-20 DIAGNOSIS — E11.22 TYPE 2 DIABETES MELLITUS WITH STAGE 1 CHRONIC KIDNEY DISEASE, WITHOUT LONG-TERM CURRENT USE OF INSULIN (H): ICD-10-CM

## 2020-07-20 DIAGNOSIS — N18.1 TYPE 2 DIABETES MELLITUS WITH STAGE 1 CHRONIC KIDNEY DISEASE, WITHOUT LONG-TERM CURRENT USE OF INSULIN (H): ICD-10-CM

## 2020-07-20 DIAGNOSIS — F43.22 ADJUSTMENT DISORDER WITH ANXIOUS MOOD: ICD-10-CM

## 2020-07-20 DIAGNOSIS — I48.20 CHRONIC ATRIAL FIBRILLATION (H): Primary | ICD-10-CM

## 2020-07-20 DIAGNOSIS — Z79.01 LONG TERM CURRENT USE OF ANTICOAGULANT THERAPY: ICD-10-CM

## 2020-07-20 DIAGNOSIS — E78.5 HYPERLIPIDEMIA LDL GOAL <100: ICD-10-CM

## 2020-07-20 DIAGNOSIS — L82.0 INFLAMED SEBORRHEIC KERATOSIS: ICD-10-CM

## 2020-07-20 DIAGNOSIS — I10 ESSENTIAL HYPERTENSION: ICD-10-CM

## 2020-07-20 DIAGNOSIS — Z12.11 COLON CANCER SCREENING: ICD-10-CM

## 2020-07-20 DIAGNOSIS — I10 BENIGN ESSENTIAL HYPERTENSION: ICD-10-CM

## 2020-07-20 DIAGNOSIS — Z17.0 MALIGNANT NEOPLASM OF BREAST IN FEMALE, ESTROGEN RECEPTOR POSITIVE, UNSPECIFIED LATERALITY, UNSPECIFIED SITE OF BREAST (H): ICD-10-CM

## 2020-07-20 LAB
ALBUMIN SERPL-MCNC: 4.3 G/DL (ref 3.4–5)
ALBUMIN UR-MCNC: NEGATIVE MG/DL
ALP SERPL-CCNC: 106 U/L (ref 40–150)
ALT SERPL W P-5'-P-CCNC: 42 U/L (ref 0–50)
ANION GAP SERPL CALCULATED.3IONS-SCNC: 7 MMOL/L (ref 3–14)
APPEARANCE UR: CLEAR
AST SERPL W P-5'-P-CCNC: 38 U/L (ref 0–45)
BILIRUB SERPL-MCNC: 0.5 MG/DL (ref 0.2–1.3)
BILIRUB UR QL STRIP: NEGATIVE
BUN SERPL-MCNC: 21 MG/DL (ref 7–30)
CALCIUM SERPL-MCNC: 9 MG/DL (ref 8.5–10.1)
CHLORIDE SERPL-SCNC: 105 MMOL/L (ref 94–109)
CHOLEST SERPL-MCNC: 147 MG/DL
CO2 SERPL-SCNC: 27 MMOL/L (ref 20–32)
COLOR UR AUTO: YELLOW
CREAT SERPL-MCNC: 1.04 MG/DL (ref 0.52–1.04)
CREAT UR-MCNC: 30 MG/DL
ERYTHROCYTE [DISTWIDTH] IN BLOOD BY AUTOMATED COUNT: 13.6 % (ref 10–15)
GFR SERPL CREATININE-BSD FRML MDRD: 52 ML/MIN/{1.73_M2}
GLUCOSE SERPL-MCNC: 125 MG/DL (ref 70–99)
GLUCOSE UR STRIP-MCNC: NEGATIVE MG/DL
HBA1C MFR BLD: 6.5 % (ref 0–5.6)
HCT VFR BLD AUTO: 47.5 % (ref 35–47)
HDLC SERPL-MCNC: 36 MG/DL
HGB BLD-MCNC: 16 G/DL (ref 11.7–15.7)
HGB UR QL STRIP: NEGATIVE
INR PPP: 1.9 (ref 0.86–1.14)
KETONES UR STRIP-MCNC: NEGATIVE MG/DL
LDLC SERPL CALC-MCNC: 73 MG/DL
LEUKOCYTE ESTERASE UR QL STRIP: NEGATIVE
MCH RBC QN AUTO: 31.6 PG (ref 26.5–33)
MCHC RBC AUTO-ENTMCNC: 33.7 G/DL (ref 31.5–36.5)
MCV RBC AUTO: 94 FL (ref 78–100)
MICROALBUMIN UR-MCNC: 19 MG/L
MICROALBUMIN/CREAT UR: 63.67 MG/G CR (ref 0–25)
NITRATE UR QL: NEGATIVE
NONHDLC SERPL-MCNC: 111 MG/DL
PH UR STRIP: 5 PH (ref 5–7)
PLATELET # BLD AUTO: 273 10E9/L (ref 150–450)
POTASSIUM SERPL-SCNC: 4.1 MMOL/L (ref 3.4–5.3)
PROT SERPL-MCNC: 8.5 G/DL (ref 6.8–8.8)
RBC # BLD AUTO: 5.06 10E12/L (ref 3.8–5.2)
SODIUM SERPL-SCNC: 139 MMOL/L (ref 133–144)
SOURCE: NORMAL
SP GR UR STRIP: 1.02 (ref 1–1.03)
TRIGL SERPL-MCNC: 190 MG/DL
TSH SERPL DL<=0.005 MIU/L-ACNC: 1.32 MU/L (ref 0.4–4)
UROBILINOGEN UR STRIP-ACNC: 0.2 EU/DL (ref 0.2–1)
WBC # BLD AUTO: 7.3 10E9/L (ref 4–11)

## 2020-07-20 PROCEDURE — 85610 PROTHROMBIN TIME: CPT | Performed by: INTERNAL MEDICINE

## 2020-07-20 PROCEDURE — 82306 VITAMIN D 25 HYDROXY: CPT | Performed by: INTERNAL MEDICINE

## 2020-07-20 PROCEDURE — 99207 C FOOT EXAM  NO CHARGE: CPT | Mod: 25 | Performed by: INTERNAL MEDICINE

## 2020-07-20 PROCEDURE — 83036 HEMOGLOBIN GLYCOSYLATED A1C: CPT | Performed by: INTERNAL MEDICINE

## 2020-07-20 PROCEDURE — 85027 COMPLETE CBC AUTOMATED: CPT | Performed by: INTERNAL MEDICINE

## 2020-07-20 PROCEDURE — 82043 UR ALBUMIN QUANTITATIVE: CPT | Performed by: INTERNAL MEDICINE

## 2020-07-20 PROCEDURE — 80061 LIPID PANEL: CPT | Performed by: INTERNAL MEDICINE

## 2020-07-20 PROCEDURE — 99214 OFFICE O/P EST MOD 30 MIN: CPT | Mod: 25 | Performed by: INTERNAL MEDICINE

## 2020-07-20 PROCEDURE — G0439 PPPS, SUBSEQ VISIT: HCPCS | Performed by: INTERNAL MEDICINE

## 2020-07-20 PROCEDURE — 84443 ASSAY THYROID STIM HORMONE: CPT | Performed by: INTERNAL MEDICINE

## 2020-07-20 PROCEDURE — 80053 COMPREHEN METABOLIC PANEL: CPT | Performed by: INTERNAL MEDICINE

## 2020-07-20 PROCEDURE — 36415 COLL VENOUS BLD VENIPUNCTURE: CPT | Performed by: INTERNAL MEDICINE

## 2020-07-20 PROCEDURE — 17110 DESTRUCTION B9 LES UP TO 14: CPT | Performed by: INTERNAL MEDICINE

## 2020-07-20 PROCEDURE — 99207 ZZC NO CHARGE NURSE ONLY: CPT

## 2020-07-20 PROCEDURE — 81003 URINALYSIS AUTO W/O SCOPE: CPT | Performed by: INTERNAL MEDICINE

## 2020-07-20 ASSESSMENT — MIFFLIN-ST. JEOR: SCORE: 1087.36

## 2020-07-20 ASSESSMENT — ACTIVITIES OF DAILY LIVING (ADL): CURRENT_FUNCTION: NO ASSISTANCE NEEDED
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
## 2020-07-20 NOTE — PROGRESS NOTES
ANTICOAGULATION FOLLOW-UP CLINIC VISIT    Patient Name:  Catalina Marie  Date:  2020  Contact Type:  Telephone    SUBJECTIVE:  Patient Findings     Comments:   The patient was assessed for diet, medication, and activity level changes, missed or extra doses, bruising or bleeding, with no problem findings.          Clinical Outcomes     Comments:   The patient was assessed for diet, medication, and activity level changes, missed or extra doses, bruising or bleeding, with no problem findings.             OBJECTIVE    Recent labs: (last 7 days)     20  1019   INR 1.90*       ASSESSMENT / PLAN  INR assessment SUB    Recheck INR In: 3 WEEKS    INR Location Clinic      Anticoagulation Summary  As of 2020    INR goal:   2.0-3.0   TTR:   73.3 % (5.9 mo)   INR used for dosin.90! (2020)   Warfarin maintenance plan:   5 mg (5 mg x 1) every Mon, Wed, Fri; 2.5 mg (5 mg x 0.5) all other days   Full warfarin instructions:   : 5 mg; : 5 mg; : 5 mg; Otherwise 5 mg every Mon, Wed, Fri; 2.5 mg all other days   Weekly warfarin total:   25 mg   Plan last modified:   Lucia Arreola RN (2020)   Next INR check:   2020   Priority:   INR   Target end date:   Indefinite    Indications    Long term current use of anticoagulant therapy [Z79.01]  Atrial fibrillation (H) [I48.91] [I48.91]  Chronic atrial fibrillation (H) [I48.20]             Anticoagulation Episode Summary     INR check location:       Preferred lab:       Send INR reminders to:   DEMOND FRANCO    Comments:   Winter: FAXED INR results from [facility] in [place], [place]. Phone: [phone].  General Results#: [phone]  Call pt's cell in [place] with results/plan: [phone]      Anticoagulation Care Providers     Provider Role Specialty Phone number    Ian Tracy MD Referring Internal Medicine [phone]            See the Encounter Report to view Anticoagulation Flowsheet and Dosing Calendar (Go to Encounters tab in chart  review, and find the Anticoagulation Therapy Visit)        Lucia Arreola RN

## 2020-07-21 ENCOUNTER — TELEPHONE (OUTPATIENT)
Dept: FAMILY MEDICINE | Facility: CLINIC | Age: 75
End: 2020-07-21
Payer: MEDICARE

## 2020-07-21 VITALS
DIASTOLIC BLOOD PRESSURE: 80 MMHG | HEIGHT: 64 IN | SYSTOLIC BLOOD PRESSURE: 132 MMHG | BODY MASS INDEX: 23.05 KG/M2 | OXYGEN SATURATION: 96 % | TEMPERATURE: 97.6 F | WEIGHT: 135 LBS | HEART RATE: 89 BPM

## 2020-07-21 LAB — DEPRECATED CALCIDIOL+CALCIFEROL SERPL-MC: 42 UG/L (ref 20–75)

## 2020-07-21 NOTE — TELEPHONE ENCOUNTER
Reason for Call:  Medication or medication refill:    Do you use a [facility]?  Name of the pharmacy and phone number for the current request:       [facility] #[id number] - [place], [place] - [address]      Name of the medication requested:   Per visit with Dr Tracy Monday 7/21 a cream for calluses on her knee    Other request: none    Can we leave a detailed message on this number? YES    Phone number patient can be reached at: Cell number on file:    Telephone Information:   Mobile [phone]       Best Time: any    Call taken on 7/21/2020 at 6:56 PM by Maria C Schuster

## 2020-07-22 ENCOUNTER — TELEPHONE (OUTPATIENT)
Dept: FAMILY MEDICINE | Facility: CLINIC | Age: 75
End: 2020-07-22

## 2020-07-22 NOTE — TELEPHONE ENCOUNTER
Please abstract the following data from this visit with this patient into the appropriate field in Epic:    Tests that can be patient reported without a hard copy:    Eye exam with ophthalmology on this date: 10/21/2019 at [facility]    Juana Thompson CMA

## 2020-08-03 DIAGNOSIS — I10 BENIGN ESSENTIAL HYPERTENSION: ICD-10-CM

## 2020-08-05 RX ORDER — FENOFIBRATE 160 MG/1
160 TABLET ORAL DAILY
Qty: 90 TABLET | Refills: 0 | Status: SHIPPED | OUTPATIENT
Start: 2020-08-05 | End: 2020-10-27

## 2020-08-05 NOTE — TELEPHONE ENCOUNTER
Routing refill request to provider for review/approval because:  Drug interaction warning (HIGH)  Kami ABURTO RN

## 2020-08-19 ENCOUNTER — ANTICOAGULATION THERAPY VISIT (OUTPATIENT)
Dept: NURSING | Facility: CLINIC | Age: 75
End: 2020-08-19

## 2020-08-19 ENCOUNTER — OFFICE VISIT (OUTPATIENT)
Dept: FAMILY MEDICINE | Facility: CLINIC | Age: 75
End: 2020-08-19
Payer: MEDICARE

## 2020-08-19 VITALS
TEMPERATURE: 97.3 F | SYSTOLIC BLOOD PRESSURE: 110 MMHG | OXYGEN SATURATION: 95 % | HEART RATE: 95 BPM | HEIGHT: 64 IN | BODY MASS INDEX: 23.22 KG/M2 | WEIGHT: 136 LBS | DIASTOLIC BLOOD PRESSURE: 72 MMHG

## 2020-08-19 DIAGNOSIS — L82.0 INFLAMED SEBORRHEIC KERATOSIS: ICD-10-CM

## 2020-08-19 DIAGNOSIS — I48.11 LONGSTANDING PERSISTENT ATRIAL FIBRILLATION (H): ICD-10-CM

## 2020-08-19 DIAGNOSIS — I50.31 ACUTE DIASTOLIC CONGESTIVE HEART FAILURE (H): ICD-10-CM

## 2020-08-19 DIAGNOSIS — Z79.01 LONG TERM CURRENT USE OF ANTICOAGULANT THERAPY: ICD-10-CM

## 2020-08-19 DIAGNOSIS — E78.5 HYPERLIPIDEMIA LDL GOAL <100: ICD-10-CM

## 2020-08-19 DIAGNOSIS — I48.20 CHRONIC ATRIAL FIBRILLATION (H): ICD-10-CM

## 2020-08-19 DIAGNOSIS — F43.22 ADJUSTMENT DISORDER WITH ANXIOUS MOOD: ICD-10-CM

## 2020-08-19 DIAGNOSIS — E04.2 NONTOXIC MULTINODULAR GOITER: ICD-10-CM

## 2020-08-19 DIAGNOSIS — Z00.00 ROUTINE GENERAL MEDICAL EXAMINATION AT A HEALTH CARE FACILITY: Primary | ICD-10-CM

## 2020-08-19 DIAGNOSIS — N18.1 TYPE 2 DIABETES MELLITUS WITH STAGE 1 CHRONIC KIDNEY DISEASE, WITHOUT LONG-TERM CURRENT USE OF INSULIN (H): ICD-10-CM

## 2020-08-19 DIAGNOSIS — N18.30 CKD (CHRONIC KIDNEY DISEASE) STAGE 3, GFR 30-59 ML/MIN (H): ICD-10-CM

## 2020-08-19 DIAGNOSIS — I10 ESSENTIAL HYPERTENSION: ICD-10-CM

## 2020-08-19 DIAGNOSIS — E11.22 TYPE 2 DIABETES MELLITUS WITH STAGE 1 CHRONIC KIDNEY DISEASE, WITHOUT LONG-TERM CURRENT USE OF INSULIN (H): ICD-10-CM

## 2020-08-19 DIAGNOSIS — Z17.0 MALIGNANT NEOPLASM OF BREAST IN FEMALE, ESTROGEN RECEPTOR POSITIVE, UNSPECIFIED LATERALITY, UNSPECIFIED SITE OF BREAST (H): ICD-10-CM

## 2020-08-19 DIAGNOSIS — E55.9 VITAMIN D DEFICIENCY: ICD-10-CM

## 2020-08-19 DIAGNOSIS — Z90.13 H/O BILATERAL MASTECTOMY: ICD-10-CM

## 2020-08-19 DIAGNOSIS — L57.0 ACTINIC KERATOSIS: ICD-10-CM

## 2020-08-19 DIAGNOSIS — C50.919 MALIGNANT NEOPLASM OF BREAST IN FEMALE, ESTROGEN RECEPTOR POSITIVE, UNSPECIFIED LATERALITY, UNSPECIFIED SITE OF BREAST (H): ICD-10-CM

## 2020-08-19 LAB
ERYTHROCYTE [DISTWIDTH] IN BLOOD BY AUTOMATED COUNT: 13.7 % (ref 10–15)
HCT VFR BLD AUTO: 45.4 % (ref 35–47)
HGB BLD-MCNC: 15.4 G/DL (ref 11.7–15.7)
INR PPP: 2.6 (ref 0.86–1.14)
MCH RBC QN AUTO: 31.9 PG (ref 26.5–33)
MCHC RBC AUTO-ENTMCNC: 33.9 G/DL (ref 31.5–36.5)
MCV RBC AUTO: 94 FL (ref 78–100)
NT-PROBNP SERPL-MCNC: 1000 PG/ML (ref 0–450)
PLATELET # BLD AUTO: 276 10E9/L (ref 150–450)
RBC # BLD AUTO: 4.83 10E12/L (ref 3.8–5.2)
WBC # BLD AUTO: 6.4 10E9/L (ref 4–11)

## 2020-08-19 PROCEDURE — 99207 C FOOT EXAM  NO CHARGE: CPT | Performed by: INTERNAL MEDICINE

## 2020-08-19 PROCEDURE — 36415 COLL VENOUS BLD VENIPUNCTURE: CPT | Performed by: INTERNAL MEDICINE

## 2020-08-19 PROCEDURE — 85610 PROTHROMBIN TIME: CPT | Performed by: INTERNAL MEDICINE

## 2020-08-19 PROCEDURE — 83880 ASSAY OF NATRIURETIC PEPTIDE: CPT | Performed by: INTERNAL MEDICINE

## 2020-08-19 PROCEDURE — 99214 OFFICE O/P EST MOD 30 MIN: CPT | Performed by: INTERNAL MEDICINE

## 2020-08-19 PROCEDURE — 85027 COMPLETE CBC AUTOMATED: CPT | Performed by: INTERNAL MEDICINE

## 2020-08-19 ASSESSMENT — MIFFLIN-ST. JEOR: SCORE: 1091.89

## 2020-08-19 NOTE — PROGRESS NOTES
"Subjective     Catalina Marie is a 74 year old female who presents to clinic today for the following health issues:    HPI   Chief Complaint   Patient presents with     RECHECK     1 month F/U     Cardiovascular exercise not as vigorous as in the past however, walking regularly for approximately 30 minutes 4 times per week.  No weights    GI early satiety which may be related to diet, weight gain 1 pound        Review of Systems   Constitutional, HEENT, cardiovascular, pulmonary, gi and gu systems are negative, except as otherwise noted.      Objective    /72 (BP Location: Left arm, Patient Position: Sitting, Cuff Size: Adult Regular)   Pulse 95   Temp 97.3  F (36.3  C) (Skin)   Ht 1.626 m (5' 4\")   Wt 61.7 kg (136 lb)   SpO2 95%   BMI 23.34 kg/m    Body mass index is 23.34 kg/m .  Physical Exam   GENERAL: healthy, alert and no distress  NECK: no adenopathy, no asymmetry, masses, or scars and thyroid normal to palpation  RESP: lungs clear to auscultation - no rales, rhonchi or wheezes  CV: regular rate and rhythm, normal S1 S2, no S3 or S4, no murmur, click or rub, no peripheral edema and peripheral pulses strong  ABDOMEN: soft, nontender, no hepatosplenomegaly, no masses and bowel sounds normal  MS: no gross musculoskeletal defects noted, no edema    Review lab reports        Assessment & Plan     Routine general medical examination at a health care facility    - CBC with platelets  - C FOOT EXAM  NO CHARGE    Chronic atrial fibrillation (H)      Type 2 diabetes mellitus with stage 1 chronic kidney disease, without long-term current use of insulin (H)    - C FOOT EXAM  NO CHARGE    CKD (chronic kidney disease) stage 3, GFR 30-59 ml/min (H)  .  Monitor medications closely avoid prerenal azotemia  - CBC with platelets    Malignant neoplasm of breast in female, estrogen receptor positive, unspecified laterality, unspecified site of breast (H)      Acute diastolic congestive heart failure (H)  Continue " to follow closely  - CBC with platelets  - BNP-N terminal pro    Nontoxic multinodular goiter  Status quo last ultrasound 2017    Hyperlipidemia LDL goal <100  Continue same aggressive therapy    Adjustment disorder with anxious mood  Managing well although she does much better in [place]    Essential hypertension  Well-controlled with current therapy  - CBC with platelets    Inflamed seborrheic keratosis  No changes    Actinic keratosis      Vitamin D deficiency      H/O bilateral mastectomy      Long term current use of anticoagulant therapy    - INR    Atrial fibrillation (H) [I48.91]    - INR       FUTURE APPOINTMENTS:       - Follow-up visit in 2 mo    No follow-ups on file.    Ian Tracy MD  [facility]

## 2020-08-19 NOTE — PROGRESS NOTES
"SUBJECTIVE:   Catalina Marie is a 74 year old female who presents for Preventive Visit.      Are you in the first 12 months of your Medicare coverage?  No    HPI  Do you feel safe in your environment? { :627089}    Have you ever done Advance Care Planning? (For example, a Health Directive, POLST, or a discussion with a medical provider or your loved ones about your wishes): { :083796}    {Hearing Test Done (Optional):089891}  Fall risk  { :483950}  {If any of the above assessments are answered yes, consider ordering appropriate referrals (Optional):581025::\"click delete button to remove this line now\"}  Cognitive Screening { :025167}    {Do you have sleep apnea, excessive snoring or daytime drowsiness? (Optional):242937}    Reviewed and updated as needed this visit by clinical staff  Tobacco         Reviewed and updated as needed this visit by Provider        Social History     Tobacco Use     Smoking status: Former Smoker     Smokeless tobacco: Never Used   Substance Use Topics     Alcohol use: Yes     Alcohol/week: 0.0 standard drinks     {Rooming Staff- Complete this question if Prescreen response is not shown below for today's visit. If you drink alcohol do you typically have >3 drinks per day or >7 drinks per week? (Optional):153070}    Alcohol Use 7/20/2020   Prescreen: >3 drinks/day or >7 drinks/week? No   {add AUDIT responses (Optional) (A score of 7 for adult men is an indication of hazardous drinking; a score of 8 or more is an indication of an alcohol use disorder.  A score of 7 or more for adult women is an indication of hazardous drinking or an alchohol use disorder):577387}    {Outside tests to abstract? :760431}    {additional problems to add (Optional):376109}    Current providers sharing in care for this patient include: {Rooming staff:  Please update Care Team in Rooming Activity, refresh this note and then delete this statement}  Patient Care Team:  Ian Tracy MD as PCP - General " "(Internal Medicine)  Ian Tracy MD as Assigned PCP    The following health maintenance items are reviewed in Epic and correct as of today:  Health Maintenance   Topic Date Due     HF ACTION PLAN  1945     HEPATITIS B IMMUNIZATION (1 of 3 - Risk 3-dose series) 1962     ZOSTER IMMUNIZATION (2 of 3) 2013     DIGOXIN  2019     INFLUENZA VACCINE (1) 2020     EYE EXAM  10/21/2020     A1C  2021     BMP  2021     MEDICARE ANNUAL WELLNESS VISIT  2021     ALT  2021     LIPID  2021     MICROALBUMIN  2021     DIABETIC FOOT EXAM  2021     FALL RISK ASSESSMENT  2021     CBC  2021     COLORECTAL CANCER SCREENING  2024     ADVANCE CARE PLANNING  2025     DTAP/TDAP/TD IMMUNIZATION (3 - Td) 10/04/2028     DEXA  Completed     TSH W/FREE T4 REFLEX  Completed     PHQ-2  Completed     PNEUMOCOCCAL IMMUNIZATION 65+ LOW/MEDIUM RISK  Completed     IPV IMMUNIZATION  Aged Out     MENINGITIS IMMUNIZATION  Aged Out     {Chronicprobdata (optional):203389}  {Decision Support (Optional):097948}    Review of Systems  {ROS COMP (Optional):697237}    OBJECTIVE:   Ht 1.626 m (5' 4\")   BMI 23.17 kg/m   Estimated body mass index is 23.17 kg/m  as calculated from the following:    Height as of this encounter: 1.626 m (5' 4\").    Weight as of 20: 61.2 kg (135 lb).  Physical Exam  {Exam (Optional) :283569}    {Diagnostic Test Results (Optional):454309::\"Diagnostic Test Results:\",\"Labs reviewed in Epic\"}    ASSESSMENT / PLAN:   {Diag Picklist:526636}    COUNSELING:  {Medicare Counselin}    Estimated body mass index is 23.17 kg/m  as calculated from the following:    Height as of this encounter: 1.626 m (5' 4\").    Weight as of 20: 61.2 kg (135 lb).    {Weight Management Plan (ACO) Complete if BMI is abnormal-  Ages 18-64  BMI >24.9.  Age 65+ with BMI <23 or >30 (Optional):710522}     reports that she has quit smoking. She has never used " smokeless tobacco.  {Tobacco Cessation -- Complete if patient is a smoker (Optional):262087}    Appropriate preventive services were discussed with this patient, including applicable screening as appropriate for cardiovascular disease, diabetes, osteopenia/osteoporosis, and glaucoma.  As appropriate for age/gender, discussed screening for colorectal cancer, prostate cancer, breast cancer, and cervical cancer. Checklist reviewing preventive services available has been given to the patient.    Reviewed patients plan of care and provided an AVS. The {CarePlan:128997} for Catalina meets the Care Plan requirement. This Care Plan has been established and reviewed with the {PATIENT, FAMILY MEMBER, CAREGIVER:338191}.    Counseling Resources:  ATP IV Guidelines  Pooled Cohorts Equation Calculator  Breast Cancer Risk Calculator  FRAX Risk Assessment  ICSI Preventive Guidelines  Dietary Guidelines for Americans, 2010  USDA's MyPlate  ASA Prophylaxis  Lung CA Screening    Ian Tracy MD  [facility]    Identified Health Risks:

## 2020-08-28 NOTE — NURSING NOTE
"Chief Complaint   Patient presents with     RECHECK     glucose     Flu Shot       Initial /66 (BP Location: Left arm, Cuff Size: Adult Regular)  Pulse 54  Temp 98.2  F (36.8  C) (Tympanic)  Ht 5' 4\" (1.626 m)  Wt 138 lb (62.6 kg)  SpO2 95%  BMI 23.69 kg/m2 Estimated body mass index is 23.69 kg/(m^2) as calculated from the following:    Height as of this encounter: 5' 4\" (1.626 m).    Weight as of this encounter: 138 lb (62.6 kg).  Medication Reconciliation: complete     Lia Mariscal MA    " [Restricted in physically strenuous activity but ambulatory and able to carry out work of a light or sedentary nature] : Status 1- Restricted in physically strenuous activity but ambulatory and able to carry out work of a light or sedentary nature, e.g., light house work, office work [Normal] : affect appropriate

## 2020-09-17 ENCOUNTER — ANTICOAGULATION THERAPY VISIT (OUTPATIENT)
Dept: NURSING | Facility: CLINIC | Age: 75
End: 2020-09-17

## 2020-09-17 DIAGNOSIS — Z79.01 LONG TERM CURRENT USE OF ANTICOAGULANT THERAPY: ICD-10-CM

## 2020-09-17 DIAGNOSIS — I48.20 CHRONIC ATRIAL FIBRILLATION (H): ICD-10-CM

## 2020-09-17 DIAGNOSIS — I48.11 LONGSTANDING PERSISTENT ATRIAL FIBRILLATION (H): ICD-10-CM

## 2020-09-17 LAB — INR PPP: 3.4 (ref 0.86–1.14)

## 2020-09-17 PROCEDURE — 36416 COLLJ CAPILLARY BLOOD SPEC: CPT | Performed by: INTERNAL MEDICINE

## 2020-09-17 PROCEDURE — 85610 PROTHROMBIN TIME: CPT | Performed by: INTERNAL MEDICINE

## 2020-09-17 NOTE — PROGRESS NOTES
ANTICOAGULATION MANAGEMENT     Patient Name:  Catalina Marie  Date:  9/17/2020    ASSESSMENT /SUBJECTIVE:    Today's INR result of 3.4 is supratherapeutic. Goal INR of 2.0-3.0      Warfarin dose taken: Warfarin taken as previously instructed    Diet: Decreased greens/vitamin K intake may be affecting INR    Medication changes/ interactions: No new medications/supplements affecting INR    Previous INR: Therapeutic     S/S of bleeding or thromboembolism: No    New injury or illness: No    Upcoming surgery, procedure or cardioversion: No    Additional findings: None      PLAN:    Spoke with Catalina regarding INR result and instructed:     Warfarin Dosing Instructions: take 2.5mg 9/17 and 9/18 and then resume 5mg MWF and 2.5mg AOD    Instructed patient to follow up no later than: 2 weeks  Lab visit scheduled    Education provided: Importance of consistent vitamin K intake      Jennifer verbalizes understanding and agrees to warfarin dosing plan.    Instructed to call the Anticoagulation Clinic for any changes, questions or concerns. (#[phone])        Jazlyn Ward RN      OBJECTIVE:  Recent labs: (last 7 days)     09/17/20  1041   INR 3.40*         No question data found.  Anticoagulation Summary  As of 9/17/2020    INR goal:   2.0-3.0   TTR:   82.1 % (5.9 mo)   INR used for dosing:   No new INR was available at the time of this encounter.   Warfarin maintenance plan:   5 mg (5 mg x 1) every Mon, Wed, Fri; 2.5 mg (5 mg x 0.5) all other days   Full warfarin instructions:   9/18: 2.5 mg; Otherwise 5 mg every Mon, Wed, Fri; 2.5 mg all other days   Weekly warfarin total:   25 mg   Plan last modified:   Lucia Arreola RN (7/20/2020)   Next INR check:   10/1/2020   Priority:   Maintenance   Target end date:   Indefinite    Indications    Long term current use of anticoagulant therapy [Z79.01]  Atrial fibrillation (H) [I48.91] [I48.91]  Chronic atrial fibrillation (H) [I48.20]             Anticoagulation Episode  Summary     INR check location:       Preferred lab:       Send INR reminders to:   DEMOND FRANCO    Comments:   Winter: FAXED INR results from [facility] in [place], [place]. Phone: [phone].  General Results#: [phone]  Call pt's cell in [place] with results/plan: [phone]      Anticoagulation Care Providers     Provider Role Specialty Phone number    Ian Tracy MD Referring Internal Medicine [phone]

## 2020-10-01 ENCOUNTER — ANTICOAGULATION THERAPY VISIT (OUTPATIENT)
Dept: NURSING | Facility: CLINIC | Age: 75
End: 2020-10-01

## 2020-10-01 DIAGNOSIS — Z79.01 LONG TERM CURRENT USE OF ANTICOAGULANT THERAPY: ICD-10-CM

## 2020-10-01 DIAGNOSIS — I48.11 LONGSTANDING PERSISTENT ATRIAL FIBRILLATION (H): ICD-10-CM

## 2020-10-01 DIAGNOSIS — I48.20 CHRONIC ATRIAL FIBRILLATION (H): ICD-10-CM

## 2020-10-01 LAB
CAPILLARY BLOOD COLLECTION: NORMAL
INR PPP: 2.8 (ref 0.86–1.14)

## 2020-10-01 PROCEDURE — 85610 PROTHROMBIN TIME: CPT | Performed by: INTERNAL MEDICINE

## 2020-10-01 PROCEDURE — 36416 COLLJ CAPILLARY BLOOD SPEC: CPT | Performed by: INTERNAL MEDICINE

## 2020-10-01 PROCEDURE — 99207 PR NO CHARGE NURSE ONLY: CPT

## 2020-10-01 NOTE — PROGRESS NOTES
Anticoagulation Management    Unable to reach [facility] today. Attempt #2    Left message to continue current dose of warfarin 2.5 mg tonight.      Anticoagulation clinic to follow up    Jazlyn Ward RN  Anticoagulation Management    Unable to reach [facility] today.    Today's INR result of 2.8 is therapeutic (goal INR of 2.0-3.0).  Result received from: Clinic Lab    Follow up required to confirm warfarin dose taken and assess for changes    [facility]       Anticoagulation clinic to follow up    Jazlyn Ward RN

## 2020-10-02 NOTE — PROGRESS NOTES
ANTICOAGULATION FOLLOW-UP CLINIC VISIT    Patient Name:  Catalina Marie  Date:  10/2/2020  Contact Type:  Telephone    SUBJECTIVE:  Patient Findings     Comments:  Unable to reach pt by phone  Will send the AVS to her            Clinical Outcomes     Comments:  Unable to reach pt by phone  Will send the AVS to her               OBJECTIVE    Recent labs: (last 7 days)     10/01/20  1035   INR 2.80*       ASSESSMENT / PLAN  INR assessment THER    Recheck INR In: 4 WEEKS    INR Location Clinic      Anticoagulation Summary  As of 10/1/2020    INR goal:  2.0-3.0   TTR:  76.6 % (5.8 mo)   INR used for dosing:  No new INR was available at the time of this encounter.   Warfarin maintenance plan:  5 mg (5 mg x 1) every Mon, Wed, Fri; 2.5 mg (5 mg x 0.5) all other days   Full warfarin instructions:  5 mg every Mon, Wed, Fri; 2.5 mg all other days   Weekly warfarin total:  25 mg   No change documented:  Jazlyn Ward RN   Plan last modified:  Lucia Arreola RN (7/20/2020)   Next INR check:  10/29/2020   Priority:  Maintenance   Target end date:  Indefinite    Indications    Long term current use of anticoagulant therapy [Z79.01]  Atrial fibrillation (H) [I48.91] [I48.91]  Chronic atrial fibrillation (H) [I48.20]             Anticoagulation Episode Summary     INR check location:      Preferred lab:      Send INR reminders to:  DEMOND FRANCO    Comments:  Winter: FAXED INR results from [facility] in [place], [place]. Phone: [phone].  General Results#: [phone]  Call pt's cell in [place] with results/plan: [phone]      Anticoagulation Care Providers     Provider Role Specialty Phone number    Ian Tracy MD Referring Internal Medicine [phone]            See the Encounter Report to view Anticoagulation Flowsheet and Dosing Calendar (Go to Encounters tab in chart review, and find the Anticoagulation Therapy Visit)        Lucia Arreola, RN

## 2020-10-05 ENCOUNTER — OFFICE VISIT (OUTPATIENT)
Dept: FAMILY MEDICINE | Facility: CLINIC | Age: 75
End: 2020-10-05
Payer: MEDICARE

## 2020-10-05 VITALS
HEART RATE: 52 BPM | TEMPERATURE: 95.6 F | HEIGHT: 64 IN | DIASTOLIC BLOOD PRESSURE: 80 MMHG | BODY MASS INDEX: 23.56 KG/M2 | WEIGHT: 138 LBS | OXYGEN SATURATION: 97 % | SYSTOLIC BLOOD PRESSURE: 185 MMHG

## 2020-10-05 DIAGNOSIS — I10 ESSENTIAL HYPERTENSION: ICD-10-CM

## 2020-10-05 DIAGNOSIS — C50.919 MALIGNANT NEOPLASM OF BREAST IN FEMALE, ESTROGEN RECEPTOR POSITIVE, UNSPECIFIED LATERALITY, UNSPECIFIED SITE OF BREAST (H): ICD-10-CM

## 2020-10-05 DIAGNOSIS — N18.1 TYPE 2 DIABETES MELLITUS WITH STAGE 1 CHRONIC KIDNEY DISEASE, WITHOUT LONG-TERM CURRENT USE OF INSULIN (H): Primary | ICD-10-CM

## 2020-10-05 DIAGNOSIS — N18.31 STAGE 3A CHRONIC KIDNEY DISEASE (H): ICD-10-CM

## 2020-10-05 DIAGNOSIS — I48.11 LONGSTANDING PERSISTENT ATRIAL FIBRILLATION (H): ICD-10-CM

## 2020-10-05 DIAGNOSIS — I50.31 ACUTE DIASTOLIC CONGESTIVE HEART FAILURE (H): ICD-10-CM

## 2020-10-05 DIAGNOSIS — E11.22 TYPE 2 DIABETES MELLITUS WITH STAGE 1 CHRONIC KIDNEY DISEASE, WITHOUT LONG-TERM CURRENT USE OF INSULIN (H): Primary | ICD-10-CM

## 2020-10-05 DIAGNOSIS — E78.5 HYPERLIPIDEMIA LDL GOAL <100: ICD-10-CM

## 2020-10-05 DIAGNOSIS — Z17.0 MALIGNANT NEOPLASM OF BREAST IN FEMALE, ESTROGEN RECEPTOR POSITIVE, UNSPECIFIED LATERALITY, UNSPECIFIED SITE OF BREAST (H): ICD-10-CM

## 2020-10-05 DIAGNOSIS — Z23 NEED FOR PROPHYLACTIC VACCINATION AND INOCULATION AGAINST INFLUENZA: ICD-10-CM

## 2020-10-05 DIAGNOSIS — E04.2 NONTOXIC MULTINODULAR GOITER: ICD-10-CM

## 2020-10-05 DIAGNOSIS — Z90.13 H/O BILATERAL MASTECTOMY: ICD-10-CM

## 2020-10-05 LAB
ALBUMIN SERPL-MCNC: 3.8 G/DL (ref 3.4–5)
ALP SERPL-CCNC: 99 U/L (ref 40–150)
ALT SERPL W P-5'-P-CCNC: 41 U/L (ref 0–50)
ANION GAP SERPL CALCULATED.3IONS-SCNC: 9 MMOL/L (ref 3–14)
AST SERPL W P-5'-P-CCNC: 36 U/L (ref 0–45)
BILIRUB SERPL-MCNC: 0.4 MG/DL (ref 0.2–1.3)
BUN SERPL-MCNC: 17 MG/DL (ref 7–30)
CALCIUM SERPL-MCNC: 8.9 MG/DL (ref 8.5–10.1)
CHLORIDE SERPL-SCNC: 107 MMOL/L (ref 94–109)
CHOLEST SERPL-MCNC: 119 MG/DL
CO2 SERPL-SCNC: 25 MMOL/L (ref 20–32)
CREAT SERPL-MCNC: 1.02 MG/DL (ref 0.52–1.04)
GFR SERPL CREATININE-BSD FRML MDRD: 53 ML/MIN/{1.73_M2}
GLUCOSE SERPL-MCNC: 123 MG/DL (ref 70–99)
HBA1C MFR BLD: 6.2 % (ref 0–5.6)
HDLC SERPL-MCNC: 36 MG/DL
LDLC SERPL CALC-MCNC: 45 MG/DL
NONHDLC SERPL-MCNC: 83 MG/DL
NT-PROBNP SERPL-MCNC: 316 PG/ML (ref 0–450)
POTASSIUM SERPL-SCNC: 4.1 MMOL/L (ref 3.4–5.3)
PROT SERPL-MCNC: 7.5 G/DL (ref 6.8–8.8)
SODIUM SERPL-SCNC: 141 MMOL/L (ref 133–144)
TRIGL SERPL-MCNC: 188 MG/DL

## 2020-10-05 PROCEDURE — 80061 LIPID PANEL: CPT | Performed by: INTERNAL MEDICINE

## 2020-10-05 PROCEDURE — 80053 COMPREHEN METABOLIC PANEL: CPT | Performed by: INTERNAL MEDICINE

## 2020-10-05 PROCEDURE — 83880 ASSAY OF NATRIURETIC PEPTIDE: CPT | Performed by: INTERNAL MEDICINE

## 2020-10-05 PROCEDURE — 99214 OFFICE O/P EST MOD 30 MIN: CPT | Mod: 25 | Performed by: INTERNAL MEDICINE

## 2020-10-05 PROCEDURE — 90662 IIV NO PRSV INCREASED AG IM: CPT | Performed by: INTERNAL MEDICINE

## 2020-10-05 PROCEDURE — 83036 HEMOGLOBIN GLYCOSYLATED A1C: CPT | Performed by: INTERNAL MEDICINE

## 2020-10-05 PROCEDURE — G0008 ADMIN INFLUENZA VIRUS VAC: HCPCS | Performed by: INTERNAL MEDICINE

## 2020-10-05 RX ORDER — AMLODIPINE BESYLATE 5 MG/1
5 TABLET ORAL DAILY
Qty: 30 TABLET | Refills: 1 | Status: SHIPPED | OUTPATIENT
Start: 2020-10-05 | End: 2020-10-21

## 2020-10-05 ASSESSMENT — MIFFLIN-ST. JEOR: SCORE: 1095.96

## 2020-10-05 NOTE — PROGRESS NOTES
"Subjective     Catalina Marie is a 75 year old female who presents to clinic today for the following health issues:    HPI    Patient is noted an escalation of her blood pressure in spite of maintaining a regular activity routine which has been decreased because of the pandemic in the associated avoidance of the health club.  She walks with her  for 30 to 60 minutes and has not done any strength training she has gained weight but not substantially.  Her blood pressure this morning was 168/88.    No significant problems with shortness of breath, chest pain or chest discomfort.     Cardiac Follow Up      Review of Systems   Constitutional, HEENT, cardiovascular, pulmonary, gi-digestive issues associated with gas and bloating in spite of maintaining a relatively careful diet and gu systems are negative, except as otherwise noted.      Objective    BP (!) 185/80 (BP Location: Left arm, Patient Position: Sitting)   Pulse 52   Temp 95.6  F (35.3  C) (Tympanic)   Ht 1.626 m (5' 4\")   Wt 62.6 kg (138 lb)   SpO2 97%   BMI 23.69 kg/m    Body mass index is 23.69 kg/m .  Physical Exam   GENERAL: healthy, alert and no distress  NECK: no adenopathy, no asymmetry, masses, or scars and thyroid normal to palpation  RESP: lungs clear to auscultation - no rales, rhonchi or wheezes  CV: regular rate and rhythm, , no murmur, click or rub, trace to 1+ pretibial peripheral edema and peripheral pulses strong  ABDOMEN: soft, nontender, no hepatosplenomegaly, no masses and bowel sounds normal  MS: no gross musculoskeletal defects noted, no edema  Feet clean and dry          Assessment & Plan     Type 2 diabetes mellitus with stage 1 chronic kidney disease, without long-term current use of insulin (H)  Noted improvement in hemoglobin A1c  - Hemoglobin A1c    Longstanding persistent atrial fibrillation (H)  Patient's change in blood pressure may be related to the fact that she has gone back into a normal sinus rhythm.  No " change in therapeutic modalities to maintain ongoing anticoagulation associated with rate control.    Malignant neoplasm of breast in female, estrogen receptor positive, unspecified laterality, unspecified site of breast (H)  No change in current therapy    Stage 3a chronic kidney disease  Follow-up lab studies as ordered    Essential hypertension  Increase amlodipine and follow blood pressure serially and return to clinic in 2 to 4 weeks to reevaluate blood pressure control  - Comprehensive metabolic panel  - amLODIPine (NORVASC) 5 MG tablet; Take 1 tablet (5 mg) by mouth daily    Hyperlipidemia LDL goal <100  Follow-up labs  - Lipid panel reflex to direct LDL Fasting    H/O bilateral mastectomy      Nontoxic multinodular goiter  Reevaluate serially    Acute diastolic congestive heart failure (H)  Follow diastolic heart failure closely, continue current therapy  - BNP-N terminal pro    Need for prophylactic vaccination and inoculation against influenza    - FLUZONE HIGH DOSE 65+  [42825]  - Vaccine Administration, Initial [80316]        FUTURE APPOINTMENTS:       - Follow-up visit in 1 mo    No follow-ups on file.    Ian Tracy MD  [facility]

## 2020-10-06 ENCOUNTER — MYC MEDICAL ADVICE (OUTPATIENT)
Dept: FAMILY MEDICINE | Facility: CLINIC | Age: 75
End: 2020-10-06

## 2020-10-21 ENCOUNTER — APPOINTMENT (OUTPATIENT)
Dept: LAB | Facility: CLINIC | Age: 75
End: 2020-10-21
Payer: MEDICARE

## 2020-10-21 ENCOUNTER — OFFICE VISIT (OUTPATIENT)
Dept: FAMILY MEDICINE | Facility: CLINIC | Age: 75
End: 2020-10-21
Payer: MEDICARE

## 2020-10-21 ENCOUNTER — ANTICOAGULATION THERAPY VISIT (OUTPATIENT)
Dept: NURSING | Facility: CLINIC | Age: 75
End: 2020-10-21

## 2020-10-21 VITALS
HEART RATE: 71 BPM | HEIGHT: 64 IN | TEMPERATURE: 97.3 F | OXYGEN SATURATION: 96 % | SYSTOLIC BLOOD PRESSURE: 155 MMHG | BODY MASS INDEX: 23.05 KG/M2 | DIASTOLIC BLOOD PRESSURE: 97 MMHG | WEIGHT: 135 LBS

## 2020-10-21 DIAGNOSIS — I48.20 CHRONIC ATRIAL FIBRILLATION (H): ICD-10-CM

## 2020-10-21 DIAGNOSIS — F43.22 ADJUSTMENT DISORDER WITH ANXIOUS MOOD: ICD-10-CM

## 2020-10-21 DIAGNOSIS — N18.1 TYPE 2 DIABETES MELLITUS WITH STAGE 1 CHRONIC KIDNEY DISEASE, WITHOUT LONG-TERM CURRENT USE OF INSULIN (H): Primary | ICD-10-CM

## 2020-10-21 DIAGNOSIS — Z79.01 LONG TERM CURRENT USE OF ANTICOAGULANT THERAPY: ICD-10-CM

## 2020-10-21 DIAGNOSIS — N18.31 STAGE 3A CHRONIC KIDNEY DISEASE (H): ICD-10-CM

## 2020-10-21 DIAGNOSIS — I10 ESSENTIAL HYPERTENSION: ICD-10-CM

## 2020-10-21 DIAGNOSIS — I48.11 LONGSTANDING PERSISTENT ATRIAL FIBRILLATION (H): ICD-10-CM

## 2020-10-21 DIAGNOSIS — E04.2 NONTOXIC MULTINODULAR GOITER: ICD-10-CM

## 2020-10-21 DIAGNOSIS — Z17.0 MALIGNANT NEOPLASM OF BREAST IN FEMALE, ESTROGEN RECEPTOR POSITIVE, UNSPECIFIED LATERALITY, UNSPECIFIED SITE OF BREAST (H): ICD-10-CM

## 2020-10-21 DIAGNOSIS — Z90.13 H/O BILATERAL MASTECTOMY: ICD-10-CM

## 2020-10-21 DIAGNOSIS — C50.919 MALIGNANT NEOPLASM OF BREAST IN FEMALE, ESTROGEN RECEPTOR POSITIVE, UNSPECIFIED LATERALITY, UNSPECIFIED SITE OF BREAST (H): ICD-10-CM

## 2020-10-21 DIAGNOSIS — E11.22 TYPE 2 DIABETES MELLITUS WITH STAGE 1 CHRONIC KIDNEY DISEASE, WITHOUT LONG-TERM CURRENT USE OF INSULIN (H): Primary | ICD-10-CM

## 2020-10-21 LAB
CAPILLARY BLOOD COLLECTION: NORMAL
ERYTHROCYTE [DISTWIDTH] IN BLOOD BY AUTOMATED COUNT: 13.7 % (ref 10–15)
HCT VFR BLD AUTO: 46.3 % (ref 35–47)
HGB BLD-MCNC: 15.4 G/DL (ref 11.7–15.7)
INR PPP: 2.6 (ref 0.86–1.14)
MCH RBC QN AUTO: 31.5 PG (ref 26.5–33)
MCHC RBC AUTO-ENTMCNC: 33.3 G/DL (ref 31.5–36.5)
MCV RBC AUTO: 95 FL (ref 78–100)
PLATELET # BLD AUTO: 252 10E9/L (ref 150–450)
RBC # BLD AUTO: 4.89 10E12/L (ref 3.8–5.2)
WBC # BLD AUTO: 8.3 10E9/L (ref 4–11)

## 2020-10-21 PROCEDURE — 80048 BASIC METABOLIC PNL TOTAL CA: CPT | Performed by: INTERNAL MEDICINE

## 2020-10-21 PROCEDURE — 82040 ASSAY OF SERUM ALBUMIN: CPT | Performed by: INTERNAL MEDICINE

## 2020-10-21 PROCEDURE — 36415 COLL VENOUS BLD VENIPUNCTURE: CPT | Performed by: INTERNAL MEDICINE

## 2020-10-21 PROCEDURE — 99214 OFFICE O/P EST MOD 30 MIN: CPT | Performed by: INTERNAL MEDICINE

## 2020-10-21 PROCEDURE — 85610 PROTHROMBIN TIME: CPT | Performed by: INTERNAL MEDICINE

## 2020-10-21 PROCEDURE — 85027 COMPLETE CBC AUTOMATED: CPT | Performed by: INTERNAL MEDICINE

## 2020-10-21 RX ORDER — AMLODIPINE BESYLATE 5 MG/1
5 TABLET ORAL DAILY
Qty: 45 TABLET | Refills: 3 | Status: SHIPPED | OUTPATIENT
Start: 2020-10-21 | End: 2020-11-09

## 2020-10-21 RX ORDER — SPIRONOLACTONE 25 MG/1
25 TABLET ORAL DAILY
Qty: 30 TABLET | Refills: 1 | Status: SHIPPED | OUTPATIENT
Start: 2020-10-21 | End: 2020-11-13 | Stop reason: ALTCHOICE

## 2020-10-21 ASSESSMENT — MIFFLIN-ST. JEOR: SCORE: 1082.36

## 2020-10-21 NOTE — PROGRESS NOTES
ANTICOAGULATION MANAGEMENT     Patient Name:  Catalina Marie  Date:  10/21/2020    ASSESSMENT /SUBJECTIVE:    Today's INR result of 2.6 is therapeutic. Goal INR of 2.0-3.0      Warfarin dose taken: Warfarin taken as instructed    Diet: No new diet changes affecting INR    Medication changes/ interactions: Potential interaction between spironolactone and warfarin which may affect subsequent INRs    Previous INR: Therapeutic     S/S of bleeding or thromboembolism: No    New injury or illness: No    Upcoming surgery, procedure or cardioversion: No    Additional findings: patient is leaving for her [place] home this weekend.  She is followed by cardiology in [place], will have INR checked with them in 2 weeks.       PLAN:    Telephone call with Catalina regarding INR result and instructed:     Warfarin Dosing Instructions: Continue your current warfarin dose 5mg MWF and 2.5mg AOD    Instructed patient to follow up no later than: 2 weeks  patient will be in [place], will have it checked by her MD in [place]    Education provided: Potential interaction between warfarin and Spironolactone      Jennifer verbalizes understanding and agrees to warfarin dosing plan.    Instructed to call the Anticoagulation Clinic for any changes, questions or concerns. (#[phone])        Jazlyn Ward RN      OBJECTIVE:  Recent labs: (last 7 days)     10/21/20  1132   INR 2.60*         No question data found.  Anticoagulation Summary  As of 10/21/2020    INR goal:  2.0-3.0   TTR:  76.8 % (5.9 mo)   INR used for dosing:  No new INR was available at the time of this encounter.   Warfarin maintenance plan:  5 mg (5 mg x 1) every Mon, Wed, Fri; 2.5 mg (5 mg x 0.5) all other days   Full warfarin instructions:  5 mg every Mon, Wed, Fri; 2.5 mg all other days   Weekly warfarin total:  25 mg   No change documented:  Jazlyn Ward RN   Plan last modified:  Lucia Arreola RN (7/20/2020)   Next INR check:  11/4/2020   Priority:  Maintenance   Target  end date:  Indefinite    Indications    Long term current use of anticoagulant therapy [Z79.01]  Atrial fibrillation (H) [I48.91] [I48.91]  Chronic atrial fibrillation (H) [I48.20]             Anticoagulation Episode Summary     INR check location:      Preferred lab:      Send INR reminders to:  DEMOND FRANCO    Comments:  Winter: FAXED INR results from [facility] in [place], [place]. Phone: [phone].  General Results#: [phone]  Call pt's cell in [place] with results/plan: [phone]      Anticoagulation Care Providers     Provider Role Specialty Phone number    Ian Tracy MD Referring Internal Medicine [phone]       Anticoagulation Management    Unable to reach [facility] today.    Today's INR result of 2.6 is therapeutic (goal INR of 2.0-3.0).  Result received from: Clinic Lab    Follow up required to confirm warfarin dose taken and assess for changes    LMTCB   Transfer to [phone]      Anticoagulation clinic to follow up    Jazlyn Ward RN

## 2020-10-21 NOTE — PROGRESS NOTES
"Subjective     Catalina Marie is a 75 year old female who presents to clinic today for the following health issues:    HPI         Follow up hypertension  Reviewed log of blood pressures and noted episodes of hypertension related to midmorning.  Ongoing stress with her relatives.    This morning at 7 AM she developed mid lower umbilical pain which was significant for crescendo decrescendo in approximately 2-1/2 hours she tried Tums with no relief small bowel movement had no relief and the episode of pain resolved spontaneously.  Reviewing her diet it did not seem to be associated with tainted food.  No other changes in her bowel habits which she does not monitor regularly.          Review of Systems   Constitutional, HEENT, cardiovascular, pulmonary, gi and gu systems are negative, except as otherwise noted.      Objective    BP (!) 155/97 (BP Location: Left arm, Patient Position: Sitting, Cuff Size: Adult Regular)   Pulse 71   Temp 97.3  F (36.3  C) (Temporal)   Ht 1.626 m (5' 4\")   Wt 61.2 kg (135 lb)   SpO2 96%   BMI 23.17 kg/m    Body mass index is 23.17 kg/m .  Physical Exam   GENERAL: healthy, alert and no distress  NECK: no adenopathy, no asymmetry, masses, or scars and thyroid normal to palpation  RESP: lungs clear to auscultation - no rales, rhonchi or wheezes  CV: Irregularly irregular rhythm,, no murmur, click or rub, no peripheral edema and peripheral pulses strong  ABDOMEN: soft, nontender, no hepatosplenomegaly, no masses and bowel sounds normal  MS: no gross musculoskeletal defects noted, no edema            Assessment & Plan     Type 2 diabetes mellitus with stage 1 chronic kidney disease, without long-term current use of insulin (H)  Managing well with her current medications and diet and exercise program  - Capillary Blood Collection    Chronic atrial fibrillation (H)  Well-controlled with current therapy  Malignant neoplasm of breast in female, estrogen receptor positive, unspecified " laterality, unspecified site of breast (H)      Stage 3a chronic kidney disease  Continue to monitor closely to avoid prerenal azotemia and/or nephrotoxic drugs    Adjustment disorder with anxious mood  Managing relatively well with her exercise program and medications    Essential hypertension  Ongoing follow-up to ensure complete control of her blood pressure.  Add Aldactone and reevaluate her potassium and kidney functions in 1 week  - Basic metabolic panel  - CBC with platelets  - spironolactone (ALDACTONE) 25 MG tablet; Take 1 tablet (25 mg) by mouth daily  - amLODIPine (NORVASC) 5 MG tablet; Take 1 tablet (5 mg) by mouth daily  - Miscellaneous Order for DME - ONLY FOR DME    Nontoxic multinodular goiter  Continue yearly surveillance  H/O bilateral mastectomy      Long term current use of anticoagulant therapy    - INR    Longstanding persistent atrial fibrillation (H)    - INR        FUTURE APPOINTMENTS:       - Follow-up visit in 2wks    No follow-ups on file.    Ian Tracy MD  [facility]

## 2020-10-22 LAB
ANION GAP SERPL CALCULATED.3IONS-SCNC: 5 MMOL/L (ref 3–14)
BUN SERPL-MCNC: 19 MG/DL (ref 7–30)
CALCIUM SERPL-MCNC: 10.4 MG/DL (ref 8.5–10.1)
CHLORIDE SERPL-SCNC: 106 MMOL/L (ref 94–109)
CO2 SERPL-SCNC: 31 MMOL/L (ref 20–32)
CREAT SERPL-MCNC: 1.03 MG/DL (ref 0.52–1.04)
GFR SERPL CREATININE-BSD FRML MDRD: 52 ML/MIN/{1.73_M2}
GLUCOSE SERPL-MCNC: 109 MG/DL (ref 70–99)
POTASSIUM SERPL-SCNC: 4.2 MMOL/L (ref 3.4–5.3)
SODIUM SERPL-SCNC: 142 MMOL/L (ref 133–144)

## 2020-10-23 DIAGNOSIS — E83.52 HYPERCALCEMIA: Primary | ICD-10-CM

## 2020-10-23 LAB — ALBUMIN SERPL-MCNC: 4.2 G/DL (ref 3.4–5)

## 2020-10-27 DIAGNOSIS — I10 BENIGN ESSENTIAL HYPERTENSION: ICD-10-CM

## 2020-10-27 NOTE — TELEPHONE ENCOUNTER
Has contra-indication    Follow up with Dr Tracy due about Nov 4th - not scheduled.    LOV 10-, follow up 2 weeks after new medication added    RT Riya (R)

## 2020-10-29 RX ORDER — FENOFIBRATE 160 MG/1
160 TABLET ORAL DAILY
Qty: 90 TABLET | Refills: 3 | Status: SHIPPED | OUTPATIENT
Start: 2020-10-29 | End: 2021-10-25

## 2020-10-30 ENCOUNTER — TRANSFERRED RECORDS (OUTPATIENT)
Dept: HEALTH INFORMATION MANAGEMENT | Facility: CLINIC | Age: 75
End: 2020-10-30

## 2020-10-30 LAB
CREAT SERPL-MCNC: 1.51 MG/DL (ref 0.57–1)
GFR SERPL CREATININE-BSD FRML MDRD: 33 ML/MIN/1.73
GLUCOSE SERPL-MCNC: 113 MG/DL (ref 65–99)
POTASSIUM SERPL-SCNC: 5.1 MMOL/L (ref 3.5–5.2)

## 2020-11-03 ENCOUNTER — MYC MEDICAL ADVICE (OUTPATIENT)
Dept: FAMILY MEDICINE | Facility: CLINIC | Age: 75
End: 2020-11-03

## 2020-11-06 ENCOUNTER — TELEPHONE (OUTPATIENT)
Dept: FAMILY MEDICINE | Facility: CLINIC | Age: 75
End: 2020-11-06

## 2020-11-06 DIAGNOSIS — I10 ESSENTIAL HYPERTENSION: ICD-10-CM

## 2020-11-06 DIAGNOSIS — F43.22 ADJUSTMENT DISORDER WITH ANXIOUS MOOD: Primary | ICD-10-CM

## 2020-11-06 NOTE — TELEPHONE ENCOUNTER
I spoke to rep.at [facility].  She faxed lab results from 10/30 to my attention now. Fax: [phone]    Placed on PCP desk.                                  Creat:   1.51 mg/dL   (0.57-1.00)  GFR:        33 mL/min/1.73  (>59)  Glucose: 113 mg/dL  Alyssa Valdovinos RN on 11/6/2020 at 3:01 PM

## 2020-11-06 NOTE — TELEPHONE ENCOUNTER
Reason for Call:  Other call back    Detailed comments: patient says she talked to someone 1 hour ago regarding lab results from elsewhere that were faxed over??  That Person? Said Yes we got them?  I told her they are probably on [employer] Desk? Then she started talking about she is self dosing something? I told her I would have a Nurse call her back    Phone Number Patient can be reached at: Home number on file [phone] (home)    Best Time: Today    Can we leave a detailed message on this number? Not Applicable    Call taken on 11/6/2020 at 11:51 AM by Christin Yap

## 2020-11-06 NOTE — TELEPHONE ENCOUNTER
Jennifer called back and said the clinic needs to call to get the lab information.    Ph. [phone]

## 2020-11-07 DIAGNOSIS — F43.22 ADJUSTMENT DISORDER WITH ANXIOUS MOOD: ICD-10-CM

## 2020-11-09 ENCOUNTER — MYC MEDICAL ADVICE (OUTPATIENT)
Dept: FAMILY MEDICINE | Facility: CLINIC | Age: 75
End: 2020-11-09

## 2020-11-09 RX ORDER — SERTRALINE HYDROCHLORIDE 100 MG/1
100 TABLET, FILM COATED ORAL DAILY
Qty: 90 TABLET | Refills: 3 | Status: SHIPPED | OUTPATIENT
Start: 2020-11-09 | End: 2021-10-05

## 2020-11-09 RX ORDER — SERTRALINE HYDROCHLORIDE 100 MG/1
100 TABLET, FILM COATED ORAL DAILY
Qty: 90 TABLET | Refills: 1 | Status: SHIPPED | OUTPATIENT
Start: 2020-11-09 | End: 2022-02-01

## 2020-11-09 RX ORDER — AMLODIPINE BESYLATE 5 MG/1
2.5 TABLET ORAL DAILY
Qty: 30 TABLET | Refills: 1 | Status: SHIPPED | OUTPATIENT
Start: 2020-11-09 | End: 2021-02-18

## 2020-11-09 NOTE — TELEPHONE ENCOUNTER
Patient called back to follow-up on this. Her concern is that she is self adjusting her medications but doesn't want to continue to do so without Dr Tracy's advice    Ph. [phone]

## 2020-11-09 NOTE — TELEPHONE ENCOUNTER
With addition of spironolactone to her other antihypertensive medications we have caused her blood pressure to be too low and have also caused prerenal azotemia.  Continue all the other medications including amlodipine 2-1/2 mg daily verapamil, lisinopril and Lasix and discontinue spironolactone.  The patient will contact me in 2 weeks with a follow-up of her blood pressure reports.

## 2020-11-10 ENCOUNTER — MYC MEDICAL ADVICE (OUTPATIENT)
Dept: FAMILY MEDICINE | Facility: CLINIC | Age: 75
End: 2020-11-10

## 2020-11-10 NOTE — TELEPHONE ENCOUNTER
Message sent to patient.  She is currently in [place] for the winter and has her INR's managed by cardiology while there.

## 2020-11-13 NOTE — TELEPHONE ENCOUNTER
See note from Dr Tracy 11/9/2020 - discontinue spironolactone.  Fax from pharmacy received [id number]) asking for refill.  Appears to be auto-request.  Spironolactone is still active in chart however.  Will fax back to pharmacy - medication has been stopped, however chart needs to be updated.    Madeleine Zepeda RT (R)

## 2020-11-23 DIAGNOSIS — I10 BENIGN ESSENTIAL HYPERTENSION: ICD-10-CM

## 2020-11-24 ENCOUNTER — TRANSFERRED RECORDS (OUTPATIENT)
Dept: HEALTH INFORMATION MANAGEMENT | Facility: CLINIC | Age: 75
End: 2020-11-24

## 2020-11-25 RX ORDER — LISINOPRIL 40 MG/1
40 TABLET ORAL DAILY
Qty: 90 TABLET | Refills: 3 | Status: SHIPPED | OUTPATIENT
Start: 2020-11-25 | End: 2021-11-23

## 2020-11-30 DIAGNOSIS — I48.0 PAROXYSMAL ATRIAL FIBRILLATION (H): ICD-10-CM

## 2020-11-30 DIAGNOSIS — I10 ESSENTIAL HYPERTENSION: ICD-10-CM

## 2020-11-30 NOTE — TELEPHONE ENCOUNTER
Pharmacy in [place] (patient now there for winter) asking for refills of    Furosemide and Atenolol    Pharmacy stating both medications are only ONCE daily.  Rx's in chart state BID dosing.    Needs triage to see how patient is taking medication - based on [employer] messages going back to even early November, patient's BP meds have been changed several times.  Unsure what patient is taking.    Madeleine Zepeda, RT (R)

## 2020-12-01 RX ORDER — FUROSEMIDE 20 MG
20 TABLET ORAL 2 TIMES DAILY
Qty: 180 TABLET | Refills: 0 | Status: SHIPPED | OUTPATIENT
Start: 2020-12-01 | End: 2021-03-02

## 2020-12-01 RX ORDER — ATENOLOL 25 MG/1
TABLET ORAL
Qty: 180 TABLET | Refills: 0 | Status: SHIPPED | OUTPATIENT
Start: 2020-12-01 | End: 2021-03-02

## 2020-12-01 RX ORDER — WARFARIN SODIUM 5 MG/1
TABLET ORAL
Qty: 90 TABLET | Refills: 1 | OUTPATIENT
Start: 2020-12-01

## 2020-12-01 NOTE — TELEPHONE ENCOUNTER
Patient is currently in [place] and is INR is managed by her Cardiologist while the.  Pharmacy advised to obtain refill from cardiology office.    Jazlyn Ward RN  [facility]

## 2020-12-01 NOTE — TELEPHONE ENCOUNTER
[place]ie message sent to patient to clarify dosing on furosemide and atenolol     Med list: Atenolol 25mg twice a day and Furosemide 20mg twice a day     Maylin HOWARD RN

## 2020-12-01 NOTE — TELEPHONE ENCOUNTER
Pt confirmed med list dosing correct  Prescription approved per [facility] Refill Protocol.  Kami ABURTO RN

## 2020-12-02 DIAGNOSIS — I50.31 ACUTE DIASTOLIC CONGESTIVE HEART FAILURE (H): Primary | ICD-10-CM

## 2020-12-02 RX ORDER — SPIRONOLACTONE 25 MG/1
25 TABLET ORAL DAILY
COMMUNITY
Start: 2020-10-21 | End: 2020-12-02

## 2020-12-03 NOTE — TELEPHONE ENCOUNTER
Routing refill request to provider for review/approval because:  Labs out of range:  BPs.  Medication is reported/historical  Please authorize if appropriate.  Thanks,  Eufemia Rodriguez RN

## 2020-12-04 RX ORDER — SPIRONOLACTONE 25 MG/1
25 TABLET ORAL DAILY
Qty: 90 TABLET | Refills: 3 | Status: SHIPPED | OUTPATIENT
Start: 2020-12-04 | End: 2021-07-21

## 2020-12-21 DIAGNOSIS — E78.5 HYPERLIPIDEMIA LDL GOAL <100: ICD-10-CM

## 2020-12-22 RX ORDER — ATORVASTATIN CALCIUM 10 MG/1
5 TABLET, FILM COATED ORAL DAILY
Qty: 45 TABLET | Refills: 3 | Status: SHIPPED | OUTPATIENT
Start: 2020-12-22 | End: 2021-11-23

## 2021-01-08 ENCOUNTER — TELEPHONE (OUTPATIENT)
Dept: FAMILY MEDICINE | Facility: CLINIC | Age: 76
End: 2021-01-08

## 2021-01-08 NOTE — TELEPHONE ENCOUNTER
Reason for Call:  Other appointment    Detailed comments: Patient would like to establish care with Dr Trinh  She knows that he works with Dr Tracy and would like to establish care for her and her  as patients   They would like to do their physicals in July and are only in [place] during Summer and Fall    Phone Number Patient can be reached at: Cell number on file:    Telephone Information:   Mobile [phone]       Best Time: anytime    Can we leave a detailed message on this number? YES    Call taken on 1/8/2021 at 2:39 PM by Trista Karimi

## 2021-01-13 DIAGNOSIS — I48.20 CHRONIC ATRIAL FIBRILLATION (H): ICD-10-CM

## 2021-01-15 NOTE — TELEPHONE ENCOUNTER
Component      Latest Ref Rng & Units 12/26/2019 7/20/2020 10/5/2020 10/21/2020   Creatinine      0.57 - 1.00 mg/dL 1.01 1.04 1.02 1.03   GFR Estimate      >59 ml/min/1.73 54 (L) 52 (L) 53 (L) 52 (L)   GFR Estimate If Black      >59 ml/min/1.73 63 60 (L) 61 61     Component      Latest Ref Rng & Units 10/30/2020   Creatinine      0.57 - 1.00 mg/dL 1.51 (A)   GFR Estimate      >59 ml/min/1.73 33 (A)   GFR Estimate If Black      >59 ml/min/1.73 38 (A)     Most recent CREATININE elevated 10/30/20;  TO PCP to review. Please advise if we should have patient obtain lab draw in [place] ([facility] lab or similar)    Thank you,  Alyssa Valdovinos RN on 1/15/2021 at 9:42 AM

## 2021-01-18 RX ORDER — VERAPAMIL HYDROCHLORIDE 240 MG/1
240 CAPSULE, EXTENDED RELEASE ORAL DAILY
Qty: 90 CAPSULE | Refills: 3 | Status: SHIPPED | OUTPATIENT
Start: 2021-01-18 | End: 2021-12-17

## 2021-02-10 DIAGNOSIS — I10 ESSENTIAL HYPERTENSION: ICD-10-CM

## 2021-02-10 NOTE — TELEPHONE ENCOUNTER
Reason for Call:  Medication or medication refill:    Do you use a [facility]?  Name of the pharmacy and phone number for the current request:     [id number] - [place], [place] - [facility] [address] AT [facility]    Name of the medication requested: amLODIPine (NORVASC) 5 MG tablet     Other request: Pt is in [place] until [place] - Physical with new PCP scheduled for July 21st.    Can we leave a detailed message on this number? YES    Phone number patient can be reached at: Cell number on file:    Telephone Information:   Mobile [phone]     Best Time: early evening    Call taken on 2/10/2021 at 2:08 PM by Candace Min

## 2021-02-12 RX ORDER — AMLODIPINE BESYLATE 5 MG/1
2.5 TABLET ORAL DAILY
Qty: 30 TABLET | Refills: 1 | OUTPATIENT
Start: 2021-02-12

## 2021-02-12 NOTE — TELEPHONE ENCOUNTER
Routing refill request to provider for review/approval because:  Last creatinine and BP high   Dr Tracy no longer with [facility]

## 2021-02-16 NOTE — TELEPHONE ENCOUNTER
The patient called to see if this is going to be filled  Patient would like a 3 month supply  Please inform patient of status

## 2021-02-18 RX ORDER — AMLODIPINE BESYLATE 5 MG/1
2.5 TABLET ORAL DAILY
Qty: 45 TABLET | Refills: 0 | Status: SHIPPED | OUTPATIENT
Start: 2021-02-18 | End: 2021-05-12

## 2021-02-26 ENCOUNTER — DOCUMENTATION ONLY (OUTPATIENT)
Dept: FAMILY MEDICINE | Facility: CLINIC | Age: 76
End: 2021-02-26

## 2021-02-26 DIAGNOSIS — I48.11 LONGSTANDING PERSISTENT ATRIAL FIBRILLATION (H): Primary | ICD-10-CM

## 2021-02-26 NOTE — PROGRESS NOTES
ANTICOAGULATION MANAGEMENT      Catalina Marie due for annual renewal of referral to anticoagulation monitoring. Order pended for your review and signature.      ANTICOAGULATION SUMMARY      Warfarin indication(s)     Atrial fibrillation    Heart valve present?  NO       Current goal range   INR: 2.0-3.0     Goal appropriate for indication? Yes, INR 2-3 appropriate for hx of DVT, PE, hypercoagulable state, Afib, LVAD, or bileaflet AVR without risk factors     Current duration of therapy Indefinite/long term therapy   Time in Therapeutic Range (TTR)  (Goal > 60%) 76.8 %       Office visit with referring provider's group within last year yes on 10/21/20       Patient has MIRIAM of appt with Dr. Trinh on 7/21, needs updated INR referral/INR orders for when returns from [place].    Jazlyn Ward RN

## 2021-03-01 DIAGNOSIS — I10 ESSENTIAL HYPERTENSION: ICD-10-CM

## 2021-03-01 DIAGNOSIS — I48.0 PAROXYSMAL ATRIAL FIBRILLATION (H): ICD-10-CM

## 2021-03-01 NOTE — TELEPHONE ENCOUNTER
Reason for Call:  Medication or medication refill:    Do you use a [facility]?  Name of the pharmacy and phone number for the current request:       [id number] - [place], [place] - [address] AT [facility]    Name of the medication requested: atenolol (TENORMIN) 25 MG tablet  furosemide (LASIX) 20 MG tablet    Other request: The patient is still in [place]   Has an appointment scheduled for July with Dr Trinh    Can we leave a detailed message on this number? YES    Phone number patient can be reached at: Home number on file [phone] (home)    Best Time: anytime    Call taken on 3/1/2021 at 10:15 AM by Trista Karimi

## 2021-03-02 RX ORDER — ATENOLOL 25 MG/1
TABLET ORAL
Qty: 180 TABLET | Refills: 0 | Status: SHIPPED | OUTPATIENT
Start: 2021-03-02 | End: 2021-05-26

## 2021-03-02 RX ORDER — FUROSEMIDE 20 MG
20 TABLET ORAL 2 TIMES DAILY
Qty: 180 TABLET | Refills: 0 | Status: SHIPPED | OUTPATIENT
Start: 2021-03-02 | End: 2021-05-26

## 2021-03-02 NOTE — TELEPHONE ENCOUNTER
"To :    I tried to fill Atenolol.  A warning \"popped up\" due to recent RX for Verapamil.    Please sign Atenolol refill if she is to continue on both medications. (change med list if inaccurate.)    Thank you!  Alyssa Valdovinos RN on 11/27/2019 at 9:56 AM    " 250

## 2021-03-08 DIAGNOSIS — I10 BENIGN ESSENTIAL HYPERTENSION: ICD-10-CM

## 2021-03-08 DIAGNOSIS — J30.0 CHRONIC VASOMOTOR RHINITIS: ICD-10-CM

## 2021-03-08 NOTE — TELEPHONE ENCOUNTER
LOV 10- Rossana former PCP  Next OV 7- Ziggy to establish care (JL approved 1-8-2021)    RT Riya (R)

## 2021-03-10 RX ORDER — DIGOXIN 250 MCG
125 TABLET ORAL DAILY
Qty: 45 TABLET | Refills: 3 | Status: SHIPPED | OUTPATIENT
Start: 2021-03-10 | End: 2022-02-15

## 2021-03-10 RX ORDER — IPRATROPIUM BROMIDE 42 UG/1
2 SPRAY, METERED NASAL 4 TIMES DAILY
Qty: 45 ML | Refills: 3 | Status: SHIPPED | OUTPATIENT
Start: 2021-03-10 | End: 2024-09-05

## 2021-03-10 NOTE — TELEPHONE ENCOUNTER
Routing refill request to provider for review/approval because:  Labs out of range:  Creatinine    10/30/2020      Ref Range & Units 4mo ago    Creatinine 0.57 - 1.00 mg/dL 1.51Abnormal      GFR Estimate >59 ml/min/1.73 33Abnormal      GFR Estimate If Black >59 ml/min/1.73 38Abnormal     Resulting Agency  [facility]

## 2021-03-24 ENCOUNTER — TELEPHONE (OUTPATIENT)
Dept: FAMILY MEDICINE | Facility: CLINIC | Age: 76
End: 2021-03-24

## 2021-03-24 DIAGNOSIS — I48.0 PAROXYSMAL ATRIAL FIBRILLATION (H): ICD-10-CM

## 2021-03-24 RX ORDER — PROPAFENONE HYDROCHLORIDE 150 MG/1
TABLET, COATED ORAL
Qty: 180 TABLET | Refills: 0 | OUTPATIENT
Start: 2021-03-24

## 2021-03-24 RX ORDER — PROPAFENONE HYDROCHLORIDE 150 MG/1
TABLET, COATED ORAL
Qty: 180 TABLET | Refills: 1 | Status: SHIPPED | OUTPATIENT
Start: 2021-03-24 | End: 2021-06-21

## 2021-03-24 NOTE — TELEPHONE ENCOUNTER
Pt is requesting 90 day supply of propafenone (RYTHMOL) 150 MG TABS tablet. She picked up 90 tablets but she takes 3 times daily. Asked if Rx can be sent for 180 more tablets.

## 2021-03-24 NOTE — TELEPHONE ENCOUNTER
Does she see a heart doctor cardiologist?  If not, she should probably see 1 for an update on her controlled atrial fibrillation.  Typically, I do not prescribe propafenone, I am happy to provide her with a bridging prescription until she can get into see me.  When she does see me, we will discuss referral to cardiology to take a look at her atrial fibrillation rhythm control management

## 2021-03-25 NOTE — TELEPHONE ENCOUNTER
"Spoke with patient:     Has not been following with a cardiology provider   Dr Tracy was monitoring/managing her a-fib   Has been stable on this for 4 or more years per pt   States she previously did see cardiology and they had recommended an ablation but Dr Tracy had told her not to have that done/had advised was too long procedure for her and told her it typically has to be redone   He kept her in the office when he started her on this to monitor her   And pt states this has \"kept me going\"     May/June will be returning to [place] - scheduled in July to see Dr Trinh due to insurance. Will plan to discuss at that time      Maylin HOAWRD RN    "

## 2021-05-08 ENCOUNTER — HEALTH MAINTENANCE LETTER (OUTPATIENT)
Age: 76
End: 2021-05-08

## 2021-05-11 DIAGNOSIS — I10 ESSENTIAL HYPERTENSION: ICD-10-CM

## 2021-05-12 RX ORDER — AMLODIPINE BESYLATE 5 MG/1
2.5 TABLET ORAL DAILY
Qty: 45 TABLET | Refills: 0 | Status: SHIPPED | OUTPATIENT
Start: 2021-05-12 | End: 2021-07-28

## 2021-06-20 DIAGNOSIS — I48.0 PAROXYSMAL ATRIAL FIBRILLATION (H): ICD-10-CM

## 2021-06-21 RX ORDER — PROPAFENONE HYDROCHLORIDE 150 MG/1
TABLET, COATED ORAL
Qty: 270 TABLET | Refills: 1 | Status: SHIPPED | OUTPATIENT
Start: 2021-06-21 | End: 2022-01-18

## 2021-06-21 NOTE — TELEPHONE ENCOUNTER
Routing refill request to provider for review/approval because:  Drug not on the [facility] refill protocol     Pending Prescriptions:                       Disp   Refills    propafenone (RYTHMOL) 150 MG TABS tablet *270 ta*1            Sig: TAKE 1 TABLET BY MOUTH THREE TIMES A DAY,           MORNING, DINNER, AND BEDTIME    Last Written Prescription Date  3/24/2021  Last Fill Quantity: 180,  # refills: 1  Last office visit: 10/21/2020 with prescribing provider:  Dr. Tracy  Future Office Visit:   Next 5 appointments (look out 90 days)    Jul 21, 2021  9:30 AM  PHYSICAL with Sulaiman Trinh MD  [facility] [address], [address]  [place] [place] [facility] [place] [address] [phone]         Atiya Roberts RN  [facility]

## 2021-07-13 DIAGNOSIS — I48.11 LONGSTANDING PERSISTENT ATRIAL FIBRILLATION (H): Primary | ICD-10-CM

## 2021-07-21 ENCOUNTER — LAB (OUTPATIENT)
Dept: LAB | Facility: CLINIC | Age: 76
End: 2021-07-21

## 2021-07-21 ENCOUNTER — OFFICE VISIT (OUTPATIENT)
Dept: FAMILY MEDICINE | Facility: CLINIC | Age: 76
End: 2021-07-21
Payer: MEDICARE

## 2021-07-21 ENCOUNTER — LAB (OUTPATIENT)
Dept: LAB | Facility: CLINIC | Age: 76
End: 2021-07-21
Payer: MEDICARE

## 2021-07-21 ENCOUNTER — ANTICOAGULATION THERAPY VISIT (OUTPATIENT)
Dept: NURSING | Facility: CLINIC | Age: 76
End: 2021-07-21

## 2021-07-21 VITALS
HEIGHT: 64 IN | SYSTOLIC BLOOD PRESSURE: 136 MMHG | WEIGHT: 135 LBS | OXYGEN SATURATION: 98 % | BODY MASS INDEX: 23.05 KG/M2 | HEART RATE: 68 BPM | TEMPERATURE: 98.6 F | DIASTOLIC BLOOD PRESSURE: 78 MMHG

## 2021-07-21 DIAGNOSIS — I50.31 ACUTE DIASTOLIC CONGESTIVE HEART FAILURE (H): ICD-10-CM

## 2021-07-21 DIAGNOSIS — Z11.59 NEED FOR HEPATITIS C SCREENING TEST: ICD-10-CM

## 2021-07-21 DIAGNOSIS — C50.919 MALIGNANT NEOPLASM OF BREAST IN FEMALE, ESTROGEN RECEPTOR POSITIVE, UNSPECIFIED LATERALITY, UNSPECIFIED SITE OF BREAST (H): ICD-10-CM

## 2021-07-21 DIAGNOSIS — F43.22 ADJUSTMENT DISORDER WITH ANXIOUS MOOD: Primary | ICD-10-CM

## 2021-07-21 DIAGNOSIS — I48.20 CHRONIC ATRIAL FIBRILLATION (H): ICD-10-CM

## 2021-07-21 DIAGNOSIS — E04.2 NONTOXIC MULTINODULAR GOITER: ICD-10-CM

## 2021-07-21 DIAGNOSIS — N18.31 STAGE 3A CHRONIC KIDNEY DISEASE (H): ICD-10-CM

## 2021-07-21 DIAGNOSIS — N18.30 TYPE 2 DIABETES MELLITUS WITH STAGE 3 CHRONIC KIDNEY DISEASE, WITHOUT LONG-TERM CURRENT USE OF INSULIN, UNSPECIFIED WHETHER STAGE 3A OR 3B CKD (H): ICD-10-CM

## 2021-07-21 DIAGNOSIS — E11.22 TYPE 2 DIABETES MELLITUS WITH STAGE 3 CHRONIC KIDNEY DISEASE, WITHOUT LONG-TERM CURRENT USE OF INSULIN, UNSPECIFIED WHETHER STAGE 3A OR 3B CKD (H): ICD-10-CM

## 2021-07-21 DIAGNOSIS — Z17.0 MALIGNANT NEOPLASM OF BREAST IN FEMALE, ESTROGEN RECEPTOR POSITIVE, UNSPECIFIED LATERALITY, UNSPECIFIED SITE OF BREAST (H): ICD-10-CM

## 2021-07-21 DIAGNOSIS — I48.11 LONGSTANDING PERSISTENT ATRIAL FIBRILLATION (H): ICD-10-CM

## 2021-07-21 DIAGNOSIS — I10 ESSENTIAL HYPERTENSION: ICD-10-CM

## 2021-07-21 DIAGNOSIS — E78.5 HYPERLIPIDEMIA LDL GOAL <100: ICD-10-CM

## 2021-07-21 LAB
DIGOXIN SERPL-MCNC: 0.1 UG/L
HBA1C MFR BLD: 6.4 % (ref 0–5.6)
INR BLD: 1.9 (ref 0.9–1.1)

## 2021-07-21 PROCEDURE — 99215 OFFICE O/P EST HI 40 MIN: CPT | Performed by: INTERNAL MEDICINE

## 2021-07-21 PROCEDURE — 85610 PROTHROMBIN TIME: CPT

## 2021-07-21 PROCEDURE — 82043 UR ALBUMIN QUANTITATIVE: CPT | Performed by: INTERNAL MEDICINE

## 2021-07-21 PROCEDURE — 83036 HEMOGLOBIN GLYCOSYLATED A1C: CPT | Performed by: INTERNAL MEDICINE

## 2021-07-21 PROCEDURE — 80162 ASSAY OF DIGOXIN TOTAL: CPT

## 2021-07-21 PROCEDURE — 36416 COLLJ CAPILLARY BLOOD SPEC: CPT

## 2021-07-21 PROCEDURE — 86803 HEPATITIS C AB TEST: CPT | Performed by: INTERNAL MEDICINE

## 2021-07-21 PROCEDURE — 36415 COLL VENOUS BLD VENIPUNCTURE: CPT | Performed by: INTERNAL MEDICINE

## 2021-07-21 ASSESSMENT — MIFFLIN-ST. JEOR: SCORE: 1082.36

## 2021-07-21 NOTE — PROGRESS NOTES
ANTICOAGULATION MANAGEMENT     Catalina Marie 75 year old female is on warfarin with subtherapeutic INR result. (Goal INR 2.0-3.0)    Recent labs: (last 7 days)     07/21/21  0849   INR 1.9*       ASSESSMENT     Source(s): Patient/Caregiver Call       Warfarin doses taken: Warfarin taken as instructed    Diet: Increased greens/vitamin K in diet; plans to resume previous intake    New illness, injury, or hospitalization: No    Medication/supplement changes: None noted    Signs or symptoms of bleeding or clotting: No    Previous INR: Pt reports that her INR has been therapeutic while in [place]. Pt has continued on the same warfarin maintenance dose.    Additional findings: None     PLAN     Recommended plan for no diet, medication or health factor changes affecting INR     Dosing Instructions:Pt advised to take 7.5 mg of warfarin today 7/21/21then Continue your current warfarin dose with next INR in 3 weeks       Summary  As of 7/21/2021    Full warfarin instructions:  7/21: 7.5 mg; Otherwise 5 mg every Mon, Wed, Fri; 2.5 mg all other days   Next INR check:               Telephone call with Catalina who agrees to plan and repeated back plan correctly    Patient elected to schedule next visit when she has her calendar    Education provided: Importance of notifying clinic for changes in medications; a sooner lab recheck maybe needed. and Contact [phone]  with any changes, questions or concerns.     Plan made per [facility] anticoagulation protocol    Nayeli Davison RN  Anticoagulation Clinic  7/21/2021    _______________________________________________________________________     Anticoagulation Episode Summary     Current INR goal:  2.0-3.0   TTR:  70.2 % (3.1 mo)   Target end date:  Indefinite   Send INR reminders to:  DEMOND FRANCO    Indications    Long term current use of anticoagulant therapy [Z79.01]  Atrial fibrillation (H) [I48.91] [I48.91]  Chronic atrial fibrillation (H) [I48.20]  Longstanding  persistent atrial fibrillation (H) [I48.11]           Comments:  Winter: FAXED INR results from [facility] in [place], [place]. Phone: [phone].  General Results#: [phone]  Call pt's cell in [place] with results/plan: [phone]         Anticoagulation Care Providers     Provider Role Specialty Phone number    Ian Tracy MD Referring Internal Medicine [phone]    Sulaiman Trinh MD Referring Internal Medicine [phone]

## 2021-07-21 NOTE — PROGRESS NOTES
Assessment & Plan     Type 2 diabetes mellitus with stage 3 chronic kidney disease, without long-term current use of insulin (H)  Overdue for recheck of below labs; thus far diet controlled   - Hemoglobin A1c  - Albumin Random Urine Quantitative with Creat Ratio; Future    Acute diastolic congestive heart failure (H)  Well compensated current volume status     Chronic atrial fibrillation (H)  Rhythm seems controlled on propafenone and digoxin; anticoagulated on coumadin for stroke prophylaxis  My recommendation to her was that she see a cardiologist or EP to discuss whether ongoing propafenone is the best strategy for her atrial fibrillation management.  She was very reluctant.  I did a lot of explaining about why this would be to her benefit.    - Digoxin level; Future    Malignant neoplasm of breast in female, estrogen receptor positive, unspecified laterality, unspecified site of breast (H)  Remote history of breast surgery and schwanomma resection     Stage 3a chronic kidney disease  Stable as of last October     Adjustment disorder with anxious mood  She is on zoloft for this     Essential hypertension  Second check reading is better    Nontoxic multinodular goiter  Chart history of this; check TFTs with annual labs in October     Hyperlipidemia LDL goal <100  On statin therapy ; recheck at annual interval in October     Need for hepatitis C screening test    - Hepatitis C antibody      41 minutes spent on the date of the encounter doing chart review, history and exam, documentation and further activities per the note           Return in about 3 months (around 10/21/2021) for Preventive Visit.  Patient instructed to return to clinic or contact us sooner if symptoms worsen or new symptoms develop.     Sulaiman Trinh MD  [facility] JOAN Rodriguez is a 75 year old who presents for the following health issues  accompanied by her spouse:    HPI     New Patient/Transfer of Care    75  "year old female with atrial fibrillation, diabetes, hypertension, adjustment disorder, hyperlipidemia, heart failure   Her previous PCP retired  She mac in [place] and has an MD there  She feels well today   Her home blood pressure readings are well controlled, blood pressure is often high in clinic setting   Today home blood pressure was in 120's over 70's  She finds herself picking at her skin a lot  She feels her mood is currently under good control     Review of Systems         Objective    /78 (BP Location: Left arm, Patient Position: Sitting, Cuff Size: Adult Regular)   Pulse 68   Temp 98.6  F (37  C) (Temporal)   Ht 1.626 m (5' 4\")   Wt 61.2 kg (135 lb)   SpO2 98%   Breastfeeding No   BMI 23.17 kg/m    Body mass index is 23.17 kg/m .  Physical Exam   GENERAL: healthy, alert and no distress  NECK: no adenopathy, no asymmetry, masses, or scars and thyroid normal to palpation  RESP: lungs clear to auscultation - no rales, rhonchi or wheezes  CV: Heart with regular rate and rhythm. No murmur.  ABDOMEN: soft, nontender, no hepatosplenomegaly, no masses and bowel sounds normal  MS: no gross musculoskeletal defects noted, no edema  NEURO: Normal strength and tone, mentation intact and speech normal  PSYCH: mentation appears normal, very anxious affect  SKIN:  No rash, excoriation on left forearm     Labs pending             "

## 2021-07-22 LAB
CREAT UR-MCNC: 36 MG/DL
HCV AB SERPL QL IA: NONREACTIVE
MICROALBUMIN UR-MCNC: 10 MG/DL
MICROALBUMIN/CREAT UR: 27.78 MG/G CR (ref 0–25)

## 2021-07-22 NOTE — RESULT ENCOUNTER NOTE
The following letter pertains to your most recent diagnostic tests:      The digoxin level is OK for you.      Sincerely,    Dr. Trinh

## 2021-07-22 NOTE — RESULT ENCOUNTER NOTE
The following letter pertains to your most recent diagnostic tests:    -Your micro albumin level is elevated. This means you have trace amounts of protein in the urine. It indicates that your kidneys are being affected by diabetes. Keeping blood pressure low is the best treatment in order to keep this stable. People with microalbuminuria should avoid anti-inflamatory agents such as motrin, alleve and ibuprofen as much as possible because excessive use of these medications can be harmful to the kidneys. Anybody that has microalbuminuria should be on lisinopril or losartan-as you already are-since these medications are protective for the kidney's in this situation.     -Your hepatitis C screening test is negative.  You do not have hepatitis C.     -Your hemoglobin A1c test which is a diabetes blood test that represents and average of your blood sugars over the last 3 months returned at 6.4 which is at your goal of hemoglobin A1c less than 7.       Bottom line:  diabetes control is good.  We should recheck the hemoglobin A1c and other labs in October before you go to [place].           Sincerely,    Dr. Trinh

## 2021-07-27 DIAGNOSIS — I10 ESSENTIAL HYPERTENSION: ICD-10-CM

## 2021-07-28 RX ORDER — AMLODIPINE BESYLATE 5 MG/1
2.5 TABLET ORAL DAILY
Qty: 45 TABLET | Refills: 0 | Status: SHIPPED | OUTPATIENT
Start: 2021-07-28 | End: 2021-08-04

## 2021-08-03 DIAGNOSIS — I10 ESSENTIAL HYPERTENSION: ICD-10-CM

## 2021-08-04 RX ORDER — AMLODIPINE BESYLATE 5 MG/1
TABLET ORAL
Qty: 45 TABLET | Refills: 1 | Status: SHIPPED | OUTPATIENT
Start: 2021-08-04 | End: 2021-10-20

## 2021-08-04 NOTE — TELEPHONE ENCOUNTER
Routing refill request to provider for review/approval because:  Labs out of range:    Creatinine   Date Value Ref Range Status   10/30/2020 1.51 (A) 0.57 - 1.00 mg/dL Final         Nenita Marley RN  [facility] Triage

## 2021-08-11 ENCOUNTER — LAB (OUTPATIENT)
Dept: LAB | Facility: CLINIC | Age: 76
End: 2021-08-11
Payer: MEDICARE

## 2021-08-11 ENCOUNTER — ANTICOAGULATION THERAPY VISIT (OUTPATIENT)
Dept: ANTICOAGULATION | Facility: CLINIC | Age: 76
End: 2021-08-11

## 2021-08-11 DIAGNOSIS — I48.11 LONGSTANDING PERSISTENT ATRIAL FIBRILLATION (H): ICD-10-CM

## 2021-08-11 DIAGNOSIS — Z79.01 LONG TERM CURRENT USE OF ANTICOAGULANT THERAPY: Primary | ICD-10-CM

## 2021-08-11 DIAGNOSIS — I48.20 CHRONIC ATRIAL FIBRILLATION (H): ICD-10-CM

## 2021-08-11 DIAGNOSIS — I48.91 ATRIAL FIBRILLATION (H): ICD-10-CM

## 2021-08-11 LAB — INR BLD: 1.9 (ref 0.9–1.1)

## 2021-08-11 PROCEDURE — 36415 COLL VENOUS BLD VENIPUNCTURE: CPT

## 2021-08-11 PROCEDURE — 85610 PROTHROMBIN TIME: CPT

## 2021-08-11 NOTE — PROGRESS NOTES
ANTICOAGULATION MANAGEMENT     Catalina Marie 75 year old female is on warfarin with subtherapeutic INR result. (Goal INR 2.0-3.0)    Recent labs: (last 7 days)     08/11/21  0934   INR 1.9*       ASSESSMENT     Source(s): Chart Review and Patient/Caregiver Call       Warfarin doses taken: Missed one dose over a week ago, then took an extra half tablet (not the full missed dose) the next day    Diet: Patient eats some green veggies daily, she had a large salad a couple days ago which may be keeping INR low.    New illness, injury, or hospitalization: No    Medication/supplement changes: None noted    Signs or symptoms of bleeding or clotting: No    Previous INR: Subtherapeutic    Additional findings: None     PLAN     Recommended plan for temporary change(s) affecting INR     Dosing Instructions: Booster dose then Increase your warfarin dose (10% change) with next INR in 2 weeks (maintenance dose adjusted since INR has been consistently sub therapeutic)       Summary  As of 8/11/2021    Full warfarin instructions:  8/11: 7.5 mg; Otherwise 2.5 mg every Tue, Thu, Sat; 5 mg all other days   Next INR check:  8/25/2021             Telephone call with Catalina who agrees to plan and repeated back plan correctly    Lab visit scheduled    Education provided: Please call back if any changes to your diet, medications or how you've been taking warfarin and Contact [phone]  with any changes, questions or concerns.     Plan made per [facility] anticoagulation protocol    Susy Ingram RN  Anticoagulation Clinic  8/11/2021    _______________________________________________________________________     Anticoagulation Episode Summary     Current INR goal:  2.0-3.0   TTR:  51.5 % (3.1 mo)   Target end date:  Indefinite   Send INR reminders to:  DEMOND FRANCO    Indications    Long term current use of anticoagulant therapy [Z79.01]  Atrial fibrillation (H) [I48.91] [I48.91]  Chronic atrial fibrillation (H) [I48.20]  Longstanding  persistent atrial fibrillation (H) [I48.11]           Comments:  Winter: FAXED INR results from [facility] in [place], [place]. Phone: [phone].  General Results#: [phone]  Call pt's cell in [place] with results/plan: [phone]         Anticoagulation Care Providers     Provider Role Specialty Phone number    Ian Tracy MD Referring Internal Medicine [phone]    Sulaiman Trinh MD Referring Internal Medicine [phone]

## 2021-08-17 DIAGNOSIS — I48.0 PAROXYSMAL ATRIAL FIBRILLATION (H): ICD-10-CM

## 2021-08-17 DIAGNOSIS — I10 ESSENTIAL HYPERTENSION: ICD-10-CM

## 2021-08-19 RX ORDER — ATENOLOL 25 MG/1
TABLET ORAL
Qty: 180 TABLET | Refills: 0 | Status: SHIPPED | OUTPATIENT
Start: 2021-08-19 | End: 2021-11-30

## 2021-08-19 RX ORDER — FUROSEMIDE 20 MG
TABLET ORAL
Qty: 180 TABLET | Refills: 3 | Status: SHIPPED | OUTPATIENT
Start: 2021-08-19 | End: 2022-08-08

## 2021-08-19 NOTE — TELEPHONE ENCOUNTER
Creatinine   Date Value Ref Range Status   10/30/2020 1.51 (A) 0.57 - 1.00 mg/dL Final     Please refill as appropriate.    Janice Lovett RN  [facility]

## 2021-08-25 ENCOUNTER — ANTICOAGULATION THERAPY VISIT (OUTPATIENT)
Dept: ANTICOAGULATION | Facility: CLINIC | Age: 76
End: 2021-08-25

## 2021-08-25 ENCOUNTER — LAB (OUTPATIENT)
Dept: LAB | Facility: CLINIC | Age: 76
End: 2021-08-25
Payer: MEDICARE

## 2021-08-25 DIAGNOSIS — I48.91 ATRIAL FIBRILLATION (H): ICD-10-CM

## 2021-08-25 DIAGNOSIS — I48.20 CHRONIC ATRIAL FIBRILLATION (H): ICD-10-CM

## 2021-08-25 DIAGNOSIS — I48.11 LONGSTANDING PERSISTENT ATRIAL FIBRILLATION (H): ICD-10-CM

## 2021-08-25 DIAGNOSIS — Z79.01 LONG TERM CURRENT USE OF ANTICOAGULANT THERAPY: Primary | ICD-10-CM

## 2021-08-25 LAB — INR BLD: 2.5 (ref 0.9–1.1)

## 2021-08-25 PROCEDURE — 85610 PROTHROMBIN TIME: CPT

## 2021-08-25 PROCEDURE — 36416 COLLJ CAPILLARY BLOOD SPEC: CPT

## 2021-08-25 NOTE — PROGRESS NOTES
ANTICOAGULATION MANAGEMENT     Catalina Marie 75 year old female is on warfarin with therapeutic INR result. (Goal INR 2.0-3.0)    Recent labs: (last 7 days)     08/25/21  1039   INR 2.5*       ASSESSMENT     Source(s): Patient/Caregiver Call       Warfarin doses taken: Warfarin taken as instructed    Diet: No new diet changes identified    New illness, injury, or hospitalization: No    Medication/supplement changes: None noted    Signs or symptoms of bleeding or clotting: No    Previous INR: Subtherapeutic    Additional findings: None     PLAN     Recommended plan for no diet, medication or health factor changes affecting INR     Dosing Instructions: Continue your current warfarin dose with next INR in 3 weeks       Summary  As of 8/25/2021    Full warfarin instructions:  2.5 mg every Tue, Thu, Sat; 5 mg all other days   Next INR check:  9/15/2021             Telephone call with Catalina who verbalizes understanding and agrees to plan    Lab visit scheduled    Education provided: Monitoring for bleeding signs and symptoms and Monitoring for clotting signs and symptoms    Plan made per [facility] anticoagulation protocol    Jazlyn Ward RN  Anticoagulation Clinic  8/25/2021    _______________________________________________________________________     Anticoagulation Episode Summary     Current INR goal:  2.0-3.0   TTR:  49.0 % (3.1 mo)   Target end date:  Indefinite   Send INR reminders to:  DEMOND FRANCO    Indications    Long term current use of anticoagulant therapy [Z79.01]  Atrial fibrillation (H) [I48.91] [I48.91]  Chronic atrial fibrillation (H) [I48.20]  Longstanding persistent atrial fibrillation (H) [I48.11]           Comments:  Winter: FAXED INR results from [facility] in [place], [place]. Phone: [phone].  General Results#: [phone]  Call pt's cell in [place] with results/plan: [phone]         Anticoagulation Care Providers     Provider Role Specialty Phone number    Ian Tracy MD Referring  Internal Medicine [phone]    Sulaiman Trinh MD [facility] Internal Medicine [phone]

## 2021-08-28 ENCOUNTER — HEALTH MAINTENANCE LETTER (OUTPATIENT)
Age: 76
End: 2021-08-28

## 2021-09-15 ENCOUNTER — ANTICOAGULATION THERAPY VISIT (OUTPATIENT)
Dept: ANTICOAGULATION | Facility: CLINIC | Age: 76
End: 2021-09-15

## 2021-09-15 ENCOUNTER — LAB (OUTPATIENT)
Dept: LAB | Facility: CLINIC | Age: 76
End: 2021-09-15
Payer: MEDICARE

## 2021-09-15 DIAGNOSIS — I48.11 LONGSTANDING PERSISTENT ATRIAL FIBRILLATION (H): ICD-10-CM

## 2021-09-15 DIAGNOSIS — I48.20 CHRONIC ATRIAL FIBRILLATION (H): ICD-10-CM

## 2021-09-15 DIAGNOSIS — Z79.01 LONG TERM CURRENT USE OF ANTICOAGULANT THERAPY: Primary | ICD-10-CM

## 2021-09-15 DIAGNOSIS — I48.91 ATRIAL FIBRILLATION (H): ICD-10-CM

## 2021-09-15 LAB — INR BLD: 3.1 (ref 0.9–1.1)

## 2021-09-15 PROCEDURE — 85610 PROTHROMBIN TIME: CPT

## 2021-09-15 PROCEDURE — 36416 COLLJ CAPILLARY BLOOD SPEC: CPT

## 2021-09-15 NOTE — PROGRESS NOTES
ANTICOAGULATION MANAGEMENT     Catalina Marie 75 year old female is on warfarin with therapeutic INR result. (Goal INR 2.0-3.0)    Recent labs: (last 7 days)     09/15/21  1040   INR 3.1*       ASSESSMENT     Source(s): Chart Review       Warfarin doses taken: Warfarin taken as instructed    Diet: Increased greens/vitamin K in diet; plans to resume previous intake less spinach then normal    New illness, injury, or hospitalization: No    Medication/supplement changes: None noted    Signs or symptoms of bleeding or clotting: No    Previous INR: Therapeutic last visit; previously outside of goal range    Additional findings: None     PLAN     Recommended plan for no diet, medication or health factor changes affecting INR     Dosing Instructions: Continue your current warfarin dose with next INR in 2 weeks   Did not adjust she plans to eat a bit more spinach and if she comes in high next time may need to go back to previous dosing. She had 2 low readings in a row hence the increase in dosing 2 weeks ago.    Summary  As of 9/15/2021    Full warfarin instructions:  2.5 mg every Tue, Thu, Sat; 5 mg all other days   Next INR check:               Telephone call with Catalina who verbalizes understanding and agrees to plan    Lab visit scheduled    Education provided: Please call back if any changes to your diet, medications or how you've been taking warfarin    Plan made per [facility] anticoagulation protocol    Dasia Liu RN  Anticoagulation Clinic  9/15/2021    _______________________________________________________________________     Anticoagulation Episode Summary     Current INR goal:  2.0-3.0   TTR:  58.2 % (3.1 mo)   Target end date:  Indefinite   Send INR reminders to:  DEMOND FRANCO    Indications    Long term current use of anticoagulant therapy [Z79.01]  Atrial fibrillation (H) [I48.91] [I48.91]  Chronic atrial fibrillation (H) [I48.20]  Longstanding persistent atrial fibrillation (H) [I48.11]            Comments:  Winter: FAXED INR results from [facility] in [place], [place]. Phone: [phone].  General Results#: [phone]  Call pt's cell in [place] with results/plan: [phone]         Anticoagulation Care Providers     Provider Role Specialty Phone number    Ian Tracy MD Referring Internal Medicine [phone]    Sulaiman Trinh MD Referring Internal Medicine [phone]        Anticoagulation Management    Unable to reach brian today.    Today's INR result of 3.1 is supratherapeutic (goal INR of 2.0-3.0).  Result received from: Clinic Lab    Follow up required to assess for changes     LCBM to call for dosing instructions      Anticoagulation clinic to follow up    Dasia Liu RN

## 2021-09-30 ENCOUNTER — ANTICOAGULATION THERAPY VISIT (OUTPATIENT)
Dept: NURSING | Facility: CLINIC | Age: 76
End: 2021-09-30

## 2021-09-30 ENCOUNTER — LAB (OUTPATIENT)
Dept: LAB | Facility: CLINIC | Age: 76
End: 2021-09-30
Payer: MEDICARE

## 2021-09-30 DIAGNOSIS — I48.11 LONGSTANDING PERSISTENT ATRIAL FIBRILLATION (H): ICD-10-CM

## 2021-09-30 LAB — INR BLD: 2.2 (ref 0.9–1.1)

## 2021-09-30 PROCEDURE — 85610 PROTHROMBIN TIME: CPT

## 2021-09-30 PROCEDURE — 36416 COLLJ CAPILLARY BLOOD SPEC: CPT

## 2021-09-30 NOTE — PROGRESS NOTES
ANTICOAGULATION MANAGEMENT     Catalina Marie 76 year old female is on warfarin with therapeutic INR result. (Goal INR 2.0-3.0)    Recent labs: (last 7 days)     09/30/21  1047   INR 2.2*       ASSESSMENT     Source(s): Chart Review and Patient/Caregiver Call       Warfarin doses taken: Warfarin taken as instructed    Diet: No new diet changes identified    New illness, injury, or hospitalization: No    Medication/supplement changes: None noted    Signs or symptoms of bleeding or clotting: No    Previous INR: Supratherapeutic    Additional findings: Pt leaving to winter in [place] around the end of October. Pt has a doctor/clinic there that manages INR/dosing.     PLAN     Recommended plan for no diet, medication or health factor changes affecting INR     Dosing Instructions: Continue your current warfarin dose with next INR in 3 weeks       Summary  As of 9/30/2021    Full warfarin instructions:  2.5 mg every Tue, Thu, Sat; 5 mg all other days   Next INR check:  10/21/2021             Telephone call with Catalina who agrees to plan and repeated back plan correctly    Lab visit scheduled    Education provided: Importance of notifying clinic for changes in medications; a sooner lab recheck maybe needed. and Contact [phone]  with any changes, questions or concerns.     Plan made per [facility] anticoagulation protocol    Nayeli Davison RN  Anticoagulation Clinic  9/30/2021    _______________________________________________________________________     Anticoagulation Episode Summary     Current INR goal:  2.0-3.0   TTR:  69.2 % (3.1 mo)   Target end date:  Indefinite   Send INR reminders to:  DEMOND FRANCO    Indications    Long term current use of anticoagulant therapy [Z79.01]  Atrial fibrillation (H) [I48.91] [I48.91]  Chronic atrial fibrillation (H) [I48.20]  Longstanding persistent atrial fibrillation (H) [I48.11]           Comments:  Winter: FAXED INR results from [facility] in [place], [place]. Phone: [phone].   General Results#: [phone]  Call pt's cell in [place] with results/plan: [phone]         Anticoagulation Care Providers     Provider Role Specialty Phone number    Ian Tracy MD Referring Internal Medicine [phone]    Sulaiman Trinh MD Referring Internal Medicine [phone]

## 2021-10-05 ENCOUNTER — OFFICE VISIT (OUTPATIENT)
Dept: FAMILY MEDICINE | Facility: CLINIC | Age: 76
End: 2021-10-05
Payer: MEDICARE

## 2021-10-05 VITALS
BODY MASS INDEX: 23 KG/M2 | HEIGHT: 64 IN | DIASTOLIC BLOOD PRESSURE: 77 MMHG | HEART RATE: 49 BPM | TEMPERATURE: 97.5 F | WEIGHT: 134.7 LBS | SYSTOLIC BLOOD PRESSURE: 127 MMHG | OXYGEN SATURATION: 95 %

## 2021-10-05 DIAGNOSIS — C50.919 MALIGNANT NEOPLASM OF BREAST IN FEMALE, ESTROGEN RECEPTOR POSITIVE, UNSPECIFIED LATERALITY, UNSPECIFIED SITE OF BREAST (H): ICD-10-CM

## 2021-10-05 DIAGNOSIS — Z00.00 ENCOUNTER FOR MEDICARE ANNUAL WELLNESS EXAM: Primary | ICD-10-CM

## 2021-10-05 DIAGNOSIS — E04.2 NONTOXIC MULTINODULAR GOITER: ICD-10-CM

## 2021-10-05 DIAGNOSIS — Z23 NEED FOR PROPHYLACTIC VACCINATION AND INOCULATION AGAINST INFLUENZA: ICD-10-CM

## 2021-10-05 DIAGNOSIS — Z17.0 MALIGNANT NEOPLASM OF BREAST IN FEMALE, ESTROGEN RECEPTOR POSITIVE, UNSPECIFIED LATERALITY, UNSPECIFIED SITE OF BREAST (H): ICD-10-CM

## 2021-10-05 DIAGNOSIS — E11.22 TYPE 2 DIABETES MELLITUS WITH STAGE 1 CHRONIC KIDNEY DISEASE, WITHOUT LONG-TERM CURRENT USE OF INSULIN (H): ICD-10-CM

## 2021-10-05 DIAGNOSIS — N18.1 TYPE 2 DIABETES MELLITUS WITH STAGE 1 CHRONIC KIDNEY DISEASE, WITHOUT LONG-TERM CURRENT USE OF INSULIN (H): ICD-10-CM

## 2021-10-05 DIAGNOSIS — Z23 NEEDS FLU SHOT: ICD-10-CM

## 2021-10-05 DIAGNOSIS — I48.11 LONGSTANDING PERSISTENT ATRIAL FIBRILLATION (H): ICD-10-CM

## 2021-10-05 DIAGNOSIS — F43.22 ADJUSTMENT DISORDER WITH ANXIOUS MOOD: ICD-10-CM

## 2021-10-05 DIAGNOSIS — E78.5 HYPERLIPIDEMIA LDL GOAL <100: ICD-10-CM

## 2021-10-05 DIAGNOSIS — I10 ESSENTIAL HYPERTENSION: ICD-10-CM

## 2021-10-05 LAB
ERYTHROCYTE [DISTWIDTH] IN BLOOD BY AUTOMATED COUNT: 14 % (ref 10–15)
HBA1C MFR BLD: 6.6 % (ref 0–5.6)
HCT VFR BLD AUTO: 46 % (ref 35–47)
HGB BLD-MCNC: 15.4 G/DL (ref 11.7–15.7)
MCH RBC QN AUTO: 31.6 PG (ref 26.5–33)
MCHC RBC AUTO-ENTMCNC: 33.5 G/DL (ref 31.5–36.5)
MCV RBC AUTO: 94 FL (ref 78–100)
PLATELET # BLD AUTO: 256 10E3/UL (ref 150–450)
RBC # BLD AUTO: 4.88 10E6/UL (ref 3.8–5.2)
WBC # BLD AUTO: 8.8 10E3/UL (ref 4–11)

## 2021-10-05 PROCEDURE — 80053 COMPREHEN METABOLIC PANEL: CPT | Performed by: INTERNAL MEDICINE

## 2021-10-05 PROCEDURE — 99207 PR FOOT EXAM NO CHARGE: CPT | Performed by: INTERNAL MEDICINE

## 2021-10-05 PROCEDURE — 99214 OFFICE O/P EST MOD 30 MIN: CPT | Mod: 25 | Performed by: INTERNAL MEDICINE

## 2021-10-05 PROCEDURE — G0008 ADMIN INFLUENZA VIRUS VAC: HCPCS | Performed by: INTERNAL MEDICINE

## 2021-10-05 PROCEDURE — 90662 IIV NO PRSV INCREASED AG IM: CPT | Performed by: INTERNAL MEDICINE

## 2021-10-05 PROCEDURE — 85027 COMPLETE CBC AUTOMATED: CPT | Performed by: INTERNAL MEDICINE

## 2021-10-05 PROCEDURE — 80061 LIPID PANEL: CPT | Performed by: INTERNAL MEDICINE

## 2021-10-05 PROCEDURE — 84443 ASSAY THYROID STIM HORMONE: CPT | Performed by: INTERNAL MEDICINE

## 2021-10-05 PROCEDURE — 36415 COLL VENOUS BLD VENIPUNCTURE: CPT | Performed by: INTERNAL MEDICINE

## 2021-10-05 PROCEDURE — 83036 HEMOGLOBIN GLYCOSYLATED A1C: CPT | Performed by: INTERNAL MEDICINE

## 2021-10-05 PROCEDURE — G0439 PPPS, SUBSEQ VISIT: HCPCS | Performed by: INTERNAL MEDICINE

## 2021-10-05 ASSESSMENT — ENCOUNTER SYMPTOMS
EYE PAIN: 0
PALPITATIONS: 0
COUGH: 0
DIARRHEA: 0
ARTHRALGIAS: 0
JOINT SWELLING: 0
CONSTIPATION: 0
DYSURIA: 0
ABDOMINAL PAIN: 0
WEAKNESS: 0
HEMATOCHEZIA: 0
PARESTHESIAS: 1
FREQUENCY: 1
HEADACHES: 0
BREAST MASS: 0
NERVOUS/ANXIOUS: 1
CHILLS: 0
MYALGIAS: 0
SORE THROAT: 0
HEMATURIA: 0
HEARTBURN: 0
DIZZINESS: 0
NAUSEA: 0
FEVER: 0

## 2021-10-05 ASSESSMENT — ACTIVITIES OF DAILY LIVING (ADL): CURRENT_FUNCTION: NO ASSISTANCE NEEDED

## 2021-10-05 ASSESSMENT — MIFFLIN-ST. JEOR: SCORE: 1076

## 2021-10-05 NOTE — PROGRESS NOTES
"SUBJECTIVE:   Catalina Marie is a 76 year old female who presents for Preventive Visit.      Patient has been advised of split billing requirements and indicates understanding: Yes   Are you in the first 12 months of your Medicare coverage?  No    Healthy Habits:     In general, how would you rate your overall health?  Fair    Frequency of exercise:  4-5 days/week    Duration of exercise:  30-45 minutes    Do you usually eat at least 4 servings of fruit and vegetables a day, include whole grains    & fiber and avoid regularly eating high fat or \"junk\" foods?  No    Taking medications regularly:  No    Medication side effects:  Other    Ability to successfully perform activities of daily living:  No assistance needed    Home Safety:  No safety concerns identified    Hearing Impairment:  Difficulty following a conversation in a noisy restaurant or crowded room, feel that people are mumbling or not speaking clearly, difficulty following dialogue in the theater, difficult to understand a speaker at a public meeting or [employer] service, need to ask people to speak up or repeat themselves, find that men's voices are easier to understand than woman's, difficulty understanding soft or whispered speech and difficulty understanding speech on the telephone    In the past 6 months, have you been bothered by leaking of urine? Yes    In general, how would you rate your overall mental or emotional health?  Good      PHQ-2 Total Score: 0    Additional concerns today:  No      Reviewed and updated as needed this visit by clinical staff  Tobacco  Allergies  Meds              Reviewed and updated as needed this visit by Provider                Social History     Tobacco Use     Smoking status: Former Smoker     Smokeless tobacco: Never Used   Substance Use Topics     Alcohol use: Yes     Comment: 1 wine with dinner         Alcohol Use 10/5/2021   Prescreen: >3 drinks/day or >7 drinks/week? Yes   Prescreen: >3 drinks/day or >7 " drinks/week? -   AUDIT SCORE  4         Current providers sharing in care for this patient include:   Patient Care Team:  Sulaiman Trinh MD as PCP - General (Internal Medicine)  Sulaiman Trinh MD as Assigned PCP    The following health maintenance items are reviewed in Epic and correct as of today:  Health Maintenance Due   Topic Date Due     HF ACTION PLAN  Never done     ZOSTER IMMUNIZATION (2 of 3) 05/19/2013     EYE EXAM  10/21/2020     MEDICARE ANNUAL WELLNESS VISIT  07/20/2021     DIABETIC FOOT EXAM  08/19/2021     INFLUENZA VACCINE (1) 09/01/2021     ALT  10/05/2021     LIPID  10/05/2021     FALL RISK ASSESSMENT  10/21/2021     CBC  10/21/2021     Patient Active Problem List   Diagnosis     Long term current use of anticoagulant therapy     Atrial fibrillation (H) [I48.91]     Essential hypertension     Type 2 diabetes mellitus with stage 1 chronic kidney disease, without long-term current use of insulin (H)     Adjustment disorder with anxious mood     Hyperlipidemia LDL goal <100     CKD (chronic kidney disease) stage 3, GFR 30-59 ml/min (H)     Overdose, accidental or unintentional, initial encounter     Malignant neoplasm of breast in female, estrogen receptor positive, unspecified laterality, unspecified site of breast (H)     H/O bilateral mastectomy     Acute diastolic congestive heart failure (H)     Nontoxic multinodular goiter     Strain of right rotator cuff capsule, subsequent encounter     Chronic atrial fibrillation (H)     Chronic vasomotor rhinitis     Inflamed seborrheic keratosis     Routine general medical examination at a health care facility     Longstanding persistent atrial fibrillation (H)     Past Surgical History:   Procedure Laterality Date     BREAST SURGERY       COLONOSCOPY       EYE SURGERY       HEAD & NECK SURGERY      brain tumor resection     PHACOEMULSIFICATION CLEAR CORNEA WITH STANDARD INTRAOCULAR LENS IMPLANT Right 7/6/2016    Procedure: PHACOEMULSIFICATION CLEAR  CORNEA WITH STANDARD INTRAOCULAR LENS IMPLANT;  Surgeon: Aiden Lee MD;  Location: [facility]       Social History     Tobacco Use     Smoking status: Former Smoker     Smokeless tobacco: Never Used   Substance Use Topics     Alcohol use: Yes     Comment: 1 wine with dinner     History reviewed. No pertinent family history.      Current Outpatient Medications   Medication Sig Dispense Refill     amLODIPine (NORVASC) 5 MG tablet TAKE 1/2 TABLET BY MOUTH DAILY 45 tablet 1     atenolol (TENORMIN) 25 MG tablet TAKE 1 TABLET BY MOUTH TWICE A  tablet 0     atorvastatin (LIPITOR) 10 MG tablet Take 0.5 tablets (5 mg) by mouth daily 45 tablet 3     Calcium Carbonate-Vitamin D (CALCIUM + D PO) Take by mouth daily       carboxymethylcellulose (CELLUVISC/REFRESH LIQUIGEL) 1 % ophthalmic solution Place 1 drop into both eyes 4 times daily as needed for dry eyes       digoxin (LANOXIN) 250 MCG tablet Take 0.5 tablets (125 mcg) by mouth daily 45 tablet 3     fenofibrate (TRIGLIDE/LOFIBRA) 160 MG tablet Take 1 tablet (160 mg) by mouth daily 90 tablet 3     furosemide (LASIX) 20 MG tablet TAKE 1 TABLET BY MOUTH TWICE A  tablet 3     ipratropium (ATROVENT) 0.06 % nasal spray Spray 2 sprays into both nostrils 4 times daily 45 mL 3     lisinopril (ZESTRIL) 40 MG tablet Take 1 tablet (40 mg) by mouth daily 90 tablet 3     propafenone (RYTHMOL) 150 MG TABS tablet TAKE 1 TABLET BY MOUTH THREE TIMES A DAY, MORNING, DINNER, AND BEDTIME 270 tablet 1     sertraline (ZOLOFT) 100 MG tablet Take 1 tablet (100 mg) by mouth daily 90 tablet 1     triamcinolone (KENALOG) 0.1 % external cream Apply topically 2 times daily Sig: Apply sparingly to right lateral foot daily 30 g 1     verapamil ER (VERELAN) 240 MG 24 hr capsule Take 1 capsule (240 mg) by mouth daily 90 capsule 3     warfarin ANTICOAGULANT (COUMADIN) 5 MG tablet TAKE 1/2 TO 1 TABLET EVERY DAY OR AS DIRECTED BY INR CLINIC 90 tablet 0     Allergies   Allergen Reactions      "Pcn [Penicillins]          Review of Systems   Constitutional: Negative for chills and fever.   HENT: Positive for hearing loss. Negative for congestion, ear pain and sore throat.    Eyes: Negative for pain and visual disturbance.   Respiratory: Negative for cough.    Cardiovascular: Negative for chest pain, palpitations and peripheral edema.   Gastrointestinal: Negative for abdominal pain, constipation, diarrhea, heartburn, hematochezia and nausea.   Breasts:  Negative for tenderness, breast mass and discharge.   Genitourinary: Positive for frequency and urgency. Negative for dysuria, genital sores, hematuria, pelvic pain, vaginal bleeding and vaginal discharge.   Musculoskeletal: Negative for arthralgias, joint swelling and myalgias.   Skin: Negative for rash.   Neurological: Positive for paresthesias. Negative for dizziness, weakness and headaches.   Psychiatric/Behavioral: Negative for mood changes. The patient is nervous/anxious.      Above symptoms are not new  OBJECTIVE:   /77 (BP Location: Left arm, Cuff Size: Adult Large)   Pulse (!) 49   Temp 97.5  F (36.4  C) (Tympanic)   Ht 1.626 m (5' 4\")   Wt 61.1 kg (134 lb 11.2 oz)   SpO2 95%   BMI 23.12 kg/m   Estimated body mass index is 23.12 kg/m  as calculated from the following:    Height as of this encounter: 1.626 m (5' 4\").    Weight as of this encounter: 61.1 kg (134 lb 11.2 oz).  Physical Exam  GENERAL APPEARANCE: healthy, alert and no distress  EYES: Eyes grossly normal to inspection, PERRL and conjunctivae and sclerae normal  HENT: ear canals and TM's normal  NECK: no adenopathy, no asymmetry, masses, or scars and thyroid normal to palpation  RESP: lungs clear to auscultation - no rales, rhonchi or wheezes  CV: regular rate and rhythm, normal S1 S2, no S3 or S4, no murmur, click or rub, no peripheral edema and peripheral pulses strong  ABDOMEN: soft, nontender, no hepatosplenomegaly, no masses and bowel sounds normal  MS: no musculoskeletal " defects are noted and gait is age appropriate without ataxia  SKIN: no suspicious lesions or rashes  NEURO: Normal strength and tone, sensory exam grossly normal, mentation intact and speech normal  PSYCH: mentation appears normal and affect normal/bright  FEET:  Normal sensation to 10g monofilament in bilateral feet without obvious foot ulcers     Labs pending     ASSESSMENT / PLAN:   (Z00.00) Encounter for Medicare annual wellness exam  (primary encounter diagnosis)  Comment:   Plan:     (I48.11) Longstanding persistent atrial fibrillation (H)  Comment: again, I think she should see EP, there may be a way to control her rate/rhythm without causing so much bradycardia and using so many medicaitons  Plan: Adult Cardiology Eval Referral        She will see EP when she returns from [place]    (E11.22,  N18.1) Type 2 diabetes mellitus with stage 1 chronic kidney disease, without long-term current use of insulin (H)  Comment: recheck labs; well controlled with diet interventions alone currently   Plan: HEMOGLOBIN A1C, FOOT EXAM  NO CHARGE         [62447.114], Hemoglobin A1c            (C50.919,  Z17.0) Malignant neoplasm of breast in female, estrogen receptor positive, unspecified laterality, unspecified site of breast (H)  Comment: post mastectomy more than 10 years ago  Plan:     (F43.22) Adjustment disorder with anxious mood  Comment: stable on zoloft; continue current dose  Plan:     (I10) Essential hypertension  Comment: well controlled, continue current medications  Plan:     (E78.5) Hyperlipidemia LDL goal <100  Comment: on statin therapy recheck   Plan: Lipid panel reflex to direct LDL Fasting,         Comprehensive metabolic panel, CBC with         platelets            (E04.2) Nontoxic multinodular goiter  Comment: recheck thyroid function tests  Plan: TSH with free T4 reflex            (Z23) Needs flu shot  Comment:   Plan: INFLUENZA, QUAD, HIGH DOSE, PF, 65YR + (FLUZONE        HD)              Patient has  "been advised of split billing requirements and indicates understanding: Yes  COUNSELING:  Reviewed preventive health counseling, as reflected in patient instructions  Special attention given to:       Regular exercise       Healthy diet/nutrition       Immunizations    Recommended flu shot today   ; recommended shingrix            Colon cancer screening; normal colonoscopy 2014       Hepatitis C screening       HIV screening for high risk patient       Mammogram:  Post mastectomy     Estimated body mass index is 23.12 kg/m  as calculated from the following:    Height as of this encounter: 1.626 m (5' 4\").    Weight as of this encounter: 61.1 kg (134 lb 11.2 oz).        She reports that she has quit smoking. She has never used smokeless tobacco.      Appropriate preventive services were discussed with this patient, including applicable screening as appropriate for cardiovascular disease, diabetes, osteopenia/osteoporosis, and glaucoma.  As appropriate for age/gender, discussed screening for colorectal cancer, prostate cancer, breast cancer, and cervical cancer. Checklist reviewing preventive services available has been given to the patient.    Reviewed patients plan of care and provided an AVS. The Basic Care Plan (routine screening as documented in Health Maintenance) for Catalina meets the Care Plan requirement. This Care Plan has been established and reviewed with the Patient.    Counseling Resources:  ATP IV Guidelines  Pooled Cohorts Equation Calculator  Breast Cancer Risk Calculator  Breast Cancer: Medication to Reduce Risk  FRAX Risk Assessment  ICSI Preventive Guidelines  Dietary Guidelines for Americans, 2010  USDA's MyPlate  ASA Prophylaxis  Lung CA Screening    Sulaiman Trinh MD  [facility]    Identified Health Risks:  "

## 2021-10-05 NOTE — PROGRESS NOTES
The patient was provided with suggestions to help her develop a healthy physical lifestyle.  The patient was counseled and encouraged to consider modifying their diet and eating habits. She was provided with information on recommended healthy diet options.  The patient was provided with written information regarding signs of hearing loss.  Information on urinary incontinence and treatment options given to patient.

## 2021-10-05 NOTE — PATIENT INSTRUCTIONS
"You should get the new shingles vaccine series \"SHINGRIX\" (not Zostavax) at a pharmacy.        Patient Education   Personalized Prevention Plan  You are due for the preventive services outlined below.  Your care team is available to assist you in scheduling these services.  If you have already completed any of these items, please share that information with your care team to update in your medical record.  Health Maintenance Due   Topic Date Due     Heart Failure Action Plan  Never done     Zoster (Shingles) Vaccine (2 of 3) 05/19/2013     Eye Exam  10/21/2020     Diabetic Foot Exam  08/19/2021     Flu Vaccine (1) 09/01/2021     Liver Monitoring Lab  10/05/2021     Cholesterol Lab  10/05/2021     FALL RISK ASSESSMENT  10/21/2021     Complete Blood Count  10/21/2021     Your Health Risk Assessment indicates you feel you are not in good health    A healthy lifestyle helps keep the body fit and the mind alert. It helps protect you from disease, helps you fight disease, and helps prevent chronic disease (disease that doesn't go away) from getting worse. This is important as you get older and begin to notice twinges in muscles and joints and a decline in the strength and stamina you once took for granted. A healthy lifestyle includes good healthcare, good nutrition, weight control, recreation, and regular exercise. Avoid harmful substances and do what you can to keep safe. Another part of a healthy lifestyle is stay mentally active and socially involved.    Good healthcare     Have a wellness visit every year.     If you have new symptoms, let us know right away. Don't wait until the next checkup.     Take medicines exactly as prescribed and keep your medicines in a safe place. Tell us if your medicine causes problems.   Healthy diet and weight control     Eat 3 or 4 small, nutritious, low-fat, high-fiber meals a day. Include a variety of fruits, vegetables, and whole-grain foods.     Make sure you get enough calcium in " your diet. Calcium, vitamin D, and exercise help prevent osteoporosis (bone thinning).     If you live alone, try eating with others when you can. That way you get a good meal and have company while you eat it.     Try to keep a healthy weight. If you eat more calories than your body uses for energy, it will be stored as fat and you will gain weight.     Recreation   Recreation is not limited to sports and team events. It includes any activity that provides relaxation, interest, enjoyment, and exercise. Recreation provides an outlet for physical, mental, and social energy. It can give a sense of worth and achievement. It can help you stay healthy.    Mental Exercise and Social Involvement  Mental and emotional health is as important as physical health. Keep in touch with friends and family. Stay as active as possible. Continue to learn and challenge yourself.   Things you can do to stay mentally active are:    Learn something new, like a foreign language or musical instrument.     Play SCRABBLE or do crossword puzzles. If you cannot find people to play these games with you at home, you can play them with others on your computer through the Internet.     Join a games club--anything from card games to chess or checkers or lawn bowling.     Start a new hobby.     Go back to school.     Volunteer.     Read.   Keep up with world events.    Understanding USDA MyPlate  The USDA has guidelines to help you make healthy food choices. These are called MyPlate. MyPlate shows the food groups that make up healthy meals using the image of a place setting. Before you eat, think about the healthiest choices for what to put on your plate or in your cup or bowl. To learn more about building a healthy plate, visit www.choosemyplate.gov.    The food groups    Fruits. Any fruit or 100% fruit juice counts as part of the Fruit Group. Fruits may be fresh, canned, frozen, or dried, and may be whole, cut-up, or pureed. Make 1/2 of your plate  fruits and vegetables.    Vegetables. Any vegetable or 100% vegetable juice counts as a member of the Vegetable Group. Vegetables may be fresh, frozen, canned, or dried. They can be served raw or cooked and may be whole, cut-up, or mashed. Make 1/2 of your plate fruits and vegetables.    Grains. All foods made from grains are part of the Grains Group. These include wheat, rice, oats, cornmeal, and barley. Grains are often used to make foods such as bread, pasta, oatmeal, cereal, tortillas, and grits. Grains should be no more than 1/4 of your plate. At least half of your grains should be whole grains.    Protein. This group includes meat, poultry, seafood, beans and peas, eggs, processed soy products (such as tofu), nuts (including nut butters), and seeds. Make protein choices no more than 1/4 of your plate. Meat and poultry choices should be lean or low fat.    Dairy. The Dairy Group includes all fluid milk products and foods made from milk that contain calcium, such as yogurt and cheese. (Foods that have little calcium, such as cream, butter, and cream cheese, are not part of this group.) Most dairy choices should be low-fat or fat-free.    Oils. Oils aren't a food group, but they do contain essential nutrients. However it's important to watch your intake of oils. These are fats that are liquid at room temperature. They include canola, corn, olive, soybean, vegetable, and sunflower oil. Foods that are mainly oil include mayonnaise, certain salad dressings, and soft margarines. You likely already get your daily oil allowance from the foods you eat.  Things to limit  Eating healthy also means limiting these things in your diet:       Salt (sodium). Many processed foods have a lot of sodium. To keep sodium intake down, eat fresh vegetables, meats, poultry, and seafood when possible. Purchase low-sodium, reduced-sodium, or no-salt-added food products at the store. And don't add salt to your meals at home. Instead,  season them with herbs and spices such as dill, oregano, cumin, and paprika. Or try adding flavor with lemon or lime zest and juice.    Saturated fat. Saturated fats are most often found in animal products such as beef, pork, and chicken. They are often solid at room temperature, such as butter. To reduce your saturated fat intake, choose leaner cuts of meat and poultry. And try healthier cooking methods such as grilling, broiling, roasting, or baking. For a simple lower-fat swap, use plain nonfat yogurt instead of mayonnaise when making potato salad or macaroni salad.    Added sugars. These are sugars added to foods. They are in foods such as ice cream, candy, soda, fruit drinks, sports drinks, energy drinks, cookies, pastries, jams, and syrups. Cut down on added sugars by sharing sweet treats with a family member or friend. You can also choose fruit for dessert, and drink water or other unsweetened beverages.     [employer] last reviewed this educational content on 6/1/2020 2000-2021 [employer]. All rights reserved. This information is not intended as a substitute for professional medical care. Always follow your healthcare professional's instructions.          Signs of Hearing Loss      Hearing much better with one ear can be a sign of hearing loss.   Hearing loss is a problem shared by many people. In fact, it is one of the most common health problems, particularly as people age. Most people age 65 and older have some hearing loss. By age 80, almost everyone does. Hearing loss often occurs slowly over the years. So you may not realize your hearing has gotten worse.  Have your hearing checked  Call your healthcare provider if you:    Have to strain to hear normal conversation    Have to watch other people s faces very carefully to follow what they re saying    Need to ask people to repeat what they ve said    Often misunderstand what people are saying    Turn the volume of the television or radio  up so high that others complain    Feel that people are mumbling when they re talking to you    Find that the effort to hear leaves you feeling tired and irritated    Notice, when using the phone, that you hear better with one ear than the other  [employer] last reviewed this educational content on 1/1/2020 2000-2021 [employer]. All rights reserved. This information is not intended as a substitute for professional medical care. Always follow your healthcare professional's instructions.          Urinary Incontinence, Female (Adult)   Urinary incontinence means loss of bladder control. This problem affects many women, especially as they get older. If you have incontinence, you may be embarrassed to ask for help. But know that this problem can be treated.   Types of Incontinence  There are different types of incontinence. Two of the main types are described here. You can have more than one type.     Stress incontinence. With this type, urine leaks when pressure (stress) is put on the bladder. This may happen when you cough, sneeze, or laugh. Stress incontinence most often occurs because the pelvic floor muscles that support the bladder and urethra are weak. This can happen after pregnancy and vaginal childbirth or a hysterectomy. It can also be due to excess body weight or hormone changes.    Urge incontinence (also called overactive bladder). With this type, a sudden urge to urinate is felt often. This may happen even though there may not be much urine in the bladder. The need to urinate often during the night is common. Urge incontinence most often occurs because of bladder spasms. This may be due to bladder irritation or infection. Damage to bladder nerves or pelvic muscles, constipation, and certain medicines can also lead to urge incontinence.  Treatment depends on the cause. Further evaluation is needed to find the type you have. This will likely include an exam and certain tests. Based on the  results, you and your healthcare provider can then plan treatment. Until a diagnosis is made, the home care tips below can help ease symptoms.   Home care    Do pelvic floor muscle exercises, if they are prescribed. The pelvic floor muscles help support the bladder and urethra. Many women find that their symptoms improve when doing special exercises that strengthen these muscles. To do the exercises, contract the muscles you would use to stop your stream of urine. But do this when you re not urinating. Hold for 10 seconds, then relax. Repeat 10 to 20 times in a row, at least 3 times a day. Your healthcare provider may give you other instructions for how to do the exercises and how often.    Keep a bladder diary. This helps track how often and how much you urinate over a set period of time. Bring this diary with you to your next visit with the provider. The information can help your provider learn more about your bladder problem.    Lose weight, if advised to by your provider. Extra weight puts pressure on the bladder. Your provider can help you create a weight-loss plan that s right for you. This may include exercising more and making certain diet changes.    Don't have foods and drinks that may irritate the bladder. These can include alcohol and caffeinated drinks.    Quit smoking. Smoking and other tobacco use can lead to a long-term (chronic) cough that strains the pelvic floor muscles. Smoking may also damage the bladder and urethra. Talk with your provider about treatments or methods you can use to quit smoking.    If drinking large amounts of fluid makes you have symptoms, you may be advised to limit your fluid intake. You may also be advised to drink most of your fluids during the day and to limit fluids at night.    If you re worried about urine leakage or accidents, you may wear absorbent pads to catch urine. Change the pads often. This helps reduce discomfort. It may also reduce the risk of skin or bladder  infections.    Follow-up care  Follow up with your healthcare provider, or as directed. It may take some to find the right treatment for your problem. But healthy lifestyle changes can be made right away. These include such things as exercising on a regular basis, eating a healthy diet, losing weight (if needed), and quitting smoking. Your treatment plan may include special therapies or medicines. Certain procedures or surgery may also be options. Talk about any questions you have with your provider.   When to seek medical advice  Call the healthcare provider right away if any of these occur:    Fever of 100.4 F (38 C) or higher, or as directed by your provider    Bladder pain or fullness    Belly swelling    Nausea or vomiting    Back pain    Weakness, dizziness, or fainting  [employer] last reviewed this educational content on 1/1/2020 2000-2021 [employer]. All rights reserved. This information is not intended as a substitute for professional medical care. Always follow your healthcare professional's instructions.

## 2021-10-06 LAB
ALBUMIN SERPL-MCNC: 4.2 G/DL (ref 3.4–5)
ALP SERPL-CCNC: 94 U/L (ref 40–150)
ALT SERPL W P-5'-P-CCNC: 46 U/L (ref 0–50)
ANION GAP SERPL CALCULATED.3IONS-SCNC: 5 MMOL/L (ref 3–14)
AST SERPL W P-5'-P-CCNC: 49 U/L (ref 0–45)
BILIRUB SERPL-MCNC: 0.5 MG/DL (ref 0.2–1.3)
BUN SERPL-MCNC: 28 MG/DL (ref 7–30)
CALCIUM SERPL-MCNC: 9.3 MG/DL (ref 8.5–10.1)
CHLORIDE BLD-SCNC: 104 MMOL/L (ref 94–109)
CHOLEST SERPL-MCNC: 150 MG/DL
CO2 SERPL-SCNC: 30 MMOL/L (ref 20–32)
CREAT SERPL-MCNC: 1.2 MG/DL (ref 0.52–1.04)
FASTING STATUS PATIENT QL REPORTED: YES
GFR SERPL CREATININE-BSD FRML MDRD: 43 ML/MIN/1.73M2
GLUCOSE BLD-MCNC: 125 MG/DL (ref 70–99)
HDLC SERPL-MCNC: 39 MG/DL
LDLC SERPL CALC-MCNC: 76 MG/DL
NONHDLC SERPL-MCNC: 111 MG/DL
POTASSIUM BLD-SCNC: 4.6 MMOL/L (ref 3.4–5.3)
PROT SERPL-MCNC: 7.6 G/DL (ref 6.8–8.8)
SODIUM SERPL-SCNC: 139 MMOL/L (ref 133–144)
TRIGL SERPL-MCNC: 174 MG/DL
TSH SERPL DL<=0.005 MIU/L-ACNC: 0.81 MU/L (ref 0.4–4)

## 2021-10-08 NOTE — RESULT ENCOUNTER NOTE
The following letter pertains to your most recent diagnostic tests:    -TSH (thyroid stimulating hormone) level is normal which indicates normal circulating thyroid hormone levels.      -Cholesterol is stable since last check.    -The metabolic panel shows stable kidney function and a trivial elevation in the liver test AST.       -Your hemoglobin A1c test which is a diabetes blood test that represents and average of your blood sugars over the last 3 months returned at 6.6 which is at your goal of hemoglobin A1c less than 7.     -Your complete blood counts including your hemoglobin returned normal for you.         Bottom line:  Labs look fine except for your triglycerides and liver test.  Both of these parameters often improve with watching carbohydrates in the diet.  We can recheck when you return in 6 months.        Follow up:  office visit appointment in 6 months or return sooner if needed       Sincerely,    Dr. Trinh

## 2021-10-19 DIAGNOSIS — I10 ESSENTIAL HYPERTENSION: ICD-10-CM

## 2021-10-19 NOTE — TELEPHONE ENCOUNTER
ANTICOAGULATION MANAGEMENT      Catalina Marie due for annual renewal of referral to anticoagulation monitoring. Order pended for your review and signature.      ANTICOAGULATION SUMMARY      Warfarin indication(s)     Atrial fibrillation    Heart valve present?  NO       Current goal range   INR: 2.0-3.0     Goal appropriate for indication? Yes, INR 2-3 appropriate for hx of DVT, PE, hypercoagulable state, Afib, LVAD, or bileaflet AVR without risk factors     Current duration of therapy Indefinite/long term therapy   Time in Therapeutic Range (TTR)  (Goal > 60%) 74.8 %     Office visit with referring provider's group within last year yes on 12/26/19       Zoe Mata RN           Physical Therapy    Facility/Department: [facility]URSING  Initial Assessment    NAME: Chris Jauregui  : 1939  MRN: [id number]    Date of Service: 10/19/2021    Discharge Recommendations:  Home with Home health PT   PT Equipment Recommendations  Equipment Needed: Yes  Mobility Devices: Cookie Fennel: Rolling  Other: Pt typically walks indep. w/ cane or nothing at all, however, pt demonstrated increased stability and better gait mechanics while walking w/ rolling walker indicating an increase in safety for a reduced risk of falls once he returns home     Chris Jauregui scored a 21/24 on the AM-PAC short mobility form. Current research shows that an AM-PAC score of 18 or greater is typically associated with a discharge to the patient's home setting. Based on the patient's AM-PAC score and their current functional mobility deficits, it is recommended that the patient have 2-3 sessions per week of Physical Therapy at d/c to increase the patient's independence. At this time, this patient demonstrates the endurance and safety to discharge home with HHPT and a follow up treatment frequency of 2-3x/wk. Please see assessment section for further patient specific details. If patient discharges prior to next session this note will serve as a discharge summary. Please see below for the latest assessment towards goals. Assessment   Body structures, Functions, Activity limitations: Decreased endurance  Assessment: Pt presents to PT w/ decreased endurance secondary to chest discomfort limiting his functional mobility recently. Pt PLOF included an ability to perform all functional mobility indep. only requiring a cane on occassion. Pt will benefit from continued PT in order to return to PLOF upon d/c for safe ambulation while at home and in the community.   Treatment Diagnosis: Decreased endurance  Prognosis: Good  Decision Making: Low Complexity  History: ESTEBAN  Exam: Functional mobility and ambulation  Clinical Presentation: Stable  PT Education: Goals;PT Role;Plan of Care;Home Exercise Program;Gait Training;Functional Mobility Training  Patient Education: Pt was educated on current condition and his plan of care for once he is d/c from hospital; pt verbalized understanding  Barriers to Learning: None  REQUIRES PT FOLLOW UP: Yes  Activity Tolerance  Activity Tolerance: Patient Tolerated treatment well       Patient Diagnosis(es): The primary encounter diagnosis was Chest discomfort. A diagnosis of ESTEBAN (acute kidney injury) ([facility] [address] was also pertinent to this visit. has a past medical history of CAD (coronary artery disease), Diabetes insipidus ([facility] [address] Hypercholesteremia, and Hypertension. has a past surgical history that includes Coronary artery bypass graft; Retinal detachment surgery; hernia repair; knee surgery; Appendectomy (10-); and Thyroid surgery.     Restrictions  Restrictions/Precautions  Restrictions/Precautions: Fall Risk (High Fall Risk)  Required Braces or Orthoses?: No     Vision/Hearing  Vision: Impaired  Vision Exceptions: Legally blind;Wears glasses at all times (R eye legally blind)  Hearing: Within functional limits       Subjective  General  Chart Reviewed: Yes  Patient assessed for rehabilitation services?: Yes  Response To Previous Treatment: Not applicable  Family / Caregiver Present: No  Diagnosis: Chest discomfort/pain, ESTEBAN  Follows Commands: Within Functional Limits  General Comment  Comments: Pt reports to be in no pain and is eager to go home  Subjective  Subjective: Pt lying L sidelying in bed  Pain Screening  Patient Currently in Pain: Denies  Pain Assessment  Pain Assessment: 0-10  Pain Level: 0  Vital Signs  Patient Currently in Pain: Denies       Orientation  Orientation  Overall Orientation Status: Within Functional Limits     Social/Functional History  Social/Functional History  Lives With: Spouse  Type of Home: House  Home Layout: One level (Pt reports the food is in the basement)  Home Access: Stairs to enter without rails  Entrance Stairs - Number of Steps: 3  Bathroom Shower/Tub: Walk-in shower, Shower chair without back  Bathroom Toilet: Handicap height  Bathroom Equipment: Grab bars in shower, Shower chair  Bathroom Accessibility: Accessible  Home Equipment: [address] Help From: Family (two daughters)  ADL Assistance: Independent  Homemaking Assistance: Independent  Homemaking Responsibilities: Yes  Ambulation Assistance: Independent  Transfer Assistance: Independent  Active : Yes  Mode of Transportation: [facility]  Occupation: Retired  Leisure & Hobbies: Ride a bike  Additional Comments: Pt reports 2-3 falls in past 3-6 months as a result from not being able to step over a curb or tripping over an item     Cognition   Cognition  Overall Cognitive Status: WFL    Objective     Observation/Palpation  Posture: Fair (Rounded shoulders and forward head)    AROM RLE (degrees)  RLE AROM: WFL  RLE General AROM: Pt demonstrated ability to put shoes on sitting EOB  AROM LLE (degrees)  LLE AROM : WFL  LLE General AROM: Pt demonstrated ability to put shoes on sitting EOB  Strength RLE  Strength RLE: WFL  Strength LLE  Strength LLE: WFL     Sensation  Overall Sensation Status: Impaired (Neuropathy in feet)  Bed mobility  Supine to Sit: Stand by assistance  Scooting: Stand by assistance  Transfers  Sit to Stand: Contact guard assistance (Performed 3x from EOB and recliner chair)  Stand to sit: Contact guard assistance (Performed 3x from EOB and recliner chair; CGA for controlled descent into chair)  Ambulation  Ambulation?: Yes  More Ambulation?: No  Ambulation 1  Surface: level tile  Device: Rolling Walker  Assistance: Contact guard assistance  Gait Deviations: Slow [place];Decreased step length;Decreased step height  Distance: 250'  Comments: Pt ambulated from room to end of the hallway and back to perform stairs before returning to recliner chair in room;  Pt was educated on focusing on heel contact w/ initial contact, pt verbalized understanding  Stairs/Curb  Stairs?: Yes  Stairs  # Steps : 3  Stairs Height: 6\"  Rails: Left ascending  Device: No Device  Assistance: Contact guard assistance  Comment: Pt demonstrated increased difficulty when descending stairs however pt reported he likes to take his time and ensure he is holding onto to the railing w/ good support     Balance  Posture: Fair  Sitting - Static: Good  Sitting - Dynamic: Good  Standing - Static: Good  Standing - Dynamic: Good  Comments: Pt was able to perform dressing and cleaning while sitting and standing SBA w/ good control and no LOB  Exercises  Comments: Pt was previously at [address] [place] [address] and verbalized and demonstrated three balance exercises including semi-tandem stance and narrow ROSANNA exercises that he was taught and encouraged to continue performing w/ nearby assist for safety     Plan   Plan  Times per week: 3-5  Times per day: Daily  Plan weeks: upon d/c  Current Treatment Recommendations: Functional Mobility Training, Gait Training, Endurance Training, Balance Training, Patient/Caregiver Education & Training, Strengthening, Home Exercise Program  Safety Devices  Type of devices:  All fall risk precautions in place, Call light within reach, Chair alarm in place, Gait belt, Patient at risk for falls, Left in chair, Nurse notified  Restraints  Initially in place: No      AM-PAC Score  AM-PAC Inpatient Mobility Raw Score : 21 (10/19/21 1117)  AM-PAC Inpatient T-Scale Score : 50.25 (10/19/21 1117)  Mobility Inpatient CMS 0-100% Score: 28.97 (10/19/21 1117)  Mobility Inpatient CMS G-Code Modifier : General Eye (10/19/21 1117)          Goals  Short term goals  Time Frame for Short term goals: upon d/c  Short term goal 1: Pt will perform all bed mobility MOD I  Short term goal 2: Pt will perform all transfers MOD I w/ LRAD  Short term goal 3: Pt will return to ambulating 200' w/ cane MOD I  Patient Goals   Patient goals : Pt did not state goals       Therapy Time   Individual Concurrent Group Co-treatment   Time In       0934   Time Out       1029   Minutes       55   Timed Code Treatment Minutes: 40 Minutes  Total Treatment Minutes:  Õie 16, SPT  PT observed and directed the above evaluation/treatment.   [address], DPT [id number]

## 2021-10-20 RX ORDER — AMLODIPINE BESYLATE 5 MG/1
TABLET ORAL
Qty: 45 TABLET | Refills: 1 | Status: SHIPPED | OUTPATIENT
Start: 2021-10-20 | End: 2022-08-01

## 2021-10-20 NOTE — TELEPHONE ENCOUNTER
Routing refill request to provider for review/approval because:  Labs out of range:    Creatinine   Date Value Ref Range Status   10/05/2021 1.20 (H) 0.52 - 1.04 mg/dL Final   10/30/2020 1.51 (A) 0.57 - 1.00 mg/dL Final       Shawna Chacon RN

## 2021-10-21 ENCOUNTER — ANTICOAGULATION THERAPY VISIT (OUTPATIENT)
Dept: ANTICOAGULATION | Facility: CLINIC | Age: 76
End: 2021-10-21

## 2021-10-21 ENCOUNTER — LAB (OUTPATIENT)
Dept: LAB | Facility: CLINIC | Age: 76
End: 2021-10-21
Payer: MEDICARE

## 2021-10-21 DIAGNOSIS — I48.91 ATRIAL FIBRILLATION (H): ICD-10-CM

## 2021-10-21 DIAGNOSIS — I48.20 CHRONIC ATRIAL FIBRILLATION (H): ICD-10-CM

## 2021-10-21 DIAGNOSIS — Z79.01 LONG TERM CURRENT USE OF ANTICOAGULANT THERAPY: Primary | ICD-10-CM

## 2021-10-21 DIAGNOSIS — I48.11 LONGSTANDING PERSISTENT ATRIAL FIBRILLATION (H): ICD-10-CM

## 2021-10-21 LAB — INR BLD: 3.4 (ref 0.9–1.1)

## 2021-10-21 PROCEDURE — 99207 PR NO CHARGE NURSE ONLY: CPT

## 2021-10-21 PROCEDURE — 36415 COLL VENOUS BLD VENIPUNCTURE: CPT

## 2021-10-21 PROCEDURE — 85610 PROTHROMBIN TIME: CPT

## 2021-10-21 NOTE — PROGRESS NOTES
ANTICOAGULATION MANAGEMENT     Catalina Marie 76 year old female is on warfarin with supratherapeutic INR result. (Goal INR 2.0-3.0)    Recent labs: (last 7 days)     10/21/21  1041   INR 3.4*       ASSESSMENT     Source(s): Chart Review and Patient/Caregiver Call       Warfarin doses taken: Warfarin taken as instructed    Diet: Decreased greens/vitamin K in diet; plans to resume previous intake    New illness, injury, or hospitalization: No    Medication/supplement changes: None noted    Signs or symptoms of bleeding or clotting: No    Previous INR: Therapeutic last visit; previously outside of goal range    Additional findings: None     PLAN     Recommended plan for no diet, medication or health factor changes affecting INR     Dosing Instructions: Hold dose then continue your current warfarin dose with next INR in 8 days       Summary  As of 10/21/2021    Full warfarin instructions:  10/21: Hold; Otherwise 2.5 mg every Tue, Thu, Sat; 5 mg all other days   Next INR check:  10/29/2021             Telephone call with Catalina who verbalizes understanding and agrees to plan    Lab visit scheduled    Education provided: Impact of vitamin K foods on INR    Plan made per [facility] anticoagulation protocol    Lucia Arreola, RN  Anticoagulation Clinic  10/21/2021    _______________________________________________________________________     Anticoagulation Episode Summary     Current INR goal:  2.0-3.0   TTR:  61.4 % (3.1 mo)   Target end date:  Indefinite   Send INR reminders to:  DEMOND FRANCO    Indications    Long term current use of anticoagulant therapy [Z79.01]  Atrial fibrillation (H) [I48.91] [I48.91]  Chronic atrial fibrillation (H) [I48.20]  Longstanding persistent atrial fibrillation (H) [I48.11]           Comments:  Winter: FAXED INR results from [facility] in [place], [place]. Phone: [phone].  General Results#: [phone]  Call pt's cell in [place] with results/plan: [phone]         Anticoagulation Care  Providers     Provider Role Specialty Phone number    Ian Tracy MD Referring Internal Medicine [phone]    Sulaiman Trinh MD Referring Internal Medicine [phone]

## 2021-10-21 NOTE — PROGRESS NOTES
ANTICOAGULATION FOLLOW-UP CLINIC VISIT    Patient Name:  Catalina Marie  Date:  10/21/2021  Contact Type:  Telephone    SUBJECTIVE:         OBJECTIVE    Recent labs: (last 7 days)     10/21/21  1041   INR 3.4*       ASSESSMENT / PLAN  INR assessment SUPRA    Recheck INR In: 8 DAYS    INR Location Clinic      Anticoagulation Summary  As of 10/21/2021    INR goal:  2.0-3.0   TTR:  61.4 % (3.1 mo)   INR used for dosing:     Warfarin maintenance plan:  2.5 mg (5 mg x 0.5) every Tue, Thu, Sat; 5 mg (5 mg x 1) all other days   Full warfarin instructions:  10/21: Hold; Otherwise 2.5 mg every Tue, Thu, Sat; 5 mg all other days   Weekly warfarin total:  27.5 mg   Plan last modified:  Susy Ingram RN (8/11/2021)   Next INR check:  10/29/2021   Priority:  Maintenance   Target end date:  Indefinite    Indications    Long term current use of anticoagulant therapy [Z79.01]  Atrial fibrillation (H) [I48.91] [I48.91]  Chronic atrial fibrillation (H) [I48.20]  Longstanding persistent atrial fibrillation (H) [I48.11]             Anticoagulation Episode Summary     INR check location:      Preferred lab:      Send INR reminders to:  DEMOND FRANCO    Comments:  Winter: FAXED INR results from [facility] [facility] in [place], [place]. Phone: [phone].  General Results#: [phone]  Call pt's cell in [place] with results/plan: [phone]      Anticoagulation Care Providers     Provider Role Specialty Phone number    Ian Tracy MD Referring Internal Medicine [phone]    Sulaiman Trinh MD Referring Internal Medicine [phone]            See the Encounter Report to view Anticoagulation Flowsheet and Dosing Calendar (Go to Encounters tab in chart review, and find the Anticoagulation Therapy Visit)        Lucia Arreola, RN

## 2021-10-22 DIAGNOSIS — I10 BENIGN ESSENTIAL HYPERTENSION: ICD-10-CM

## 2021-10-25 RX ORDER — FENOFIBRATE 160 MG/1
160 TABLET ORAL DAILY
Qty: 90 TABLET | Refills: 1 | Status: SHIPPED | OUTPATIENT
Start: 2021-10-25 | End: 2022-04-25

## 2021-10-25 NOTE — TELEPHONE ENCOUNTER
Prescription approved per [facility] Refill Protocol.    Koki DUMONT RN  EP Triage

## 2021-10-29 ENCOUNTER — LAB (OUTPATIENT)
Dept: LAB | Facility: CLINIC | Age: 76
End: 2021-10-29
Payer: MEDICARE

## 2021-10-29 ENCOUNTER — ANTICOAGULATION THERAPY VISIT (OUTPATIENT)
Dept: ANTICOAGULATION | Facility: CLINIC | Age: 76
End: 2021-10-29

## 2021-10-29 DIAGNOSIS — I48.91 ATRIAL FIBRILLATION (H): ICD-10-CM

## 2021-10-29 DIAGNOSIS — I48.11 LONGSTANDING PERSISTENT ATRIAL FIBRILLATION (H): ICD-10-CM

## 2021-10-29 DIAGNOSIS — I48.20 CHRONIC ATRIAL FIBRILLATION (H): ICD-10-CM

## 2021-10-29 DIAGNOSIS — Z79.01 LONG TERM CURRENT USE OF ANTICOAGULANT THERAPY: Primary | ICD-10-CM

## 2021-10-29 LAB — INR BLD: 3.7 (ref 0.9–1.1)

## 2021-10-29 PROCEDURE — 85610 PROTHROMBIN TIME: CPT

## 2021-10-29 PROCEDURE — 99207 PR NO CHARGE NURSE ONLY: CPT

## 2021-10-29 PROCEDURE — 36416 COLLJ CAPILLARY BLOOD SPEC: CPT

## 2021-10-29 NOTE — PROGRESS NOTES
ANTICOAGULATION FOLLOW-UP CLINIC VISIT    Patient Name:  Catalina Marie  Date:  10/29/2021  Contact Type:  Telephone    SUBJECTIVE:         OBJECTIVE    Recent labs: (last 7 days)     10/29/21  1049   INR 3.7*       ASSESSMENT / PLAN  INR assessment SUPRA    Recheck INR In: 2 WEEKS    INR Location Clinic      Anticoagulation Summary  As of 10/29/2021    INR goal:  2.0-3.0   TTR:  56.5 % (3.3 mo)   INR used for dosing:  3.7 (10/29/2021)   Warfarin maintenance plan:  5 mg (5 mg x 1) every Mon, Wed, Fri; 2.5 mg (5 mg x 0.5) all other days   Full warfarin instructions:  10/29: 2.5 mg; Otherwise 5 mg every Mon, Wed, Fri; 2.5 mg all other days   Weekly warfarin total:  25 mg   Plan last modified:  Lucia Arreola RN (10/29/2021)   Next INR check:  11/12/2021   Priority:  Maintenance   Target end date:  Indefinite    Indications    Long term current use of anticoagulant therapy [Z79.01]  Atrial fibrillation (H) [I48.91] [I48.91]  Chronic atrial fibrillation (H) [I48.20]  Longstanding persistent atrial fibrillation (H) [I48.11]             Anticoagulation Episode Summary     INR check location:      Preferred lab:      Send INR reminders to:  DEMOND FRANCO    Comments:  Winter: FAXED INR results from [facility] in [place], [place]. Phone: [phone].  General Results#: [phone]  Call pt's cell in [place] with results/plan: [phone]      Anticoagulation Care Providers     Provider Role Specialty Phone number    Ian Tracy MD Referring Internal Medicine [phone]    Sulaiman Trinh MD Referring Internal Medicine [phone]            See the Encounter Report to view Anticoagulation Flowsheet and Dosing Calendar (Go to Encounters tab in chart review, and find the Anticoagulation Therapy Visit)        Lucia Arreola, RN

## 2021-10-29 NOTE — CONFIDENTIAL NOTE
ANTICOAGULATION MANAGEMENT     Catalina Marie 76 year old female is on warfarin with supratherapeutic INR result. (Goal INR 2.0-3.0)    Recent labs: (last 7 days)     10/29/21  1049   INR 3.7*       ASSESSMENT     Source(s): Chart Review and Patient/Caregiver Call       Warfarin doses taken: Warfarin taken as instructed    Diet: she has been on an increased greens diet last week    New illness, injury, or hospitalization: No    Medication/supplement changes: None noted    Signs or symptoms of bleeding or clotting: No    Previous INR: Supratherapeutic    Additional findings: None     PLAN     Recommended plan for no diet, medication or health factor changes affecting INR     Dosing Instructions:  Decrease your warfarin dose (9% change) with next INR in 2 weeks       Summary  As of 10/29/2021    Full warfarin instructions:  10/29: 2.5 mg; Otherwise 5 mg every Mon, Wed, Fri; 2.5 mg all other days   Next INR check:  11/12/2021             Telephone call with Catalina who verbalizes understanding and agrees to plan    Pt leaves for [place] on 10/31 and will have INR done there and dosed with her [place] Dr    Education provided: None required    Plan made per [facility] anticoagulation protocol    Lucia Arreola RN  Anticoagulation Clinic  10/29/2021    _______________________________________________________________________     Anticoagulation Episode Summary     Current INR goal:  2.0-3.0   TTR:  56.5 % (3.3 mo)   Target end date:  Indefinite   Send INR reminders to:  DEMOND FRANCO    Indications    Long term current use of anticoagulant therapy [Z79.01]  Atrial fibrillation (H) [I48.91] [I48.91]  Chronic atrial fibrillation (H) [I48.20]  Longstanding persistent atrial fibrillation (H) [I48.11]           Comments:  Winter: FAXED INR results from [facility] in [place], [place]. Phone: [phone].  General Results#: [phone]  Call pt's cell in [place] with results/plan: [phone]         Anticoagulation Care Providers     Provider  Role Specialty Phone number    Ian Tracy MD Referring Internal Medicine [phone]    Sulaiman Trinh MD Referring Internal Medicine [phone]

## 2021-11-22 DIAGNOSIS — I10 BENIGN ESSENTIAL HYPERTENSION: ICD-10-CM

## 2021-11-22 DIAGNOSIS — E78.5 HYPERLIPIDEMIA LDL GOAL <100: ICD-10-CM

## 2021-11-23 RX ORDER — ATORVASTATIN CALCIUM 10 MG/1
5 TABLET, FILM COATED ORAL DAILY
Qty: 45 TABLET | Refills: 2 | Status: SHIPPED | OUTPATIENT
Start: 2021-11-23 | End: 2022-08-09

## 2021-11-23 RX ORDER — LISINOPRIL 40 MG/1
40 TABLET ORAL DAILY
Qty: 90 TABLET | Refills: 3 | Status: SHIPPED | OUTPATIENT
Start: 2021-11-23 | End: 2022-08-18

## 2021-11-23 NOTE — TELEPHONE ENCOUNTER
Routing refill request to provider for review/approval because:  Labs out of range:  Dilshad GOLDEN RN  [facility]

## 2021-11-23 NOTE — TELEPHONE ENCOUNTER
Prescription approved per [facility] Refill Protocol.  Camelia GOLDEN RN  [facility]

## 2021-11-30 DIAGNOSIS — I48.0 PAROXYSMAL ATRIAL FIBRILLATION (H): ICD-10-CM

## 2021-12-01 RX ORDER — ATENOLOL 25 MG/1
TABLET ORAL
Qty: 180 TABLET | Refills: 3 | Status: SHIPPED | OUTPATIENT
Start: 2021-12-01 | End: 2022-08-18

## 2021-12-01 NOTE — TELEPHONE ENCOUNTER
Needs SIG.    Please refill as appropriate.  Thank you,    Janice Lovett RN  [facility]

## 2021-12-17 DIAGNOSIS — I48.20 CHRONIC ATRIAL FIBRILLATION (H): ICD-10-CM

## 2021-12-17 RX ORDER — VERAPAMIL HYDROCHLORIDE 240 MG/1
240 CAPSULE, EXTENDED RELEASE ORAL DAILY
Qty: 90 CAPSULE | Refills: 3 | Status: SHIPPED | OUTPATIENT
Start: 2021-12-17

## 2021-12-17 NOTE — TELEPHONE ENCOUNTER
verapamil ER (VERELAN) 240 MG 24 hr capsule 90 capsule 3 1/18/2021  No   Sig - Route: Take 1 capsule (240 mg) by mouth daily - Oral   Sent to pharmacy as: Verapamil HCl  MG Oral Capsule Extended Release 24 Hour (VERELAN)   Class: E-Prescribe   Order: [id number]   E-Prescribing Status: Receipt confirmed by pharmacy (1/18/2021  4:27 PM CST)     Routing refill request to provider for review/approval because:  Labs out of range:  Creatinine   Pharmacy would not fill Rx due to Dr Tracy's name on Rx     Dez Weaver RN  [facility]

## 2021-12-28 ENCOUNTER — TELEPHONE (OUTPATIENT)
Dept: FAMILY MEDICINE | Facility: CLINIC | Age: 76
End: 2021-12-28
Payer: MEDICARE

## 2021-12-28 NOTE — TELEPHONE ENCOUNTER
Pt and  called regarding Verapamil prescription, #90 and 3 refills, that was just sent to [facility] in [facility] on 12/17/21. The pharmacy stated they do not have this prescription. This RN called [facility] on [address] and gave verbal order of what was written for 12/17/21. The pharmacist took the order. This RN called pt back to notify her the prescription should now be available at [facility] on [address] at [facility].     Camelia GOLDEN RN  [facility]

## 2022-01-18 DIAGNOSIS — I48.0 PAROXYSMAL ATRIAL FIBRILLATION (H): ICD-10-CM

## 2022-01-18 RX ORDER — PROPAFENONE HYDROCHLORIDE 150 MG/1
TABLET, COATED ORAL
Qty: 270 TABLET | Refills: 1 | Status: SHIPPED | OUTPATIENT
Start: 2022-01-18 | End: 2022-07-19

## 2022-01-18 NOTE — TELEPHONE ENCOUNTER
[facility] in [facility] 10-5-2021 Ziggy    Appointments in Next Year    May 24, 2022 11:00 AM  (Arrive by 10:40 AM)  Provider Visit with Sulaiman Trinh MD  [facility] [phone]          RT Riya (R)

## 2022-01-18 NOTE — TELEPHONE ENCOUNTER
Routing refill request to provider for review/approval because:  Drug not on the [facility] refill protocol   Joi Archibald RN

## 2022-01-29 DIAGNOSIS — F43.22 ADJUSTMENT DISORDER WITH ANXIOUS MOOD: ICD-10-CM

## 2022-02-01 RX ORDER — SERTRALINE HYDROCHLORIDE 100 MG/1
100 TABLET, FILM COATED ORAL DAILY
Qty: 90 TABLET | Refills: 0 | Status: SHIPPED | OUTPATIENT
Start: 2022-02-01 | End: 2022-04-29

## 2022-02-01 NOTE — TELEPHONE ENCOUNTER
Prescription approved per [facility] Refill Protocol.  Kami ABURTO RN

## 2022-02-14 DIAGNOSIS — I10 BENIGN ESSENTIAL HYPERTENSION: ICD-10-CM

## 2022-02-15 RX ORDER — DIGOXIN 250 MCG
125 TABLET ORAL DAILY
Qty: 45 TABLET | Refills: 3 | Status: SHIPPED | OUTPATIENT
Start: 2022-02-15 | End: 2022-08-18

## 2022-02-28 ENCOUNTER — TRANSFERRED RECORDS (OUTPATIENT)
Dept: HEALTH INFORMATION MANAGEMENT | Facility: CLINIC | Age: 77
End: 2022-02-28
Payer: MEDICARE

## 2022-03-22 ENCOUNTER — TRANSFERRED RECORDS (OUTPATIENT)
Dept: HEALTH INFORMATION MANAGEMENT | Facility: CLINIC | Age: 77
End: 2022-03-22
Payer: MEDICARE

## 2022-04-04 ENCOUNTER — TRANSFERRED RECORDS (OUTPATIENT)
Dept: HEALTH INFORMATION MANAGEMENT | Facility: CLINIC | Age: 77
End: 2022-04-04
Payer: MEDICARE

## 2022-04-09 ENCOUNTER — HEALTH MAINTENANCE LETTER (OUTPATIENT)
Age: 77
End: 2022-04-09

## 2022-04-20 ENCOUNTER — TELEPHONE (OUTPATIENT)
Dept: ANTICOAGULATION | Facility: CLINIC | Age: 77
End: 2022-04-20
Payer: MEDICARE

## 2022-04-20 NOTE — TELEPHONE ENCOUNTER
ANTICOAGULATION     Catalina Marie is overdue for INR check.      Has been in [place] for the winter and provider in [place] has been managing warfarin. Unsure when she plans to return though she does have a clinic visit scheduled end of May. Tried to call but just received voice mail. Left message to call and schedule INR lab.    Hanna Flores RN

## 2022-04-25 ENCOUNTER — TRANSFERRED RECORDS (OUTPATIENT)
Dept: HEALTH INFORMATION MANAGEMENT | Facility: CLINIC | Age: 77
End: 2022-04-25
Payer: MEDICARE

## 2022-04-25 LAB
EJECTION FRACTION: NORMAL %
EJECTION FRACTION: NORMAL %

## 2022-04-28 ENCOUNTER — TRANSFERRED RECORDS (OUTPATIENT)
Dept: HEALTH INFORMATION MANAGEMENT | Facility: CLINIC | Age: 77
End: 2022-04-28
Payer: MEDICARE

## 2022-04-29 DIAGNOSIS — F43.22 ADJUSTMENT DISORDER WITH ANXIOUS MOOD: ICD-10-CM

## 2022-04-29 RX ORDER — SERTRALINE HYDROCHLORIDE 100 MG/1
TABLET, FILM COATED ORAL
Qty: 90 TABLET | Refills: 1 | Status: SHIPPED | OUTPATIENT
Start: 2022-04-29 | End: 2022-10-25

## 2022-04-29 NOTE — TELEPHONE ENCOUNTER
Prescription approved per [facility] Refill Protocol.    Yajaira Mendes RN on 4/29/2022 at 6:44 PM

## 2022-05-03 ENCOUNTER — TRANSFERRED RECORDS (OUTPATIENT)
Dept: HEALTH INFORMATION MANAGEMENT | Facility: CLINIC | Age: 77
End: 2022-05-03
Payer: MEDICARE

## 2022-05-03 LAB
ALT SERPL-CCNC: 29 U/L (ref 7–52)
AST SERPL-CCNC: 33 U/L (ref 13–39)
CREATININE (EXTERNAL): 1.3 MG/DL (ref 0.6–1.3)
GFR ESTIMATED (EXTERNAL): 44 ML/MIN/1.73M2
GFR ESTIMATED (IF AFRICAN AMERICAN) (EXTERNAL): 53 ML/MIN/1.73M2
GLUCOSE (EXTERNAL): 114 MG/DL (ref 70–100)
HBA1C MFR BLD: 6.6 % (ref 4.3–5.6)
POTASSIUM (EXTERNAL): 4.7 MMOL/L (ref 3.5–5.5)
TSH SERPL-ACNC: 1.27 ULU/ML (ref 0.27–4.2)

## 2022-05-04 ENCOUNTER — TRANSFERRED RECORDS (OUTPATIENT)
Dept: HEALTH INFORMATION MANAGEMENT | Facility: CLINIC | Age: 77
End: 2022-05-04
Payer: MEDICARE

## 2022-05-04 LAB
EJECTION FRACTION: 68 %
EJECTION FRACTION: 68 %

## 2022-05-05 ENCOUNTER — TRANSFERRED RECORDS (OUTPATIENT)
Dept: HEALTH INFORMATION MANAGEMENT | Facility: CLINIC | Age: 77
End: 2022-05-05
Payer: MEDICARE

## 2022-06-07 ENCOUNTER — TELEPHONE (OUTPATIENT)
Dept: FAMILY MEDICINE | Facility: CLINIC | Age: 77
End: 2022-06-07
Payer: MEDICARE

## 2022-06-07 NOTE — TELEPHONE ENCOUNTER
ANTICOAGULATION     Catalina Marie is overdue for INR check.     Spoke with Jennifer and verified patient is still in [place]. Previous INR levels have remained stable at 2.1. Pt is returning 6/23/22 and has a f/u INR appt scheduled on 7/5/22.    Thu Golden RN

## 2022-07-05 ENCOUNTER — DOCUMENTATION ONLY (OUTPATIENT)
Dept: FAMILY MEDICINE | Facility: CLINIC | Age: 77
End: 2022-07-05

## 2022-07-05 ENCOUNTER — ANTICOAGULATION THERAPY VISIT (OUTPATIENT)
Dept: ANTICOAGULATION | Facility: CLINIC | Age: 77
End: 2022-07-05

## 2022-07-05 ENCOUNTER — LAB (OUTPATIENT)
Dept: LAB | Facility: CLINIC | Age: 77
End: 2022-07-05
Payer: MEDICARE

## 2022-07-05 DIAGNOSIS — I48.20 CHRONIC ATRIAL FIBRILLATION (H): ICD-10-CM

## 2022-07-05 DIAGNOSIS — Z79.01 LONG TERM CURRENT USE OF ANTICOAGULANT THERAPY: Primary | ICD-10-CM

## 2022-07-05 DIAGNOSIS — I48.0 PAROXYSMAL ATRIAL FIBRILLATION (H): ICD-10-CM

## 2022-07-05 DIAGNOSIS — I48.91 ATRIAL FIBRILLATION (H): ICD-10-CM

## 2022-07-05 DIAGNOSIS — I48.11 LONGSTANDING PERSISTENT ATRIAL FIBRILLATION (H): ICD-10-CM

## 2022-07-05 LAB — INR BLD: 1.6 (ref 0.9–1.1)

## 2022-07-05 PROCEDURE — 36416 COLLJ CAPILLARY BLOOD SPEC: CPT

## 2022-07-05 PROCEDURE — 85610 PROTHROMBIN TIME: CPT

## 2022-07-05 NOTE — PROGRESS NOTES
ANTICOAGULATION MANAGEMENT     Catalina Marie 76 year old female is on warfarin with subtherapeutic INR result. (Goal INR 2.0-3.0)    Recent labs: (last 7 days)     07/05/22  1012   INR 1.6*       ASSESSMENT       Source(s): Chart Review and Patient/Caregiver Call       Warfarin doses taken: Warfarin taken as instructed, uses a pill box. On same dose as went she left for [place] in October.    Diet: No new diet changes identified    New illness, injury, or hospitalization: No    Medication/supplement changes: lowered dose of atenolol, but no interaction with warfarin noted.    Signs or symptoms of bleeding or clotting: No    Previous INR: Therapeutic last 2(+) visits, states last couple INRs have been around 2.1.    Additional findings: Returned from [place] about 1 week ago.       PLAN     Recommended plan for temporary change(s) affecting INR     Dosing Instructions: booster dose then continue your current warfarin dose with next INR in 2 weeks       Summary  As of 7/5/2022    Full warfarin instructions:  7/5: 5 mg; Otherwise 5 mg every Mon, Wed, Fri; 2.5 mg all other days   Next INR check:  7/19/2022             Telephone call with Jennifer who agrees to plan and repeated back plan correctly    Lab visit scheduled    Education provided: Please call back if any changes to your diet, medications or how you've been taking warfarin, Importance of consistent vitamin K intake, Goal range and significance of current result, No interaction anticipated between warfarin and atenolol dose decrease and Contact [phone]  with any changes, questions or concerns.     Plan made per [facility] anticoagulation protocol    Hanna Flores, RN  Anticoagulation Clinic  7/5/2022    _______________________________________________________________________     Anticoagulation Episode Summary     Current INR goal:  2.0-3.0   TTR:  56.5 % (3.3 mo)   Target end date:  Indefinite   Send INR reminders to:  DEMOND FRANCO    Indications    Long term  current use of anticoagulant therapy [Z79.01]  Atrial fibrillation (H) [I48.91] [I48.91]  Chronic atrial fibrillation (H) [I48.20]  Longstanding persistent atrial fibrillation (H) [I48.11]           Comments:  Winter: FAXED INR results from "[employer] in [place], [place]. Phone: [phone].  General Results#: [phone]  Call pt's cell in [place] with results/plan: [phone]         Anticoagulation Care Providers     Provider Role Specialty Phone number    Ian Tracy MD Referring Internal Medicine [phone]    Sulaiman Trinh MD Referring Internal Medicine [phone]

## 2022-07-05 NOTE — PROGRESS NOTES
ANTICOAGULATION CLINIC REFERRAL RENEWAL REQUEST       An annual renewal order is required for all patients referred to [facility].?  Please review and sign the pended referral order for Catalina Marie.       ANTICOAGULATION SUMMARY      Warfarin indication(s)   Atrial Fibrillation    Mechanical heart valve present?  NO       Current goal range   INR: 2.0-3.0     Goal appropriate for indication? Goal INR 2-3, standard for indication(s) above     Time in Therapeutic Range (TTR)  (Goal > 60%) 56.5%       Office visit with referring provider's group within last year yes on 10/5/21       Hanna Flores RN  [facility]

## 2022-07-18 ENCOUNTER — LAB (OUTPATIENT)
Dept: LAB | Facility: CLINIC | Age: 77
End: 2022-07-18
Payer: MEDICARE

## 2022-07-18 ENCOUNTER — ANTICOAGULATION THERAPY VISIT (OUTPATIENT)
Dept: ANTICOAGULATION | Facility: CLINIC | Age: 77
End: 2022-07-18

## 2022-07-18 DIAGNOSIS — Z79.01 LONG TERM CURRENT USE OF ANTICOAGULANT THERAPY: ICD-10-CM

## 2022-07-18 DIAGNOSIS — I48.20 CHRONIC ATRIAL FIBRILLATION (H): ICD-10-CM

## 2022-07-18 DIAGNOSIS — Z79.01 LONG TERM CURRENT USE OF ANTICOAGULANT THERAPY: Primary | ICD-10-CM

## 2022-07-18 DIAGNOSIS — I48.91 ATRIAL FIBRILLATION (H): ICD-10-CM

## 2022-07-18 DIAGNOSIS — I48.11 LONGSTANDING PERSISTENT ATRIAL FIBRILLATION (H): ICD-10-CM

## 2022-07-18 LAB — INR BLD: 1.7 (ref 0.9–1.1)

## 2022-07-18 PROCEDURE — 85610 PROTHROMBIN TIME: CPT

## 2022-07-18 PROCEDURE — 36415 COLL VENOUS BLD VENIPUNCTURE: CPT

## 2022-07-18 NOTE — PROGRESS NOTES
ANTICOAGULATION MANAGEMENT     Catalina Marie 76 year old female is on warfarin with subtherapeutic INR result. (Goal INR 2.0-3.0)    Recent labs: (last 7 days)     07/18/22  1034   INR 1.7*       ASSESSMENT       Source(s): Chart Review and Patient/Caregiver Call       Warfarin doses taken: Warfarin taken as instructed    Diet: Change in alcohol intake may be affecting INR. drinking less than when she was in [place] over the winter     New illness, injury, or hospitalization: No    Medication/supplement changes: None noted    Signs or symptoms of bleeding or clotting: No    Previous INR: Subtherapeutic    Additional findings: None       PLAN     Recommended plan for ongoing change(s) affecting INR     Dosing Instructions: booster dose then Increase your warfarin dose (10% change) with next INR in 2 weeks       Summary  As of 7/18/2022    Full warfarin instructions:  2.5 mg every Mon, Wed, Fri; 5 mg all other days   Next INR check:  8/1/2022             Telephone call with Jennifer who agrees to plan and repeated back plan correctly    Lab visit scheduled. MyC message sent as well.     Education provided: Please call back if any changes to your diet, medications or how you've been taking warfarin, Importance of consistent vitamin K intake, Potential interaction between warfarin and alcohol, Goal range and significance of current result and Contact [phone]  with any changes, questions or concerns.     Plan made per [facility] anticoagulation protocol    Hanna Flores RN  Anticoagulation Clinic  7/18/2022    _______________________________________________________________________     Anticoagulation Episode Summary     Current INR goal:  2.0-3.0   TTR:  50.0 % (3.8 mo)   Target end date:  Indefinite   Send INR reminders to:  DEMOND FRANCO    Indications    Long term current use of anticoagulant therapy [Z79.01]  Atrial fibrillation (H) [I48.91] [I48.91]  Chronic atrial fibrillation (H) [I48.20]  Longstanding persistent  atrial fibrillation (H) [I48.11]           Comments:           Anticoagulation Care Providers     Provider Role Specialty Phone number    Ian Tracy MD Referring Internal Medicine [phone]    Sulaiman Trinh MD Referring Internal Medicine [phone]

## 2022-07-19 ENCOUNTER — OFFICE VISIT (OUTPATIENT)
Dept: CARDIOLOGY | Facility: CLINIC | Age: 77
End: 2022-07-19
Attending: INTERNAL MEDICINE
Payer: MEDICARE

## 2022-07-19 VITALS
HEIGHT: 64 IN | WEIGHT: 132 LBS | DIASTOLIC BLOOD PRESSURE: 75 MMHG | OXYGEN SATURATION: 97 % | BODY MASS INDEX: 22.53 KG/M2 | SYSTOLIC BLOOD PRESSURE: 149 MMHG | HEART RATE: 74 BPM

## 2022-07-19 DIAGNOSIS — I10 BENIGN ESSENTIAL HYPERTENSION: ICD-10-CM

## 2022-07-19 DIAGNOSIS — I48.0 PAROXYSMAL ATRIAL FIBRILLATION (H): Primary | ICD-10-CM

## 2022-07-19 PROCEDURE — 93000 ELECTROCARDIOGRAM COMPLETE: CPT | Performed by: INTERNAL MEDICINE

## 2022-07-19 PROCEDURE — 99203 OFFICE O/P NEW LOW 30 MIN: CPT | Performed by: INTERNAL MEDICINE

## 2022-07-19 RX ORDER — PROPAFENONE HYDROCHLORIDE 150 MG/1
150 TABLET, COATED ORAL 3 TIMES DAILY
Qty: 270 TABLET | Refills: 1 | COMMUNITY
Start: 2022-07-19 | End: 2022-08-08

## 2022-07-19 NOTE — LETTER
7/19/2022    Sulaiman Trinh MD  [address]  [place] [address]    RE: Catalina Marie       Dear Colleague,     I had the pleasure of seeing Catalina Marie in the [facility].  HPI and Plan:   See dictation [id number]      Orders Placed This Encounter   Procedures     EKG 12-lead complete w/read - Clinics (performed today)     Leadless EKG Monitor 3 to 7 Days       Orders Placed This Encounter   Medications     propafenone (RYTHMOL) 150 MG TABS tablet     Sig: TAKE 1 TABLET BY MOUTH TWO TIMES PER DAY, MORNING & DINNER     Dispense:  270 tablet     Refill:  1       Medications Discontinued During This Encounter   Medication Reason     propafenone (RYTHMOL) 150 MG TABS tablet Reorder         Encounter Diagnoses   Name Primary?     Longstanding persistent atrial fibrillation (H)      Paroxysmal atrial fibrillation (H) Yes       CURRENT MEDICATIONS:  Current Outpatient Medications   Medication Sig Dispense Refill     amLODIPine (NORVASC) 5 MG tablet TAKE 0.5 TABLETS (2.5 MG) BY MOUTH DAILY 45 tablet 1     atenolol (TENORMIN) 25 MG tablet TAKE 1 TABLET BY MOUTH TWICE A DAY (Patient taking differently: TAKE 1 TABLET BY MOUTH ONCE A DAY) 180 tablet 3     atorvastatin (LIPITOR) 10 MG tablet Take 0.5 tablets (5 mg) by mouth daily 45 tablet 2     Calcium Carbonate-Vitamin D (CALCIUM + D PO) Take by mouth daily       carboxymethylcellulose (CELLUVISC/REFRESH LIQUIGEL) 1 % ophthalmic solution Place 1 drop into both eyes 4 times daily as needed for dry eyes       digoxin (LANOXIN) 250 MCG tablet TAKE 0.5 TABLETS (125 MCG) BY MOUTH DAILY 45 tablet 3     fenofibrate (TRIGLIDE/LOFIBRA) 160 MG tablet TAKE 1 TABLET (160 MG) BY MOUTH DAILY 90 tablet 1     furosemide (LASIX) 20 MG tablet TAKE 1 TABLET BY MOUTH TWICE A  tablet 3     ipratropium (ATROVENT) 0.06 % nasal spray Spray 2 sprays into both nostrils 4 times daily 45 mL 3     lisinopril (ZESTRIL) 40 MG tablet Take 1 tablet (40 mg) by  mouth daily 90 tablet 3     propafenone (RYTHMOL) 150 MG TABS tablet TAKE 1 TABLET BY MOUTH TWO TIMES PER DAY, MORNING & DINNER 270 tablet 1     sertraline (ZOLOFT) 100 MG tablet TAKE 1 TABLET BY MOUTH EVERY DAY 90 tablet 1     triamcinolone (KENALOG) 0.1 % external cream Apply topically 2 times daily Sig: Apply sparingly to right lateral foot daily 30 g 1     verapamil ER (VERELAN) 240 MG 24 hr capsule Take 1 capsule (240 mg) by mouth daily 90 capsule 3     warfarin ANTICOAGULANT (COUMADIN) 5 MG tablet TAKE 1/2 TO 1 TABLET EVERY DAY OR AS DIRECTED BY INR CLINIC 90 tablet 0       ALLERGIES     Allergies   Allergen Reactions     Pcn [Penicillins]        PAST MEDICAL HISTORY:  Past Medical History:   Diagnosis Date     Arrhythmia      Diabetes (H)     diet controlled     Hypertension      Schwannoma        PAST SURGICAL HISTORY:  Past Surgical History:   Procedure Laterality Date     BREAST SURGERY       COLONOSCOPY       EYE SURGERY       HEAD & NECK SURGERY      brain tumor resection     PHACOEMULSIFICATION CLEAR CORNEA WITH STANDARD INTRAOCULAR LENS IMPLANT Right 7/6/2016    Procedure: PHACOEMULSIFICATION CLEAR CORNEA WITH STANDARD INTRAOCULAR LENS IMPLANT;  Surgeon: Aiden Lee MD;  Location: [facility]       FAMILY HISTORY:  History reviewed. No pertinent family history.    SOCIAL HISTORY:  Social History     Socioeconomic History     Marital status:      Spouse name: None     Number of children: None     Years of education: None     Highest education level: None   Tobacco Use     Smoking status: Former Smoker     Smokeless tobacco: Never Used   Substance and Sexual Activity     Alcohol use: Yes     Comment: 1 wine with dinner     Drug use: No     Sexual activity: Not Currently           CC  Sulaiman Trinh MD  [address] [place] [address]  [place] [address]                Service Date: 07/19/2022    HISTORY OF PRESENT ILLNESS:  It was my pleasure seeing Ms. Catalina Marie, a delightful 76-year-old female  "who has been referred by Dr. Trinh for establishment of care for atrial fibrillation.    Ms. Marie states that she has had \"tachycardia\" since her 30s.  She had a number of events where she had to come to the Emergency Room for termination of tachycardia.  At some point catheter ablation was discussed, but she ultimately decided against it and over the years she was treated with various medications to prevent tachycardia, including verapamil.    Later on, possibly in her 50s or early 60s, she was diagnosed with atrial fibrillation.  She was placed on warfarin.  She describes significant difficulties in controlling the ventricular rate during AF, and she told me that on occasions her heart rate could get up into the 160-180 range.    Over the years, several medications were added including atenolol, digoxin and ultimately propafenone.    The patient spends 6-7 months per year in [place], [place].  Because she spends her summers in [place], she made the decision to establish Cardiology care in the [facility] just in case she requires some immediate assistance for a cardiac problem.    She tells me that when she last saw her cardiologist in [place] in 05/2022, digoxin was stopped in an attempt to simplify her regimen.  I do have the note from her cardiologist from 05/05/2022 which says on that day her rhythm was sinus bradycardia in the high 40s.  Digoxin was stopped because of bradycardia.  The patient apparently developed tachycardia around a little over 100 beats per minute a few days later, and digoxin was resumed.  Her dose of atenolol was slightly decreased.    Her cardiac medications at the moment include:  Atenolol 25 mg, digoxin 0.125 mg, propafenone 150 mg t.i.d., lisinopril 40 mg, amlodipine 5 mg, verapamil 240 mg and warfarin.    PAST MEDICAL HISTORY:  The patient's past medical history is significant for hypothyroidism, dyslipidemia, hypertension requiring several drugs for treatment, celiac " disease, CKD stage III, borderline type 2 diabetes mellitus, history of breast cancer.    SOCIAL HISTORY:  The patient is .  The patient lives with her .  She is modestly physically active and has no chest pain or unusual dyspnea with exertion.  No syncope, near-syncope, orthopnea or PND.    Ms. Marie does not smoke and drinks wine with dinner.  She does not have family history of premature coronary artery disease or atrial fibrillation.    PHYSICAL EXAMINATION:    VITAL SIGNS:  Blood pressure 149/75, heart rate 74 and irregular, weight 59.9 kg, BMI 22.6.  GENERAL:  She is an extremely pleasant woman in no distress.  HEENT:  Head normocephalic.  NECK:  Supple without bruits.  LUNGS:  Completely clear.  CARDIOVASCULAR:  Irregular rhythm with ventricular rate in the 70s, no gallop, murmur or rub.  ABDOMEN:  Soft, nontender.  EXTREMITIES:  No edema.  SKIN:  No rashes.  BACK:  No CVA tenderness.    DIAGNOSTIC STUDIES:  Recent laboratory tests, potassium 4.7, creatinine 1.3, TSH 1.27.  Hemoglobin A1c 6.6.    Her 12-lead ECG today showed atrial fibrillation with controlled ventricular rate.    She apparently had a nuclear stress test in early 2022 in [place].  It showed no ischemia and a possible fixed anteroseptal defect.  She also had an echocardiogram with LVEF 55%, mild to moderate MR and mild pulmonary hypertension.    IMPRESSION:    1.  Atrial fibrillation.  The patient was in atrial fibrillation today but was in sinus bradycardia during her most recent assessment in [place] in early May.  It is pretty clear that the patient has no significant symptoms related to AF unless her rate is fast.  Today she was completely unaware of atrial fibrillation, and she tells me she has always been that way.  However, she does feel badly if her heart rate is above 110 beats per minute.    She also tells me that to manage rate, she has required multiple cardiac medications.  She also told me that propafenone was  partly used in the past to control the ventricular rate during AF.  I told her that typically we use propafenone as a rhythm control medication.    With regard to her anticoagulation status, her CHADS2-VASc score is at least 5 and she is appropriately on warfarin.  Apparently, her INR has been not difficult to control.  Continuation of warfarin is reasonable.    RECOMMENDATIONS:  1.  I asked the patient to decrease propafenone from t.i.d. to b.i.d. scheduling (150 mg twice daily).  I explained that the medication is currently not effective in keeping her in rhythm all the time and it is quite cumbersome to take 3 times a day.  Let's see how she feels on propafenone twice daily.  2.  I would also like her to wear a 7-day cardiac monitor.  If the monitor shows that she is mostly in atrial fibrillation, one could make a strong case of stopping propafenone and adjusting her beta blocker upward for rate control.  I would not use propafenone for rate control for AF, as the risk would outweigh the benefit.  Given her current blood pressure which is borderline high, I believe it would be feasible to control the ventricular rate of AF with routine rate control medications rather than antiarrhythmic drugs.  3.  Continue warfarin.    It was my pleasure seeing Ms. Petty today.  Please feel free to contact me with additional questions.    William Babin MD    cc:  Sulaiman Trinh MD  [address], [address]  [place] [address]    William Babin MD        D: 2022   T: 2022   MT:     Name:     ESSENCE PETTY  MRN:      [id number]        Account:      [id number]   :      1945           Service Date: 2022       Document: [id number]      Thank you for allowing me to participate in the care of your patient.      Sincerely,     William Babin MD     [facility]  cc:   Sulaiman Trinh  MD  [address]  [place] [address]

## 2022-07-19 NOTE — PROGRESS NOTES
HPI and Plan:   See dictation [id number]      Orders Placed This Encounter   Procedures     EKG 12-lead complete w/read - Clinics (performed today)     Leadless EKG Monitor 3 to 7 Days       Orders Placed This Encounter   Medications     propafenone (RYTHMOL) 150 MG TABS tablet     Sig: TAKE 1 TABLET BY MOUTH TWO TIMES PER DAY, MORNING & DINNER     Dispense:  270 tablet     Refill:  1       Medications Discontinued During This Encounter   Medication Reason     propafenone (RYTHMOL) 150 MG TABS tablet Reorder         Encounter Diagnoses   Name Primary?     Longstanding persistent atrial fibrillation (H)      Paroxysmal atrial fibrillation (H) Yes       CURRENT MEDICATIONS:  Current Outpatient Medications   Medication Sig Dispense Refill     amLODIPine (NORVASC) 5 MG tablet TAKE 0.5 TABLETS (2.5 MG) BY MOUTH DAILY 45 tablet 1     atenolol (TENORMIN) 25 MG tablet TAKE 1 TABLET BY MOUTH TWICE A DAY (Patient taking differently: TAKE 1 TABLET BY MOUTH ONCE A DAY) 180 tablet 3     atorvastatin (LIPITOR) 10 MG tablet Take 0.5 tablets (5 mg) by mouth daily 45 tablet 2     Calcium Carbonate-Vitamin D (CALCIUM + D PO) Take by mouth daily       carboxymethylcellulose (CELLUVISC/REFRESH LIQUIGEL) 1 % ophthalmic solution Place 1 drop into both eyes 4 times daily as needed for dry eyes       digoxin (LANOXIN) 250 MCG tablet TAKE 0.5 TABLETS (125 MCG) BY MOUTH DAILY 45 tablet 3     fenofibrate (TRIGLIDE/LOFIBRA) 160 MG tablet TAKE 1 TABLET (160 MG) BY MOUTH DAILY 90 tablet 1     furosemide (LASIX) 20 MG tablet TAKE 1 TABLET BY MOUTH TWICE A  tablet 3     ipratropium (ATROVENT) 0.06 % nasal spray Spray 2 sprays into both nostrils 4 times daily 45 mL 3     lisinopril (ZESTRIL) 40 MG tablet Take 1 tablet (40 mg) by mouth daily 90 tablet 3     propafenone (RYTHMOL) 150 MG TABS tablet TAKE 1 TABLET BY MOUTH TWO TIMES PER DAY, MORNING & DINNER 270 tablet 1     sertraline (ZOLOFT) 100 MG tablet TAKE 1 TABLET BY MOUTH EVERY DAY 90  tablet 1     triamcinolone (KENALOG) 0.1 % external cream Apply topically 2 times daily Sig: Apply sparingly to right lateral foot daily 30 g 1     verapamil ER (VERELAN) 240 MG 24 hr capsule Take 1 capsule (240 mg) by mouth daily 90 capsule 3     warfarin ANTICOAGULANT (COUMADIN) 5 MG tablet TAKE 1/2 TO 1 TABLET EVERY DAY OR AS DIRECTED BY INR CLINIC 90 tablet 0       ALLERGIES     Allergies   Allergen Reactions     Pcn [Penicillins]        PAST MEDICAL HISTORY:  Past Medical History:   Diagnosis Date     Arrhythmia      Diabetes (H)     diet controlled     Hypertension      Schwannoma        PAST SURGICAL HISTORY:  Past Surgical History:   Procedure Laterality Date     BREAST SURGERY       COLONOSCOPY       EYE SURGERY       HEAD & NECK SURGERY      brain tumor resection     PHACOEMULSIFICATION CLEAR CORNEA WITH STANDARD INTRAOCULAR LENS IMPLANT Right 7/6/2016    Procedure: PHACOEMULSIFICATION CLEAR CORNEA WITH STANDARD INTRAOCULAR LENS IMPLANT;  Surgeon: Aiden Lee MD;  Location: [facility]       FAMILY HISTORY:  History reviewed. No pertinent family history.    SOCIAL HISTORY:  Social History     Socioeconomic History     Marital status:      Spouse name: None     Number of children: None     Years of education: None     Highest education level: None   Tobacco Use     Smoking status: Former Smoker     Smokeless tobacco: Never Used   Substance and Sexual Activity     Alcohol use: Yes     Comment: 1 wine with dinner     Drug use: No     Sexual activity: Not Currently           CC  Sulaiman Trinh MD  [address] [facility]  [place] [address]

## 2022-07-19 NOTE — PROGRESS NOTES
"Service Date: 07/19/2022    HISTORY OF PRESENT ILLNESS:    It was my pleasure seeing Ms. Catalina Marie, a delightful 76-year-old female who has been referred by Dr. Trinh for establishing care for atrial fibrillation.    Ms. Mraie has had paroxysmal \"tachycardia\" since her 30s.  She had a number of events where she had to come to the Emergency Room for termination.  At some point catheter ablation was discussed, but she decided against it.  Over the years she was treated with various medications, including verapamil.    Later on, possibly in her 50s or early 60s, she was diagnosed with atrial fibrillation.  She was placed on warfarin.  She describes significant difficulties in controlling the ventricular rate during AF.  She told me that on occasions her heart rate got into the 160-180 range.    Over the years, several medications were added including atenolol, digoxin and ultimately propafenone.    The patient spends 6-8 months per year in [place], [place].  Because she spends her summers in [place], she made the decision to also establish Cardiology care in the [facility].    When she last saw her cardiologist in [place] in 05/2022, digoxin was stopped in an attempt to simplify her regimen.  I do have a note from her cardiologist dated 05/05/2022 .  On that day her rhythm was sinus bradycardia in the high 40s.  Digoxin was stopped.  The patient developed tachycardia around over 100 beats per minute a few days later, thus digoxin was resumed.  Her dose of atenolol was slightly decreased.    Her cardiac medications at the moment include:  Atenolol 25 mg, digoxin 0.125 mg, propafenone 150 mg t.i.d., lisinopril 40 mg, amlodipine 5 mg, verapamil 240 mg and warfarin.    PAST MEDICAL HISTORY:  The patient's past medical history is significant for hypothyroidism, dyslipidemia, hypertension requiring several drugs for treatment, celiac disease, CKD stage III, borderline type 2 diabetes mellitus, history of " breast cancer.    ROS:  She is modestly physically active and has no chest pain or unusual dyspnea with exertion.  No syncope, near-syncope, orthopnea or PND.    SOCIAL & FAMILY HISTORY:  The patient is  and lives with her .  Ms. Marie does not smoke and drinks wine with dinner.  No family history of premature coronary artery disease or atrial fibrillation.      PHYSICAL EXAMINATION:    VITAL SIGNS:  Blood pressure 149/75, heart rate 74 and irregular, weight 59.9 kg, BMI 22.6.  GENERAL:  She is an extremely pleasant woman in no distress.  HEENT:  Head normocephalic.  NECK:  Supple without bruits.  LUNGS:  Completely clear.  CARDIOVASCULAR:  Irregular rhythm with ventricular rate in the 70s, no gallop, murmur or rub.  ABDOMEN:  Soft, nontender.  EXTREMITIES:  No edema.  SKIN:  No rashes.  BACK:  No CVA tenderness.      DIAGNOSTIC STUDIES:  - Recent laboratory tests, potassium 4.7, creatinine 1.3, TSH 1.27.  Hemoglobin A1c 6.6.  - Her 12-lead ECG today showed atrial fibrillation with controlled ventricular rate.  - She apparently had a nuclear stress test in early 2022 in [place].  It showed no ischemia and a possible fixed anteroseptal defect.  She also had an echocardiogram with LVEF 55%, mild to moderate MR and mild pulmonary hypertension.      IMPRESSION:    1.  Atrial fibrillation.  The patient was in atrial fibrillation today but was in sinus bradycardia during her most recent assessment in [place] in early May.  Mrs. Marie has no significant symptoms related to AF, unless the rate is fast.  Today she was unaware of atrial fibrillation.  However, she tells me she feels unwell when her heart rate is above 110.    Historically, she has required multiple cardiac medications to achieve adequate rate control.  She is under the impression that propafenone has been partly used to control the ventricular rate during AF.  I explained that we typically use propafenone for rhythm control. In that  regard, it does not seem to be working very well.    With regard to her anticoagulation status, her [id number] score is at least.  She is appropriately on warfarin.  Apparently, her INR has not been difficult to control.        RECOMMENDATIONS:  1.  Decrease propafenone from t.i.d. to b.i.d. (150 mg twice daily).  I explained that the medication is currently not effective in keeping her in rhythm all the time and is cumbersome to take 3 times a day.  Let's see how she feels on propafenone twice daily.  2.  Wear a 7-day cardiac monitor.  If the monitor shows that she is mostly in atrial fibrillation, one could make a strong case of stopping propafenone and increasing her beta blocker.  I would not use propafenone long-term for rate control, as risk would outweigh benefit.  Given her borderline high blood pressure, I believe it would be feasible to control ventricular rate with routine AV node blockers rather than antiarrhythmic drugs.  3.  Continue warfarin.    It was my pleasure seeing Ms. Petty today.  Please feel free to contact me with additional questions.      William Babin MD, [facility]        cc:  Sulaiman Trinh MD  [address], [address]  [place] [address]      D: 2022   T: 2022   MT: shanique    Name:     ESSENCE PETTY  MRN:      [id number]        Account:      [id number]   :      1945           Service Date: 2022       Document: [id number]

## 2022-07-21 ENCOUNTER — HOSPITAL ENCOUNTER (OUTPATIENT)
Dept: CARDIOLOGY | Facility: CLINIC | Age: 77
Discharge: HOME | End: 2022-07-21
Attending: INTERNAL MEDICINE | Admitting: INTERNAL MEDICINE
Payer: MEDICARE

## 2022-07-21 DIAGNOSIS — I48.0 PAROXYSMAL ATRIAL FIBRILLATION (H): ICD-10-CM

## 2022-07-21 PROCEDURE — 93242 EXT ECG>48HR<7D RECORDING: CPT

## 2022-07-21 PROCEDURE — 93244 EXT ECG>48HR<7D REV&INTERPJ: CPT | Performed by: INTERNAL MEDICINE

## 2022-08-01 ENCOUNTER — LAB (OUTPATIENT)
Dept: LAB | Facility: CLINIC | Age: 77
End: 2022-08-01
Payer: MEDICARE

## 2022-08-01 ENCOUNTER — ANTICOAGULATION THERAPY VISIT (OUTPATIENT)
Dept: ANTICOAGULATION | Facility: CLINIC | Age: 77
End: 2022-08-01

## 2022-08-01 DIAGNOSIS — I10 ESSENTIAL HYPERTENSION: ICD-10-CM

## 2022-08-01 DIAGNOSIS — Z79.01 LONG TERM CURRENT USE OF ANTICOAGULANT THERAPY: ICD-10-CM

## 2022-08-01 DIAGNOSIS — I48.20 CHRONIC ATRIAL FIBRILLATION (H): ICD-10-CM

## 2022-08-01 DIAGNOSIS — I48.11 LONGSTANDING PERSISTENT ATRIAL FIBRILLATION (H): ICD-10-CM

## 2022-08-01 DIAGNOSIS — I48.91 ATRIAL FIBRILLATION (H): ICD-10-CM

## 2022-08-01 DIAGNOSIS — I48.0 PAROXYSMAL ATRIAL FIBRILLATION (H): ICD-10-CM

## 2022-08-01 DIAGNOSIS — Z79.01 LONG TERM CURRENT USE OF ANTICOAGULANT THERAPY: Primary | ICD-10-CM

## 2022-08-01 LAB — INR BLD: 1.9 (ref 0.9–1.1)

## 2022-08-01 PROCEDURE — 36416 COLLJ CAPILLARY BLOOD SPEC: CPT

## 2022-08-01 PROCEDURE — 85610 PROTHROMBIN TIME: CPT

## 2022-08-01 NOTE — PROGRESS NOTES
ANTICOAGULATION MANAGEMENT     Catalina Marie 76 year old female is on warfarin with subtherapeutic INR result. (Goal INR 2.0-3.0)    Recent labs: (last 7 days)     08/01/22  1001   INR 1.9*       ASSESSMENT       Source(s): Chart Review and Patient/Caregiver Call       Warfarin doses taken: Warfarin taken as instructed    Diet: No new diet changes identified    New illness, injury, or hospitalization: No    Medication/supplement changes: AT 7/19/22 Cardiology visit, Dr. Babin decreased propafenone dose from 3x/day to 2x/day. There is a noted interaction between this med and warfarin: Propafenone may increase the serum concentration of Vitamin K Antagonists. So possible this could be holding the INR down despite the 10% maintenance dose increase 2 weeks ago. Patient states she when her HR goes low in 40s and 50s she stops the digoxin and fenofibrate until her HR goes up, it might be for 2 days at a time. This has been going on for more than 1 month. She did not clear this with her cardiologist, but plans to discuss with provider at next visit. They also reduced her atenolol, she just takes 1 at night. Fenofibrate and Derivatives may enhance the anticoagulant effect of Warfarin. All of these medication reductions could be influencing INR results today.    Signs or symptoms of bleeding or clotting: No    Previous INR: Subtherapeutic    Additional findings: None       PLAN     Recommended plan for ongoing change(s) affecting INR     Dosing Instructions: Increase your warfarin dose (9% change) with next INR in 2 weeks       Summary  As of 8/1/2022    Full warfarin instructions:  2.5 mg every Wed, Sat; 5 mg all other days   Next INR check:  8/15/2022             Telephone call with Jennifer who agrees to plan and repeated back plan correctly    Lab visit scheduled    Education provided: Please call back if any changes to your diet, medications or how you've been taking warfarin, Importance of consistent vitamin K intake,  Goal range and significance of current result, Potential interaction between warfarin and reducing the doses of propafenone, digoxin and fenofibrate and Contact [phone]  with any changes, questions or concerns.     Plan made per [facility] anticoagulation protocol    Hanna Flores RN  Anticoagulation Clinic  8/1/2022    _______________________________________________________________________     Anticoagulation Episode Summary     Current INR goal:  2.0-3.0   TTR:  48.6 % (3.9 mo)   Target end date:  Indefinite   Send INR reminders to:  [facility] [place]    Indications    Long term current use of anticoagulant therapy [Z79.01]  Atrial fibrillation (H) [I48.91] [I48.91]  Chronic atrial fibrillation (H) [I48.20]  Longstanding persistent atrial fibrillation (H) [I48.11]           Comments:           Anticoagulation Care Providers     Provider Role Specialty Phone number    Ian Tracy MD Referring Internal Medicine [phone]    Sulaiman Trinh MD Referring Internal Medicine [phone]

## 2022-08-03 ENCOUNTER — TELEPHONE (OUTPATIENT)
Dept: CARDIOLOGY | Facility: CLINIC | Age: 77
End: 2022-08-03

## 2022-08-03 RX ORDER — PROPAFENONE HYDROCHLORIDE 150 MG/1
TABLET, COATED ORAL
Qty: 270 TABLET | Refills: 1 | OUTPATIENT
Start: 2022-08-03

## 2022-08-03 RX ORDER — AMLODIPINE BESYLATE 5 MG/1
TABLET ORAL
Qty: 45 TABLET | Refills: 1 | Status: SHIPPED | OUTPATIENT
Start: 2022-08-03 | End: 2023-02-06

## 2022-08-03 NOTE — TELEPHONE ENCOUNTER
Routing refill request to provider for review/approval because:  Medication is reported/historical  BP Readings from Last 3 Encounters:   07/19/22 (!) 149/75   10/05/21 127/77   07/21/21 136/78     Padmini Marques RN

## 2022-08-03 NOTE — TELEPHONE ENCOUNTER
8/3/22 Spoke with pt who has been having issues with low Hrs off/on in the past 2 months. In the past 2 weeks she has noticed it occurring more often. She checks her BP and P each am and if her pulse is < 50 she has been holding her Digoxin 125 mcg and  her Fenofibrate . She feels increased fatigue when this occurs.  Pt recently wore 7 day ZP which is currently processing and results should be available next week.  Will update Dr Babin . Reminded pt that Fenofibrate is for cholesterol management so she should be taking this each day as prescribed.  Pt voiced understanding and agreement with plan.   Edwin 135 pm

## 2022-08-03 NOTE — TELEPHONE ENCOUNTER
[facility] Call Center    Phone Message    May a detailed message be left on voicemail: yes     Reason for Call: Other: pt elimates these meds when her heart rate drops below 50 digoxin (LANOXIN) 250 MCG tablet and fenofibrate (TRIGLIDE/LOFIBRA) 160 MG tablet when her heart rate is low .  She forgot to discuss this w/Dr. Babin the last time she saw him and wants to know of this is ok to do or not.  PLease all to advise.     Action Taken: Message routed to:  [facility] cardio    Travel Screening: Not Applicable

## 2022-08-04 NOTE — TELEPHONE ENCOUNTER
[facility] Call Center    Phone Message    May a detailed message be left on voicemail: no     Reason for Call: Other: Jennifer called back to speak with Josefina Walter RN. She would like to discuss continuing issues with her heart rate today. Please reach out to her at [phone].     Action Taken: Other: [facility]    Travel Screening: Not Applicable

## 2022-08-04 NOTE — TELEPHONE ENCOUNTER
Let's wait and see what her Zio shows.  If she is in AF, heart rate assessments by her BP cuff may be inaccurate.  We need good data before making medication decisions.  As you already said Josefina, the fenofibrate is not an issue, she should continue.  DI

## 2022-08-05 NOTE — TELEPHONE ENCOUNTER
8/5/22 Spoke w pt and explained Dr Babin recommendations and that he would like to wait for monitor results before making medication recommendations/changes . Pt is concerned because she can feel when her HR gets high ( 110's) and not too concerned about the low Hrs. She feels this has worsened since OV on 7/18 when Propafenone 150 mg  was decreased from TID to BID .  She also is concerned about her recent INRs since early July  being < 2. She stated nothing else ( diet, health, meds) have really changed. INRs managed by PCP   Notified pt that results should come available to Dr Babin in the  Next day or 2 and we will call back with plan.  Pt voiced understanding and agreement with plan.   Edwin 1020 am

## 2022-08-07 DIAGNOSIS — E78.5 HYPERLIPIDEMIA LDL GOAL <100: ICD-10-CM

## 2022-08-08 NOTE — TELEPHONE ENCOUNTER
"Her cardiac monitor shows 80% AF, 20% sinus rhythm.  Therefore, she still has periods of normal rhythm.    When she is in AF her heart rate is variable from the 40s to 140s but average is 70s.  This is \"acceptable\" as far as I am concerned, but if she feels uncomfortable when the HR is on the high side, then \"yes\" she can return to propafenone 3 times daily instead of twice daily.  It is up to her.    With regard to anticoagulation, I would be happy to switch warfarin to either Eliquis 5 mg twice daily or Xarelto 20 mg daily at dinnertime, if she finds the cost acceptable.  Either would be better than warfarin with subtherapeutic INR.      DI  "

## 2022-08-08 NOTE — TELEPHONE ENCOUNTER
"8/8/22 Spoke w pt and explained results and recommendations per Dr Babin  Her cardiac monitor shows 80% AF, 20% sinus rhythm.  Therefore, she still has periods of normal rhythm.     When she is in AF her heart rate is variable from the 40s to 140s but average is 70s.  This is \"acceptable\" as far as I am concerned, but if she feels uncomfortable when the HR is on the high side, then \"yes\" she can return to propafenone 3 times daily instead of twice daily.  It is up to her.     With regard to anticoagulation, I would be happy to switch warfarin to either Eliquis 5 mg twice daily or Xarelto 20 mg daily at dinnertime, if she finds the cost acceptable.  Either would be better than warfarin with subtherapeutic INR.      She would like to increase her Propafenone back  to TID ( med list updated). Discussed Eliquis and Xarelto as possible alternatives to Warfarin. Recommended pt talk w INR RN and also Dr Trinh at upcoming visits in August. Pt will contact insurance to find out costs of Eliquis and Xarelto and call back if she wants to transition.  Pt voiced understanding and agreement with plan.   Edwin 1245 pm   "

## 2022-08-09 RX ORDER — ATORVASTATIN CALCIUM 10 MG/1
TABLET, FILM COATED ORAL
Qty: 45 TABLET | Refills: 0 | Status: SHIPPED | OUTPATIENT
Start: 2022-08-09 | End: 2022-08-18

## 2022-08-09 NOTE — TELEPHONE ENCOUNTER
Pending Prescriptions:                       Disp   Refills    atorvastatin (LIPITOR) 10 MG tablet [Phar*45 tab*2            Sig: TAKE 1/2 TABLET BY MOUTH DAILY      Last OV was 10/5/2021 with .     Appointments in Next Year    Aug 15, 2022 11:00 AM  INR LAB with CS LAB  [facility] ([facility] ) [phone]   Aug 18, 2022  3:00 PM  (Arrive by 2:40 PM)  Provider Visit with Sulaiman Trinh MD  [facility] ([facility] ) [phone]        Routing refill request to provider for review/approval because:  Drug interaction warning      Audrey Jin, RN BSN MSN  [facility]

## 2022-08-15 ENCOUNTER — LAB (OUTPATIENT)
Dept: LAB | Facility: CLINIC | Age: 77
End: 2022-08-15
Payer: MEDICARE

## 2022-08-15 ENCOUNTER — ANTICOAGULATION THERAPY VISIT (OUTPATIENT)
Dept: ANTICOAGULATION | Facility: CLINIC | Age: 77
End: 2022-08-15

## 2022-08-15 DIAGNOSIS — I48.20 CHRONIC ATRIAL FIBRILLATION (H): ICD-10-CM

## 2022-08-15 DIAGNOSIS — Z79.01 LONG TERM CURRENT USE OF ANTICOAGULANT THERAPY: Primary | ICD-10-CM

## 2022-08-15 DIAGNOSIS — Z79.01 LONG TERM CURRENT USE OF ANTICOAGULANT THERAPY: ICD-10-CM

## 2022-08-15 DIAGNOSIS — I48.11 LONGSTANDING PERSISTENT ATRIAL FIBRILLATION (H): ICD-10-CM

## 2022-08-15 DIAGNOSIS — I48.91 ATRIAL FIBRILLATION (H): ICD-10-CM

## 2022-08-15 LAB — INR BLD: 2 (ref 0.9–1.1)

## 2022-08-15 PROCEDURE — 85610 PROTHROMBIN TIME: CPT

## 2022-08-15 PROCEDURE — 36415 COLL VENOUS BLD VENIPUNCTURE: CPT

## 2022-08-15 NOTE — PROGRESS NOTES
ANTICOAGULATION MANAGEMENT     Catalina Marie 76 year old female is on warfarin with therapeutic INR result. (Goal INR 2.0-3.0)    Recent labs: (last 7 days)     08/15/22  1056   INR 2.0*       ASSESSMENT       Source(s): Chart Review and Patient/Caregiver Call       Warfarin doses taken: Warfarin taken as instructed    Diet: Decreased greens/vitamin K in diet; ongoing change. Not eating as much spinach now.    New illness, injury, or hospitalization: No    Medication/supplement changes: None noted    Signs or symptoms of bleeding or clotting: No    Previous INR: Subtherapeutic    Additional findings: 8/3/22 Cardiology telephone encounter: Discussed Eliquis and Xarelto as possible alternatives to Warfarin. Recommended pt talk w INR RN and also Dr Trinh at upcoming visits in August. Pt will contact insurance to find out costs of Eliquis and Xarelto and call back if she wants to transition. Patient does not feel she can switch to Eliquis or Xarelto at this time so will continue with warfarin for now.         PLAN     Recommended plan for no diet, medication or health factor changes affecting INR     Dosing Instructions: Continue your current warfarin dose with next INR in 3 weeks       Summary  As of 8/15/2022    Full warfarin instructions:  2.5 mg every Wed, Sat; 5 mg all other days   Next INR check:  9/6/2022             Telephone call with Jennifer who verbalizes understanding and agrees to plan    Lab visit scheduled    Education provided: Please call back if any changes to your diet, medications or how you've been taking warfarin, Importance of consistent vitamin K intake, Impact of vitamin K foods on INR, Goal range and significance of current result and Contact [phone]  with any changes, questions or concerns.     Plan made per [facility] anticoagulation protocol    Hanna Flores, RN  Anticoagulation Clinic  8/15/2022    _______________________________________________________________________      Anticoagulation Episode Summary     Current INR goal:  2.0-3.0   TTR:  47.5 % (3.9 mo)   Target end date:  Indefinite   Send INR reminders to:  DEMOND FRANCO    Indications    Long term current use of anticoagulant therapy [Z79.01]  Atrial fibrillation (H) [I48.91] [I48.91]  Chronic atrial fibrillation (H) [I48.20]  Longstanding persistent atrial fibrillation (H) [I48.11]           Comments:           Anticoagulation Care Providers     Provider Role Specialty Phone number    Ian Tracy MD Referring Internal Medicine [phone]    Sulaiman Trinh MD Referring Internal Medicine [phone]

## 2022-08-18 ENCOUNTER — TELEPHONE (OUTPATIENT)
Dept: CARDIOLOGY | Facility: CLINIC | Age: 77
End: 2022-08-18

## 2022-08-18 ENCOUNTER — OFFICE VISIT (OUTPATIENT)
Dept: FAMILY MEDICINE | Facility: CLINIC | Age: 77
End: 2022-08-18
Payer: MEDICARE

## 2022-08-18 VITALS
WEIGHT: 139 LBS | DIASTOLIC BLOOD PRESSURE: 78 MMHG | HEART RATE: 50 BPM | HEIGHT: 64 IN | BODY MASS INDEX: 23.73 KG/M2 | SYSTOLIC BLOOD PRESSURE: 136 MMHG | OXYGEN SATURATION: 96 %

## 2022-08-18 DIAGNOSIS — N18.1 TYPE 2 DIABETES MELLITUS WITH STAGE 1 CHRONIC KIDNEY DISEASE, WITHOUT LONG-TERM CURRENT USE OF INSULIN (H): ICD-10-CM

## 2022-08-18 DIAGNOSIS — F33.9 RECURRENT MAJOR DEPRESSIVE EPISODES (H): ICD-10-CM

## 2022-08-18 DIAGNOSIS — N18.31 STAGE 3A CHRONIC KIDNEY DISEASE (H): ICD-10-CM

## 2022-08-18 DIAGNOSIS — Z85.3 PERSONAL HISTORY OF MALIGNANT NEOPLASM OF BREAST: ICD-10-CM

## 2022-08-18 DIAGNOSIS — I48.0 PAROXYSMAL ATRIAL FIBRILLATION (H): ICD-10-CM

## 2022-08-18 DIAGNOSIS — I49.5 TACHYCARDIA-BRADYCARDIA SYNDROME (H): Primary | ICD-10-CM

## 2022-08-18 DIAGNOSIS — E78.5 HYPERLIPIDEMIA LDL GOAL <100: ICD-10-CM

## 2022-08-18 DIAGNOSIS — E11.22 TYPE 2 DIABETES MELLITUS WITH STAGE 1 CHRONIC KIDNEY DISEASE, WITHOUT LONG-TERM CURRENT USE OF INSULIN (H): ICD-10-CM

## 2022-08-18 DIAGNOSIS — I10 BENIGN ESSENTIAL HYPERTENSION: ICD-10-CM

## 2022-08-18 PROBLEM — I50.31 ACUTE DIASTOLIC CONGESTIVE HEART FAILURE (H): Status: RESOLVED | Noted: 2017-10-30 | Resolved: 2022-08-18

## 2022-08-18 LAB
HBA1C MFR BLD: 6.7 % (ref 0–5.6)
HGB BLD-MCNC: 14.1 G/DL (ref 11.7–15.7)

## 2022-08-18 PROCEDURE — 36415 COLL VENOUS BLD VENIPUNCTURE: CPT | Performed by: INTERNAL MEDICINE

## 2022-08-18 PROCEDURE — 85018 HEMOGLOBIN: CPT | Performed by: INTERNAL MEDICINE

## 2022-08-18 PROCEDURE — 82043 UR ALBUMIN QUANTITATIVE: CPT | Performed by: INTERNAL MEDICINE

## 2022-08-18 PROCEDURE — 80061 LIPID PANEL: CPT | Performed by: INTERNAL MEDICINE

## 2022-08-18 PROCEDURE — 99215 OFFICE O/P EST HI 40 MIN: CPT | Performed by: INTERNAL MEDICINE

## 2022-08-18 PROCEDURE — 83036 HEMOGLOBIN GLYCOSYLATED A1C: CPT | Performed by: INTERNAL MEDICINE

## 2022-08-18 PROCEDURE — 99207 PR FOOT EXAM NO CHARGE: CPT | Performed by: INTERNAL MEDICINE

## 2022-08-18 PROCEDURE — 80162 ASSAY OF DIGOXIN TOTAL: CPT | Performed by: INTERNAL MEDICINE

## 2022-08-18 RX ORDER — LISINOPRIL 40 MG/1
40 TABLET ORAL DAILY
Qty: 90 TABLET | Refills: 3 | Status: SHIPPED | OUTPATIENT
Start: 2022-08-18

## 2022-08-18 RX ORDER — FENOFIBRATE 160 MG/1
160 TABLET ORAL DAILY
Qty: 90 TABLET | Refills: 3 | Status: SHIPPED | OUTPATIENT
Start: 2022-08-18 | End: 2023-06-16

## 2022-08-18 RX ORDER — ATENOLOL 25 MG/1
TABLET ORAL
Qty: 180 TABLET | Refills: 3 | Status: SHIPPED | OUTPATIENT
Start: 2022-08-18 | End: 2024-07-25

## 2022-08-18 RX ORDER — ATORVASTATIN CALCIUM 10 MG/1
TABLET, FILM COATED ORAL
Qty: 45 TABLET | Refills: 3 | Status: SHIPPED | OUTPATIENT
Start: 2022-08-18 | End: 2023-02-06

## 2022-08-18 ASSESSMENT — PAIN SCALES - GENERAL: PAINLEVEL: NO PAIN (0)

## 2022-08-18 NOTE — PROGRESS NOTES
Assessment & Plan     Tachycardia-bradycardia syndrome (H)  Tricky situation between low heart rates and risk for episodes of high heart rates  One solution would be AV leo ablation with pacemaker placement  She could discuss this with EP before she leave for [place]   - Adult Cardiology [facility] Referral; Future    Paroxysmal atrial fibrillation (H)  I agree with stopping digoxin for now  She is on warfarin because NOACs are not affordable for her   - Digoxin level; Future  - atenolol (TENORMIN) 25 MG tablet; TAKE 1 TABLET BY MOUTH ONCE A DAY  - Adult Cardiology [facility] Referral; Future  - Digoxin level    Recurrent major depressive episodes (H)  Stable on zoloft     Type 2 diabetes mellitus with stage 1 chronic kidney disease, without long-term current use of insulin (H)  Recheck labs   - Albumin Random Urine Quantitative with Creat Ratio; Future  - FOOT EXAM  NO CHARGE [99910.114]  - HEMOGLOBIN A1C; Future  - Albumin Random Urine Quantitative with Creat Ratio  - HEMOGLOBIN A1C    Stage 3a chronic kidney disease (H)    - Hemoglobin; Future  - Hemoglobin    Benign essential hypertension  Second check reading OK on current drugs  - lisinopril (ZESTRIL) 40 MG tablet; Take 1 tablet (40 mg) by mouth daily  - fenofibrate (TRIGLIDE/LOFIBRA) 160 MG tablet; Take 1 tablet (160 mg) by mouth daily    Hyperlipidemia LDL goal <100  On statin therapy recheck lipids today   - atorvastatin (LIPITOR) 10 MG tablet; TAKE 1/2 TABLET BY MOUTH DAILY  - Lipid panel reflex to direct LDL Fasting; Future  - Lipid panel reflex to direct LDL Fasting    Personal history of malignant neoplasm of breast          41 minutes spent on the date of the encounter doing chart review, history and exam, documentation and further activities per the note           Return in about 10 months (around 6/18/2023) for recheck.  Patient instructed to return to clinic or contact us sooner if symptoms worsen or new symptoms develop.     Sulaiman LANG  "MD Ziggy  [facility] JOAN Rodriguez is a 76 year old, presenting for the following health issues:  Recheck Medication      HPI     She is distressed by low HR readings  The history is challenging, but she describes feeling fatigued and dizziness when her heart is low  She denies falls or syncope  She did see Dr. Babin, but she does not have an appointment to see him coming up  She reduced atenolol from 25 bid to 25 qday several months ago  She stopped taking digoxin several days ago due to low HR   She worries about this because in the past when she stopped digoxin she has racing hear rates      Review of Systems         Objective    /78 (BP Location: Left arm, Patient Position: Sitting, Cuff Size: Adult Regular)   Pulse 50   Ht 1.626 m (5' 4.02\")   Wt 63 kg (139 lb)   SpO2 96%   BMI 23.85 kg/m    Body mass index is 23.85 kg/m .  Physical Exam   GENERAL: healthy, alert and no distress  NECK: no adenopathy, no asymmetry, masses, or scars and thyroid normal to palpation  RESP: lungs clear to auscultation - no rales, rhonchi or wheezes  CV: Heart with regular rate and rhythm. No obvious murmurs   ABDOMEN: soft, nontender, no hepatosplenomegaly, no masses and bowel sounds normal  MS: no gross musculoskeletal defects noted, no edema  NEURO: Normal strength and tone, mentation intact and speech normal  PSYCH: mentation appears normal, affect normal/bright  Diabetic foot exam: normal DP and PT pulses, no trophic changes or ulcerative lesions and normal sensory exam                    .  ..  "

## 2022-08-18 NOTE — TELEPHONE ENCOUNTER
Spoke to pt who states that her HR has been low all week after stopping her Digoxin on Sunday or Monday. Pt states the rates are in the 50's or high 40's with BP's in the 150's systolic.  Pt states that she is very tired. Pt currently taking along with the Digoxin, Atenolol 25 mg daily, Amlodipine 2.5 mg daily, Lisinopril 40 mg daily, Propafenone 150 tid, verapamil 240 mg daily. Pt will be seeing Dr Trinh today who will be checking her vitals, which will be a good comparison of her BP reading. Pt made aware that Dr Babin is out of the office until Monday, but may check his inbox before.  Will then let pt know recommendations. AureliaRMICHELLE

## 2022-08-18 NOTE — TELEPHONE ENCOUNTER
[facility] Call Center    Phone Message    May a detailed message be left on voicemail: no     Reason for Call: Other: Jennifer called to speak with a member of the care team. She has been experiencing a low heart rate and fatigue for 4 days and would like to know what she can do to elevate her heart rate. Please reach out to Jennifer at [phone].     Action Taken: Other: [facility]    Travel Screening: Not Applicable

## 2022-08-19 LAB
CHOLEST SERPL-MCNC: 179 MG/DL
CREAT UR-MCNC: 103 MG/DL
DIGOXIN SERPL-MCNC: <0.4 NG/ML (ref 0.6–2)
FASTING STATUS PATIENT QL REPORTED: NO
HDLC SERPL-MCNC: 34 MG/DL
LDLC SERPL CALC-MCNC: 95 MG/DL
MICROALBUMIN UR-MCNC: 13 MG/L
MICROALBUMIN/CREAT UR: 12.62 MG/G CR (ref 0–25)
NONHDLC SERPL-MCNC: 145 MG/DL
TRIGL SERPL-MCNC: 249 MG/DL

## 2022-08-19 NOTE — TELEPHONE ENCOUNTER
"It is becoming increasingly difficult to comprehend Ms. DEAN's ongoing HR concerns...    When I first saw her on 07/19/22, she was on atenolol 25 mg, dig 0.125 mg, propafenone 150 mg tid, verapamil 240 mg, that is a lot of medication, ALL PRESCRIBED IN [place]...  I suggested that she change propafenone to bid instead of tid, simply to make life easier on her.  I also asked her to wear a 7-day ZP to make sure she is not overmedicated.     When I looked at the ZP results (on bid propafenone), I thought they were acceptable showing 80% AF, avg HR in 70s.  Of course, during AF there were moments when HR was high (up to the 130-140s, briefly) or low (40s-50s, briefly).  But the overall HR control was OK...    Nonetheless, she called feeling \"uncomfortable with high HRs\".  We asked her to go back to her tid propafenone, exactly as she took for a long time before she saw me.    She is now calling with HR being \"too slow\" and feeling tired.  Apparently, dig was stopped earlier in the week.    The issue is that objective data from the recent ZP do not support her subjective concerns.  I think it may be best to stop constantly looking at her watch, it seems confusing and counterproductive.      Of course, we can see her again in the EP clinic, if she wishes.     I will also include Dr. Govind Trinh.  Govind saw her yesterday, so he is also in the loop.     DI  "

## 2022-08-22 ENCOUNTER — TELEPHONE (OUTPATIENT)
Dept: CARDIOLOGY | Facility: CLINIC | Age: 77
End: 2022-08-22

## 2022-08-22 NOTE — TELEPHONE ENCOUNTER
The central scheduling team received a referral that we cannot schedule within the timeframe requested by the referring provider. Please review the referral request for accommodation and contact the patient. If unable to accommodate, please resubmit a referral and indicate a preferred partner or affiliate location using Provider Finder or Scheduling Instructions fields.

## 2022-08-22 NOTE — TELEPHONE ENCOUNTER
Spoke to pt and made her aware that I will have a couple openings this Wednesday in [place], but at this time the next opening is on 10/4.  Pt can not made Wednesday work, but will take 10/4 at 1245 and then will keep an eye out for any sooner. Jaiden

## 2022-09-06 ENCOUNTER — LAB (OUTPATIENT)
Dept: LAB | Facility: CLINIC | Age: 77
End: 2022-09-06
Payer: MEDICARE

## 2022-09-06 ENCOUNTER — ANTICOAGULATION THERAPY VISIT (OUTPATIENT)
Dept: ANTICOAGULATION | Facility: CLINIC | Age: 77
End: 2022-09-06

## 2022-09-06 DIAGNOSIS — I48.20 CHRONIC ATRIAL FIBRILLATION (H): ICD-10-CM

## 2022-09-06 DIAGNOSIS — I48.91 ATRIAL FIBRILLATION (H): ICD-10-CM

## 2022-09-06 DIAGNOSIS — Z79.01 LONG TERM CURRENT USE OF ANTICOAGULANT THERAPY: Primary | ICD-10-CM

## 2022-09-06 DIAGNOSIS — I48.11 LONGSTANDING PERSISTENT ATRIAL FIBRILLATION (H): ICD-10-CM

## 2022-09-06 DIAGNOSIS — Z79.01 LONG TERM CURRENT USE OF ANTICOAGULANT THERAPY: ICD-10-CM

## 2022-09-06 LAB — INR BLD: 2.2 (ref 0.9–1.1)

## 2022-09-06 PROCEDURE — 36416 COLLJ CAPILLARY BLOOD SPEC: CPT

## 2022-09-06 PROCEDURE — 85610 PROTHROMBIN TIME: CPT

## 2022-09-06 NOTE — PROGRESS NOTES
ANTICOAGULATION MANAGEMENT     Catalina Marie 76 year old female is on warfarin with therapeutic INR result. (Goal INR 2.0-3.0)    Recent labs: (last 7 days)     09/06/22  1105   INR 2.2*       ASSESSMENT       Source(s): Chart Review and Patient/Caregiver Call       Warfarin doses taken: Warfarin taken as instructed    Diet: No new diet changes identified    New illness, injury, or hospitalization: No    Medication/supplement changes: None noted    Signs or symptoms of bleeding or clotting: No    Previous INR: Therapeutic last visit; previously outside of goal range    Additional findings: None       PLAN     Recommended plan for no diet, medication or health factor changes affecting INR     Dosing Instructions: Continue your current warfarin dose with next INR in 4 weeks       Summary  As of 9/6/2022    Full warfarin instructions:  2.5 mg every Wed, Sat; 5 mg all other days   Next INR check:  10/3/2022             Telephone call with Jennifer who verbalizes understanding and agrees to plan    Lab visit scheduled    Education provided: Please call back if any changes to your diet, medications or how you've been taking warfarin and Contact [phone]  with any changes, questions or concerns.     Plan made per [facility] anticoagulation protocol    Hanna Flores, RN  Anticoagulation Clinic  9/6/2022    _______________________________________________________________________     Anticoagulation Episode Summary     Current INR goal:  2.0-3.0   TTR:  47.5 % (3.9 mo)   Target end date:  Indefinite   Send INR reminders to:  DEMOND FRANCO    Indications    Long term current use of anticoagulant therapy [Z79.01]  Atrial fibrillation (H) [I48.91] [I48.91]  Chronic atrial fibrillation (H) [I48.20]  Longstanding persistent atrial fibrillation (H) [I48.11]           Comments:           Anticoagulation Care Providers     Provider Role Specialty Phone number    Ian Tracy MD Referring Internal Medicine [phone]     Sulaiman Trinh MD [facility] Internal Medicine [phone]

## 2022-09-08 ENCOUNTER — TELEPHONE (OUTPATIENT)
Dept: CARDIOLOGY | Facility: CLINIC | Age: 77
End: 2022-09-08

## 2022-09-08 DIAGNOSIS — I48.0 PAROXYSMAL ATRIAL FIBRILLATION (H): Primary | ICD-10-CM

## 2022-09-08 NOTE — TELEPHONE ENCOUNTER
As I detailed in my recent note, objective evidence does not corroborate the patient's subjective HR complaints.  In my opinion, this patient should stop checking her heart rate so often.    I have nothing else to add.  She may seek a second opinion, if she desires.    DI

## 2022-09-08 NOTE — TELEPHONE ENCOUNTER
"Patient called reports HR were in 40's this am.  When speaking with patient she reports she did not take her digoxin today d/t to low HR.  Digoxin not listed on her medication list.  Per OV note with PCP on 8/18 patient was to stop medications d/t fluctuation of her HR.  Discussed recommendations of PCP with patient who indicated that is not what was agreed upon.  After review of records there is documentation that identifies multiple contacts with HR issues.  She identifies she does not feel well with low HR or when it gets high.    Dr Babin has been informed of her HR concerns and has been in communication with PCP.  Per note on 8/19:  It is becoming increasingly difficult to comprehend Ms. DEAN's ongoing HR concerns...     When I first saw her on 07/19/22, she was on atenolol 25 mg, dig 0.125 mg, propafenone 150 mg tid, verapamil 240 mg, that is a lot of medication, ALL PRESCRIBED IN [place]...  I suggested that she change propafenone to bid instead of tid, simply to make life easier on her.  I also asked her to wear a 7-day ZP to make sure she is not overmedicated.      When I looked at the ZP results (on bid propafenone), I thought they were acceptable showing 80% AF, avg HR in 70s.  Of course, during AF there were moments when HR was high (up to the 130-140s, briefly) or low (40s-50s, briefly).  But the overall HR control was OK...     Nonetheless, she called feeling \"uncomfortable with high HRs\".  We asked her to go back to her tid propafenone, exactly as she took for a long time before she saw me.    She is now calling with HR being \"too slow\" and feeling tired.  Apparently, dig was stopped earlier in the week.     The issue is that objective data from the recent ZP do not support her subjective concerns.  I think it may be best to stop constantly looking at her watch, it seems confusing and counterproductive.       Of course, we can see her again in the EP clinic, if she wishes.      I will also include Dr." Govind Trinh.  Govind saw her yesterday, so he is also in the loop.     Offered appointment with WIL on 9/12.  Asked patient to monitor HR and follow instructions per PCP to hold digoxin.  Not sure if patient will be compliant with request.  Will update Dr Babin regarding above.  WAQAS Yanez

## 2022-09-09 ENCOUNTER — APPOINTMENT (OUTPATIENT)
Dept: MRI IMAGING | Facility: CLINIC | Age: 77
End: 2022-09-09
Attending: EMERGENCY MEDICINE
Payer: MEDICARE

## 2022-09-09 ENCOUNTER — HOSPITAL ENCOUNTER (EMERGENCY)
Facility: CLINIC | Age: 77
Discharge: HOME | End: 2022-09-09
Attending: EMERGENCY MEDICINE | Admitting: EMERGENCY MEDICINE
Payer: MEDICARE

## 2022-09-09 ENCOUNTER — APPOINTMENT (OUTPATIENT)
Dept: CT IMAGING | Facility: CLINIC | Age: 77
End: 2022-09-09
Attending: EMERGENCY MEDICINE
Payer: MEDICARE

## 2022-09-09 VITALS
HEART RATE: 64 BPM | RESPIRATION RATE: 12 BRPM | OXYGEN SATURATION: 93 % | TEMPERATURE: 98.9 F | HEIGHT: 62 IN | BODY MASS INDEX: 25.92 KG/M2 | SYSTOLIC BLOOD PRESSURE: 179 MMHG | WEIGHT: 140.87 LBS | DIASTOLIC BLOOD PRESSURE: 90 MMHG

## 2022-09-09 DIAGNOSIS — R79.1 SUBTHERAPEUTIC INTERNATIONAL NORMALIZED RATIO (INR): ICD-10-CM

## 2022-09-09 DIAGNOSIS — R20.0 LEFT SIDED NUMBNESS: ICD-10-CM

## 2022-09-09 DIAGNOSIS — G45.9 TIA (TRANSIENT ISCHEMIC ATTACK): ICD-10-CM

## 2022-09-09 DIAGNOSIS — I48.11 LONGSTANDING PERSISTENT ATRIAL FIBRILLATION (H): Primary | ICD-10-CM

## 2022-09-09 DIAGNOSIS — Z86.79 HISTORY OF ATRIAL FIBRILLATION: ICD-10-CM

## 2022-09-09 LAB
ANION GAP SERPL CALCULATED.3IONS-SCNC: 3 MMOL/L (ref 3–14)
APTT PPP: 34 SECONDS (ref 22–38)
ATRIAL RATE - MUSE: 62 BPM
BASOPHILS # BLD AUTO: 0.1 10E3/UL (ref 0–0.2)
BASOPHILS NFR BLD AUTO: 1 %
BUN SERPL-MCNC: 20 MG/DL (ref 7–30)
CALCIUM SERPL-MCNC: 8.9 MG/DL (ref 8.5–10.1)
CHLORIDE BLD-SCNC: 105 MMOL/L (ref 94–109)
CHOLEST SERPL-MCNC: 138 MG/DL
CO2 SERPL-SCNC: 32 MMOL/L (ref 20–32)
CREAT SERPL-MCNC: 0.97 MG/DL (ref 0.52–1.04)
DIASTOLIC BLOOD PRESSURE - MUSE: NORMAL MMHG
EOSINOPHIL # BLD AUTO: 0.2 10E3/UL (ref 0–0.7)
EOSINOPHIL NFR BLD AUTO: 2 %
ERYTHROCYTE [DISTWIDTH] IN BLOOD BY AUTOMATED COUNT: 13.2 % (ref 10–15)
GFR SERPL CREATININE-BSD FRML MDRD: 60 ML/MIN/1.73M2
GLUCOSE BLD-MCNC: 117 MG/DL (ref 70–99)
GLUCOSE BLDC GLUCOMTR-MCNC: 121 MG/DL (ref 70–99)
HCT VFR BLD AUTO: 42.4 % (ref 35–47)
HDLC SERPL-MCNC: 35 MG/DL
HGB BLD-MCNC: 13.9 G/DL (ref 11.7–15.7)
HOLD SPECIMEN: NORMAL
IMM GRANULOCYTES # BLD: 0 10E3/UL
IMM GRANULOCYTES NFR BLD: 0 %
INR PPP: 1.84 (ref 0.85–1.15)
INTERPRETATION ECG - MUSE: NORMAL
LDLC SERPL CALC-MCNC: 47 MG/DL
LYMPHOCYTES # BLD AUTO: 2.1 10E3/UL (ref 0.8–5.3)
LYMPHOCYTES NFR BLD AUTO: 29 %
MCH RBC QN AUTO: 30.3 PG (ref 26.5–33)
MCHC RBC AUTO-ENTMCNC: 32.8 G/DL (ref 31.5–36.5)
MCV RBC AUTO: 92 FL (ref 78–100)
MONOCYTES # BLD AUTO: 1.1 10E3/UL (ref 0–1.3)
MONOCYTES NFR BLD AUTO: 15 %
NEUTROPHILS # BLD AUTO: 3.9 10E3/UL (ref 1.6–8.3)
NEUTROPHILS NFR BLD AUTO: 53 %
NONHDLC SERPL-MCNC: 103 MG/DL
NRBC # BLD AUTO: 0 10E3/UL
NRBC BLD AUTO-RTO: 0 /100
P AXIS - MUSE: 90 DEGREES
PLATELET # BLD AUTO: 241 10E3/UL (ref 150–450)
POTASSIUM BLD-SCNC: 3.6 MMOL/L (ref 3.4–5.3)
PR INTERVAL - MUSE: 174 MS
QRS DURATION - MUSE: 88 MS
QT - MUSE: 448 MS
QTC - MUSE: 454 MS
R AXIS - MUSE: 60 DEGREES
RBC # BLD AUTO: 4.59 10E6/UL (ref 3.8–5.2)
SODIUM SERPL-SCNC: 140 MMOL/L (ref 133–144)
SYSTOLIC BLOOD PRESSURE - MUSE: NORMAL MMHG
T AXIS - MUSE: 68 DEGREES
TRIGL SERPL-MCNC: 281 MG/DL
VENTRICULAR RATE- MUSE: 62 BPM
WBC # BLD AUTO: 7.3 10E3/UL (ref 4–11)

## 2022-09-09 PROCEDURE — G1010 CDSM STANSON: HCPCS

## 2022-09-09 PROCEDURE — 99285 EMERGENCY DEPT VISIT HI MDM: CPT | Mod: 25

## 2022-09-09 PROCEDURE — 70553 MRI BRAIN STEM W/O & W/DYE: CPT | Mod: MG

## 2022-09-09 PROCEDURE — 70498 CT ANGIOGRAPHY NECK: CPT | Mod: MG

## 2022-09-09 PROCEDURE — 250N000011 HC RX IP 250 OP 636: Performed by: EMERGENCY MEDICINE

## 2022-09-09 PROCEDURE — 99205 OFFICE O/P NEW HI 60 MIN: CPT | Mod: FS | Performed by: STUDENT IN AN ORGANIZED HEALTH CARE EDUCATION/TRAINING PROGRAM

## 2022-09-09 PROCEDURE — 80061 LIPID PANEL: CPT | Performed by: INTERNAL MEDICINE

## 2022-09-09 PROCEDURE — 99417 PROLNG OP E/M EACH 15 MIN: CPT | Performed by: STUDENT IN AN ORGANIZED HEALTH CARE EDUCATION/TRAINING PROGRAM

## 2022-09-09 PROCEDURE — 93005 ELECTROCARDIOGRAM TRACING: CPT

## 2022-09-09 PROCEDURE — A9585 GADOBUTROL INJECTION: HCPCS | Performed by: EMERGENCY MEDICINE

## 2022-09-09 PROCEDURE — 70496 CT ANGIOGRAPHY HEAD: CPT | Mod: MG

## 2022-09-09 PROCEDURE — 36415 COLL VENOUS BLD VENIPUNCTURE: CPT | Performed by: EMERGENCY MEDICINE

## 2022-09-09 PROCEDURE — 85610 PROTHROMBIN TIME: CPT | Performed by: EMERGENCY MEDICINE

## 2022-09-09 PROCEDURE — 85025 COMPLETE CBC W/AUTO DIFF WBC: CPT | Performed by: EMERGENCY MEDICINE

## 2022-09-09 PROCEDURE — 80048 BASIC METABOLIC PNL TOTAL CA: CPT | Performed by: EMERGENCY MEDICINE

## 2022-09-09 PROCEDURE — 85730 THROMBOPLASTIN TIME PARTIAL: CPT | Performed by: EMERGENCY MEDICINE

## 2022-09-09 PROCEDURE — 255N000002 HC RX 255 OP 636: Performed by: EMERGENCY MEDICINE

## 2022-09-09 PROCEDURE — 250N000009 HC RX 250: Performed by: EMERGENCY MEDICINE

## 2022-09-09 RX ORDER — GADOBUTROL 604.72 MG/ML
6 INJECTION INTRAVENOUS ONCE
Status: COMPLETED | OUTPATIENT
Start: 2022-09-09 | End: 2022-09-09

## 2022-09-09 RX ORDER — IOPAMIDOL 755 MG/ML
75 INJECTION, SOLUTION INTRAVASCULAR ONCE
Status: COMPLETED | OUTPATIENT
Start: 2022-09-09 | End: 2022-09-09

## 2022-09-09 RX ADMIN — GADOBUTROL 6 ML: 604.72 INJECTION INTRAVENOUS at 18:09

## 2022-09-09 RX ADMIN — IOPAMIDOL 75 ML: 755 INJECTION, SOLUTION INTRAVENOUS at 14:51

## 2022-09-09 RX ADMIN — SODIUM CHLORIDE 100 ML: 900 INJECTION INTRAVENOUS at 14:51

## 2022-09-09 ASSESSMENT — ACTIVITIES OF DAILY LIVING (ADL)
ADLS_ACUITY_SCORE: 35

## 2022-09-09 ASSESSMENT — VISUAL ACUITY: OU: NORMAL ACUITY

## 2022-09-09 NOTE — ED NOTES
Patient assisted to bathroom by ROB Rangel. Patient assissted to undress and remove all jewelry in anticipation of MRI.

## 2022-09-09 NOTE — ED NOTES
Patient returned from imaging exam. Patient placed on ECG NIBP and SaO2 monitoring. Patient had 12 lead EKG performed.    Patient awake and alert. Oriented x 4. Patient moves all 4 extremities equally.     Pupils are equal and round.    Patient reports intermittent dizziness this summer. Patient reports worse with position changes.     Patient reports low HRs in the 40s and HTN at home.

## 2022-09-09 NOTE — ED NOTES
Rounds. Patient and spouse updated on continued POC and waits. Deny needs at present. Continue to monitor.

## 2022-09-09 NOTE — ED PROVIDER NOTES
History   Chief Complaint:  Irregular Heart Beat and Stroke Symptoms     The history is provided by the patient.   History supplemented by electronic chart review    Catalina Marie is a 76 year old female on Coumadin for Afib with history of diabetes mellitus type 2, hyperlipidemia, and hypertension who presents with stroke symptoms. Patient states that at 1330, while she was in the car with her , she felt the onset of a left sided- facial doop and associated drooling. She adds the the left side of her face now just feels numb. She reports that she has noticed her heart rate fluctuating from really high to really low for the last 2 days; chart review shows that she has been in frequent contact with her electrophysiology team regarding her cardiac medications, though recent Zio patch monitoring did not show any worrisome findings. She notes that she has not had a stroke before. She adds that she had brain surgery (L posterior schwannoma) many years ago for a benign brain tumor and also has had afib with her last INR being yesterday. She reports that she is also prediabetic and has high blood pressure. Last known well is 1330.     Review of Systems   All other systems reviewed and are negative.      Allergies:  Penicillins    Medications:  Amlodipine  Atenolol  Atorvastatin   Furosemide  Lisinopril   Propafenone   Sertraline  Verapamil ER  Coumadin     Past Medical History:     Atrial fibrillation  Benign essential hypertension  Diabetes mellitus type 2   Adjustment disorder with anxious mood   Hyperlipidemia   Chronic kidney disease   Accidental overdose   Nontoxic multinodular goiter  Strain of right rotator cuff capsule  Chronic vasomotor rhinitis   Inflamed seborrheic keratosis   Depression      Past Surgical History:    Bilateral mastectomy  Eye surgery   Brain tumor resection   Phacoemulsification clear cornea with standard intraocular lens implant     Social History:  Patient presents with  "  Patient presents via private vehicle  PCP: Sulaiman Trinh     Physical Exam     Patient Vitals for the past 24 hrs:   BP Temp Temp src Pulse Resp SpO2 Height Weight   09/09/22 1615 -- -- -- -- -- 95 % -- --   09/09/22 1600 -- -- -- 59 10 93 % -- --   09/09/22 1545 -- -- -- 60 12 95 % -- --   09/09/22 1540 (!) 190/87 -- -- 62 18 96 % -- --   09/09/22 1515 -- -- -- 64 16 97 % -- --   09/09/22 1506 (!) 181/98 -- -- 61 24 96 % -- --   09/09/22 1453 (!) 196/84 -- -- 59 12 -- -- --   09/09/22 1438 -- -- -- -- -- -- -- 63.9 kg (140 lb 14 oz)   09/09/22 1437 (!) 180/76 98.9  F (37.2  C) Temporal 56 16 95 % 1.575 m (5' 2\") 61.2 kg (135 lb)     Physical Exam  General: Nontoxic-appearing woman recumbent initially in stabilization room 2  HENT: mucous membranes moist, OP clear  Eyes: PERRL, no nystagmus  CV: rate as above, regular rhythm, no murmur audible  Resp: normal effort, speaks in full phrases, no stridor, no cough observed  GI: abdomen soft and nontender, no guarding  MSK: no bony tenderness  Skin: appropriately warm and dry  Neuro: awake, alert, clear speech, fully oriented, face symmetric,  normal, strength and sensation intact in all extr, reports decreased sensation to left face, no nuchal rigidity, ambulation not initially tested  Psych: Anxious, cooperative, no apparent hallucinations    Emergency Department Course     ECG  ECG taken at 1507, ECG read at 1509  Normal sinus rhythm   Nonspecific ST abnormality    Rate 62 bpm. MD interval 174 ms. QRS duration 88 ms. QT/QTc 448/454 ms. P-R-T axes 90 60 68.     Imaging:  CTA Head Neck w Contrast   Final Result   IMPRESSION: Normal neck and head CTA.      Radiation dose for this scan was reduced using automated exposure   control, adjustment of the mA and/or kV according to patient size, or   iterative reconstruction technique.       AVI ANNE MD            SYSTEM ID:  [id number]      CT Head w/o Contrast   Final Result   IMPRESSION: Diffuse cerebral " volume loss and cerebral white matter   changes consistent with chronic small vessel ischemic disease. No   evidence for acute intracranial pathology.      Radiation dose for this scan was reduced using automated exposure   control, adjustment of the mA and/or kV according to patient size, or   iterative reconstruction technique.        AVI ANNE MD            SYSTEM ID:  [id number]      MR Brain w/o & w Contrast    (Results Pending)     Report per radiology    Laboratory:  Labs Ordered and Resulted from Time of ED Arrival to Time of ED Departure   GLUCOSE BY METER - Abnormal       Result Value    GLUCOSE BY METER POCT 121 (*)    INR - Abnormal    INR 1.84 (*)    BASIC METABOLIC PANEL - Abnormal    Sodium 140      Potassium 3.6      Chloride 105      Carbon Dioxide (CO2) 32      Anion Gap 3      Urea Nitrogen 20      Creatinine 0.97      Calcium 8.9      Glucose 117 (*)     GFR Estimate 60 (*)    PARTIAL THROMBOPLASTIN TIME - Normal    aPTT 34     GLUCOSE MONITOR NURSING POCT   CBC WITH PLATELETS AND DIFFERENTIAL    WBC Count 7.3      RBC Count 4.59      Hemoglobin 13.9      Hematocrit 42.4      MCV 92      MCH 30.3      MCHC 32.8      RDW 13.2      Platelet Count 241      % Neutrophils 53      % Lymphocytes 29      % Monocytes 15      % Eosinophils 2      % Basophils 1      % Immature Granulocytes 0      NRBCs per 100 WBC 0      Absolute Neutrophils 3.9      Absolute Lymphocytes 2.1      Absolute Monocytes 1.1      Absolute Eosinophils 0.2      Absolute Basophils 0.1      Absolute Immature Granulocytes 0.0      Absolute NRBCs 0.0        Emergency Department Course:     Reviewed:  I reviewed nursing notes, vitals and past medical history    Assessments:  1445 I obtained history and examined the patient as noted above.   1525 I rechecked the patient and explained findings.     Consults:  1445 I spoke with stroke neurology team at bedside.    Disposition:  Care of the patient was transferred to my colleague   Vicente pending MRI Brain.     Impression & Plan   Medical Decision Making:  Code stroke was activated given very recent onset of acute focal neurologic symptoms, with consideration given to TIA, ischemic stroke, intracranial hemorrhage, metabolic abnormality and many others.  She is a history of atrial fibrillation though her EKG appears sinus at this time.  Her INR is slightly subtherapeutic.  There is no large vessel occlusion or obvious stroke on CT imaging, so she is not a candidate for thrombectomy and or thrombolytics.  She is currently pending an MRI of the brain to further evaluate her presenting symptoms.  Pending these results, her final disposition can be determined by Dr. Zhang, in collaboration with the stroke neurology service.  The patient was notified of these findings so far and the transfer of care at shift change.  She is hopeful that she will be able to be safely discharged though she understands that our final recommendations depend primarily on MRI results not yet available.    Diagnosis:    ICD-10-CM    1. Left sided numbness  R20.0    2. History of atrial fibrillation  Z86.79    3. Subtherapeutic international normalized ratio (INR)  R79.1      Scribe Disclosure:  I, Ute Pollock, am serving as a scribe at 2:52 PM on 9/9/2022 to document services personally performed by Clarke Thomas MD based on my observations and the provider's statements to me.        Clarke Thomas MD  09/09/22 4536

## 2022-09-09 NOTE — ED NOTES
"Rapid Assessment Note    History:   Catalina Marie is a 76 year old female on Coumadin with history of atrial fibrillation who presents with the sudden onset of left sided numbness approximately one hour ago. She also endorsees some mild shortness of breath. No chest pain. No black or bloody stools. Patient initially presented to the ED today for concerns of bradycardia in the 40s the last few days. Patient says she stopped her digoxin yesterday due to this concern.     Exam:   BP (!) 180/76   Pulse 56   Temp 98.9  F (37.2  C) (Temporal)   Resp 16   Ht 1.575 m (5' 2\")   Wt 61.2 kg (135 lb)   SpO2 95%   BMI 24.69 kg/m    SKIN:  Warm, dry.  EYES:  Conjunctivae normal.  Normal extraocular motion.  Visual fields intact.  CARDIOVASCULAR: Bradycardic rate with regular rhythm.  No murmur.  RESPIRATORY:  No respiratory distress.  MUSCULOSKELETAL: Full active range of motion extremities.  NEUROLOGIC:  Alert, conversant.  Hoarse voice.  No aphasia.  No dysarthria.  No gross motor deficit of the face or limbs.  No tactile sensory deficit of face or limbs.  PSYCHIATRIC:  Normal mood.      Plan of Care:   I evaluated the patient and developed an initial plan of care. I discussed this plan and explained that I, or one of my partners, would be returning to complete the evaluation.     I, Kandi Barragan, am serving as a scribe to document services personally performed by Ceasar Dent MD based on my observations and the provider's statements to me.    09/09/2022  [employer]    Portions of this medical record were completed by a scribe. UPON MY REVIEW AND AUTHENTICATION BY ELECTRONIC SIGNATURE, this confirms (a) I performed the applicable clinical services, and (b) the record is accurate.        Ceasar Dent MD  09/09/22 1444    "

## 2022-09-09 NOTE — ED NOTES
Patient reports need to use bathroom. RN stopped at bathroom while transporting from Stabe 2 to ED room 7. Patient ambulated into bathroom with standby assist. Gait stable. Patient reports safety and requests privacy. Patient given privacy. Patient ambulated back to cart independently. Gait remains stable.     Patient transported to ED room 7. Placed back on ECG NIBP and SaO2 monitoring. Spouse remains at bedside.     Patient and spouse updated on continued POC and waits. Patient denies needs.    MRI checklist completed.     Bed in lowest position with side rails up x 2. Call light in reach. Continue to monitor.

## 2022-09-09 NOTE — ED NOTES
Spouse asks to give patient her home medications. Patient and spouse advised to hold medications pending MRI results.

## 2022-09-09 NOTE — ED TRIAGE NOTES
Pt. States the last 2-3 days has been feeling irregular heartbeat, fast then slow. Pt. States she is on anticoagulants and has a history of a fib. Pt. Had an episode of left sided facial numbness with left arm and leg numbness. Pt. Also states she is feeling off balance. Pt. Voice is soft, states it will get like that when she is fatigued.   Triage Assessment     Row Name 09/09/22 7408       Triage Assessment (Adult)    Airway WDL WDL       Respiratory WDL    Respiratory WDL WDL       Skin Circulation/Temperature WDL    Skin Circulation/Temperature WDL X  left face numbness with drooling at 135pm.        Cardiac WDL    Cardiac WDL WDL       Peripheral/Neurovascular WDL    Peripheral Neurovascular WDL WDL       Cognitive/Neuro/Behavioral WDL    Cognitive/Neuro/Behavioral WDL WDL

## 2022-09-09 NOTE — CONSULTS
"[facility]    Stroke Consult Note    Reason for Consult: Stroke Code     Chief Complaint: Irregular Heart Beat      HPI  Catalina Marie is a 76 year old female with history of schwannoma s/p resection, atrial fibrillation (on Coumadin), HTN, and DM. She presented to the [facility] ED for evaluation of left sided numbness and drooling. She reports that she was in the car and around 1330 noticed that the left side of her face felt numb. She also notes patchy numbness in the left arm and leg. It lasted less than a minute before resolving. Denies weakness, language impairment or vision changes. Spouse did not appreciate a facial droop. She reports that she has been having bradycardia for the past day and today her home BP readings were in the 170s. She has been struggling to maintain a therapeutic INR. Today INR was 1.8.    Imaging Findings  CTH negative for acute pathology  CTA head/neck with distal right vertebral artery narrowing on stroke team review. No LVO    Intravenous Thrombolysis  Not given due to:   - minor/isolated/quickly resolving symptoms  - DOAC dose within 48 hours or INR > 1.7    Endovascular Treatment  Not initiated due to absence of proximal vessel occlusion    Impression   TIA vs small acute stroke in the setting of subtherapeutic INR    Recommendations  - MRI brain w/wo. Please page stroke neurology when complete. May be a candidate for TIA pathway.     Patient Follow-up     - final recommendation pending work-up      Overnight update: patient will be discharged home on warfarin adjusted dose to be therapeutic on INR 2-3. TIA pathway ordered as outpatient with TTE to be done as soon as possible.     OLENA Gomez, CNP  Vascular Neurology  To page me or covering stroke neurology team member, click here: AMCOM   Choose \"On Call\" tab at top, then search dropdown box for \"Neurology Adult\", select location, press Enter, then look for stroke/neuro " ICU/telestroke.    ______________________________________________________    Clinically Significant Risk Factors Present on Admission              # Coagulation Defect: home medication list includes an anticoagulant medication     # CKD, Stage 2 (GFR 60-89): Will monitor and treat as appropriate  - last Cr =  N/A  - last GFR = N/A     Past Medical History   Past Medical History:   Diagnosis Date     Arrhythmia      Diabetes (H)     diet controlled     Hypertension      Schwannoma      Past Surgical History   Past Surgical History:   Procedure Laterality Date     BREAST SURGERY       COLONOSCOPY       EYE SURGERY       HEAD & NECK SURGERY      brain tumor resection     PHACOEMULSIFICATION CLEAR CORNEA WITH STANDARD INTRAOCULAR LENS IMPLANT Right 7/6/2016    Procedure: PHACOEMULSIFICATION CLEAR CORNEA WITH STANDARD INTRAOCULAR LENS IMPLANT;  Surgeon: Aiden Lee MD;  Location: [facility]     Medications   Home Meds  Prior to Admission medications    Medication Sig Start Date End Date Taking? Authorizing Provider   amLODIPine (NORVASC) 5 MG tablet TAKE 0.5 TABLETS (2.5 MG) BY MOUTH DAILY 8/3/22   Sulaiman Trinh MD   atenolol (TENORMIN) 25 MG tablet TAKE 1 TABLET BY MOUTH ONCE A DAY 8/18/22   Sulaiman Trinh MD   atorvastatin (LIPITOR) 10 MG tablet TAKE 1/2 TABLET BY MOUTH DAILY 8/18/22   Sulaiman Trinh MD   Calcium Carbonate-Vitamin D (CALCIUM + D PO) Take by mouth daily    Reported, Patient   carboxymethylcellulose (CELLUVISC/REFRESH LIQUIGEL) 1 % ophthalmic solution Place 1 drop into both eyes 4 times daily as needed for dry eyes    Unknown, Entered By History   fenofibrate (TRIGLIDE/LOFIBRA) 160 MG tablet Take 1 tablet (160 mg) by mouth daily 8/18/22   Sulaiman Trinh MD   furosemide (LASIX) 20 MG tablet TAKE 1 TABLET BY MOUTH TWICE A DAY 8/8/22   Sulaiman Trinh MD   ipratropium (ATROVENT) 0.06 % nasal spray Spray 2 sprays into both nostrils 4 times daily 3/10/21   Sulaiman Trinh MD   lisinopril (ZESTRIL)  40 MG tablet Take 1 tablet (40 mg) by mouth daily 8/18/22   Sulaiman Trinh MD   propafenone (RYTHMOL) 150 MG TABS tablet Take 1 tablet (150 mg) by mouth 3 times daily 8/8/22   William Babin MD   sertraline (ZOLOFT) 100 MG tablet TAKE 1 TABLET BY MOUTH EVERY DAY 4/29/22   Sulaiman Trinh MD   triamcinolone (KENALOG) 0.1 % external cream Apply topically 2 times daily Sig: Apply sparingly to right lateral foot daily 7/16/19   Ian Tracy MD   verapamil ER (VERELAN) 240 MG 24 hr capsule Take 1 capsule (240 mg) by mouth daily 12/17/21   Sulaiman Trinh MD   warfarin ANTICOAGULANT (COUMADIN) 5 MG tablet TAKE 1/2 TO 1 TABLET EVERY DAY OR AS DIRECTED BY INR CLINIC 10/11/19   Ian Tracy MD       Scheduled Meds      Infusion Meds      PRN Meds      Allergies   Allergies   Allergen Reactions     Pcn [Penicillins]      Family History   No family history on file.  Social History   Social History     Tobacco Use     Smoking status: Former Smoker     Smokeless tobacco: Never Used   Substance Use Topics     Alcohol use: Yes     Comment: 1 wine with dinner     Drug use: No       Review of Systems   The 10 point Review of Systems is negative other than noted in the HPI or here.        PHYSICAL EXAMINATION  Temp:  [98.9  F (37.2  C)] 98.9  F (37.2  C)  Pulse:  [56-61] 61  Resp:  [12-24] 24  BP: (180-196)/(76-98) 181/98  SpO2:  [95 %-96 %] 96 %     Neurologic  Mental Status:  alert, oriented x 3, follows commands, speech clear and fluent, naming and repetition normal, voice is hoarse  Cranial Nerves:  visual fields intact, PERRL, EOMI with normal smooth pursuit, facial sensation intact and symmetric, facial movements symmetric, hearing not formally tested but intact to conversation, palate elevation symmetric and uvula midline, no dysarthria, shoulder shrug strong bilaterally, tongue protrusion midline  Motor:  normal muscle tone and bulk, no abnormal movements, able to move all limbs spontaneously,  strength 5/5 throughout upper and lower extremities, no pronator drift  Reflexes:  toes down-going  Sensory:  light touch sensation intact and symmetric throughout upper and lower extremities, no extinction on double simultaneous stimulation   Coordination:  normal finger-to-nose and heel-to-shin bilaterally without dysmetria  Station/Gait:  deferred    Dysphagia Screen  Per Nursing    Stroke Scales    NIHSS  1a. Level of Consciousness 0-->Alert, keenly responsive   1b. LOC Questions 0-->Answers both questions correctly   1c. LOC Commands 0-->Performs both tasks correctly   2.   Best Gaze 0-->Normal   3.   Visual 0-->No visual loss   4.   Facial Palsy 0-->Normal symmetrical movements   5a. Motor Arm, Left 0-->No drift, limb holds 90 (or 45) degrees for full 10 secs   5b. Motor Arm, Right 0-->No drift, limb holds 90 (or 45) degrees for full 10 secs   6a. Motor Leg, Left 0-->No drift, leg holds 30 degree position for full 5 secs   6b. Motor Leg, right 0-->No drift, leg holds 30 degree position for full 5 secs   7.   Limb Ataxia 0-->Absent   8.   Sensory 0-->Normal, no sensory loss   9.   Best Language 0-->No aphasia, normal   10. Dysarthria 0-->Normal   11. Extinction and Inattention  0-->No abnormality   Total 0 (09/09/22 1500)       Modified [place] Score (Pre-morbid)  0-No deficits    Imaging  I personally reviewed all imaging; relevant findings per HPI.     Lab Results Data   CBC  No results for input(s): WBC, RBC, HGB, HCT, PLT in the last 168 hours.  Basic Metabolic Panel    Recent Labs   Lab 09/09/22  1448   *     Liver Panel  No results for input(s): PROTTOTAL, ALBUMIN, BILITOTAL, ALKPHOS, AST, ALT, BILIDIRECT in the last 168 hours.  INR    Recent Labs   Lab Test 09/09/22  1433 09/06/22  1105 08/15/22  1056   INR 1.84* 2.2* 2.0*      Lipid Profile    Recent Labs   Lab Test 08/18/22  1548 10/05/21  1113 10/05/20  1001   CHOL 179 150 119   HDL 34* 39* 36*   LDL 95 76 45   TRIG 249* 174* 188*     A1C     Recent Labs   Lab Test 08/18/22  1548 10/05/21  1113 07/21/21  1115   A1C 6.7* 6.6* 6.4*     Troponin  No results for input(s): CTROPT, TROPONINIS, TROPONINI, GHTROP in the last 168 hours.       Stroke Code Data Data   Stroke Code Data  (for stroke code without tele)  Stroke code activated 09/09/22   1445   First stroke provider response 09/09/22   1448   Last known normal 09/09/22   1325   Time of discovery   (or onset of symptoms) 09/09/22   1330   Head CT read by Stroke Neuro Dr/Provider 09/09/22   1505   Was stroke code de-escalated? Yes 09/09/22 1521            I personally examined and evaluated the patient today. At the time of my evaluation and management the patient was in critical condition today due to acute neuro deficits. I personally managed history, imaging review, initial exam. Key decisions made today included imaging. I spent a total of 30 minutes providing critical care services, evaluating the patient, directing care and reviewing laboratory values and radiologic reports.

## 2022-09-09 NOTE — ED TRIAGE NOTES
Triage Assessment     Row Name 09/09/22 1526       Triage Assessment (Adult)    Airway WDL WDL       Respiratory WDL    Respiratory WDL WDL       Skin Circulation/Temperature WDL    Skin Circulation/Temperature WDL X  left face numbness with drooling at 135pm.        Cardiac WDL    Cardiac WDL WDL       Peripheral/Neurovascular WDL    Peripheral Neurovascular WDL WDL       Cognitive/Neuro/Behavioral WDL    Cognitive/Neuro/Behavioral WDL WDL

## 2022-09-10 NOTE — ED PROVIDER NOTES
ED course:  Patient received as a handoff from my partner Dr. Thomas.  On face-to-face evaluation patient reports no additional concerns.  Neurologic exam is normal.  MRI without evidence of acute infarct or other significant acute findings; see below.  Findings discussed with stroke neurology service who are in agreement with doubling tonight's dose of warfarin only for slightly subtherapeutic INR (1.84) and will have the patient follow-up in stroke neurology clinic (outpatient TIA pathway).  Discharged home.  Return precautions discussed.    MR Brain w/o & w Contrast   Final Result   IMPRESSION:   1.  No acute/subacute infarction, intracranial hemorrhage, mass effect, hydrocephalus, or abnormal enhancement.   2.  Mild global brain parenchymal volume loss with presumed sequelae of mild to moderate proximal vessel ischemic disease. These changes have mildly progressed since August 2015 MRI.      CTA Head Neck w Contrast   Final Result   IMPRESSION: Normal neck and head CTA.      Radiation dose for this scan was reduced using automated exposure   control, adjustment of the mA and/or kV according to patient size, or   iterative reconstruction technique.       AVI ANNE MD            SYSTEM ID:  [id number]      CT Head w/o Contrast   Final Result   IMPRESSION: Diffuse cerebral volume loss and cerebral white matter   changes consistent with chronic small vessel ischemic disease. No   evidence for acute intracranial pathology.      Radiation dose for this scan was reduced using automated exposure   control, adjustment of the mA and/or kV according to patient size, or   iterative reconstruction technique.        AVI ANNE MD            SYSTEM ID:  [id number]      Cardiac Event Monitor Adult Pediatric    (Results Pending)   Echocardiogram Complete - For age > 60 years    (Results Pending)       Impression:    ICD-10-CM    1. Longstanding persistent atrial fibrillation (H)  I48.11 Cardiac Event Monitor Adult  Pediatric     TIA Follow-Up     Echocardiogram Complete - For age > 60 years   2. Left sided numbness  R20.0    3. History of atrial fibrillation  Z86.79    4. Subtherapeutic international normalized ratio (INR)  R79.1    5. TIA (transient ischemic attack)  G45.9      Disposition:  Discharge         Smooth Zhang,   09/09/22 3376

## 2022-09-10 NOTE — ED NOTES
Pt back from mri, has no concerns at this time, states wanting to go home and eat.  Water provided, pt able to swallow with no difficulty.

## 2022-09-12 ENCOUNTER — LAB (OUTPATIENT)
Dept: LAB | Facility: CLINIC | Age: 77
End: 2022-09-12

## 2022-09-12 ENCOUNTER — ANTICOAGULATION THERAPY VISIT (OUTPATIENT)
Dept: ANTICOAGULATION | Facility: CLINIC | Age: 77
End: 2022-09-12

## 2022-09-12 ENCOUNTER — TELEPHONE (OUTPATIENT)
Dept: ANTICOAGULATION | Facility: CLINIC | Age: 77
End: 2022-09-12

## 2022-09-12 ENCOUNTER — HOSPITAL ENCOUNTER (OUTPATIENT)
Dept: CARDIOLOGY | Facility: CLINIC | Age: 77
Discharge: HOME | End: 2022-09-12
Attending: PHYSICIAN ASSISTANT
Payer: MEDICARE

## 2022-09-12 ENCOUNTER — TELEPHONE (OUTPATIENT)
Dept: NEUROLOGY | Facility: CLINIC | Age: 77
End: 2022-09-12

## 2022-09-12 ENCOUNTER — OFFICE VISIT (OUTPATIENT)
Dept: CARDIOLOGY | Facility: CLINIC | Age: 77
End: 2022-09-12
Payer: MEDICARE

## 2022-09-12 ENCOUNTER — OFFICE VISIT (OUTPATIENT)
Dept: FAMILY MEDICINE | Facility: CLINIC | Age: 77
End: 2022-09-12

## 2022-09-12 VITALS
DIASTOLIC BLOOD PRESSURE: 72 MMHG | HEART RATE: 69 BPM | BODY MASS INDEX: 24.49 KG/M2 | WEIGHT: 138.2 LBS | HEIGHT: 63 IN | SYSTOLIC BLOOD PRESSURE: 125 MMHG

## 2022-09-12 VITALS
BODY MASS INDEX: 24.45 KG/M2 | SYSTOLIC BLOOD PRESSURE: 120 MMHG | HEIGHT: 63 IN | RESPIRATION RATE: 16 BRPM | WEIGHT: 138 LBS | HEART RATE: 84 BPM | DIASTOLIC BLOOD PRESSURE: 78 MMHG | OXYGEN SATURATION: 96 % | TEMPERATURE: 97.3 F

## 2022-09-12 DIAGNOSIS — Z79.01 LONG TERM CURRENT USE OF ANTICOAGULANT THERAPY: ICD-10-CM

## 2022-09-12 DIAGNOSIS — R42 DIZZINESS: ICD-10-CM

## 2022-09-12 DIAGNOSIS — I48.91 ATRIAL FIBRILLATION (H): ICD-10-CM

## 2022-09-12 DIAGNOSIS — I48.20 CHRONIC ATRIAL FIBRILLATION (H): Primary | ICD-10-CM

## 2022-09-12 DIAGNOSIS — R20.0 FACIAL NUMBNESS: ICD-10-CM

## 2022-09-12 DIAGNOSIS — I48.11 LONGSTANDING PERSISTENT ATRIAL FIBRILLATION (H): ICD-10-CM

## 2022-09-12 DIAGNOSIS — F43.22 ADJUSTMENT DISORDER WITH ANXIOUS MOOD: ICD-10-CM

## 2022-09-12 DIAGNOSIS — I48.20 CHRONIC ATRIAL FIBRILLATION (H): ICD-10-CM

## 2022-09-12 DIAGNOSIS — I48.0 PAROXYSMAL ATRIAL FIBRILLATION (H): ICD-10-CM

## 2022-09-12 DIAGNOSIS — Z79.01 LONG TERM CURRENT USE OF ANTICOAGULANT THERAPY: Primary | ICD-10-CM

## 2022-09-12 DIAGNOSIS — I10 BENIGN ESSENTIAL HYPERTENSION: ICD-10-CM

## 2022-09-12 LAB
INR BLD: 3.3 (ref 0.9–1.1)
LVEF ECHO: NORMAL

## 2022-09-12 PROCEDURE — 85610 PROTHROMBIN TIME: CPT

## 2022-09-12 PROCEDURE — 36416 COLLJ CAPILLARY BLOOD SPEC: CPT

## 2022-09-12 PROCEDURE — 93306 TTE W/DOPPLER COMPLETE: CPT | Mod: 26 | Performed by: INTERNAL MEDICINE

## 2022-09-12 PROCEDURE — 99214 OFFICE O/P EST MOD 30 MIN: CPT | Mod: 25 | Performed by: NURSE PRACTITIONER

## 2022-09-12 PROCEDURE — 93306 TTE W/DOPPLER COMPLETE: CPT

## 2022-09-12 PROCEDURE — 99215 OFFICE O/P EST HI 40 MIN: CPT | Performed by: INTERNAL MEDICINE

## 2022-09-12 PROCEDURE — 93000 ELECTROCARDIOGRAM COMPLETE: CPT | Performed by: NURSE PRACTITIONER

## 2022-09-12 RX ORDER — DIGOXIN 125 MCG
125 TABLET ORAL DAILY
COMMUNITY
Start: 2022-09-12 | End: 2022-12-19

## 2022-09-12 ASSESSMENT — PATIENT HEALTH QUESTIONNAIRE - PHQ9
10. IF YOU CHECKED OFF ANY PROBLEMS, HOW DIFFICULT HAVE THESE PROBLEMS MADE IT FOR YOU TO DO YOUR WORK, TAKE CARE OF THINGS AT HOME, OR GET ALONG WITH OTHER PEOPLE: NOT DIFFICULT AT ALL
SUM OF ALL RESPONSES TO PHQ QUESTIONS 1-9: 2
SUM OF ALL RESPONSES TO PHQ QUESTIONS 1-9: 2

## 2022-09-12 ASSESSMENT — PAIN SCALES - GENERAL: PAINLEVEL: NO PAIN (0)

## 2022-09-12 NOTE — TELEPHONE ENCOUNTER
ANTICOAGULATION  MANAGEMENT: Discharge Review    Catalina Marie chart reviewed for anticoagulation continuity of care    Emergency room visit on 9/9/22 for left-sided facial numbness (TIA), irregular heart beat.    Discharge disposition: Home    Results:    Recent labs: (last 7 days)     09/06/22  1105 09/09/22  1433   INR 2.2* 1.84*     Anticoagulation inpatient management:     more warfarin administered than maintenance regimen , per notes, they advised a double dose that evening of warfarin for subtherapeutic INR    Anticoagulation discharge instructions:     Warfarin dosing: home regimen continued   Bridging: No   INR goal change: No      Medication changes affecting anticoagulation: No    Additional factors affecting anticoagulation: No     PLAN     Recommend to check INR on or around 9/23/22    Spoke with Jennifer, she did take 10 mg, as advised at ED. She states she was told in ED that she should get another INR today. She will check when she is in clinic to see Dr. Trinh later this afternoon.     Anticoagulation Calendar updated    Hanna Flores RN

## 2022-09-12 NOTE — PATIENT INSTRUCTIONS
Today's Recommendations    No change in medications  Please follow up with Dr. Babin can be cancelled based on neuro and PCP visits.    Please send a [employer] Telegram message or call [phone] to the RN team with questions or concerns.     Scheduling number [phone]    OLENA Rain, CNP

## 2022-09-12 NOTE — PROGRESS NOTES
"  Assessment & Plan     Chronic atrial fibrillation (H)  In clinic today, her ventricular rate is well controlled, she is on warfarin for stroke prophylaxis and declines changing to a novel oral anticoagulant due to the cost, she tells me that her most recent INR check following her ER visit was mildly supratherapeutic, her INR nurse has adjusted her dose accordingly.    With respect to her rate control, I think it is very difficult.  She has complaints when her heart rate is too low and complaints when her heart rate is too high.  She did not follow my directions of stopping digoxin.  Without adjusting her medications, I am not sure that I can help her with her symptoms that she associates with problems with her rate control.  She seems to be frustrated with her interactions at the [facility].  I did offer that she could get a second opinion from the [facility] from Dr. Cardozo who is another elecrophysiologist.  She will take this into consideration.  But she feels as if there are \"too many cooks in the kitchen\" helping her with her rate controlling agents.    Perhaps she could benefit from an AV leo ablation with a pacemaker placement?  Although that decision is well beyond the scope of her primary care physician.  And I tried to explain to her that as her PCP, I can only help so much with this problem.  Particularly if she does not follow the instructions that I recommended about holding the digoxin etc.    I think we are both a little frustrated about this.      Dizziness  This is a nonspecific symptom without correlating objective findings on neurological exam.  It could be a physical manifestation of anxiety?  This potential diagnosis did not resonate with her.  We discussed a referral to ENT to check for Ménière's disease or other inner ear pathology that might contribute to her sense of imbalance.  However, she would like to start with " checking in with a neurologist with whom she has an appointment next week.  If the neurologist cannot think of a specific cause for her dizziness symptoms, then she would be open to an ENT consult.    Benign essential hypertension  Her blood pressure seems to be very well controlled in clinic today    Facial numbness  Its not clear to me what the specific cause of this complaint was given the lack of stroke findings on her MRI.  I suppose TIA is a consideration?  She is meeting with neurology to further investigate and for any further suggestions regarding secondary prevention for TIA/stroke.    Adjustment disorder with anxious mood  I do think that there is a fair amount of underlying anxiety contributing to symptomatology.  There are several underlying organic causes to why she might not feel well including her atrial fibrillation.  However, I think that further addressing her anxiety may help her cope with her symptoms more easily and may help her present her symptoms in a manner that is more understandable to her treating physicians so that specific interventions can be taken to help her feel better if there are any.  She seemed fairly resistant to the notion that addressing anxiety might help her feel physically better.  I suggested that she did have the option of increasing her Zoloft from 100 mg to 150 mg daily or stopping Zoloft and using another medication to help her with her anxiety.  She does not want to make any changes today.        58 minutes spent on the date of the encounter doing chart review, history and exam, documentation and further activities per the note           Return in about 2 weeks (around 9/26/2022) for VV after 9/19 for follow up of dizziness.  Patient instructed to return to clinic or contact us sooner if symptoms worsen or new symptoms develop.     Sulaiman Trinh MD  [facility] JOAN Rodriguez is a 76 year old accompanied by her spouse, presenting for the  following health issues:  Balance/ Vestibular (& a few other issues ) and Referral (ENT )      HPI     This was a lengthy and rather challenging visit.  Patient is here with her .  Patient has not felt well since her previous primary care physician retired.  She attributes some of her unwell feelings to her rate control in the setting of atrial fibrillation.  She feels unwell when her heart rate is low and she feels unwell when her heart rate is high.  She was counseled to discontinue digoxin when I last saw her.  However, she did not make this medication change.  She has been in contact with electrophysiology.  And she does have an appointment to see them in October, but she is doubtful as to whether she will attend.  She gets the feeling that the nursing staff associated with the heart clinic told her that there may not be much to gain from having a follow-up appointment with electrophysiology.  In the meantime, she went to the emergency department with somewhat vague neurological symptoms including numbness on the left side of her face.  She had a CT scan as well as an MRI.  She had a lot of anxiety regarding the notations of small vessel ischemic disease noted on those imaging studies.  It took a fair amount of time to discuss this with her.  She originally made this appointment with me prior to visiting the emergency department.  She made the appointment regarding several months of symptoms of feeling unsteady.  She has not had any falls.  Her symptoms do not sound consistent with pj vertigo.  She describes them as feeling lightheaded.  She states that the symptoms are fairly constant.  Nothing seems to make the symptoms better or worse.  She describes tinnitus that has been present for decades.  No new hearing symptoms.  She wonders if a referral to an ENT would be helpful?  She has seen a physical therapist for her balance and was told that her legs are exceedingly strong.  Her history is very  "disorganized and very difficult to compile into a succinct illness script.  Furthermore, many of her symptoms do not fit a known clinical syndrome.  Also, it seems that many of her symptoms do not correlate with objective findings on heart rate monitors.  Of note, there was no stroke noted on her MRI.  It is difficult to get a straight answer out of her, but it seems that the neurological symptoms on the side of her face have resolved since her emergency department visit.  She admits to feeling anxious, but does not feel that her symptoms are related to anxiety.  She is reluctant to increase the dose of the sertraline that she takes for anxiety.  She is reluctant to consider starting a new medication for anxiety.      Review of Systems         Objective    /78 (BP Location: Right arm, Patient Position: Chair, Cuff Size: Adult Regular)   Pulse 84   Temp 97.3  F (36.3  C) (Temporal)   Resp 16   Ht 1.6 m (5' 3\")   Wt 62.6 kg (138 lb)   SpO2 96%   BMI 24.45 kg/m    Body mass index is 24.45 kg/m .  Physical Exam   General: This is a well-appearing, mildly anxious older female in no acute distress.  She does not appear toxic and appears quite comfortable.  She is here with her .  HEENT: The bilateral tympanic membranes appear normal, the neck is supple without adenopathy.  Cardiovascular: The heart has an irregularly irregular rhythm with a normal rate today.  Pulmonary: The lungs are clear to auscultation bilaterally, breathing is not labored.  Extremities: No edema.  Neurological: Alert and oriented to person place and time although she does seem to have some subtle memory deficits, this may be related to hearing loss?  She passes the \"get up and go test\", cranial nerves II to XII appear grossly intact, no pronator drift, sensation to light touch is intact in all face dermatomes, there is symmetric strength, gait appears to be normal, Romberg test is normal, deep tendon reflexes are 2+ bilaterally at " the bilateral patella tendons.  Mental State: Anxious affect, anxious mood, well-groomed, impaired insight.                    Answers for HPI/ROS submitted by the patient on 9/12/2022  If you checked off any problems, how difficult have these problems made it for you to do your work, take care of things at home, or get along with other people?: Not difficult at all  PHQ9 TOTAL SCORE: 2

## 2022-09-12 NOTE — TELEPHONE ENCOUNTER
Spoke to douglas, patients  and patient, concerned with ongoing dizziness, is scheduled for cardiology appointment today and was unaware of any other cardiac testing needs, explained that recommendations from Neuro team at ER were to complete cardiac monitoring and echo. Confirmed upcoming appointments, is concerned with length of time until Neuro follow up scheduled in next available 9/19/22. Routing to both RNCC and Stroke WIL team.    MARNIE GARCIA CMA

## 2022-09-12 NOTE — LETTER
9/12/2022    Sulaiman Trinh MD  [address]  [facility] [address]    RE: Catalina Marie       Dear Colleague,     I had the pleasure of seeing Catalina Marie in the [facility].    Electrophysiology Clinic Progress Note  Catalina Marie MRN# [id number]   YOB: 1945 Age: 76 year old     Primary cardiologist: Dr. Babin ()    Reason for visit: Atrial fibrillation     History of presenting illness:    Catalina Marie is a pleasant 76 year old patient with past medical history significant for atrial fibrillation (anticoagulated on Coumadin), hypertension, hyperlipidemia, CKD, T2DM, hypothyroidism, celiac disease, breast cancer, depression and recent TIA.    In May 2022 digoxin was stopped by her [place] cardiologist in order to simplify her regimen.  Subsequently, the patient developed tachycardia up to 100 bpm and digoxin was restarted and atenolol was slightly decreased by Dr. Artis in [place].     Jennifer was referred to electrophysiology and evaluated by Dr. Babin on 7/19/2022 by her primary care provider Dr. Trinh.  Previously, her atrial fibrillation was managed by cardiologist in [place] for which she spends 6 to 8 months of the year, and she had requested to establish care in [place] where she spends the summer. At that visit Dr. Babin had recommended that she decrease propafenone from 3 times daily to 150 mg twice daily as the medication was currently not effective in keeping her in rhythm control continuously and to simplify her medical regimen.    A 7-day Zio patch was ordered to assure that she was not overmedicated.  It revealed that she was atrial fibrillation 80% at the time with her heart rate varying from 40 to 140 bpm with average heart rate of 70 bpm.  Overall, the findings noted that her heart rate was well controlled with brief episodes in the 40s to 50s and brief episodes in the 130s to 140s.      The patient has had multiple correspondence  "with our team nurses with concerns for episodes of bradycardia and tachycardia.  Due to the patient's concerns for \"high heart rates\" propafenone was increased back up to 3 times daily dosing.  The patient then corresponded with our clinic feeling that her heart rates were \"too slow\" and she had increased fatigue.  This was also reported to her primary care provider who stopped her digoxin.    On 9/9/2022 she presented to the emergency department at [facility] with onset of acute focal neurological symptoms of a left-sided facial droop and drooling with left-sided facial numbness.  Her blood pressure was elevated up to 190 mmHg.  She was in atrial fibrillation with controlled ventricular response and a subtherapeutic INR of 1.84.  MRI was without evidence of acute infarct or other subacute findings.  The case was discussed with stroke neurology who recommended adjustment of her Coumadin dosing, obtaining an echocardiogram and an event monitor and following up in outpatient clinic.    Today she returns for a follow-up accompanied by her .  We discussed in detail the findings of her Zio patch and the variability of her heart rates based on those results while on propafenone twice daily.  She states that she often gets heart rates in the 40s at home, but upon further investigation she states she monitors her heart rate via home blood pressure cuff.  We discussed that this is not the most accurate way to assess her heart rate, and we may be titrating medications based on inaccurate results.  She has independently restarted digoxin at 0.125 mg daily due to the absence of bradycardia at home.         Assessment and Plan:     ASSESSMENT:    1. Paroxysmal atrial fibrillation    Anticoagulated on Coumadin    Cost assessment for DOACs revealed that they were cost prohibitive    Zio Patch on propafenone twice daily noted overall well controlled heart rates and that she was in atrial " "fibrillation approximately 80% of the time.    Currently on propafenone 150 mg 3 times daily for rhythm control, digoxin 125 mcg daily, atenolol 25 mg daily and verapamil  mg daily for rate control    2. Hypertension    Acutely elevated in the setting of focal neurological symptoms in the emergency department    Well-controlled today and per patient on home monitoring    3. Concern for TIA    Evaluated in the emergency department and stroke neurology recommended echocardiogram and 30-day event monitor    PLAN:     1. Continue all present medical therapy  2. Follow-up with stroke neurology and PCP  3. Can cancel Dr. Babin visit in October as he will be traveling back to [place] and may follow-up with her cardiologist upon her return.       [place] primary care provider is Dr. Ball at [facility]  Phone #[phone]  Fax # [phone]    [place] cardiologist is Dr.Omar Smith  Phone # [phone]  Fax #[phone]      Orders this Visit:  Orders Placed This Encounter   Procedures     EKG 12-lead complete w/read - Clinics (performed today)     Orders Placed This Encounter   Medications     digoxin (LANOXIN) 125 MCG tablet     Sig: Take 1 tablet (125 mcg) by mouth daily     There are no discontinued medications.         Review of Systems:     Review of Systems:  Skin:  not assessed     Eyes:  not assessed    ENT:  not assessed    Respiratory:  Positive for shortness of breath;dyspnea on exertion;cough  Cardiovascular:    fatigue;lightheadedness;Positive for  Gastroenterology: not assessed    Genitourinary:  not assessed    Musculoskeletal:  not assessed    Neurologic:  not assessed    Psychiatric:  not assessed    Heme/Lymph/Imm:  not assessed    Endocrine:  Positive for thyroid disorder;diabetes            Physical Exam:     Vitals: /72 (BP Location: Left arm, Patient Position: Sitting, Cuff Size: Adult Regular)   Pulse 69   Ht 1.6 m (5' 3\")   Wt 62.7 kg (138 lb 3.2 oz)   BMI 24.48 kg/m  "   Constitutional: Well nourished and in no apparent distress.  Eyes: Pupils equal, round. Sclerae anicteric.   HEENT: Normocephalic, atraumatic.   Neck: Supple. JVD   Respiratory: Breathing non-labored. Lungs clear to auscultation bilaterally. No crackles, wheezes, rhonchi, or rales.  Cardiovascular: Irregularly irregular rate and rhythm, normal S1 and S2. No murmur, rub, or gallop.  Skin: Warm, dry. No rashes, cyanosis, or xanthelasma.  Extremities: No edema.  Neurologic: No gross motor deficits. Alert, awake, and oriented to person, place and time.  Psychiatric: Affect appropriate.        Orders Placed This Encounter   Procedures     EKG 12-lead complete w/read - Clinics (performed today)     Orders Placed This Encounter   Medications     digoxin (LANOXIN) 125 MCG tablet     Sig: Take 1 tablet (125 mcg) by mouth daily     There are no discontinued medications.      Encounter Diagnosis   Name Primary?     Paroxysmal atrial fibrillation (H)        CURRENT MEDICATIONS:  Current Outpatient Medications   Medication Sig Dispense Refill     amLODIPine (NORVASC) 5 MG tablet TAKE 0.5 TABLETS (2.5 MG) BY MOUTH DAILY 45 tablet 1     atenolol (TENORMIN) 25 MG tablet TAKE 1 TABLET BY MOUTH ONCE A  tablet 3     atorvastatin (LIPITOR) 10 MG tablet TAKE 1/2 TABLET BY MOUTH DAILY (Patient taking differently: 5 mg TAKE 1/2 TABLET BY MOUTH DAILY) 45 tablet 3     Calcium Carbonate-Vitamin D (CALCIUM + D PO) Take by mouth daily       carboxymethylcellulose (CELLUVISC/REFRESH LIQUIGEL) 1 % ophthalmic solution Place 1 drop into both eyes 4 times daily as needed for dry eyes       digoxin (LANOXIN) 125 MCG tablet Take 1 tablet (125 mcg) by mouth daily       fenofibrate (TRIGLIDE/LOFIBRA) 160 MG tablet Take 1 tablet (160 mg) by mouth daily 90 tablet 3     furosemide (LASIX) 20 MG tablet TAKE 1 TABLET BY MOUTH TWICE A  tablet 3     ipratropium (ATROVENT) 0.06 % nasal spray Spray 2 sprays into both nostrils 4 times daily 45 mL  3     lisinopril (ZESTRIL) 40 MG tablet Take 1 tablet (40 mg) by mouth daily 90 tablet 3     propafenone (RYTHMOL) 150 MG TABS tablet Take 1 tablet (150 mg) by mouth 3 times daily 270 tablet 3     sertraline (ZOLOFT) 100 MG tablet TAKE 1 TABLET BY MOUTH EVERY DAY 90 tablet 1     triamcinolone (KENALOG) 0.1 % external cream Apply topically 2 times daily Sig: Apply sparingly to right lateral foot daily 30 g 1     verapamil ER (VERELAN) 240 MG 24 hr capsule Take 1 capsule (240 mg) by mouth daily 90 capsule 3     warfarin ANTICOAGULANT (COUMADIN) 5 MG tablet TAKE 1/2 TO 1 TABLET EVERY DAY OR AS DIRECTED BY INR CLINIC 90 tablet 0       ALLERGIES  Allergies   Allergen Reactions     Pcn [Penicillins]          PAST MEDICAL HISTORY:  Past Medical History:   Diagnosis Date     Arrhythmia      Diabetes (H)     diet controlled     Hypertension      Schwannoma        PAST SURGICAL HISTORY:  Past Surgical History:   Procedure Laterality Date     BREAST SURGERY       COLONOSCOPY       EYE SURGERY       HEAD & NECK SURGERY      brain tumor resection     PHACOEMULSIFICATION CLEAR CORNEA WITH STANDARD INTRAOCULAR LENS IMPLANT Right 2016    Procedure: PHACOEMULSIFICATION CLEAR CORNEA WITH STANDARD INTRAOCULAR LENS IMPLANT;  Surgeon: Aiden Lee MD;  Location: [facility]       FAMILY HISTORY:  No family history on file.    SOCIAL HISTORY:  Social History     Socioeconomic History     Marital status:      Spouse name: None     Number of children: None     Years of education: None     Highest education level: None   Tobacco Use     Smoking status: Former Smoker     Quit date:      Years since quittin.7     Smokeless tobacco: Never Used   Substance and Sexual Activity     Alcohol use: Yes     Comment: 1 wine with dinner     Drug use: No     Sexual activity: Not Currently     Thank you for allowing me to participate in the care of your patient.      Sincerely,     BERRY CORDOVA, OLENA CNP     [facility]  [facility]  cc:   William Babin MD  [address]  [place] [address]

## 2022-09-12 NOTE — PROGRESS NOTES
"  Electrophysiology Clinic Progress Note  Catalina Marie MRN# [id number]   YOB: 1945 Age: 76 year old     Primary cardiologist: Dr. Babin ()    Reason for visit: Atrial fibrillation     History of presenting illness:    Catalina Marie is a pleasant 76 year old patient with past medical history significant for atrial fibrillation (anticoagulated on Coumadin), hypertension, hyperlipidemia, CKD, T2DM, hypothyroidism, celiac disease, breast cancer, depression and recent TIA.    In May 2022 digoxin was stopped by her [place] cardiologist in order to simplify her regimen.  Subsequently, the patient developed tachycardia up to 100 bpm and digoxin was restarted and atenolol was slightly decreased by Dr. Artis in [place].     Jennifer was referred to electrophysiology and evaluated by Dr. Babin on 7/19/2022 by her primary care provider Dr. Trinh.  Previously, her atrial fibrillation was managed by cardiologist in [place] for which she spends 6 to 8 months of the year, and she had requested to establish care in [place] where she spends the summer. At that visit Dr. Babin had recommended that she decrease propafenone from 3 times daily to 150 mg twice daily as the medication was currently not effective in keeping her in rhythm control continuously and to simplify her medical regimen.    A 7-day Zio patch was ordered to assure that she was not overmedicated.  It revealed that she was atrial fibrillation 80% at the time with her heart rate varying from 40 to 140 bpm with average heart rate of 70 bpm.  Overall, the findings noted that her heart rate was well controlled with brief episodes in the 40s to 50s and brief episodes in the 130s to 140s.      The patient has had multiple correspondence with our team nurses with concerns for episodes of bradycardia and tachycardia.  Due to the patient's concerns for \"high heart rates\" propafenone was increased back up to 3 times daily dosing.  The patient then " "corresponded with our clinic feeling that her heart rates were \"too slow\" and she had increased fatigue.  This was also reported to her primary care provider who stopped her digoxin.    On 9/9/2022 she presented to the emergency department at [facility] with onset of acute focal neurological symptoms of a left-sided facial droop and drooling with left-sided facial numbness.  Her blood pressure was elevated up to 190 mmHg.  She was in atrial fibrillation with controlled ventricular response and a subtherapeutic INR of 1.84.  MRI was without evidence of acute infarct or other subacute findings.  The case was discussed with stroke neurology who recommended adjustment of her Coumadin dosing, obtaining an echocardiogram and an event monitor and following up in outpatient clinic.    Today she returns for a follow-up accompanied by her .  We discussed in detail the findings of her Zio patch and the variability of her heart rates based on those results while on propafenone twice daily.  She states that she often gets heart rates in the 40s at home, but upon further investigation she states she monitors her heart rate via home blood pressure cuff.  We discussed that this is not the most accurate way to assess her heart rate, and we may be titrating medications based on inaccurate results.  She has independently restarted digoxin at 0.125 mg daily due to the absence of bradycardia at home.         Assessment and Plan:     ASSESSMENT:    1. Paroxysmal atrial fibrillation    Anticoagulated on Coumadin    Cost assessment for DOACs revealed that they were cost prohibitive    Zio Patch on propafenone twice daily noted overall well controlled heart rates and that she was in atrial fibrillation approximately 80% of the time.    Currently on propafenone 150 mg 3 times daily for rhythm control, digoxin 125 mcg daily, atenolol 25 mg daily and verapamil  mg daily for rate " "control    2. Hypertension    Acutely elevated in the setting of focal neurological symptoms in the emergency department    Well-controlled today and per patient on home monitoring    3. Concern for TIA    Evaluated in the emergency department and stroke neurology recommended echocardiogram and 30-day event monitor    PLAN:     1. Continue all present medical therapy  2. Follow-up with stroke neurology and PCP  3. Can cancel Dr. Babin visit in October as he will be traveling back to [place] and may follow-up with her cardiologist upon her return.       [place] primary care provider is Dr. Ball at [facility]  Phone #[phone]  Fax # [phone]    [place] cardiologist is Dr.Omar Smith  Phone # [phone]  Fax #[phone]      Orders this Visit:  Orders Placed This Encounter   Procedures     EKG 12-lead complete w/read - Clinics (performed today)     Orders Placed This Encounter   Medications     digoxin (LANOXIN) 125 MCG tablet     Sig: Take 1 tablet (125 mcg) by mouth daily     There are no discontinued medications.         Review of Systems:     Review of Systems:  Skin:  not assessed     Eyes:  not assessed    ENT:  not assessed    Respiratory:  Positive for shortness of breath;dyspnea on exertion;cough  Cardiovascular:    fatigue;lightheadedness;Positive for  Gastroenterology: not assessed    Genitourinary:  not assessed    Musculoskeletal:  not assessed    Neurologic:  not assessed    Psychiatric:  not assessed    Heme/Lymph/Imm:  not assessed    Endocrine:  Positive for thyroid disorder;diabetes            Physical Exam:     Vitals: /72 (BP Location: Left arm, Patient Position: Sitting, Cuff Size: Adult Regular)   Pulse 69   Ht 1.6 m (5' 3\")   Wt 62.7 kg (138 lb 3.2 oz)   BMI 24.48 kg/m    Constitutional: Well nourished and in no apparent distress.  Eyes: Pupils equal, round. Sclerae anicteric.   HEENT: Normocephalic, atraumatic.   Neck: Supple. JVD   Respiratory: Breathing " non-labored. Lungs clear to auscultation bilaterally. No crackles, wheezes, rhonchi, or rales.  Cardiovascular: Irregularly irregular rate and rhythm, normal S1 and S2. No murmur, rub, or gallop.  Skin: Warm, dry. No rashes, cyanosis, or xanthelasma.  Extremities: No edema.  Neurologic: No gross motor deficits. Alert, awake, and oriented to person, place and time.  Psychiatric: Affect appropriate.        Orders Placed This Encounter   Procedures     EKG 12-lead complete w/read - Clinics (performed today)     Orders Placed This Encounter   Medications     digoxin (LANOXIN) 125 MCG tablet     Sig: Take 1 tablet (125 mcg) by mouth daily     There are no discontinued medications.      Encounter Diagnosis   Name Primary?     Paroxysmal atrial fibrillation (H)        CURRENT MEDICATIONS:  Current Outpatient Medications   Medication Sig Dispense Refill     amLODIPine (NORVASC) 5 MG tablet TAKE 0.5 TABLETS (2.5 MG) BY MOUTH DAILY 45 tablet 1     atenolol (TENORMIN) 25 MG tablet TAKE 1 TABLET BY MOUTH ONCE A  tablet 3     atorvastatin (LIPITOR) 10 MG tablet TAKE 1/2 TABLET BY MOUTH DAILY (Patient taking differently: 5 mg TAKE 1/2 TABLET BY MOUTH DAILY) 45 tablet 3     Calcium Carbonate-Vitamin D (CALCIUM + D PO) Take by mouth daily       carboxymethylcellulose (CELLUVISC/REFRESH LIQUIGEL) 1 % ophthalmic solution Place 1 drop into both eyes 4 times daily as needed for dry eyes       digoxin (LANOXIN) 125 MCG tablet Take 1 tablet (125 mcg) by mouth daily       fenofibrate (TRIGLIDE/LOFIBRA) 160 MG tablet Take 1 tablet (160 mg) by mouth daily 90 tablet 3     furosemide (LASIX) 20 MG tablet TAKE 1 TABLET BY MOUTH TWICE A  tablet 3     ipratropium (ATROVENT) 0.06 % nasal spray Spray 2 sprays into both nostrils 4 times daily 45 mL 3     lisinopril (ZESTRIL) 40 MG tablet Take 1 tablet (40 mg) by mouth daily 90 tablet 3     propafenone (RYTHMOL) 150 MG TABS tablet Take 1 tablet (150 mg) by mouth 3 times daily 270  tablet 3     sertraline (ZOLOFT) 100 MG tablet TAKE 1 TABLET BY MOUTH EVERY DAY 90 tablet 1     triamcinolone (KENALOG) 0.1 % external cream Apply topically 2 times daily Sig: Apply sparingly to right lateral foot daily 30 g 1     verapamil ER (VERELAN) 240 MG 24 hr capsule Take 1 capsule (240 mg) by mouth daily 90 capsule 3     warfarin ANTICOAGULANT (COUMADIN) 5 MG tablet TAKE 1/2 TO 1 TABLET EVERY DAY OR AS DIRECTED BY INR CLINIC 90 tablet 0       ALLERGIES  Allergies   Allergen Reactions     Pcn [Penicillins]          PAST MEDICAL HISTORY:  Past Medical History:   Diagnosis Date     Arrhythmia      Diabetes (H)     diet controlled     Hypertension      Schwannoma        PAST SURGICAL HISTORY:  Past Surgical History:   Procedure Laterality Date     BREAST SURGERY       COLONOSCOPY       EYE SURGERY       HEAD & NECK SURGERY      brain tumor resection     PHACOEMULSIFICATION CLEAR CORNEA WITH STANDARD INTRAOCULAR LENS IMPLANT Right 2016    Procedure: PHACOEMULSIFICATION CLEAR CORNEA WITH STANDARD INTRAOCULAR LENS IMPLANT;  Surgeon: Aiden Lee MD;  Location: [facility]       FAMILY HISTORY:  No family history on file.    SOCIAL HISTORY:  Social History     Socioeconomic History     Marital status:      Spouse name: None     Number of children: None     Years of education: None     Highest education level: None   Tobacco Use     Smoking status: Former Smoker     Quit date:      Years since quittin.7     Smokeless tobacco: Never Used   Substance and Sexual Activity     Alcohol use: Yes     Comment: 1 wine with dinner     Drug use: No     Sexual activity: Not Currently

## 2022-09-12 NOTE — TELEPHONE ENCOUNTER
ED TIA Pathway patient. Chart reviewed    Neurology phone note while in the hospital: YES    Head CT or Brain MRI completed:YES    Head and Neck Vessel Imaging completed (MRA Head/Neck or CTA Head/Neck):YES    Patient scheduled for Stroke WIL Virtual appointment 9/19/22 at 2:00 PM- (Next available)     Please Notify patient of scheduled follow up and Assist with scheduling Echocardiogram and Cardiac Event Monitor     MARNIE GARCIA CMA

## 2022-09-12 NOTE — PROGRESS NOTES
ANTICOAGULATION MANAGEMENT     Catalina Marie 76 year old female is on warfarin with supratherapeutic INR result. (Goal INR 2.0-3.0)    Recent labs: (last 7 days)     09/12/22  1516   INR 3.3*       ASSESSMENT       Source(s): Chart Review and Patient/Caregiver Call       Warfarin doses taken: Warfarin taken as instructed , she doubled her dose as instructed at ED on 9/9/22. This resulted in a 17% increase in this week's warfarin dose which may have been a bit excessive.    Diet: No new diet changes identified    New illness, injury, or hospitalization: Yes: to ED on 9/9/22 with symptoms of TIA. CT and MRI negative for acute changes, but she plans to follow up with Neuro. Met with PCP today but note is incomplete, no med changes noted.    Medication/supplement changes: None noted    Signs or symptoms of bleeding or clotting: No    Previous INR: Subtherapeutic    Additional findings: None       PLAN     Recommended plan for temporary change(s) affecting INR     Dosing Instructions: partial hold then continue your current warfarin dose with next INR in 2 weeks       Summary  As of 9/12/2022    Full warfarin instructions:  9/12: 2.5 mg; Otherwise 2.5 mg every Wed, Sat; 5 mg all other days   Next INR check:  9/27/2022             Telephone call with Jennifer who agrees to plan and repeated back plan correctly    Lab visit scheduled    Education provided: Please call back if any changes to your diet, medications or how you've been taking warfarin, Importance of consistent vitamin K intake, Goal range and significance of current result, Monitoring for bleeding signs and symptoms, Monitoring for clotting signs and symptoms, When to seek medical attention/emergency care and Contact [phone]  with any changes, questions or concerns.     Plan made per [facility] anticoagulation protocol    Hanna Flores, RN  Anticoagulation Clinic  9/12/2022    _______________________________________________________________________      Anticoagulation Episode Summary     Current INR goal:  2.0-3.0   TTR:  45.6 % (3.9 mo)   Target end date:  Indefinite   Send INR reminders to:  DEMOND FRANCO    Indications    Long term current use of anticoagulant therapy [Z79.01]  Atrial fibrillation (H) [I48.91] [I48.91]  Chronic atrial fibrillation (H) [I48.20]  Longstanding persistent atrial fibrillation (H) [I48.11]           Comments:           Anticoagulation Care Providers     Provider Role Specialty Phone number    Ian Tracy MD Referring Internal Medicine [phone]    Sulaiman Trinh MD Referring Internal Medicine [phone]

## 2022-09-12 NOTE — TELEPHONE ENCOUNTER
On review, pt already has known afib and even just recently wore a ziopatch to access afib burden so she does not need to wear the 30 day event monitor as part of her TIA workup.     Called cardiology clinic and was told that the pt already completed echo, but they were unsure if monitor had already been placed. Clinic staff said they were contacting the EKG tech to relay information.     Called pt directly and told her that she does not need to have the monitor placed since we already know she has afib. Pt acknowledged understanding and was appreciative of the call.       Zoe Schultz BS, RN, SCRN  RN Stroke Neurology Care Coordinator  [facility] Neuroscience Service Line

## 2022-09-13 NOTE — TELEPHONE ENCOUNTER
Clinic Care Coordination Contact    Situation: Patient chart reviewed by care coordinator.    Background: From ED consult note, Catalina Marie is a 76 year old female with history of schwannoma s/p resection, atrial fibrillation (on Coumadin), HTN, and DM. She presented to the [facility] ED for evaluation of left sided numbness and drooling. She reports that she was in the car and around 1330 noticed that the left side of her face felt numb. She also notes patchy numbness in the left arm and leg. It lasted less than a minute before resolving. Denies weakness, language impairment or vision changes. Spouse did not appreciate a facial droop. She reports that she has been having bradycardia for the past day and today her home BP readings were in the 170s. She has been struggling to maintain a therapeutic INR. Today INR was 1.8.    Assessment: Patient was in the ER for suspected TIA in the setting of subtherapeutic INR on 9/9. She had office visits on 9/12 with both cardiology and PCP. She is scheduled for a virtual visit with our stroke WIL team on 9/19.    Plan/Recommendations: No additional RNCC outreach indicated as she has been seen twice in clinic yesterday by cardiology and PCP where questions were discussed and pt advised to keep appt with our team next week.     Zoe Schultz BS, RN, SCRN  RN Stroke Neurology Care Coordinator  [facility] Neuroscience Service Line

## 2022-09-14 DIAGNOSIS — I48.20 CHRONIC ATRIAL FIBRILLATION (H): Primary | ICD-10-CM

## 2022-09-14 RX ORDER — DIGOXIN 250 MCG
TABLET ORAL
Qty: 45 TABLET | Refills: 1 | Status: SHIPPED | OUTPATIENT
Start: 2022-09-14 | End: 2022-09-19

## 2022-09-14 NOTE — TELEPHONE ENCOUNTER
Routing refill request to provider for review/approval because:    X Normal digoxin level on file in past 12 mos

## 2022-09-19 ENCOUNTER — VIRTUAL VISIT (OUTPATIENT)
Dept: NEUROLOGY | Facility: CLINIC | Age: 77
End: 2022-09-19
Payer: MEDICARE

## 2022-09-19 ENCOUNTER — TELEPHONE (OUTPATIENT)
Dept: NEUROLOGY | Facility: CLINIC | Age: 77
End: 2022-09-19

## 2022-09-19 DIAGNOSIS — R20.0 NUMBNESS: Primary | ICD-10-CM

## 2022-09-19 DIAGNOSIS — I48.11 LONGSTANDING PERSISTENT ATRIAL FIBRILLATION (H): ICD-10-CM

## 2022-09-19 DIAGNOSIS — E78.5 HYPERLIPIDEMIA LDL GOAL <100: ICD-10-CM

## 2022-09-19 PROCEDURE — 99443 PR PHYSICIAN TELEPHONE EVALUATION 21-30 MIN: CPT | Mod: 95

## 2022-09-19 NOTE — NURSING NOTE
Medication list reviewed with patient. Concerns about ongoing dizziness, lightheadedness and balance issues.    MARNIE GARCIA, CMA

## 2022-09-19 NOTE — PROGRESS NOTES
"  Jennifer is a 76 year old who is being evaluated via a billable telephone visit.      What phone number would you like to be contacted at? [phone]  How would you like to obtain your AVS? Criselda  Phone call duration: 21 minutes      __________________________________________________________      [facility] - [place]    [phone]  __________________________________________________________    Chief Complaint: Patient presents with:  Transient Ischemic Attack      History of Present Illness: Catalina Marie is a 76 year old female presenting as a new patient to TIA clinic. She presented to the [facility] emergency department on 9/9/22 because of left side numbness and drooling.    She reports that she had been feeling off, heart rate was low and BP was high.  HR was in mid 40s.  -180.  She was very tired and lying down and started to drool.  L face seemed \"frozen\" but not totally numb.  L eye felt funny too.  BP in /76.  Symptoms lasted 30-45 minutes    She has a past medical history of afib on coumadin, HTN and DM2.    TIAEvaluation summarized:  MRI/Head CT: MRI brain shows no acute stroke or infarct  Head vessels: CTA head normal  Neck vessels: CTA neck normal  Echo: EF 65-70%, severe biatrial enlargement  EKG/Tele: atrial fibrillation  LDL: 47  A1c: 6.7  Other testing: INR 1.8    ABCD2 Patients Score   Age ? 60 years 1 point 1   Blood Pressure     -SBP ? 140 or DBP ?  90    1 point 1   Clinical Features    -Unilateral weakness   -Speech disturbance w/o weakness    -Other    2 points  1 point    0 points 0   Duration of symptoms    ?60 minutes    10-59 minutes    <10 minutes   2 points  1 point  0 points 1   Diabetes  1 point 1   Patient s ABCD2 Score (0-7) = 4     She was started on no new meds for recurrent stroke prevention- kept on her home coumadin. Since the above-mentioned ER visit, she still continues to have some abnormal feeling in her left eye " "that is hard to describe but she has no vision loss, no numbess or tingling or weakness.       Impression:   Problem List Items Addressed This Visit        Endocrine    Hyperlipidemia LDL goal <100       Circulatory    Longstanding persistent atrial fibrillation (H)      Other Visit Diagnoses     Numbness    -  Primary        76 year old woman with an episode for which she had TIA workup, however her symptoms are not especially focal and neuroimaging was benign.  She could have had some fluctuation in BP or HR. Even if I had higher suspicion for TIA, she is already anticoagulated anyway, and her LDL and A1c are well within range. The possibility of TIA does still exist and her INR was a little low at 1.8 so she should work with PCP to keep 2-3. I encouraged her to go to the ER with any new symptoms, but reassured her about this episode.    Plan:   - Continue coumadin with stricter INR control goal 2-3  - Continue statin for goal LDL 40-70  - No indication for a cardionet monitor because she already has known atrial fibrillation  - Continue tight BP control <135/80 if tolerated  - No further workup  - Return for with [facility] only, as needed.    Stroke Education provided.  She will call us with any questions.  For any acute neurologic deficits she was advised to  go directly to the hospital rather than call the clinic.    Amna Dewitt PA-C  Neurology  09/19/2022 1:58 PM  To page me or covering stroke neurology team member, click here: AMCOM  Choose \"On Call\" tab at top, then search dropdown box for \"Neurology Adult\" & press Enter, look for Neuro ICU/Stroke    ___________________________________________________________________    Current Medications  Current Outpatient Medications   Medication Sig     amLODIPine (NORVASC) 5 MG tablet TAKE 0.5 TABLETS (2.5 MG) BY MOUTH DAILY     atenolol (TENORMIN) 25 MG tablet TAKE 1 TABLET BY MOUTH ONCE A DAY     atorvastatin (LIPITOR) 10 MG tablet TAKE 1/2 TABLET BY MOUTH DAILY " (Patient taking differently: 5 mg TAKE 1/2 TABLET BY MOUTH DAILY)     Calcium Carbonate-Vitamin D (CALCIUM + D PO) Take by mouth daily     carboxymethylcellulose (CELLUVISC/REFRESH LIQUIGEL) 1 % ophthalmic solution Place 1 drop into both eyes 4 times daily as needed for dry eyes     digoxin (LANOXIN) 125 MCG tablet Take 1 tablet (125 mcg) by mouth daily     fenofibrate (TRIGLIDE/LOFIBRA) 160 MG tablet Take 1 tablet (160 mg) by mouth daily     furosemide (LASIX) 20 MG tablet TAKE 1 TABLET BY MOUTH TWICE A DAY     ipratropium (ATROVENT) 0.06 % nasal spray Spray 2 sprays into both nostrils 4 times daily     lisinopril (ZESTRIL) 40 MG tablet Take 1 tablet (40 mg) by mouth daily     propafenone (RYTHMOL) 150 MG TABS tablet Take 1 tablet (150 mg) by mouth 3 times daily     sertraline (ZOLOFT) 100 MG tablet TAKE 1 TABLET BY MOUTH EVERY DAY     triamcinolone (KENALOG) 0.1 % external cream Apply topically 2 times daily Sig: Apply sparingly to right lateral foot daily     verapamil ER (VERELAN) 240 MG 24 hr capsule Take 1 capsule (240 mg) by mouth daily     warfarin ANTICOAGULANT (COUMADIN) 5 MG tablet TAKE 1/2 TO 1 TABLET EVERY DAY OR AS DIRECTED BY INR CLINIC     digoxin (LANOXIN) 250 MCG tablet TAKE 0.5 TABLETS (125 MCG) BY MOUTH DAILY (Patient not taking: Reported on 2022)     No current facility-administered medications for this visit.       Past Medical History  Past Medical History:   Diagnosis Date     Arrhythmia      Diabetes (H)     diet controlled     Hypertension      Schwannoma        Social History  Social History     Tobacco Use     Smoking status: Former Smoker     Quit date:      Years since quittin.7     Smokeless tobacco: Never Used   Vaping Use     Vaping Use: Never used   Substance Use Topics     Alcohol use: Yes     Comment: 1 wine with dinner     Drug use: No       Family History  No family history on file.    Physical Exam    Vitals - Patient Reported 2022   Weight (Patient Reported)  138 lb   Systolic (Patient Reported) 116   Diastolic (Patient Reported) 76   Pulse (Patient Reported) 66             Neurologic  Phone-only visit. Speech somewhat slow but within normal limits    Neuroimaging: as per HPI. I personally reviewed those images    Labs:    Coagulation studies:  Recent Labs   Lab Test 09/12/22  1516 09/09/22  1433 09/06/22  1105   INR 3.3* 1.84* 2.2*        Lipid panel:  Recent Labs   Lab Test 09/09/22  1433 08/18/22  1548   CHOL 138 179   HDL 35* 34*   LDL 47 95   TRIG 281* 249*       HbA1C:  Recent Labs   Lab Test 08/18/22  1548 10/05/21  1113 07/21/21  1115   A1C 6.7* 6.6* 6.4*         Billing:    I spent a total of 56 minutes on the day of the visit.   Time spent doing chart review, history and exam, documentation and further activities per the note

## 2022-09-19 NOTE — TELEPHONE ENCOUNTER
[facility] Call Center    Phone Message    May a detailed message be left on voicemail: yes     Reason for Call:   Patient's  temo called, states that they are unsure how to start patients video visit with Stroke WIL at 2 PM today. Temo asking if this can be done as a telephone call, please call patient back to assist with patients request.         Action Taken: Other: CS Neurology    Travel Screening: Not Applicable

## 2022-09-19 NOTE — LETTER
"    9/19/2022        RE: Catalina Marie  [address] [facility] [address]          Jennifer is a 76 year old who is being evaluated via a billable telephone visit.      What phone number would you like to be contacted at? [phone]  How would you like to obtain your AVS? [email or web address]  Phone call duration: 21 minutes      __________________________________________________________      [facility] [place]    [phone]  __________________________________________________________    Chief Complaint: Patient presents with:  Transient Ischemic Attack      History of Present Illness: Catalina Marie is a 76 year old female presenting as a new patient to TIA clinic. She presented to the [facility] emergency department on 9/9/22 because of left side numbness and drooling.    She reports that she had been feeling off, heart rate was low and BP was high.  HR was in mid 40s.  -180.  She was very tired and lying down and started to drool.  L face seemed \"frozen\" but not totally numb.  L eye felt funny too.  BP in /76.  Symptoms lasted 30-45 minutes    She has a past medical history of afib on coumadin, HTN and DM2.    TIAEvaluation summarized:  MRI/Head CT: MRI brain shows no acute stroke or infarct  Head vessels: CTA head normal  Neck vessels: CTA neck normal  Echo: EF 65-70%, severe biatrial enlargement  EKG/Tele: atrial fibrillation  LDL: 47  A1c: 6.7  Other testing: INR 1.8    ABCD2 Patients Score   Age ? 60 years 1 point 1   Blood Pressure     -SBP ? 140 or DBP ?  90    1 point 1   Clinical Features    -Unilateral weakness   -Speech disturbance w/o weakness    -Other    2 points  1 point    0 points 0   Duration of symptoms    ?60 minutes    10-59 minutes    <10 minutes   2 points  1 point  0 points 1   Diabetes  1 point 1   Patient s ABCD2 Score (0-7) = 4     She was started on no new meds for recurrent stroke prevention- kept on her home coumadin. Since " "the above-mentioned ER visit, she still continues to have some abnormal feeling in her left eye that is hard to describe but she has no vision loss, no numbess or tingling or weakness.       Impression:   Problem List Items Addressed This Visit        Endocrine    Hyperlipidemia LDL goal <100       Circulatory    Longstanding persistent atrial fibrillation (H)      Other Visit Diagnoses     Numbness    -  Primary        76 year old woman with an episode for which she had TIA workup, however her symptoms are not especially focal and neuroimaging was benign.  She could have had some fluctuation in BP or HR. Even if I had higher suspicion for TIA, she is already anticoagulated anyway, and her LDL and A1c are well within range. The possibility of TIA does still exist and her INR was a little low at 1.8 so she should work with PCP to keep 2-3. I encouraged her to go to the ER with any new symptoms, but reassured her about this episode.    Plan:   - Continue coumadin with stricter INR control goal 2-3  - Continue statin for goal LDL 40-70  - No indication for a cardionet monitor because she already has known atrial fibrillation  - Continue tight BP control <135/80 if tolerated  - No further workup  - Return for with  only, as needed.    Stroke Education provided.  She will call us with any questions.  For any acute neurologic deficits she was advised to  go directly to the hospital rather than call the clinic.    Amna Dewitt PA-C  Neurology  09/19/2022 1:58 PM  To page me or covering stroke neurology team member, click here: AMCOM  Choose \"On Call\" tab at top, then search dropdown box for \"Neurology Adult\" & press Enter, look for Neuro ICU/Stroke    ___________________________________________________________________    Current Medications  Current Outpatient Medications   Medication Sig     amLODIPine (NORVASC) 5 MG tablet TAKE 0.5 TABLETS (2.5 MG) BY MOUTH DAILY     atenolol (TENORMIN) 25 MG tablet TAKE 1 TABLET " BY MOUTH ONCE A DAY     atorvastatin (LIPITOR) 10 MG tablet TAKE 1/2 TABLET BY MOUTH DAILY (Patient taking differently: 5 mg TAKE 1/2 TABLET BY MOUTH DAILY)     Calcium Carbonate-Vitamin D (CALCIUM + D PO) Take by mouth daily     carboxymethylcellulose (CELLUVISC/REFRESH LIQUIGEL) 1 % ophthalmic solution Place 1 drop into both eyes 4 times daily as needed for dry eyes     digoxin (LANOXIN) 125 MCG tablet Take 1 tablet (125 mcg) by mouth daily     fenofibrate (TRIGLIDE/LOFIBRA) 160 MG tablet Take 1 tablet (160 mg) by mouth daily     furosemide (LASIX) 20 MG tablet TAKE 1 TABLET BY MOUTH TWICE A DAY     ipratropium (ATROVENT) 0.06 % nasal spray Spray 2 sprays into both nostrils 4 times daily     lisinopril (ZESTRIL) 40 MG tablet Take 1 tablet (40 mg) by mouth daily     propafenone (RYTHMOL) 150 MG TABS tablet Take 1 tablet (150 mg) by mouth 3 times daily     sertraline (ZOLOFT) 100 MG tablet TAKE 1 TABLET BY MOUTH EVERY DAY     triamcinolone (KENALOG) 0.1 % external cream Apply topically 2 times daily Sig: Apply sparingly to right lateral foot daily     verapamil ER (VERELAN) 240 MG 24 hr capsule Take 1 capsule (240 mg) by mouth daily     warfarin ANTICOAGULANT (COUMADIN) 5 MG tablet TAKE 1/2 TO 1 TABLET EVERY DAY OR AS DIRECTED BY INR CLINIC     digoxin (LANOXIN) 250 MCG tablet TAKE 0.5 TABLETS (125 MCG) BY MOUTH DAILY (Patient not taking: Reported on 2022)     No current facility-administered medications for this visit.       Past Medical History  Past Medical History:   Diagnosis Date     Arrhythmia      Diabetes (H)     diet controlled     Hypertension      Schwannoma        Social History  Social History     Tobacco Use     Smoking status: Former Smoker     Quit date:      Years since quittin.7     Smokeless tobacco: Never Used   Vaping Use     Vaping Use: Never used   Substance Use Topics     Alcohol use: Yes     Comment: 1 wine with dinner     Drug use: No       Family History  No family history  on file.    Physical Exam    Vitals - Patient Reported 9/19/2022   Weight (Patient Reported) 138 lb   Systolic (Patient Reported) 116   Diastolic (Patient Reported) 76   Pulse (Patient Reported) 66             Neurologic  Phone-only visit. Speech somewhat slow but within normal limits    Neuroimaging: as per HPI. I personally reviewed those images    Labs:    Coagulation studies:  Recent Labs   Lab Test 09/12/22  1516 09/09/22  1433 09/06/22  1105   INR 3.3* 1.84* 2.2*        Lipid panel:  Recent Labs   Lab Test 09/09/22  1433 08/18/22  1548   CHOL 138 179   HDL 35* 34*   LDL 47 95   TRIG 281* 249*       HbA1C:  Recent Labs   Lab Test 08/18/22  1548 10/05/21  1113 07/21/21  1115   A1C 6.7* 6.6* 6.4*         Billing:    I spent a total of 56 minutes on the day of the visit.   Time spent doing chart review, history and exam, documentation and further activities per the note              Sincerely,        CS NEUROLOGY STROKE

## 2022-09-29 ENCOUNTER — VIRTUAL VISIT (OUTPATIENT)
Dept: FAMILY MEDICINE | Facility: CLINIC | Age: 77
End: 2022-09-29
Payer: MEDICARE

## 2022-09-29 DIAGNOSIS — I10 BENIGN ESSENTIAL HYPERTENSION: ICD-10-CM

## 2022-09-29 DIAGNOSIS — R14.3 EXCESSIVE GAS: ICD-10-CM

## 2022-09-29 DIAGNOSIS — I48.20 CHRONIC ATRIAL FIBRILLATION (H): Primary | ICD-10-CM

## 2022-09-29 PROCEDURE — 99442 PR PHYSICIAN TELEPHONE EVALUATION 11-20 MIN: CPT | Mod: 93 | Performed by: INTERNAL MEDICINE
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
## 2022-09-30 ENCOUNTER — LAB (OUTPATIENT)
Dept: LAB | Facility: CLINIC | Age: 77
End: 2022-09-30
Payer: MEDICARE

## 2022-09-30 ENCOUNTER — ANTICOAGULATION THERAPY VISIT (OUTPATIENT)
Dept: ANTICOAGULATION | Facility: CLINIC | Age: 77
End: 2022-09-30

## 2022-09-30 DIAGNOSIS — Z79.01 LONG TERM CURRENT USE OF ANTICOAGULANT THERAPY: ICD-10-CM

## 2022-09-30 DIAGNOSIS — Z79.01 LONG TERM CURRENT USE OF ANTICOAGULANT THERAPY: Primary | ICD-10-CM

## 2022-09-30 DIAGNOSIS — I48.20 CHRONIC ATRIAL FIBRILLATION (H): ICD-10-CM

## 2022-09-30 DIAGNOSIS — I48.11 LONGSTANDING PERSISTENT ATRIAL FIBRILLATION (H): ICD-10-CM

## 2022-09-30 LAB — INR BLD: 3.1 (ref 0.9–1.1)

## 2022-09-30 PROCEDURE — 36416 COLLJ CAPILLARY BLOOD SPEC: CPT

## 2022-09-30 PROCEDURE — 85610 PROTHROMBIN TIME: CPT

## 2022-09-30 NOTE — PROGRESS NOTES
ANTICOAGULATION MANAGEMENT     Catalina Marie 77 year old female is on warfarin with supratherapeutic INR result. (Goal INR 2.0-3.0)    Recent labs: (last 7 days)     09/30/22  1057   INR 3.1*       ASSESSMENT       Source(s): Chart Review and Patient/Caregiver Call       Warfarin doses taken: Warfarin taken as instructed    Diet: Decreased greens/vitamin K in diet; ongoing change    New illness, injury, or hospitalization: No    Medication/supplement changes: None noted    Signs or symptoms of bleeding or clotting: No    Previous INR: Supratherapeutic    Additional findings: Leaving for [place] 10/30/22       PLAN     Recommended plan for ongoing change(s) affecting INR     Dosing Instructions: decrease your warfarin dose (8.3% change) with next INR in 2-3 weeks       Summary  As of 9/30/2022    Full warfarin instructions:  2.5 mg every Mon, Wed, Fri; 5 mg all other days   Next INR check:  10/14/2022             Telephone call with Jennifer who verbalizes understanding and agrees to plan  Sent [employer] message with dosing and follow up instructions    Lab visit scheduled    Education provided: Importance of consistent vitamin K intake and Contact [phone]  with any changes, questions or concerns.     Plan made per [facility] anticoagulation protocol    Amie Sepulveda RN  Anticoagulation Clinic  9/30/2022    _______________________________________________________________________     Anticoagulation Episode Summary     Current INR goal:  2.0-3.0   TTR:  34.3 % (3.9 mo)   Target end date:  Indefinite   Send INR reminders to:  DEMOND FRANCO    Indications    Long term current use of anticoagulant therapy [Z79.01]  Atrial fibrillation (H) [I48.91] [I48.91]  Chronic atrial fibrillation (H) [I48.20]  Longstanding persistent atrial fibrillation (H) [I48.11]           Comments:           Anticoagulation Care Providers     Provider Role Specialty Phone number    Ian Tracy MD Referring Internal Medicine [phone]    Ziggy  Sulaiman DEAN MD [facility] Internal Medicine [phone]

## 2022-10-10 ENCOUNTER — HEALTH MAINTENANCE LETTER (OUTPATIENT)
Age: 77
End: 2022-10-10

## 2022-10-14 ENCOUNTER — DOCUMENTATION ONLY (OUTPATIENT)
Dept: ANTICOAGULATION | Facility: CLINIC | Age: 77
End: 2022-10-14

## 2022-10-14 ENCOUNTER — LAB (OUTPATIENT)
Dept: LAB | Facility: CLINIC | Age: 77
End: 2022-10-14
Payer: MEDICARE

## 2022-10-14 ENCOUNTER — ANTICOAGULATION THERAPY VISIT (OUTPATIENT)
Dept: ANTICOAGULATION | Facility: CLINIC | Age: 77
End: 2022-10-14

## 2022-10-14 DIAGNOSIS — I48.20 CHRONIC ATRIAL FIBRILLATION (H): ICD-10-CM

## 2022-10-14 DIAGNOSIS — Z79.01 LONG TERM CURRENT USE OF ANTICOAGULANT THERAPY: Primary | ICD-10-CM

## 2022-10-14 DIAGNOSIS — I48.11 LONGSTANDING PERSISTENT ATRIAL FIBRILLATION (H): ICD-10-CM

## 2022-10-14 DIAGNOSIS — Z79.01 LONG TERM CURRENT USE OF ANTICOAGULANT THERAPY: ICD-10-CM

## 2022-10-14 LAB — INR BLD: 2.4 (ref 0.9–1.1)

## 2022-10-14 PROCEDURE — 85610 PROTHROMBIN TIME: CPT

## 2022-10-14 PROCEDURE — 36416 COLLJ CAPILLARY BLOOD SPEC: CPT

## 2022-10-14 NOTE — PROGRESS NOTES
ANTICOAGULATION MANAGEMENT     Catalina Marie 77 year old female is on warfarin with therapeutic INR result. (Goal INR 2.0-3.0)    Recent labs: (last 7 days)     10/14/22  1044   INR 2.4*       ASSESSMENT       Source(s): Chart Review    Previous INR was Therapeutic last 2(+) visits    Medication, diet, health changes since last INR chart reviewed; none identified           PLAN     Unable to reach Jennifer today.    Left message to current doses this weekend. Request call back for assessment.    Follow up required to confirm warfarin dose taken and assess for changes and information about upcoming travel plans ( see separate encounter and enter information)      Departure date:   Anticipated return date:   Alternate contact information (if applicable):       INR monitoring plan:     Provider/Clinic name and phone number to manage warfarin:     ?   WAQAS Pat RN  Anticoagulation Clinic  10/14/2022

## 2022-10-17 ENCOUNTER — VIRTUAL VISIT (OUTPATIENT)
Dept: FAMILY MEDICINE | Facility: CLINIC | Age: 77
End: 2022-10-17
Payer: MEDICARE

## 2022-10-17 ENCOUNTER — TELEPHONE (OUTPATIENT)
Dept: FAMILY MEDICINE | Facility: CLINIC | Age: 77
End: 2022-10-17

## 2022-10-17 ENCOUNTER — NURSE TRIAGE (OUTPATIENT)
Dept: FAMILY MEDICINE | Facility: CLINIC | Age: 77
End: 2022-10-17

## 2022-10-17 DIAGNOSIS — U07.1 INFECTION DUE TO 2019 NOVEL CORONAVIRUS: Primary | ICD-10-CM

## 2022-10-17 PROCEDURE — 99212 OFFICE O/P EST SF 10 MIN: CPT | Mod: CS | Performed by: NURSE PRACTITIONER

## 2022-10-17 NOTE — PROGRESS NOTES
Jennifer is a 77 year old who is being evaluated via a billable video visit.      How would you like to obtain your AVS? MyChart  If the video visit is dropped, the invitation should be resent by: Text to cell phone: [phone]  Will anyone else be joining your video visit? No    Switched to telephone visit d/t pt preference     Assessment & Plan   Problem List Items Addressed This Visit    None  Visit Diagnoses     Infection due to 2019 novel coronavirus    -  Primary           Doesn't qualify for paxlovid d/t drug interactions. Not interested in monoclonals at this point. Will continue to watch and wait. She knows reasons to follow-up     OLENA Miranda CNP  [facility]   Jennifer is a 77 year old, presenting for the following health issues:  No chief complaint on file.      HPI       COVID-19 Symptom Review  How many days ago did these symptoms start? friday    Are any of the following symptoms significant for you?    New or worsening difficulty breathing? No    Worsening cough? Yes, it's a dry cough.     Fever or chills? Chills     Headache: No    Sore throat: No    Chest pain: No    Diarrhea: YES    Body aches? YES      Symptoms as noted above started Friday late, 3 days ago   Got a flu shot on Wednesday 2 days prior       Review of Systems   Detailed as above         Objective           Vitals:  No vitals were obtained today due to virtual visit.    Physical Exam   Occasional cough noted   Normal mood and affect             Visit Details    Telephone: 15 mins

## 2022-10-17 NOTE — TELEPHONE ENCOUNTER
"Nurse Triage SBAR    Is this a 2nd Level Triage? YES, LICENSED PRACTITIONER REVIEW IS REQUIRED    Situation: Pts  called the clinic stating his wife tested positive for covid 19 yesterday and has symptoms and is wondering what to do. No C2C on file but pt gave verbal consent to speak to her  Nick about her medical information. Pt was also on the phone conversation.      Background: Pt stated in the beginning she had chills, and HA. This morning pt stated she had mild central chest pressure that went away after she coughed. Pt stated mild SOB when walking from the bed to the bathroom but doesn't notice if its better when she clears out her nose due to her always having a runny nose.      Assessment: Loss of smell, looses voice every so often, fatigue, cough (manily dry cough with an occasional productive cough). Pt stated \"if it was worse I wouldn't be in the apartment\".      Protocol Recommended Disposition:   Discuss With PCP And Callback By Nurse Within 1 Hour  Informed pt and  about the covid tx line. Routing to provider for recommendations.     Please call pt or pts  back with PCPs recommendations.     Pt stated they will answer the phone   Phone number patient can be reached at: [phone]- Pts  nick   [phone]- pt #     Cherri Barboza RN  [facility] Triage         Routed to provider    Does the patient meet one of the following criteria for ADS visit consideration? 16+ years old, with an [facility] PCP     TIP  Providers, please consider if this condition is appropriate for management at one of our Acute and Diagnostic Services sites.     If patient is a good candidate, please use dotphrase <dot>triageresponse and select Refer to ADS to document.            [phone]- pt #     Tested with home test- positive on 10/16/22  -Chest feels heavy and then coughs and feels better   -loss of smell   -looses voice every so often   -fatigue   -Pt stated if it " "was worse she wouldn't be at the apartment   -No CP   -Mild center of chest CP when coughing this morning but not currently having it   -A little SOB when walking from the bathroom to the bed   -Pt states her nose is always runny.   1. COVID-19 DIAGNOSIS: \"Who made your COVID-19 diagnosis?\" \"Was it confirmed by a positive lab test or self-test?\" If not diagnosed by a doctor (or NP/PA), ask \"Are there lots of cases (community spread) where you live?\" Note: See [facility] health department website, if unsure.      Home testy   2. COVID-19 EXPOSURE: \"Was there any known exposure to COVID before the symptoms began?\" CDC Definition of close contact: within 6 feet (2 meters) for a total of 15 minutes or more over a 24-hour period.       Unknown   3. ONSET: \"When did the COVID-19 symptoms start?\"       10/16/22  4. WORST SYMPTOM: \"What is your worst symptom?\" (e.g., cough, fever, shortness of breath, muscle aches)      Cough frequently (dry cough but has coughed up some phlegm occasionally), fatigue   5. COUGH: \"Do you have a cough?\" If Yes, ask: \"How bad is the cough?\"        Frequent cough   6. FEVER: \"Do you have a fever?\" If Yes, ask: \"What is your temperature, how was it measured, and when did it start?\"      99 last night   7. RESPIRATORY STATUS: \"Describe your breathing?\" (e.g., shortness of breath, wheezing, unable to speak)       A little wheezing,   8. BETTER-SAME-WORSE: \"Are you getting better, staying the same or getting worse compared to yesterday?\"  If getting worse, ask, \"In what way?\"      Pt stated \"I dont think I am getting any worse\"   9. HIGH RISK DISEASE: \"Do you have any chronic medical problems?\" (e.g., asthma, heart or lung disease, weak immune system, obesity, etc.)      Pt stated she has multiple heart problems   10. VACCINE: \"Have you had the COVID-19 vaccine?\" If Yes, ask: \"Which one, how many shots, when did you get it?\"        Yes   11. BOOSTER: \"Have you received your COVID-19 booster?\" If Yes, " "ask: \"Which one and when did you get it?\"        2 boosters- spring time 2022      13. OTHER SYMPTOMS: \"Do you have any other symptoms?\"  (e.g., chills, fatigue, headache, loss of smell or taste, muscle pain, sore throat)       Chills, HA in the beginning. Fatigue    14. O2 SATURATION MONITOR:  \"Do you use an oxygen saturation monitor (pulse oximeter) at home?\" If Yes, ask \"What is your reading (oxygen level) today?\" \"What is your usual oxygen saturation reading?\" (e.g., 95%)        No way to check o2 levels     Reason for Disposition    HIGH RISK for severe COVID complications (e.g., weak immune system, age > 64 years, obesity with BMI > 25, pregnant, chronic lung disease or other chronic medical condition) (Exception: Already seen by PCP and no new or worsening symptoms.)    Additional Information    Negative: SEVERE difficulty breathing (e.g., struggling for each breath, speaks in single words)    Negative: Bluish (or gray) lips or face now    Negative: Difficult to awaken or acting confused (e.g., disoriented, slurred speech)    Negative: Shock suspected (e.g., cold/pale/clammy skin, too weak to stand, low BP, rapid pulse)    Negative: Sounds like a life-threatening emergency to the triager    Negative: [1] Diagnosed or suspected COVID-19 AND [2] symptoms lasting 3 or more weeks    Negative: [1] COVID-19 exposure AND [2] no symptoms    Negative: COVID-19 vaccine reaction suspected (e.g., fever, headache, muscle aches) occurring 1 to 3 days after getting vaccine    Negative: COVID-19 vaccine, questions about    Negative: [1] Lives with someone known to have influenza (flu test positive) AND [2] flu-like symptoms (e.g., cough, runny nose, sore throat, SOB; with or without fever)    Negative: [1] Adult with possible COVID-19 symptoms AND [2] triager concerned about severity of symptoms or other causes    Negative: COVID-19 and breastfeeding, questions about    Negative: SEVERE or constant chest pain or pressure  " (Exception: Mild central chest pain, present only when coughing.)    Negative: MODERATE difficulty breathing (e.g., speaks in phrases, SOB even at rest, pulse 100-120)    Negative: Headache and stiff neck (can't touch chin to chest)    Negative: Oxygen level (e.g., pulse oximetry) 90 percent or lower    Negative: Chest pain or pressure    Negative: Patient sounds very sick or weak to the triager    Negative: MILD difficulty breathing (e.g., minimal/no SOB at rest, SOB with walking, pulse <100)    Negative: Fever > 103 F (39.4 C)    Negative: [1] Fever > 101 F (38.3 C) AND [2] over 60 years of age    Negative: [1] Fever > 100.0 F (37.8 C) AND [2] bedridden (e.g., nursing home patient, CVA, chronic illness, recovering from surgery)    Protocols used: CORONAVIRUS (COVID-19) DIAGNOSED OR [id number] 1.18.2022

## 2022-10-17 NOTE — TELEPHONE ENCOUNTER
Reason for Call:  Other appointment    Detailed comments: Catalina  called because there patient wants a telephone visit appointment with the provider. She tested positive for covid yesterday but also she has a heart condition.     Phone Number Patient can be reached at: Home number on file [phone] (home)    Best Time: Any time     Can we leave a detailed message on this number? YES    Call taken on 10/17/2022 at 9:52 AM by Naina Dooley

## 2022-10-17 NOTE — TELEPHONE ENCOUNTER
Shouldn't a call like this automatically trigger a response to get the patient set up for a virtual visit with PCP or a team provider or even other clinic provider as soon as possible to discuss COVID treatment options?  Please do so.

## 2022-10-17 NOTE — PROGRESS NOTES
ANTICOAGULATION MANAGEMENT     Catalina Marie 77 year old female is on warfarin with therapeutic INR result. (Goal INR 2.0-3.0)    Recent labs: (last 7 days)     10/14/22  1044   INR 2.4*       ASSESSMENT       Source(s): Chart Review and Patient/Caregiver Call       Warfarin doses taken: Warfarin taken as instructed    Diet: No new diet changes identified    New illness, injury, or hospitalization: Yes: Covid +, symptoms started 10/14/22 - fatigue, headache, chills, cough, one incident of chest pressure relieved by cough)    Medication/supplement changes: None noted    Signs or symptoms of bleeding or clotting: No    Previous INR: Supratherapeutic    Additional findings: Leaving for [place] at end of month, will check INR once more before traveling. Returning sometime in May, possibly June. Has a provider in [place] that manages her INR.       PLAN     Recommended plan for temporary change(s) affecting INR     Dosing Instructions: Continue your current warfarin dose with next INR in 2 weeks       Summary  As of 10/14/2022    Full warfarin instructions:  2.5 mg every Mon, Wed, Fri; 5 mg all other days   Next INR check:  10/28/2022             Telephone call with Jennifer who verbalizes understanding and agrees to plan    Lab visit scheduled    Education provided: Please call back if any changes to your diet, medications or how you've been taking warfarin, Importance of consistent vitamin K intake, Goal range and significance of current result, Importance of therapeutic range, Importance of following up at instructed interval, Importance of taking warfarin as instructed, Potential interaction between warfarin and + Covid diagnosis. Also if provider prescribes Paxlovid it could lower INR and Jennifer should call for earlier appointment if given prescription., Monitoring for bleeding signs and symptoms, Monitoring for clotting signs and symptoms and Contact [phone]  with any changes, questions or concerns.     Plan made per [facility]  anticoagulation protocol    Hanna Flores RN  Anticoagulation Clinic  10/17/2022    _______________________________________________________________________     Anticoagulation Episode Summary     Current INR goal:  2.0-3.0   TTR:  33.3 % (3.8 mo)   Target end date:  Indefinite   Send INR reminders to:  DEMOND FRANCO    Indications    Long term current use of anticoagulant therapy [Z79.01]  Atrial fibrillation (H) [I48.91] [I48.91]  Chronic atrial fibrillation (H) [I48.20]  Longstanding persistent atrial fibrillation (H) [I48.11]           Comments:           Anticoagulation Care Providers     Provider Role Specialty Phone number    Ian Tracy MD Referring Internal Medicine [phone]    Sulaiman Trinh MD Referring Internal Medicine [phone]

## 2022-10-24 DIAGNOSIS — F43.22 ADJUSTMENT DISORDER WITH ANXIOUS MOOD: ICD-10-CM

## 2022-10-25 RX ORDER — SERTRALINE HYDROCHLORIDE 100 MG/1
TABLET, FILM COATED ORAL
Qty: 90 TABLET | Refills: 0 | Status: SHIPPED | OUTPATIENT
Start: 2022-10-25 | End: 2023-01-23

## 2022-10-25 NOTE — TELEPHONE ENCOUNTER
Prescription approved per [facility] Refill Protocol.    Audrey Jin, RN BSN MSN  [facility]

## 2022-10-28 ENCOUNTER — LAB (OUTPATIENT)
Dept: LAB | Facility: CLINIC | Age: 77
End: 2022-10-28
Payer: MEDICARE

## 2022-10-28 ENCOUNTER — ANTICOAGULATION THERAPY VISIT (OUTPATIENT)
Dept: ANTICOAGULATION | Facility: CLINIC | Age: 77
End: 2022-10-28

## 2022-10-28 DIAGNOSIS — Z79.01 LONG TERM CURRENT USE OF ANTICOAGULANT THERAPY: Primary | ICD-10-CM

## 2022-10-28 DIAGNOSIS — I48.20 CHRONIC ATRIAL FIBRILLATION (H): ICD-10-CM

## 2022-10-28 DIAGNOSIS — Z79.01 LONG TERM CURRENT USE OF ANTICOAGULANT THERAPY: ICD-10-CM

## 2022-10-28 DIAGNOSIS — I48.11 LONGSTANDING PERSISTENT ATRIAL FIBRILLATION (H): ICD-10-CM

## 2022-10-28 LAB — INR BLD: 3.5 (ref 0.9–1.1)

## 2022-10-28 PROCEDURE — 36416 COLLJ CAPILLARY BLOOD SPEC: CPT

## 2022-10-28 PROCEDURE — 85610 PROTHROMBIN TIME: CPT

## 2022-10-28 NOTE — PROGRESS NOTES
ANTICOAGULATION MANAGEMENT     Catalina Marie 77 year old female is on warfarin with supratherapeutic INR result. (Goal INR 2.0-3.0)    Recent labs: (last 7 days)     10/28/22  1039   INR 3.5*       ASSESSMENT       Source(s): Chart Review and Patient/Caregiver Call       Warfarin doses taken: Warfarin taken as instructed    Diet: Decreased greens/vitamin K in diet; plans to resume previous intake    New illness, injury, or hospitalization: Yes: Covid + 10/17/22    Medication/supplement changes: None noted    Signs or symptoms of bleeding or clotting: No    Previous INR: Therapeutic last visit; previously outside of goal range    Additional findings: Leaving for [place] on 10/31/22. Will have the INR managed there.       PLAN     Recommended plan for temporary change(s) affecting INR     Dosing Instructions: hold dose then continue your current warfarin dose with next INR in 2 weeks       Summary  As of 10/28/2022    Full warfarin instructions:  10/28: Hold; Otherwise 2.5 mg every Mon, Wed, Fri; 5 mg all other days; Starting 10/28/2022   Next INR check:  11/11/2022             Telephone call with Jennifer who verbalizes understanding and agrees to plan    Patient using outside facility for labs    Education provided:     Please call back if any changes to your diet, medications or how you've been taking warfarin    Symptom monitoring: monitoring for bleeding signs and symptoms and when to seek medical attention/emergency care    Contact [phone]  with any changes, questions or concerns.     Plan made per [facility] anticoagulation protocol    Mayra MARS RN  Anticoagulation Clinic  10/28/2022    _______________________________________________________________________     Anticoagulation Episode Summary     Current INR goal:  2.0-3.0   TTR:  39.0 % (3.9 mo)   Target end date:  Indefinite   Send INR reminders to:  DEMOND FRANCO    Indications    Long term current use of anticoagulant therapy [Z79.01]  Atrial fibrillation  (H) [I48.91] [I48.91]  Chronic atrial fibrillation (H) [I48.20]  Longstanding persistent atrial fibrillation (H) [I48.11]           Comments:           Anticoagulation Care Providers     Provider Role Specialty Phone number    Ian Tracy MD Referring Internal Medicine [phone]    Sulaiman Trinh MD Referring Internal Medicine [phone]

## 2022-11-27 ENCOUNTER — HEALTH MAINTENANCE LETTER (OUTPATIENT)
Age: 77
End: 2022-11-27

## 2022-11-29 ENCOUNTER — TRANSFERRED RECORDS (OUTPATIENT)
Dept: HEALTH INFORMATION MANAGEMENT | Facility: CLINIC | Age: 77
End: 2022-11-29

## 2022-11-29 LAB
ALT SERPL-CCNC: 22 U/L (ref 7–52)
AST SERPL-CCNC: 30 U/L (ref 13–39)
CHOLESTEROL (EXTERNAL): 136 MG/DL (ref 20–180)
CREATININE (EXTERNAL): 1.2 MG/DL (ref 0.6–1.3)
GFR ESTIMATED (EXTERNAL): 47 ML/MIN/1.73M2
GFR ESTIMATED (IF AFRICAN AMERICAN) (EXTERNAL): 57 ML/MIN/1.73M2
GLUCOSE (EXTERNAL): 119 MG/DL (ref 70–100)
HBA1C MFR BLD: 6.4 % (ref 4.3–5.6)
HDLC SERPL-MCNC: 36 MG/DL (ref 23–92)
INR (EXTERNAL): 2.5 (ref 0.8–1.2)
LDL CHOLESTEROL CALCULATED (EXTERNAL): 66 MG/DL (ref 0–99)
NON HDL CHOLESTEROL (EXTERNAL): 100 MG/DL
POTASSIUM (EXTERNAL): 4.2 MMOL/L (ref 3.5–5.5)
TRIGLYCERIDES (EXTERNAL): 169 MG/DL (ref 30–150)
TSH SERPL-ACNC: 0.63 UIU/ML (ref 0.27–4.2)

## 2022-12-06 ENCOUNTER — TRANSFERRED RECORDS (OUTPATIENT)
Dept: HEALTH INFORMATION MANAGEMENT | Facility: CLINIC | Age: 77
End: 2022-12-06

## 2022-12-19 ENCOUNTER — TELEPHONE (OUTPATIENT)
Dept: CARDIOLOGY | Facility: CLINIC | Age: 77
End: 2022-12-19

## 2022-12-19 DIAGNOSIS — I48.91 A-FIB (H): Primary | ICD-10-CM

## 2022-12-19 RX ORDER — DIGOXIN 125 MCG
125 TABLET ORAL DAILY
Qty: 90 TABLET | Refills: 2 | Status: SHIPPED | OUTPATIENT
Start: 2022-12-19 | End: 2023-07-03

## 2022-12-19 RX ORDER — DIGOXIN 125 MCG
125 TABLET ORAL DAILY
Status: CANCELLED | OUTPATIENT
Start: 2022-12-19

## 2023-01-02 ENCOUNTER — MYC MEDICAL ADVICE (OUTPATIENT)
Dept: ANTICOAGULATION | Facility: CLINIC | Age: 78
End: 2023-01-02

## 2023-01-02 ENCOUNTER — DOCUMENTATION ONLY (OUTPATIENT)
Dept: ANTICOAGULATION | Facility: CLINIC | Age: 78
End: 2023-01-02

## 2023-01-02 NOTE — PROGRESS NOTES
ANTICOAGULATION     Catalina Marie is overdue for INR check.      My Chart message sent    Katia Monique RN

## 2023-02-04 DIAGNOSIS — E78.5 HYPERLIPIDEMIA LDL GOAL <100: ICD-10-CM

## 2023-02-04 DIAGNOSIS — I10 ESSENTIAL HYPERTENSION: ICD-10-CM

## 2023-02-06 RX ORDER — ATORVASTATIN CALCIUM 10 MG/1
TABLET, FILM COATED ORAL
Qty: 45 TABLET | Refills: 3 | Status: SHIPPED | OUTPATIENT
Start: 2023-02-06

## 2023-02-06 RX ORDER — AMLODIPINE BESYLATE 5 MG/1
TABLET ORAL
Qty: 45 TABLET | Refills: 1 | Status: SHIPPED | OUTPATIENT
Start: 2023-02-06 | End: 2023-07-03

## 2023-02-25 NOTE — TELEPHONE ENCOUNTER
Forwarded to Dr. Tracy.  Please review patient's Mychart message and advise regarding Afib.    Nicole Gale, RN, BS, PHN     Subjective:       Patient is here today for a diagnostic/therapeutic injection. 2/25/23    Active Hospital Problems    Diagnosis Date Noted    Lumbar radiculitis [M54.16] 01/07/2022    Lumbar spondylosis [M47.816] 01/07/2022       HPI: Patient is here today for adiagnostic/therapeutic injection. The most recent [facility] office visit notes have been reviewed and are unchanged. Review of Systems   Constitutional:  Positive for fatigue. HENT: Negative. Eyes: Negative. Respiratory: Negative. Cardiovascular: Negative. Gastrointestinal:  Negative for constipation and nausea. Endocrine: Negative. Genitourinary:  Negative for difficulty urinating. Musculoskeletal:  Positive for arthralgias, back pain and myalgias. Skin: Negative. Allergic/Immunologic: Negative. Neurological:  Positive for weakness and numbness. Hematological: Negative. Psychiatric/Behavioral:  Positive for sleep disturbance. All other systems reviewed and are negative. Allergies   Allergen Reactions    Tetanus Toxoids Hives    Codeine Nausea Only    Dtap-Ipv Vaccine Rash    Penicillins Rash    Tetanus Antitoxin Rash          Prior to Admission medications    Medication Sig Start Date End Date Taking? Authorizing Provider   allopurinol (ZYLOPRIM) 300 MG tablet Take 1 tablet by mouth once daily 2/21/23   Sade Ramos MD   gabapentin (NEURONTIN) 100 MG capsule Take 1 capsule by mouth daily for 30 days. 2/3/23 3/5/23  Cathy Sanchez MD   gabapentin (NEURONTIN) 100 MG capsule Take 1 capsule by mouth 3 times daily for 30 days.  2/3/23 3/5/23  Cathy Sanchez MD   bisoprolol (ZEBETA) 10 MG tablet Take 1 tablet by mouth once daily 1/6/23   Sadia Kwan MD   nitroGLYCERIN (NITROSTAT) 0.4 MG SL tablet Place 0.4 mg under the tongue every 5 minutes as needed 6/10/22 6/10/23  Historical Provider, MD   lisinopril (PRINIVIL;ZESTRIL) 10 MG tablet Take 1 tablet by mouth daily 12/15/22   Lis Mendes Sona Boston MD   amLODIPine (NORVASC) 10 MG tablet Take 1 tablet by mouth once daily 12/13/22   Dara Moser MD   gabapentin (NEURONTIN) 100 MG capsule Take 1 capsule by mouth 3 times daily for 31 days.  12/21/21 1/6/23  Corey Delcid MD   tiZANidine (ZANAFLEX) 4 MG tablet Take 1 tablet by mouth 3 times daily as needed (muscle spasm) 8/31/21   Bishop Marissa DO   isosorbide mononitrate (IMDUR) 30 MG extended release tablet Take 1 tablet by mouth once daily 7/22/21   Diana Lauren, APRN - CNP   gemfibrozil (LOPID) 600 MG tablet TAKE 1 TABLET BY MOUTH ONCE DAILY 7/22/21   Diana Lauren, CEM - CNP   pravastatin (PRAVACHOL) 10 MG tablet Take 1 tablet by mouth once daily 7/22/21   Diana Lauren, CEM - ALMA   lisinopril-hydroCHLOROthiazide (PRINZIDE;ZESTORETIC) 20-25 MG per tablet Take 1 tablet by mouth once daily 11/17/20 12/13/22  Ayesha Wu MD   TURMERIC PO Take 1,000 mg by mouth    Historical Provider, MD   aspirin 81 MG tablet Take 81 mg by mouth     Historical Provider, MD   Cyanocobalamin (B-12) 2500 MCG TABS Take 2,500 mcg by mouth daily     Historical Provider, MD   Cholecalciferol (D3-1000 PO) Take 1,000 Units by mouth daily     Historical Provider, MD   b complex vitamins capsule Take 1 capsule by mouth daily    Historical Provider, MD       Past Medical History:   Diagnosis Date    Angina at rest [facility])     Arthritis     CAD (coronary artery disease)     angina    Hyperlipidemia     Hypertension     Skin cancer        Past Surgical History:   Procedure Laterality Date    CARDIAC CATHETERIZATION      x 2    COLONOSCOPY      COLONOSCOPY N/A 9/12/2018    COLONOSCOPY WITH BIOPSY performed by Mariangel Blanco DO at [address] [facility]      forehead and  mouth in [address]  10/14/2021    CT BONE MARROW BIOPSY 10/14/2021 [address] [facility] CT SCAN    HERNIA REPAIR      PAIN MANAGEMENT PROCEDURE Left 1/7/2022    Left L4 L5 TRANSFORAMINAL performed by Corey Delcid MD at [address] [facility] PROCEDURE Left 2023    Left L4-L5 L5-S1 FACET performed by Eryn Angulo MD at [facility] andSocial History reviewed in the electronic medical record. Imaging: Reviewed available imaging in oursystem with the patient. No results found. Objective:     Vitals:    23 0956   BP: (!) 197/91   Pulse: 70   Resp: 16   Temp: 98.3 °F (36.8 °C)   SpO2: 99%          Physical Exam  Constitutional:       Appearance: He is well-developed. HENT:      Head: Normocephalic and atraumatic. Cardiovascular:      Pulses: Normal pulses. Comments: Warm extremities. Normal capillary refill. Pulmonary:      Effort: Pulmonary effort is normal.   Musculoskeletal:      Lumbar back: Negative right straight leg raise test and negative left straight leg raise test.   Skin:     General: Skin is warm and dry. Neurological:      Mental Status: He is alert and oriented to person, place, and time. Cranial Nerves: No cranial nerve deficit. Sensory: No sensory deficit. Motor: Motor strength is normal. No atrophy or abnormal muscle tone. Deep Tendon Reflexes: Reflexes are normal and symmetric. Psychiatric:         Speech: Speech normal.         Behavior: Behavior normal.     Neurologic Exam     Mental Status   Oriented to person, place, and time. Speech: speech is normal     Motor Exam     Strength   Strength 5/5 throughout. Right quadriceps: 5/5  Left quadriceps: 5/5  Right hamstrin/5  Left hamstrin/5  Back Exam     Tenderness   The patient is experiencing tenderness in the lumbar (+muse).     Range of Motion   Extension:  normal   Flexion:  normal   Lateral bend right:  normal   Lateral bend left:  normal   Rotation right:  normal   Rotation left:  normal     Muscle Strength   Right Quadriceps:  5/5   Left Quadriceps:  5/5   Right Hamstrings:  5/5   Left Hamstrings:  5/5     Tests   Straight leg raise right: negative  Straight leg raise left: negative    Other   Toe walk: normal  Heel walk: normal  Sensation: normal  Gait: normal           Lab Results   Component Value Date    WBC 4.9 12/13/2022    HGB 12.4 (L) 12/13/2022    HCT 35.0 (L) 12/13/2022     12/13/2022    CHOL 161 12/13/2022    TRIG 344 (H) 12/13/2022    HDL 28 (L) 12/13/2022    ALT 48 (H) 12/13/2022    AST 44 (H) 12/13/2022     12/13/2022    K 4.1 12/13/2022    CL 97 (L) 12/13/2022    CREATININE 1.12 12/13/2022    BUN 27 (H) 12/13/2022    CO2 26 12/13/2022    TSH 3.17 10/25/2021    PSA 1.57 09/21/2021    LABA1C 5.9 12/13/2022       Assessment:                            Active Hospital Problems    Diagnosis Date Noted    Lumbar radiculitis [M54.16] 01/07/2022    Lumbar spondylosis [M47.816] 01/07/2022                  Plan:   Proceed with planned procedure  - LL4-5l5-S1 Facet Steroid Injection    The patientunderstands the planned operation and its associated risks and benefits and agrees to proceed. The surgical consent form has been signed. Last NSAID/anticoagulant medication use was:-    Premedication taken for contrast allergy? No    Valium taken for oral sedation?  No

## 2023-03-07 ENCOUNTER — TRANSFERRED RECORDS (OUTPATIENT)
Dept: HEALTH INFORMATION MANAGEMENT | Facility: CLINIC | Age: 78
End: 2023-03-07

## 2023-03-07 LAB — EJECTION FRACTION: NORMAL %

## 2023-03-18 ENCOUNTER — TRANSFERRED RECORDS (OUTPATIENT)
Dept: HEALTH INFORMATION MANAGEMENT | Facility: CLINIC | Age: 78
End: 2023-03-18

## 2023-03-25 ENCOUNTER — HEALTH MAINTENANCE LETTER (OUTPATIENT)
Age: 78
End: 2023-03-25

## 2023-05-16 ENCOUNTER — TELEPHONE (OUTPATIENT)
Dept: FAMILY MEDICINE | Facility: CLINIC | Age: 78
End: 2023-05-16

## 2023-05-16 NOTE — TELEPHONE ENCOUNTER
Patient call trying to reschedule appointment because patient in currently in [place]. Will be back at the end of June wondering if pcp could squeeze in for appointment. Pcp appointments are out to sept. Please call patient back to confirm. Thank you.

## 2023-05-19 NOTE — TELEPHONE ENCOUNTER
Patient already scheduled for 6/26/2023 at 11:00 am.      Winifred HUMPHREY MA on 5/19/2023 at 2:08 PM

## 2023-06-11 ENCOUNTER — HEALTH MAINTENANCE LETTER (OUTPATIENT)
Age: 78
End: 2023-06-11

## 2023-06-30 ENCOUNTER — ANTICOAGULATION THERAPY VISIT (OUTPATIENT)
Dept: ANTICOAGULATION | Facility: CLINIC | Age: 78
End: 2023-06-30

## 2023-06-30 ENCOUNTER — LAB (OUTPATIENT)
Dept: LAB | Facility: CLINIC | Age: 78
End: 2023-06-30
Payer: MEDICARE

## 2023-06-30 DIAGNOSIS — I48.20 CHRONIC ATRIAL FIBRILLATION (H): ICD-10-CM

## 2023-06-30 DIAGNOSIS — I48.11 LONGSTANDING PERSISTENT ATRIAL FIBRILLATION (H): ICD-10-CM

## 2023-06-30 DIAGNOSIS — Z79.01 LONG TERM CURRENT USE OF ANTICOAGULANT THERAPY: ICD-10-CM

## 2023-06-30 DIAGNOSIS — Z79.01 LONG TERM CURRENT USE OF ANTICOAGULANT THERAPY: Primary | ICD-10-CM

## 2023-06-30 LAB — INR BLD: 2.5 (ref 0.9–1.1)

## 2023-06-30 PROCEDURE — 36416 COLLJ CAPILLARY BLOOD SPEC: CPT

## 2023-06-30 PROCEDURE — 85610 PROTHROMBIN TIME: CPT

## 2023-06-30 NOTE — PROGRESS NOTES
ANTICOAGULATION MANAGEMENT     Catalina Marie 77 year old female is on warfarin with therapeutic INR result. (Goal INR 2.0-3.0)    Recent labs: (last 7 days)     06/30/23  1102   INR 2.5*       ASSESSMENT       Source(s): Chart Review and Patient/Caregiver Call       Warfarin doses taken: More warfarin taken than planned which may be affecting INR - MD previously increased by 18.2% by [facility] in [place] (2.5 mg Sat + 5 mg AOD)    Diet: Decreased greens/vitamin K in diet; plans to resume previous intake (poor diet during road trip from [place]) - may not resume as much greens as she had been eating in [place], encouraged to keep consistent    Medication/supplement changes: Reports several medication changes during the last several months in [place], but could not recall off the top of her head, patient will update MAR at appt with Dr. Trinh on 7/3/23    New illness, injury, or hospitalization: No    Signs or symptoms of bleeding or clotting: No    Previous result: Subtherapeutic - previous INR was 1.7 about 1 week ago per patient (checked in [place])     Additional findings: Arrived in [place] on Wed 6/28/23         PLAN     Recommended plan for temporary change(s) and ongoing change(s) affecting INR     Dosing Instructions: Continue your current warfarin dose with next INR in 2 weeks       Summary  As of 6/30/2023    Full warfarin instructions:  2.5 mg every Sat; 5 mg all other days   Next INR check:  7/14/2023             Telephone call with Jennifer who verbalizes understanding and agrees to plan    Lab visit scheduled    Education provided:     Please call back if any changes to your diet, medications or how you've been taking warfarin    Symptom monitoring: monitoring for bleeding signs and symptoms and monitoring for clotting signs and symptoms    Plan made with [facility] Pharmacist Charis Briscoe - dose increase outside of protocol    Thu Golden, RN  Anticoagulation  Clinic  6/30/2023    _______________________________________________________________________     Anticoagulation Episode Summary     Current INR goal:  2.0-3.0   TTR:  40.6 % (4.3 mo)   Target end date:  Indefinite   Send INR reminders to:  DEMOND FRANCO    Indications    Long term current use of anticoagulant therapy [Z79.01]  Atrial fibrillation (H) [I48.91] [I48.91]  Chronic atrial fibrillation (H) [I48.20]  Longstanding persistent atrial fibrillation (H) [I48.11]           Comments:           Anticoagulation Care Providers     Provider Role Specialty Phone number    Ian Tracy MD Referring Internal Medicine [phone]    Sulaiman Trinh MD Referring Internal Medicine [phone]

## 2023-07-03 ENCOUNTER — OFFICE VISIT (OUTPATIENT)
Dept: FAMILY MEDICINE | Facility: CLINIC | Age: 78
End: 2023-07-03
Payer: MEDICARE

## 2023-07-03 VITALS
DIASTOLIC BLOOD PRESSURE: 85 MMHG | WEIGHT: 133.8 LBS | HEART RATE: 88 BPM | RESPIRATION RATE: 18 BRPM | OXYGEN SATURATION: 95 % | BODY MASS INDEX: 22.84 KG/M2 | HEIGHT: 64 IN | TEMPERATURE: 97.3 F | SYSTOLIC BLOOD PRESSURE: 132 MMHG

## 2023-07-03 DIAGNOSIS — I10 BENIGN ESSENTIAL HYPERTENSION: ICD-10-CM

## 2023-07-03 DIAGNOSIS — I48.20 CHRONIC ATRIAL FIBRILLATION (H): Primary | ICD-10-CM

## 2023-07-03 DIAGNOSIS — N18.1 TYPE 2 DIABETES MELLITUS WITH STAGE 1 CHRONIC KIDNEY DISEASE, WITHOUT LONG-TERM CURRENT USE OF INSULIN (H): ICD-10-CM

## 2023-07-03 DIAGNOSIS — E11.22 TYPE 2 DIABETES MELLITUS WITH STAGE 1 CHRONIC KIDNEY DISEASE, WITHOUT LONG-TERM CURRENT USE OF INSULIN (H): ICD-10-CM

## 2023-07-03 DIAGNOSIS — E78.5 HYPERLIPIDEMIA LDL GOAL <100: ICD-10-CM

## 2023-07-03 DIAGNOSIS — F43.22 ADJUSTMENT DISORDER WITH ANXIOUS MOOD: ICD-10-CM

## 2023-07-03 DIAGNOSIS — N18.30 STAGE 3 CHRONIC KIDNEY DISEASE, UNSPECIFIED WHETHER STAGE 3A OR 3B CKD (H): ICD-10-CM

## 2023-07-03 LAB — HBA1C MFR BLD: 6.5 % (ref 0–5.6)

## 2023-07-03 PROCEDURE — 83036 HEMOGLOBIN GLYCOSYLATED A1C: CPT | Performed by: INTERNAL MEDICINE

## 2023-07-03 PROCEDURE — 36415 COLL VENOUS BLD VENIPUNCTURE: CPT | Performed by: INTERNAL MEDICINE

## 2023-07-03 PROCEDURE — 99214 OFFICE O/P EST MOD 30 MIN: CPT | Performed by: INTERNAL MEDICINE

## 2023-07-03 ASSESSMENT — PAIN SCALES - GENERAL: PAINLEVEL: NO PAIN (0)

## 2023-07-03 NOTE — RESULT ENCOUNTER NOTE
The following letter pertains to your most recent diagnostic tests:    -Your hemoglobin A1c test which is a diabetes blood test that represents and average of your blood sugars over the last 3 months returned at 6.5 which is at your goal of hemoglobin A1c less than 7.         Sincerely,    Dr. Trinh

## 2023-07-03 NOTE — PROGRESS NOTES
"SUBJECTIVE:   Jennifer is a 77 year old who presents for Preventive Visit.       No data to display              Are you in the first 12 months of your Medicare coverage?  No    HPI      Have you ever done Advance Care Planning? (For example, a Health Directive, POLST, or a discussion with a medical provider or your loved ones about your wishes): { :187257}       Fall risk  { :954354}  {If any of the above assessments are answered yes, consider ordering appropriate referrals (Optional):092857::\"click delete button to remove this line now\"}  Cognitive Screening { :250599}    {Do you have sleep apnea, excessive snoring or daytime drowsiness? (Optional):139880}    Reviewed and updated as needed this visit by clinical staff                  Reviewed and updated as needed this visit by Provider                 Social History     Tobacco Use     Smoking status: Former     Types: Cigarettes     Quit date:      Years since quittin.5     Smokeless tobacco: Never   Substance Use Topics     Alcohol use: Yes     Comment: 1 wine with dinner     {Rooming staff  Click this link to complete the Prescreen if response below is not for today's visit  Alcohol Use Prescreen >3 drinks/day or > 7 drinks/week.  If the prescreen question answer is YES, complete the full AUDIT  :893473}        10/5/2021    10:17 AM   Alcohol Use   Prescreen: >3 drinks/day or >7 drinks/week? Yes   AUDIT SCORE  4   {add AUDIT responses (Optional) (A score of 7 for adult men is an indication of hazardous drinking; a score of 8 or more is an indication of an alcohol use disorder.  A score of 7 or more for adult women is an indication of hazardous drinking or an alchohol use disorder):037493}  Do you have a current opioid prescription? { :618984}  Do you use any other controlled substances or medications that are not prescribed by a provider? {Substance Use :244931::\"None\"}  {Provider  If there are gaps in the social history shown above, please follow the " link and refresh the note Link to Social and Substance History :742147}    {Outside tests to abstract? :178450}    {additional problems to add (Optional):984597}    Current providers sharing in care for this patient include: {Rooming staff:  Please update Care Team from storyboard, refresh this note and then delete this statement}  Patient Care Team:  Sulaiman Trinh MD as PCP - General (Internal Medicine)  Sulaiman Trinh MD as Assigned PCP  William Babin MD as MD (Cardiovascular Disease)  Amna Dewitt PA-C as Physician Assistant (Psychiatry & Neurology Vascular Neurology)  Alley Dai APRN CNP as Assigned Heart and Vascular Provider    The following health maintenance items are reviewed in Epic and correct as of today:  Health Maintenance   Topic Date Due     DEPRESSION ACTION PLAN  Never done     ZOSTER IMMUNIZATION (2 of 3) 05/19/2013     EYE EXAM  10/21/2020     COVID-19 Vaccine (3 - Moderna series) 04/29/2021     MEDICARE ANNUAL WELLNESS VISIT  10/05/2022     ANNUAL REVIEW OF HM ORDERS  10/05/2022     PHQ-9  03/12/2023     A1C  05/29/2023     HEMOGLOBIN  09/09/2023     MICROALBUMIN  08/18/2023     DIGOXIN  08/18/2023     DIABETIC FOOT EXAM  08/18/2023     INFLUENZA VACCINE (1) 09/01/2023     CBC  09/09/2023     FALL RISK ASSESSMENT  10/17/2023     ALT  11/29/2023     LIPID  11/29/2023     TSH W/FREE T4 REFLEX  11/29/2023     ADVANCE CARE PLANNING  07/21/2025     HF ACTION PLAN  08/18/2025     DEXA  07/06/2030     DTAP/TDAP/TD IMMUNIZATION (4 - Td or Tdap) 12/21/2030     HEPATITIS C SCREENING  Completed     Pneumococcal Vaccine: 65+ Years  Completed     URINALYSIS  Completed     IPV IMMUNIZATION  Aged Out     MENINGITIS IMMUNIZATION  Aged Out     BMP  Discontinued     COLORECTAL CANCER SCREENING  Discontinued     {Chronicprobdata (optional):422743}  {Decision Support (Optional):109562}    {Mammo DS 75+ :065547}  Pertinent mammograms are reviewed under the imaging tab.    Review of  "Systems  {ROS COMP (Optional):019298}    OBJECTIVE:   There were no vitals taken for this visit. Estimated body mass index is 24.45 kg/m  as calculated from the following:    Height as of 22: 1.6 m (5' 3\").    Weight as of 22: 62.6 kg (138 lb).  Physical Exam  {Exam (Optional) :454514}    {Diagnostic Test Results (Optional):747932::\"Diagnostic Test Results:\",\"Labs reviewed in Epic\"}    ASSESSMENT / PLAN:   {Diag Picklist:211647}    {Patient advised of split billing (Optional):561904}      COUNSELING:  {Medicare Counselin}        She reports that she quit smoking about 38 years ago. She has never used smokeless tobacco.      Appropriate preventive services were discussed with this patient, including applicable screening as appropriate for cardiovascular disease, diabetes, osteopenia/osteoporosis, and glaucoma.  As appropriate for age/gender, discussed screening for colorectal cancer, prostate cancer, breast cancer, and cervical cancer. Checklist reviewing preventive services available has been given to the patient.    Reviewed patients plan of care and provided an AVS. The {CarePlan:337016} for Catalina meets the Care Plan requirement. This Care Plan has been established and reviewed with the {PATIENT, FAMILY MEMBER, CAREGIVER:151094}.    {Counseling Resources  [place] Preventive Services Task Force  Cholesterol Screening  Health diet/nutrition  Pooled Cohorts Equation Calculator  USDA's MyPlate  ASA Prophylaxis  Lung CA Screening  Osteoporosis prevention/bone health :875324}  {Breast Cancer Risk Calculator  BRCA-Related Cancer Risk Assessment FHS-7 Tool :786285}    Sulaiman Trinh MD  [facility]    Identified Health Risks:  {Medicare required documentation of substance and opioid use disorders screening :725904}  "

## 2023-07-03 NOTE — PROGRESS NOTES
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, without long-term current use of insulin (H)  Recheck A1c  - HEMOGLOBIN A1C; Future    Stage 3 chronic kidney disease, unspecified whether stage 3a or 3b CKD (H)  GFR was OK 7 months ago     Chronic atrial fibrillation (H)  Second check HR in clinic 88; stopping amlodipine and starting verapamil seems to be helping rate control and symptoms     Benign essential hypertension  Blood pressure OK     Adjustment disorder with anxious mood  Stable on zoloft     Hyperlipidemia LDL goal <100  11/2022 lipid pane was OK       She will follow up with plasti  She declined a COVID booster recommendation today  She plans to get Shingrix    21 minutes spent by me on the date of the encounter doing chart review, history and exam, documentation and further activities per the note           Sulaiman Trinh MD  [facility] JOAN Rodriguez is a 77 year old, presenting for the following health issues:  Follow Up         No data to display              HPI       Follow up atrial fibrillation, diabetes, hypertension, hyperlipidemia, adjustment disorder, CKD  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  Physical Exam   GENERAL: healthy, alert and no distress  RESP: lungs clear to auscultation - no rales, rhonchi or wheezes  CV: Heart with regular rate and rhythm.   ABDOMEN: soft, nontender, no " hepatosplenomegaly, no masses and bowel sounds normal  MS: no gross musculoskeletal defects noted, no edema  SKIN:  I don't really see an obvious skin lesion on left temple where she points, but she is wearing a fair amount of concealer  NEURO: Normal strength and tone, mentation intact and speech normal  PSYCH: mentation appears normal, affect normal/bright

## 2023-07-05 ENCOUNTER — DOCUMENTATION ONLY (OUTPATIENT)
Dept: ANTICOAGULATION | Facility: CLINIC | Age: 78
End: 2023-07-05
Payer: MEDICARE

## 2023-07-05 DIAGNOSIS — Z79.01 LONG TERM CURRENT USE OF ANTICOAGULANT THERAPY: Primary | ICD-10-CM

## 2023-07-05 DIAGNOSIS — I48.20 CHRONIC ATRIAL FIBRILLATION (H): ICD-10-CM

## 2023-07-05 NOTE — PROGRESS NOTES
ANTICOAGULATION CLINIC REFERRAL RENEWAL REQUEST       An annual renewal order is required for all patients referred to [facility].?  Please review and sign the pended referral order for Catalina Marie.       ANTICOAGULATION SUMMARY      Warfarin indication(s)   Atrial Fibrillation    Mechanical heart valve present?  NO       Current goal range   INR: 2.0-3.0     Goal appropriate for indication? Goal INR 2-3, standard for indication(s) above     Time in Therapeutic Range (TTR)  (Goal > 60%) 40.6%       Office visit with referring provider's group within last year yes on 7/3/23       Thu Golden RN  [facility]

## 2023-07-14 ENCOUNTER — ANTICOAGULATION THERAPY VISIT (OUTPATIENT)
Dept: ANTICOAGULATION | Facility: CLINIC | Age: 78
End: 2023-07-14

## 2023-07-14 ENCOUNTER — LAB (OUTPATIENT)
Dept: LAB | Facility: CLINIC | Age: 78
End: 2023-07-14
Payer: MEDICARE

## 2023-07-14 DIAGNOSIS — N18.30 CKD (CHRONIC KIDNEY DISEASE) STAGE 3, GFR 30-59 ML/MIN (H): Primary | ICD-10-CM

## 2023-07-14 DIAGNOSIS — I48.20 CHRONIC ATRIAL FIBRILLATION (H): ICD-10-CM

## 2023-07-14 DIAGNOSIS — Z79.01 LONG TERM CURRENT USE OF ANTICOAGULANT THERAPY: ICD-10-CM

## 2023-07-14 DIAGNOSIS — I48.11 LONGSTANDING PERSISTENT ATRIAL FIBRILLATION (H): ICD-10-CM

## 2023-07-14 DIAGNOSIS — Z79.01 LONG TERM CURRENT USE OF ANTICOAGULANT THERAPY: Primary | ICD-10-CM

## 2023-07-14 LAB — INR BLD: 2.4 (ref 0.9–1.1)

## 2023-07-14 PROCEDURE — 36416 COLLJ CAPILLARY BLOOD SPEC: CPT

## 2023-07-14 PROCEDURE — 85610 PROTHROMBIN TIME: CPT

## 2023-07-14 NOTE — PROGRESS NOTES
ANTICOAGULATION MANAGEMENT     Catalina Marie 77 year old female is on warfarin with therapeutic INR result. (Goal INR 2.0-3.0)    Recent labs: (last 7 days)     07/14/23  1116   INR 2.4*       ASSESSMENT       Source(s): Chart Review    Previous INR was Therapeutic last 2(+) visits    Medication, diet, health changes since last INR chart reviewed; none identified             PLAN     Recommended plan for no diet, medication or health factor changes affecting INR     Dosing Instructions: Continue your current warfarin dose with next INR in 3 weeks       Summary  As of 7/14/2023    Full warfarin instructions:  2.5 mg every Sat; 5 mg all other days   Next INR check:  8/4/2023             Detailed voice message left for Jennifer with dosing instructions and follow up date.     Contact [phone]  to schedule and with any changes, questions or concerns.     Education provided:     Please call back if any changes to your diet, medications or how you've been taking warfarin    Plan made per [facility] anticoagulation protocol    Hamlet Thakkar RN  Anticoagulation Clinic  7/14/2023    _______________________________________________________________________     Anticoagulation Episode Summary     Current INR goal:  2.0-3.0   TTR:  49.4 % (4.4 mo)   Target end date:  Indefinite   Send INR reminders to:  DEMOND FRANCO    Indications    Long term current use of anticoagulant therapy [Z79.01]  Chronic atrial fibrillation (H) [I48.20]           Comments:           Anticoagulation Care Providers     Provider Role Specialty Phone number    Sulaiman Trinh MD Referring Internal Medicine [phone]

## 2023-07-21 ENCOUNTER — TRANSFERRED RECORDS (OUTPATIENT)
Dept: HEALTH INFORMATION MANAGEMENT | Facility: CLINIC | Age: 78
End: 2023-07-21

## 2023-08-04 ENCOUNTER — LAB (OUTPATIENT)
Dept: LAB | Facility: CLINIC | Age: 78
End: 2023-08-04
Payer: MEDICARE

## 2023-08-04 ENCOUNTER — ANTICOAGULATION THERAPY VISIT (OUTPATIENT)
Dept: ANTICOAGULATION | Facility: CLINIC | Age: 78
End: 2023-08-04

## 2023-08-04 ENCOUNTER — TELEPHONE (OUTPATIENT)
Dept: FAMILY MEDICINE | Facility: CLINIC | Age: 78
End: 2023-08-04

## 2023-08-04 DIAGNOSIS — Z79.01 LONG TERM CURRENT USE OF ANTICOAGULANT THERAPY: Primary | ICD-10-CM

## 2023-08-04 DIAGNOSIS — I48.20 CHRONIC ATRIAL FIBRILLATION (H): ICD-10-CM

## 2023-08-04 DIAGNOSIS — Z79.01 LONG TERM CURRENT USE OF ANTICOAGULANT THERAPY: ICD-10-CM

## 2023-08-04 LAB — INR BLD: 3.1 (ref 0.9–1.1)

## 2023-08-04 PROCEDURE — 36415 COLL VENOUS BLD VENIPUNCTURE: CPT

## 2023-08-04 PROCEDURE — 85610 PROTHROMBIN TIME: CPT

## 2023-08-04 NOTE — PROGRESS NOTES
"ANTICOAGULATION MANAGEMENT     Catalina Marie 77 year old female is on warfarin with supratherapeutic INR result. (Goal INR 2.0-3.0)    Recent labs: (last 7 days)     08/04/23  1259   INR 3.1*       ASSESSMENT     Source(s): Chart Review and Patient/Caregiver Call     Warfarin doses taken: Warfarin taken as instructed  Diet: No new diet changes identified  Medication/supplement changes: None noted  New illness, injury, or hospitalization: Yes: had a Mohs procedure on her face down in [place] about a week and a half ago. Describes it as a \"significant incision\". She states the sutures were supposed to be dissolving by now but they aren't. This writer advised she follow up with Dermatology here in [place].  Signs or symptoms of bleeding or clotting: No  Previous result: Therapeutic last 2(+) visits  Additional findings: None       PLAN     Recommended plan for temporary change(s) affecting INR     Dosing Instructions: Continue your current warfarin dose have an extra serving of green veggies each day for the next two days, then return to regular intake, with next INR in 2 weeks       Summary  As of 8/4/2023      Full warfarin instructions:  2.5 mg every Sat; 5 mg all other days   Next INR check:  8/18/2023               Telephone call with Jennifer who verbalizes understanding and agrees to plan    Lab visit scheduled    Education provided:   Please call back if any changes to your diet, medications or how you've been taking warfarin  Contact [phone]  with any changes, questions or concerns.     Plan made per [facility] anticoagulation protocol    Hanna Flores RN  Anticoagulation Clinic  8/4/2023    _______________________________________________________________________     Anticoagulation Episode Summary       Current INR goal:  2.0-3.0   TTR:  62.9 % (4.4 mo)   Target end date:  Indefinite   Send INR reminders to:  DEMOND FRANCO    Indications    Long term current use of anticoagulant therapy [Z79.01]  Chronic " atrial fibrillation (H) [I48.20]             Comments:               Anticoagulation Care Providers       Provider Role Specialty Phone number    Sulaiman Trinh MD Referring Internal Medicine [phone]

## 2023-08-04 NOTE — TELEPHONE ENCOUNTER
Patient called     Returned to [place] yesterday     In [place] had melanoma surgery on face 7/25/23     When she was in surgeon's office sutures weren't out     Nurse there advised leave them in another 3 days to heal     She has now had the sutures in place for 10 days and would like to be seen and reassessed.     Reviewed schedule, nothing available until Tues with any [place] provider. Pt would like to be seen sooner. Agreed to to come in to  this weekend     Maylin HOWARD, Triage RN  [facility]

## 2023-08-05 ENCOUNTER — OFFICE VISIT (OUTPATIENT)
Dept: URGENT CARE | Facility: URGENT CARE | Age: 78
End: 2023-08-05
Payer: MEDICARE

## 2023-08-05 VITALS
DIASTOLIC BLOOD PRESSURE: 76 MMHG | TEMPERATURE: 98.9 F | OXYGEN SATURATION: 95 % | SYSTOLIC BLOOD PRESSURE: 122 MMHG | HEART RATE: 65 BPM

## 2023-08-05 DIAGNOSIS — T14.8XXA SKIN WOUND FROM SURGICAL INCISION: Primary | ICD-10-CM

## 2023-08-05 PROCEDURE — 99213 OFFICE O/P EST LOW 20 MIN: CPT | Performed by: PHYSICIAN ASSISTANT

## 2023-08-05 ASSESSMENT — ENCOUNTER SYMPTOMS: FEVER: 0

## 2023-08-05 NOTE — PROGRESS NOTES
Assessment & Plan:        ICD-10-CM    1. Skin wound from surgical incision  T14.8XXA             Plan/Clinical Decision Making:    Patient had removal of skin lesion on 7/25/23 on left face, still has visible incision wound and bruising in left face. Incision appears to be healing well. No sign of infection. Has bruising with mild swelling in left face.   Discussed healing appears well and that it will take weeks for it to look more healed. Can gently clean wound and apply antibiotic as needed. Follow-up with dermatology as needed for further wound care.       Return if symptoms worsen or fail to improve.     At the end of the encounter, I discussed results, diagnosis, medications. Discussed red flags for immediate return to clinic/ER, as well as indications for follow up if no improvement. Patient understood and agreed to plan. Patient was stable for discharge.        Hansa Cheng PA-C on 8/5/2023 at 9:39 AM          Subjective:     HPI:    Jennifer is a 77 year old female who presents to clinic today for the following health issues:  Chief Complaint   Patient presents with    Wound Check     Left side of face, concerned about the sutures that are still in since 7/25/2023 while in [place]     HPI    Patient has wound care issues about surgical incision on face. Removal of melanoma on face by derm.   No drainage, no redness, has bruising in face.   History obtained from the patient.    Review of Systems   Constitutional:  Negative for fever.   Skin:  Negative for rash.         Patient Active Problem List   Diagnosis    Long term current use of anticoagulant therapy    Atrial fibrillation (H) [I48.91]    Benign essential hypertension    Type 2 diabetes mellitus with stage 1 chronic kidney disease, without long-term current use of insulin (H)    Adjustment disorder with anxious mood    Hyperlipidemia LDL goal <100    CKD (chronic kidney disease) stage 3, GFR 30-59 ml/min (H)    Overdose, accidental or unintentional,  initial encounter    Personal history of malignant neoplasm of breast    H/O bilateral mastectomy    Nontoxic multinodular goiter    Strain of right rotator cuff capsule, subsequent encounter    Chronic atrial fibrillation (H)    Chronic vasomotor rhinitis    Inflamed seborrheic keratosis    Routine general medical examination at a health care facility    Longstanding persistent atrial fibrillation (H)    Recurrent major depressive episodes (H)        Past Medical History:   Diagnosis Date    Arrhythmia     Diabetes (H)     diet controlled    Hypertension     Schwannoma        Social History     Tobacco Use    Smoking status: Former     Types: Cigarettes     Quit date:      Years since quittin.6    Smokeless tobacco: Never   Substance Use Topics    Alcohol use: Yes     Comment: 1 wine with dinner             Objective:     Vitals:    23 0854   BP: 122/76   BP Location: Left arm   Patient Position: Sitting   Cuff Size: Adult Regular   Pulse: 65   Temp: 98.9  F (37.2  C)   TempSrc: Tympanic   SpO2: 95%         Physical Exam   EXAM:   Pleasant, alert, appropriate appearance. NAD.  Head Exam: Normocephalic, atraumatic.  Eye Exam:  PERRLA, EOMI, non icteric/injection.    Nose Exam: Normal external nose.    Face: left face bruising 4cm healing surgical incision left upper cheek. Edges healing well together. No sign of infection.   Skin: no rash or lesion.      Results:  No results found for any visits on 23.

## 2023-08-11 ENCOUNTER — LAB (OUTPATIENT)
Dept: LAB | Facility: CLINIC | Age: 78
End: 2023-08-11
Payer: MEDICARE

## 2023-08-11 ENCOUNTER — ANTICOAGULATION THERAPY VISIT (OUTPATIENT)
Dept: ANTICOAGULATION | Facility: CLINIC | Age: 78
End: 2023-08-11

## 2023-08-11 DIAGNOSIS — Z79.01 LONG TERM CURRENT USE OF ANTICOAGULANT THERAPY: Primary | ICD-10-CM

## 2023-08-11 DIAGNOSIS — I48.20 CHRONIC ATRIAL FIBRILLATION (H): ICD-10-CM

## 2023-08-11 DIAGNOSIS — Z79.01 LONG TERM CURRENT USE OF ANTICOAGULANT THERAPY: ICD-10-CM

## 2023-08-11 LAB — INR BLD: 2.6 (ref 0.9–1.1)

## 2023-08-11 PROCEDURE — 85610 PROTHROMBIN TIME: CPT

## 2023-08-11 PROCEDURE — 36416 COLLJ CAPILLARY BLOOD SPEC: CPT

## 2023-08-11 NOTE — PROGRESS NOTES
ANTICOAGULATION MANAGEMENT     Catalina Marie 77 year old female is on warfarin with therapeutic INR result. (Goal INR 2.0-3.0)    Recent labs: (last 7 days)     08/11/23  1122   INR 2.6*       ASSESSMENT     Source(s): Chart Review and Patient/Caregiver Call     Warfarin doses taken: Less warfarin taken than planned which may be affecting INR  Diet: No new diet changes identified  Medication/supplement changes: None noted  New illness, injury, or hospitalization: No  UC 8/5  check lesion on face,  Healing well  Signs or symptoms of bleeding or clotting: No  Previous result: Supratherapeutic  Additional findings: None       PLAN     Recommended plan for no diet, medication or health factor changes affecting INR     Dosing Instructions: Continue your current warfarin dose with next INR in 3 weeks       Summary  As of 8/11/2023      Full warfarin instructions:  2.5 mg every Sat; 5 mg all other days   Next INR check:  9/1/2023               Telephone call with Jennifer who verbalizes understanding and agrees to plan    Lab visit scheduled    Education provided:   Please call back if any changes to your diet, medications or how you've been taking warfarin    Plan made per [facility] anticoagulation protocol    Lucia Arreola RN  Anticoagulation Clinic  8/11/2023    _______________________________________________________________________     Anticoagulation Episode Summary       Current INR goal:  2.0-3.0   TTR:  67.1 % (4.4 mo)   Target end date:  Indefinite   Send INR reminders to:  DEMOND FRANCO    Indications    Long term current use of anticoagulant therapy [Z79.01]  Chronic atrial fibrillation (H) [I48.20]             Comments:               Anticoagulation Care Providers       Provider Role Specialty Phone number    Sulaiman Trinh MD Referring Internal Medicine [phone]

## 2023-09-01 ENCOUNTER — ANTICOAGULATION THERAPY VISIT (OUTPATIENT)
Dept: ANTICOAGULATION | Facility: CLINIC | Age: 78
End: 2023-09-01

## 2023-09-01 ENCOUNTER — LAB (OUTPATIENT)
Dept: LAB | Facility: CLINIC | Age: 78
End: 2023-09-01
Payer: MEDICARE

## 2023-09-01 ENCOUNTER — MYC MEDICAL ADVICE (OUTPATIENT)
Dept: FAMILY MEDICINE | Facility: CLINIC | Age: 78
End: 2023-09-01

## 2023-09-01 DIAGNOSIS — Z79.01 LONG TERM CURRENT USE OF ANTICOAGULANT THERAPY: ICD-10-CM

## 2023-09-01 DIAGNOSIS — I48.20 CHRONIC ATRIAL FIBRILLATION (H): ICD-10-CM

## 2023-09-01 DIAGNOSIS — Z79.01 LONG TERM CURRENT USE OF ANTICOAGULANT THERAPY: Primary | ICD-10-CM

## 2023-09-01 DIAGNOSIS — Z85.3 PERSONAL HISTORY OF MALIGNANT NEOPLASM OF BREAST: Primary | ICD-10-CM

## 2023-09-01 LAB — INR BLD: 3.4 (ref 0.9–1.1)

## 2023-09-01 PROCEDURE — 85610 PROTHROMBIN TIME: CPT

## 2023-09-01 PROCEDURE — 36416 COLLJ CAPILLARY BLOOD SPEC: CPT

## 2023-09-01 NOTE — PROGRESS NOTES
ANTICOAGULATION MANAGEMENT     Catalina Marie 77 year old female is on warfarin with supratherapeutic INR result. (Goal INR 2.0-3.0)    Recent labs: (last 7 days)     09/01/23  1100   INR 3.4*       ASSESSMENT     Source(s): Chart Review and Patient/Caregiver Call     Warfarin doses taken: Warfarin taken as instructed  Diet: Decreased greens/vitamin K in diet; plans to resume previous intake  Medication/supplement changes: None noted  New illness, injury, or hospitalization: No  Signs or symptoms of bleeding or clotting: No  Previous result: Therapeutic last visit; previously outside of goal range  Additional findings: Reports feeling more stressed this summer regarding newer dx of melanoma        PLAN     Recommended plan for temporary change(s) affecting INR     Dosing Instructions: partial hold then continue your current warfarin dose with next INR in 2 weeks       Summary  As of 9/1/2023      Full warfarin instructions:  9/1: 2.5 mg; Otherwise 2.5 mg every Sat; 5 mg all other days   Next INR check:  9/15/2023               Telephone call with Jennifer who verbalizes understanding and agrees to plan    Lab visit scheduled    Education provided:   Please call back if any changes to your diet, medications or how you've been taking warfarin  Symptom monitoring: monitoring for bleeding signs and symptoms and monitoring for clotting signs and symptoms    Plan made per [facility] anticoagulation protocol    Thu Golden RN  Anticoagulation Clinic  9/1/2023    _______________________________________________________________________     Anticoagulation Episode Summary       Current INR goal:  2.0-3.0   TTR:  62.6 % (4.4 mo)   Target end date:  Indefinite   Send INR reminders to:  DEMOND FRANCO    Indications    Long term current use of anticoagulant therapy [Z79.01]  Chronic atrial fibrillation (H) [I48.20]             Comments:               Anticoagulation Care Providers       Provider Role Specialty Phone number     Sulaiman Trinh MD [facility] Internal Medicine [phone]

## 2023-09-05 ENCOUNTER — MEDICAL CORRESPONDENCE (OUTPATIENT)
Dept: HEALTH INFORMATION MANAGEMENT | Facility: CLINIC | Age: 78
End: 2023-09-05
Payer: MEDICARE

## 2023-09-05 NOTE — TELEPHONE ENCOUNTER
PCP- please see mychart:    Pended order for mastectomy bra - routing to PCP to please sign if appropriate and route back to triage to follow up with patient     Soniya Christopher RN  [facility]

## 2023-09-05 NOTE — TELEPHONE ENCOUNTER
Sent patient [employer] message to  DME at  of clinic.    Soniya Christopher RN  [facility]

## 2023-09-06 NOTE — TELEPHONE ENCOUNTER
Called [phone] spoke with pt. And confirmed she has the order for the Mastectomy Bra. She picked it up yesterday. I put a copy in the [employer] accordion file folder.

## 2023-09-15 ENCOUNTER — ANTICOAGULATION THERAPY VISIT (OUTPATIENT)
Dept: ANTICOAGULATION | Facility: CLINIC | Age: 78
End: 2023-09-15

## 2023-09-15 ENCOUNTER — LAB (OUTPATIENT)
Dept: LAB | Facility: CLINIC | Age: 78
End: 2023-09-15
Payer: MEDICARE

## 2023-09-15 DIAGNOSIS — I48.20 CHRONIC ATRIAL FIBRILLATION (H): ICD-10-CM

## 2023-09-15 DIAGNOSIS — Z79.01 LONG TERM CURRENT USE OF ANTICOAGULANT THERAPY: ICD-10-CM

## 2023-09-15 DIAGNOSIS — Z79.01 LONG TERM CURRENT USE OF ANTICOAGULANT THERAPY: Primary | ICD-10-CM

## 2023-09-15 LAB — INR BLD: 3 (ref 0.9–1.1)

## 2023-09-15 PROCEDURE — 36416 COLLJ CAPILLARY BLOOD SPEC: CPT

## 2023-09-15 PROCEDURE — 85610 PROTHROMBIN TIME: CPT

## 2023-09-15 NOTE — PROGRESS NOTES
ANTICOAGULATION MANAGEMENT     Catalina Marie 77 year old female is on warfarin with therapeutic INR result. (Goal INR 2.0-3.0)    Recent labs: (last 7 days)     09/15/23  1058   INR 3.0*       ASSESSMENT     Source(s): Chart Review and Patient/Caregiver Call     Warfarin doses taken: Partial hold recently which may be affecting INR  Diet: Increased greens/vitamin K in diet; ongoing change (resumed normal intake)  Medication/supplement changes: None noted  New illness, injury, or hospitalization: No  Signs or symptoms of bleeding or clotting: No  Previous result: Supratherapeutic  Additional findings: None       PLAN     Recommended plan for temporary change(s) and ongoing change(s) affecting INR     Dosing Instructions: Continue your current warfarin dose with next INR in 2-3 weeks       Summary  As of 9/15/2023      Full warfarin instructions:  2.5 mg every Sat; 5 mg all other days   Next INR check:  10/2/2023               Telephone call with Jennifer who verbalizes understanding and agrees to plan    Lab visit scheduled    Education provided:   Please call back if any changes to your diet, medications or how you've been taking warfarin  Symptom monitoring: monitoring for bleeding signs and symptoms and monitoring for clotting signs and symptoms    Plan made per [facility] anticoagulation protocol    Thu Golden RN  Anticoagulation Clinic  9/15/2023    _______________________________________________________________________     Anticoagulation Episode Summary       Current INR goal:  2.0-3.0   TTR:  55.6 % (4.4 mo)   Target end date:  Indefinite   Send INR reminders to:  DEMOND FRANCO    Indications    Long term current use of anticoagulant therapy [Z79.01]  Chronic atrial fibrillation (H) [I48.20]             Comments:               Anticoagulation Care Providers       Provider Role Specialty Phone number    Sulaiman Trinh MD Referring Internal Medicine [phone]

## 2023-09-29 ENCOUNTER — TRANSFERRED RECORDS (OUTPATIENT)
Dept: HEALTH INFORMATION MANAGEMENT | Facility: CLINIC | Age: 78
End: 2023-09-29

## 2023-10-02 ENCOUNTER — ANTICOAGULATION THERAPY VISIT (OUTPATIENT)
Dept: ANTICOAGULATION | Facility: CLINIC | Age: 78
End: 2023-10-02

## 2023-10-02 ENCOUNTER — LAB (OUTPATIENT)
Dept: LAB | Facility: CLINIC | Age: 78
End: 2023-10-02
Payer: MEDICARE

## 2023-10-02 DIAGNOSIS — Z79.01 LONG TERM CURRENT USE OF ANTICOAGULANT THERAPY: Primary | ICD-10-CM

## 2023-10-02 DIAGNOSIS — Z79.01 LONG TERM CURRENT USE OF ANTICOAGULANT THERAPY: ICD-10-CM

## 2023-10-02 DIAGNOSIS — I48.20 CHRONIC ATRIAL FIBRILLATION (H): ICD-10-CM

## 2023-10-02 LAB — INR BLD: 3.1 (ref 0.9–1.1)

## 2023-10-02 PROCEDURE — 36416 COLLJ CAPILLARY BLOOD SPEC: CPT

## 2023-10-02 PROCEDURE — 85610 PROTHROMBIN TIME: CPT

## 2023-10-02 NOTE — PROGRESS NOTES
ANTICOAGULATION MANAGEMENT     Catalina Marie 78 year old female is on warfarin with supratherapeutic INR result. (Goal INR 2.0-3.0)    Recent labs: (last 7 days)     10/02/23  1108   INR 3.1*       ASSESSMENT     Source(s): Chart Review and Patient/Caregiver Call     Warfarin doses taken: Warfarin taken as instructed  Diet: No new diet changes identified  Medication/supplement changes: None noted  New illness, injury, or hospitalization: No  Signs or symptoms of bleeding or clotting: No  Previous result: Therapeutic last visit; previously outside of goal range  Additional findings: None and Leaves for [place] 10/23/23. Has provider there that manages warfarin dosing.  Pt prefers to increase amount of spinach in her salads before changing her warfarin dosing again. She understands it is a high vitamin K food and not to go overboard on it.       PLAN     Recommended plan for no diet, medication or health factor changes affecting INR     Dosing Instructions: Continue your current warfarin dose with next INR in 2 weeks       Summary  As of 10/2/2023      Full warfarin instructions:  2.5 mg every Sat; 5 mg all other days   Next INR check:  10/19/2023               Telephone call with Jennifer who verbalizes understanding and agrees to plan    Check at provider office visit    Education provided:   Please call back if any changes to your diet, medications or how you've been taking warfarin  Dietary considerations: importance of consistent vitamin K intake, impact of vitamin K foods on INR, and vitamin K content of foods  Contact [phone]  with any changes, questions or concerns.     Plan made per [facility] anticoagulation protocol    Hanna Flores RN  Anticoagulation Clinic  10/2/2023    _______________________________________________________________________     Anticoagulation Episode Summary       Current INR goal:  2.0-3.0   TTR:  55.6 % (4.4 mo)   Target end date:  Indefinite   Send INR reminders to:  DEMOND FRANCO     Indications    Long term current use of anticoagulant therapy [Z79.01]  Chronic atrial fibrillation (H) [I48.20]             Comments:               Anticoagulation Care Providers       Provider Role Specialty Phone number    Sulaiman Trinh MD Referring Internal Medicine [phone]

## 2023-10-19 ENCOUNTER — OFFICE VISIT (OUTPATIENT)
Dept: FAMILY MEDICINE | Facility: CLINIC | Age: 78
End: 2023-10-19
Payer: MEDICARE

## 2023-10-19 ENCOUNTER — APPOINTMENT (OUTPATIENT)
Dept: LAB | Facility: CLINIC | Age: 78
End: 2023-10-19
Payer: MEDICARE

## 2023-10-19 ENCOUNTER — ANTICOAGULATION THERAPY VISIT (OUTPATIENT)
Dept: ANTICOAGULATION | Facility: CLINIC | Age: 78
End: 2023-10-19

## 2023-10-19 VITALS
WEIGHT: 139.9 LBS | BODY MASS INDEX: 23.88 KG/M2 | TEMPERATURE: 98 F | SYSTOLIC BLOOD PRESSURE: 136 MMHG | HEIGHT: 64 IN | HEART RATE: 52 BPM | OXYGEN SATURATION: 95 % | RESPIRATION RATE: 16 BRPM | DIASTOLIC BLOOD PRESSURE: 74 MMHG

## 2023-10-19 DIAGNOSIS — I48.11 LONGSTANDING PERSISTENT ATRIAL FIBRILLATION (H): ICD-10-CM

## 2023-10-19 DIAGNOSIS — I10 BENIGN ESSENTIAL HYPERTENSION: ICD-10-CM

## 2023-10-19 DIAGNOSIS — E11.22 TYPE 2 DIABETES MELLITUS WITH STAGE 1 CHRONIC KIDNEY DISEASE, WITHOUT LONG-TERM CURRENT USE OF INSULIN (H): Primary | ICD-10-CM

## 2023-10-19 DIAGNOSIS — Z79.01 LONG TERM CURRENT USE OF ANTICOAGULANT THERAPY: Primary | ICD-10-CM

## 2023-10-19 DIAGNOSIS — I48.20 CHRONIC ATRIAL FIBRILLATION (H): ICD-10-CM

## 2023-10-19 DIAGNOSIS — N18.1 TYPE 2 DIABETES MELLITUS WITH STAGE 1 CHRONIC KIDNEY DISEASE, WITHOUT LONG-TERM CURRENT USE OF INSULIN (H): Primary | ICD-10-CM

## 2023-10-19 DIAGNOSIS — E78.5 HYPERLIPIDEMIA LDL GOAL <100: ICD-10-CM

## 2023-10-19 DIAGNOSIS — F33.9 RECURRENT MAJOR DEPRESSIVE EPISODES (H): ICD-10-CM

## 2023-10-19 DIAGNOSIS — Z79.01 LONG TERM CURRENT USE OF ANTICOAGULANT THERAPY: ICD-10-CM

## 2023-10-19 DIAGNOSIS — N18.30 STAGE 3 CHRONIC KIDNEY DISEASE, UNSPECIFIED WHETHER STAGE 3A OR 3B CKD (H): ICD-10-CM

## 2023-10-19 DIAGNOSIS — L65.9 ALOPECIA: ICD-10-CM

## 2023-10-19 LAB
ALBUMIN SERPL BCG-MCNC: 4.4 G/DL (ref 3.5–5.2)
ALP SERPL-CCNC: 85 U/L (ref 35–104)
ALT SERPL W P-5'-P-CCNC: 26 U/L (ref 0–50)
ANION GAP SERPL CALCULATED.3IONS-SCNC: 10 MMOL/L (ref 7–15)
AST SERPL W P-5'-P-CCNC: 36 U/L (ref 0–45)
BILIRUB SERPL-MCNC: 0.5 MG/DL
BUN SERPL-MCNC: 23.1 MG/DL (ref 8–23)
CALCIUM SERPL-MCNC: 9 MG/DL (ref 8.8–10.2)
CHLORIDE SERPL-SCNC: 105 MMOL/L (ref 98–107)
CHOLEST SERPL-MCNC: 127 MG/DL
CREAT SERPL-MCNC: 1.02 MG/DL (ref 0.51–0.95)
CREAT UR-MCNC: 68.1 MG/DL
DEPRECATED HCO3 PLAS-SCNC: 27 MMOL/L (ref 22–29)
DIGOXIN SERPL-MCNC: <0.4 NG/ML (ref 0.6–2)
EGFRCR SERPLBLD CKD-EPI 2021: 55 ML/MIN/1.73M2
ERYTHROCYTE [DISTWIDTH] IN BLOOD BY AUTOMATED COUNT: 13.8 % (ref 10–15)
FERRITIN SERPL-MCNC: 135 NG/ML (ref 11–328)
GLUCOSE SERPL-MCNC: 101 MG/DL (ref 70–99)
HBA1C MFR BLD: 6.2 % (ref 0–5.6)
HCT VFR BLD AUTO: 40 % (ref 35–47)
HDLC SERPL-MCNC: 49 MG/DL
HGB BLD-MCNC: 12.8 G/DL (ref 11.7–15.7)
INR BLD: 3.3 (ref 0.9–1.1)
LDLC SERPL CALC-MCNC: 62 MG/DL
MCH RBC QN AUTO: 30.3 PG (ref 26.5–33)
MCHC RBC AUTO-ENTMCNC: 32 G/DL (ref 31.5–36.5)
MCV RBC AUTO: 95 FL (ref 78–100)
MICROALBUMIN UR-MCNC: 17.4 MG/L
MICROALBUMIN/CREAT UR: 25.55 MG/G CR (ref 0–25)
NONHDLC SERPL-MCNC: 78 MG/DL
PLATELET # BLD AUTO: 220 10E3/UL (ref 150–450)
POTASSIUM SERPL-SCNC: 3.9 MMOL/L (ref 3.4–5.3)
PROT SERPL-MCNC: 7.2 G/DL (ref 6.4–8.3)
RBC # BLD AUTO: 4.22 10E6/UL (ref 3.8–5.2)
SODIUM SERPL-SCNC: 142 MMOL/L (ref 135–145)
TRIGL SERPL-MCNC: 80 MG/DL
TSH SERPL DL<=0.005 MIU/L-ACNC: 0.79 UIU/ML (ref 0.3–4.2)
WBC # BLD AUTO: 8.4 10E3/UL (ref 4–11)

## 2023-10-19 PROCEDURE — 84443 ASSAY THYROID STIM HORMONE: CPT | Performed by: INTERNAL MEDICINE

## 2023-10-19 PROCEDURE — 99207 PR FOOT EXAM NO CHARGE: CPT | Performed by: INTERNAL MEDICINE

## 2023-10-19 PROCEDURE — 36415 COLL VENOUS BLD VENIPUNCTURE: CPT | Performed by: INTERNAL MEDICINE

## 2023-10-19 PROCEDURE — 85027 COMPLETE CBC AUTOMATED: CPT | Performed by: INTERNAL MEDICINE

## 2023-10-19 PROCEDURE — 82728 ASSAY OF FERRITIN: CPT | Performed by: INTERNAL MEDICINE

## 2023-10-19 PROCEDURE — 80162 ASSAY OF DIGOXIN TOTAL: CPT | Performed by: INTERNAL MEDICINE

## 2023-10-19 PROCEDURE — 82043 UR ALBUMIN QUANTITATIVE: CPT | Performed by: INTERNAL MEDICINE

## 2023-10-19 PROCEDURE — G0008 ADMIN INFLUENZA VIRUS VAC: HCPCS | Performed by: INTERNAL MEDICINE

## 2023-10-19 PROCEDURE — 82570 ASSAY OF URINE CREATININE: CPT | Performed by: INTERNAL MEDICINE

## 2023-10-19 PROCEDURE — 80053 COMPREHEN METABOLIC PANEL: CPT | Performed by: INTERNAL MEDICINE

## 2023-10-19 PROCEDURE — 80061 LIPID PANEL: CPT | Performed by: INTERNAL MEDICINE

## 2023-10-19 PROCEDURE — 83036 HEMOGLOBIN GLYCOSYLATED A1C: CPT | Performed by: INTERNAL MEDICINE

## 2023-10-19 PROCEDURE — 85610 PROTHROMBIN TIME: CPT | Performed by: INTERNAL MEDICINE

## 2023-10-19 PROCEDURE — 99214 OFFICE O/P EST MOD 30 MIN: CPT | Mod: 25 | Performed by: INTERNAL MEDICINE

## 2023-10-19 PROCEDURE — 90662 IIV NO PRSV INCREASED AG IM: CPT | Performed by: INTERNAL MEDICINE

## 2023-10-19 ASSESSMENT — PATIENT HEALTH QUESTIONNAIRE - PHQ9
SUM OF ALL RESPONSES TO PHQ QUESTIONS 1-9: 3
SUM OF ALL RESPONSES TO PHQ QUESTIONS 1-9: 3
10. IF YOU CHECKED OFF ANY PROBLEMS, HOW DIFFICULT HAVE THESE PROBLEMS MADE IT FOR YOU TO DO YOUR WORK, TAKE CARE OF THINGS AT HOME, OR GET ALONG WITH OTHER PEOPLE: NOT DIFFICULT AT ALL

## 2023-10-19 ASSESSMENT — PAIN SCALES - GENERAL: PAINLEVEL: NO PAIN (0)

## 2023-10-19 NOTE — PROGRESS NOTES
ANTICOAGULATION  MANAGEMENT- Travel planning    Catalina Marie reports upcoming travel plans to [place].    Departure date: 10/23/23  Anticipated return date: 6/1/23  Alternate contact information (if applicable): cell phone       INR monitoring plan:     Provider/Clinic name and phone number to manage warfarin: Dr Paulino Ball at [facility]     Anticoagulation calendar updated with date of return from travel (if monitoring with outside provider)     Instructed to call the Anticoauglation Clinic for any changes, questions or concerns. [phone]   ?   Lucia Arreola RN

## 2023-10-19 NOTE — PROGRESS NOTES
ANTICOAGULATION MANAGEMENT     Catalina Marie 78 year old female is on warfarin with supratherapeutic INR result. (Goal INR 2.0-3.0)    Recent labs: (last 7 days)     10/19/23  1211   INR 3.3*       ASSESSMENT     Source(s): Chart Review and Patient/Caregiver Call     Warfarin doses taken: Warfarin taken as instructed  Diet: No new diet changes identified  Medication/supplement changes: None noted  New illness, injury, or hospitalization: No  Signs or symptoms of bleeding or clotting: No  Previous result: Supratherapeutic  Additional findings: None and Leaving for [place] 10/23 and returning aroung 6/1/24       PLAN     Recommended plan for no diet, medication or health factor changes affecting INR     Dosing Instructions: partial hold then continue your current warfarin dose with next INR in 2 weeks       Summary  As of 10/19/2023      Full warfarin instructions:  10/19: 2.5 mg; Otherwise 2.5 mg every Sat; 5 mg all other days   Next INR check:  6/1/2024               Telephone call with Jennifer who verbalizes understanding and agrees to plan    Lab visit scheduled    Education provided:   Please call back if any changes to your diet, medications or how you've been taking warfarin    Plan made per [facility] anticoagulation protocol    Lucia Arreola RN  Anticoagulation Clinic  10/19/2023    _______________________________________________________________________     Anticoagulation Episode Summary       Current INR goal:  2.0-3.0   TTR:  43.2% (4.4 mo)   Target end date:  Indefinite   Send INR reminders to:  DEMOND FRANCO    Indications    Long term current use of anticoagulant therapy [Z79.01]  Chronic atrial fibrillation (H) [I48.20]             Comments:               Anticoagulation Care Providers       Provider Role Specialty Phone number    Sulaiman Trinh MD Referring Internal Medicine [phone]           Eufemia

## 2023-10-19 NOTE — PROGRESS NOTES
Assessment & Plan     Type 2 diabetes mellitus with stage 1 chronic kidney disease, without long-term current use of insulin (H)    - FOOT EXAM  - Hemoglobin A1c; Future  - Hemoglobin A1c    Longstanding persistent atrial fibrillation (H)  Rate controlled, on warfarin for stroke prophylaxis    Recurrent major depressive episodes (H24)  Stable    Stage 3 chronic kidney disease, unspecified whether stage 3a or 3b CKD (H)  Recheck labs  - Albumin Random Urine Quantitative with Creat Ratio; Future  - Albumin Random Urine Quantitative with Creat Ratio    Benign essential hypertension  Second check reading okay    Hyperlipidemia LDL goal <100  On statin therapy, recheck labs  - Comprehensive metabolic panel; Future  - CBC with platelets; Future  - Lipid panel reflex to direct LDL Fasting; Future  - Comprehensive metabolic panel  - CBC with platelets  - Lipid panel reflex to direct LDL Fasting    Alopecia  Check for reversible causes of alopecia, if labs do not show any actionable abnormalities, recommended she consider returning to dermatology to discuss this matter  - TSH; Future  - Ferritin; Future  - TSH  - Ferritin        No LOS data to display   Time spent by me doing chart review, history and exam, documentation and further activities per the note           Sulaiman Trinh MD  [facility] JOAN Rodriguez is a 78 year old, presenting for the following health issues:  Follow Up (Post surgery (facial), plans to go to [place] next month, planning to do annual medicare in [place] December 2023/), Health Maintenance (Eye exam: going to [place] (11/02/2023  [facility] and Diabetes (Diabetic foot exam)        10/19/2023    11:20 AM   Additional Questions   Roomed by beth   Accompanied by Not applicable, by themselves       History of Present Illness       Hyperlipidemia:  She presents for follow up of hyperlipidemia.   She is taking medication to lower cholesterol. She is  "not having myalgia or other side effects to statin medications.    Hypertension: She presents for follow up of hypertension.  She does check blood pressure  regularly outside of the clinic. Outside blood pressures have been over 140/90. She does not follow a low salt diet.     Vascular Disease:  She presents for follow up of vascular disease.     She never takes nitroglycerin. She is not taking daily aspirin.    She eats 2-3 servings of fruits and vegetables daily.She consumes 1 sweetened beverage(s) daily.She exercises with enough effort to increase her heart rate 20 to 29 minutes per day.  She exercises with enough effort to increase her heart rate 4 days per week.   She is taking medications regularly.       Wt Readings from Last 4 Encounters:   10/19/23 63.5 kg (139 lb 14.4 oz)   07/03/23 60.7 kg (133 lb 12.8 oz)   09/12/22 62.6 kg (138 lb)   09/12/22 62.7 kg (138 lb 3.2 oz)     Follow-up diabetes, A-fib, hypertension, hyperlipidemia, chronic kidney disease, depression    Complains of diffuse hair loss without scarring present for years but worsening        Review of Systems         Objective    /74 (BP Location: Left arm, Patient Position: Sitting, Cuff Size: Adult Regular)   Pulse 52   Temp 98  F (36.7  C) (Oral)   Resp 16   Ht 1.62 m (5' 3.78\")   Wt 63.5 kg (139 lb 14.4 oz)   SpO2 95%   BMI 24.18 kg/m    Body mass index is 24.18 kg/m .  Physical Exam   GENERAL: healthy, alert and no distress  NECK: no adenopathy, no asymmetry, masses, or scars and thyroid normal to palpation  RESP: lungs clear to auscultation - no rales, rhonchi or wheezes  CV: The heart has an irregularly irregular rhythm with a normal rate.   ABDOMEN: soft, nontender, no hepatosplenomegaly, no masses and bowel sounds normal  MS: no gross musculoskeletal defects noted, no edema  SKIN: Mildly thinning hair and androgenic pattern without scalp scarring  NEURO: Normal strength and tone, mentation intact and speech normal  PSYCH: " mentation appears normal, affect normal/bright

## 2023-10-20 NOTE — RESULT ENCOUNTER NOTE
The following letter pertains to your most recent diagnostic tests:    -Your microalbumin level which is a  urine test to check for any evidence of early kidney injury returned essentially negative/normal.     -The metabolic panel is normal and stable for you.    -The cholesterol is OK for you.      -The thyroid test TSH is normal.    -The iron test ferritin is normal.    -Your hemoglobin A1c test which is a diabetes blood test that represents and average of your blood sugars over the last 3 months returned at 6.2 which is at your goal of hemoglobin A1c at least less than 8.     -Your complete blood counts including your hemoglobin returned normal for you.         Bottom line:  Overall, the lab results look stable and at goal for you.        Follow up:  If the hair loss is troubling for you, the next step would be to see a dermatologist to discuss if there are any treatments that could help.  I should see you back in 6 months.      Sincerely,    Dr. Trinh

## 2023-11-14 ENCOUNTER — TRANSFERRED RECORDS (OUTPATIENT)
Dept: HEALTH INFORMATION MANAGEMENT | Facility: CLINIC | Age: 78
End: 2023-11-14

## 2023-11-17 ENCOUNTER — TRANSFERRED RECORDS (OUTPATIENT)
Dept: HEALTH INFORMATION MANAGEMENT | Facility: CLINIC | Age: 78
End: 2023-11-17

## 2023-11-21 ENCOUNTER — TRANSFERRED RECORDS (OUTPATIENT)
Dept: HEALTH INFORMATION MANAGEMENT | Facility: CLINIC | Age: 78
End: 2023-11-21

## 2023-12-04 ENCOUNTER — TRANSFERRED RECORDS (OUTPATIENT)
Dept: HEALTH INFORMATION MANAGEMENT | Facility: CLINIC | Age: 78
End: 2023-12-04

## 2023-12-04 LAB — EJECTION FRACTION: NORMAL %

## 2023-12-05 ENCOUNTER — TRANSFERRED RECORDS (OUTPATIENT)
Dept: HEALTH INFORMATION MANAGEMENT | Facility: CLINIC | Age: 78
End: 2023-12-05

## 2023-12-07 ENCOUNTER — TRANSFERRED RECORDS (OUTPATIENT)
Dept: HEALTH INFORMATION MANAGEMENT | Facility: CLINIC | Age: 78
End: 2023-12-07
Payer: MEDICARE

## 2023-12-14 ENCOUNTER — TRANSFERRED RECORDS (OUTPATIENT)
Dept: HEALTH INFORMATION MANAGEMENT | Facility: CLINIC | Age: 78
End: 2023-12-14

## 2023-12-28 ENCOUNTER — TRANSFERRED RECORDS (OUTPATIENT)
Dept: HEALTH INFORMATION MANAGEMENT | Facility: CLINIC | Age: 78
End: 2023-12-28

## 2024-01-07 ENCOUNTER — HEALTH MAINTENANCE LETTER (OUTPATIENT)
Age: 79
End: 2024-01-07

## 2024-01-31 ENCOUNTER — TRANSFERRED RECORDS (OUTPATIENT)
Dept: HEALTH INFORMATION MANAGEMENT | Facility: CLINIC | Age: 79
End: 2024-01-31

## 2024-02-21 ENCOUNTER — TRANSFERRED RECORDS (OUTPATIENT)
Dept: HEALTH INFORMATION MANAGEMENT | Facility: CLINIC | Age: 79
End: 2024-02-21

## 2024-02-21 LAB
ALT SERPL-CCNC: 39 U/L (ref 113–56)
CREATININE (EXTERNAL): 1.21 MG/DL (ref 0.55–1.02)
GFR ESTIMATED (EXTERNAL): 45 ML/MIN/1.73M2
GLUCOSE (EXTERNAL): 121 MG/DL (ref 70–100)
INR (EXTERNAL): 2.67 (ref 0.88–1.13)

## 2024-02-22 ENCOUNTER — TRANSFERRED RECORDS (OUTPATIENT)
Dept: HEALTH INFORMATION MANAGEMENT | Facility: CLINIC | Age: 79
End: 2024-02-22

## 2024-02-27 ENCOUNTER — TRANSFERRED RECORDS (OUTPATIENT)
Dept: HEALTH INFORMATION MANAGEMENT | Facility: CLINIC | Age: 79
End: 2024-02-27

## 2024-03-11 ENCOUNTER — TRANSFERRED RECORDS (OUTPATIENT)
Dept: HEALTH INFORMATION MANAGEMENT | Facility: CLINIC | Age: 79
End: 2024-03-11

## 2024-03-12 NOTE — PROGRESS NOTES
ANTICOAGULATION FOLLOW-UP CLINIC VISIT    Patient Name:  Catalina Marie  Date:  4/24/2018  Contact Type:  Responded to patient's INFUSD message with dosing instruction and recheck date.     SUBJECTIVE:     Patient Findings     Positives No Problem Findings    Comments Faxed results received from [facility].     INR drawn 4/23/18 was 3.2.           OBJECTIVE    INR   Date Value Ref Range Status   04/23/2018 3.2  Final       ASSESSMENT / PLAN  INR assessment SUPRA    Recheck INR In: 4 WEEKS    INR Location Outside lab      Anticoagulation Summary as of 4/24/2018     INR goal 2.0-3.0   Today's INR 3.2! (4/23/2018)   Maintenance plan 2.5 mg (5 mg x 0.5) on Tue, Fri; 5 mg (5 mg x 1) all other days   Full instructions 2.5 mg on Tue, Fri; 5 mg all other days   Weekly total 30 mg   No change documented Carline Myrick RN   Plan last modified Elodia Campbell RN (1/25/2018)   Next INR check 5/22/2018   Priority INR   Target end date     Indications   Long-term (current) use of anticoagulants [Z79.01] [Z79.01]  Atrial fibrillation (H) [I48.91] [I48.91]         Anticoagulation Episode Summary     INR check location     Preferred lab     Send INR reminders to [facility]AG[facility]    Comments Expect FAXED INR results from [facility] in [place], [place]. Phone: [phone].  General Results#: [phone]  Call pt's cell in [place] with results/plan: [phone]      Anticoagulation Care Providers     Provider Role Specialty Phone number    Ian Tracy MD Referring Internal Medicine [phone]            See the Encounter Report to view Anticoagulation Flowsheet and Dosing Calendar (Go to Encounters tab in chart review, and find the Anticoagulation Therapy Visit)    Dosage adjustment made based on physician directed care plan.    Carline Myrick RN                You can access the FollowMyHealth Patient Portal offered by [facility] by registering at the following website: [email or web address]. By joining [facility]’s FollowMyHealth portal, you will also be able to view your health information using other applications (apps) compatible with our system.

## 2024-03-15 ENCOUNTER — MEDICAL CORRESPONDENCE (OUTPATIENT)
Dept: HEALTH INFORMATION MANAGEMENT | Facility: CLINIC | Age: 79
End: 2024-03-15

## 2024-04-08 ENCOUNTER — TRANSFERRED RECORDS (OUTPATIENT)
Dept: HEALTH INFORMATION MANAGEMENT | Facility: CLINIC | Age: 79
End: 2024-04-08
Payer: MEDICARE

## 2024-04-09 ENCOUNTER — TRANSFERRED RECORDS (OUTPATIENT)
Dept: HEALTH INFORMATION MANAGEMENT | Facility: CLINIC | Age: 79
End: 2024-04-09
Payer: MEDICARE

## 2024-04-23 ENCOUNTER — TRANSFERRED RECORDS (OUTPATIENT)
Dept: HEALTH INFORMATION MANAGEMENT | Facility: CLINIC | Age: 79
End: 2024-04-23
Payer: MEDICARE

## 2024-04-24 ENCOUNTER — TRANSFERRED RECORDS (OUTPATIENT)
Dept: HEALTH INFORMATION MANAGEMENT | Facility: CLINIC | Age: 79
End: 2024-04-24
Payer: MEDICARE

## 2024-05-07 ENCOUNTER — TRANSFERRED RECORDS (OUTPATIENT)
Dept: HEALTH INFORMATION MANAGEMENT | Facility: CLINIC | Age: 79
End: 2024-05-07

## 2024-05-26 ENCOUNTER — HEALTH MAINTENANCE LETTER (OUTPATIENT)
Age: 79
End: 2024-05-26

## 2024-06-12 ENCOUNTER — ANTICOAGULATION THERAPY VISIT (OUTPATIENT)
Dept: ANTICOAGULATION | Facility: CLINIC | Age: 79
End: 2024-06-12

## 2024-06-12 ENCOUNTER — LAB (OUTPATIENT)
Dept: LAB | Facility: CLINIC | Age: 79
End: 2024-06-12
Payer: MEDICARE

## 2024-06-12 ENCOUNTER — TELEPHONE (OUTPATIENT)
Dept: ANTICOAGULATION | Facility: CLINIC | Age: 79
End: 2024-06-12

## 2024-06-12 DIAGNOSIS — Z79.01 LONG TERM CURRENT USE OF ANTICOAGULANT THERAPY: ICD-10-CM

## 2024-06-12 DIAGNOSIS — I48.20 CHRONIC ATRIAL FIBRILLATION (H): Primary | ICD-10-CM

## 2024-06-12 DIAGNOSIS — Z79.01 LONG TERM CURRENT USE OF ANTICOAGULANT THERAPY: Primary | ICD-10-CM

## 2024-06-12 DIAGNOSIS — I48.20 CHRONIC ATRIAL FIBRILLATION (H): ICD-10-CM

## 2024-06-12 DIAGNOSIS — E11.22 TYPE 2 DIABETES MELLITUS WITH STAGE 1 CHRONIC KIDNEY DISEASE, WITHOUT LONG-TERM CURRENT USE OF INSULIN (H): Primary | ICD-10-CM

## 2024-06-12 DIAGNOSIS — N18.1 TYPE 2 DIABETES MELLITUS WITH STAGE 1 CHRONIC KIDNEY DISEASE, WITHOUT LONG-TERM CURRENT USE OF INSULIN (H): Primary | ICD-10-CM

## 2024-06-12 LAB — INR BLD: 2.6 (ref 0.9–1.1)

## 2024-06-12 PROCEDURE — 85610 PROTHROMBIN TIME: CPT

## 2024-06-12 PROCEDURE — 36416 COLLJ CAPILLARY BLOOD SPEC: CPT

## 2024-06-12 NOTE — PROGRESS NOTES
ANTICOAGULATION MANAGEMENT     Catalina Marie 78 year old female is on warfarin with therapeutic INR result. (Goal INR 2.0-3.0)    Recent labs: (last 7 days)     06/12/24  1315   INR 2.6*       ASSESSMENT     Source(s): Chart Review and Patient/Caregiver Call     Warfarin doses taken: Warfarin taken as instructed, Template incorrect; verbally confirmed home dose with 5 mg daily , and was changed while down in [place] and has been therapeutic on this dosing  Diet: No new diet changes identified  Medication/supplement changes: None noted  New illness, injury, or hospitalization: No  Signs or symptoms of bleeding or clotting: No  Previous result: Therapeutic last 2(+) visits  Additional findings:  has been doing monthly INR in [place]        PLAN     Recommended plan for no diet, medication or health factor changes affecting INR     Dosing Instructions: Continue your current warfarin dose with next INR in 4 weeks       Summary  As of 6/12/2024      Full warfarin instructions:  5 mg every day   Next INR check:  7/10/2024               Telephone call with Jennifer who verbalizes understanding and agrees to plan    Lab visit scheduled    Education provided:   Please call back if any changes to your diet, medications or how you've been taking warfarin  Goal range and lab monitoring: goal range and significance of current result, Importance of therapeutic range, and Importance of following up at instructed interval    Plan made per [facility] anticoagulation protocol    Genevieve Hawk RN  Anticoagulation Clinic  6/12/2024    _______________________________________________________________________     Anticoagulation Episode Summary       Current INR goal:  2.0-3.0   TTR:  43.3% (3.7 mo)   Target end date:  Indefinite   Send INR reminders to:  DEMOND FRANCO    Indications    Long term current use of anticoagulant therapy [Z79.01]  Chronic atrial fibrillation (H) [I48.20]             Comments:               Anticoagulation Care  Providers       Provider Role Specialty Phone number    Sulaiman Trinh MD Referring Internal Medicine [phone]

## 2024-06-12 NOTE — TELEPHONE ENCOUNTER
ANTICOAGULATION CLINIC REFERRAL RENEWAL REQUEST       An annual renewal order is required for all patients referred to [facility].?  Please review and sign the pended referral order for Catalina Marie.       ANTICOAGULATION SUMMARY      Warfarin indication(s)   Atrial Fibrillation    Mechanical heart valve present?  NO       Current goal range   INR: 2.0-3.0     Goal appropriate for indication? Goal INR 2-3, standard for indication(s) above     Time in Therapeutic Range (TTR)  (Goal > 60%) 43.3%       Office visit with referring provider's group within last year yes on 10/19/2023       Genevieve Hawk RN  [facility]

## 2024-06-25 ENCOUNTER — TRANSFERRED RECORDS (OUTPATIENT)
Dept: MULTI SPECIALTY CLINIC | Facility: CLINIC | Age: 79
End: 2024-06-25

## 2024-06-25 LAB — RETINOPATHY: NORMAL

## 2024-07-11 ENCOUNTER — TELEPHONE (OUTPATIENT)
Dept: ANTICOAGULATION | Facility: CLINIC | Age: 79
End: 2024-07-11
Payer: MEDICARE

## 2024-07-11 NOTE — TELEPHONE ENCOUNTER
ANTICOAGULATION     Catalina Marie is overdue for an INR check.  INR was rescheduled for Sat 7/13/24. INRs shouldn't be scheduled on weekends since no RN able to manage warfarin dosing. Called Jennifer to help reschedule to weekday; and inform that if she chooses to leave lab on Saturday, no RN will call until Monday morning.    No answer. Left message for patient to call and schedule lab appointment as soon as possible. If returning call, please schedule.  If questions, transfer to [facility] RN.    Hanna Flores, RN

## 2024-07-13 ENCOUNTER — LAB (OUTPATIENT)
Dept: LAB | Facility: CLINIC | Age: 79
End: 2024-07-13
Payer: MEDICARE

## 2024-07-13 DIAGNOSIS — I48.20 CHRONIC ATRIAL FIBRILLATION (H): ICD-10-CM

## 2024-07-13 DIAGNOSIS — Z79.01 LONG TERM CURRENT USE OF ANTICOAGULANT THERAPY: ICD-10-CM

## 2024-07-13 LAB — INR BLD: 2.3 (ref 0.9–1.1)

## 2024-07-13 PROCEDURE — 36415 COLL VENOUS BLD VENIPUNCTURE: CPT

## 2024-07-13 PROCEDURE — 85610 PROTHROMBIN TIME: CPT

## 2024-07-15 ENCOUNTER — ANTICOAGULATION THERAPY VISIT (OUTPATIENT)
Dept: ANTICOAGULATION | Facility: CLINIC | Age: 79
End: 2024-07-15
Payer: MEDICARE

## 2024-07-15 DIAGNOSIS — Z79.01 LONG TERM CURRENT USE OF ANTICOAGULANT THERAPY: Primary | ICD-10-CM

## 2024-07-15 DIAGNOSIS — I48.20 CHRONIC ATRIAL FIBRILLATION (H): ICD-10-CM

## 2024-07-15 NOTE — PROGRESS NOTES
ANTICOAGULATION MANAGEMENT     Catalina Marie 78 year old female is on warfarin with therapeutic INR result. (Goal INR 2.0-3.0)    Recent labs: (last 7 days)     07/13/24  1556   INR 2.3*       ASSESSMENT     Source(s): Chart Review and Patient/Caregiver Call     Warfarin doses taken: Warfarin taken as instructed  Diet: No new diet changes identified  Medication/supplement changes:  She may start a You Therory product of Collagen plus biotene. No interaction found.   New illness, injury, or hospitalization: No  Signs or symptoms of bleeding or clotting: No  Previous result: Therapeutic last 2(+) visits  Additional findings: None       PLAN     Recommended plan for no diet, medication or health factor changes affecting INR     Dosing Instructions: Continue your current warfarin dose with next INR in 4-5 weeks       Summary  As of 7/15/2024      Full warfarin instructions:  5 mg every day   Next INR check:  8/16/2024               Telephone call with Jennifer who agrees to plan and repeated back plan correctly    Lab visit scheduled    Education provided: Please call back if any changes to your diet, medications or how you've been taking warfarin  Goal range and lab monitoring: goal range and significance of current result and Importance of therapeutic range    Plan made per [facility] anticoagulation protocol    Maribel Johnson RN  Anticoagulation Clinic  7/15/2024    _______________________________________________________________________     Anticoagulation Episode Summary       Current INR goal:  2.0-3.0   TTR:  50.3% (4.2 mo)   Target end date:  Indefinite   Send INR reminders to:  DEMOND FRANCO    Indications    Long term current use of anticoagulant therapy [Z79.01]  Chronic atrial fibrillation (H) [I48.20]             Comments:               Anticoagulation Care Providers       Provider Role Specialty Phone number    Sulaiman Trinh MD Referring Internal Medicine [phone]

## 2024-07-17 ENCOUNTER — APPOINTMENT (OUTPATIENT)
Dept: URBAN - METROPOLITAN AREA CLINIC 255 | Age: 79
Setting detail: DERMATOLOGY
End: 2024-07-19

## 2024-07-17 VITALS — WEIGHT: 138 LBS | HEIGHT: 64 IN

## 2024-07-17 DIAGNOSIS — D22 MELANOCYTIC NEVI: ICD-10-CM

## 2024-07-17 DIAGNOSIS — L57.0 ACTINIC KERATOSIS: ICD-10-CM

## 2024-07-17 DIAGNOSIS — D18.0 HEMANGIOMA: ICD-10-CM

## 2024-07-17 DIAGNOSIS — L57.8 OTHER SKIN CHANGES DUE TO CHRONIC EXPOSURE TO NONIONIZING RADIATION: ICD-10-CM

## 2024-07-17 DIAGNOSIS — Z71.89 OTHER SPECIFIED COUNSELING: ICD-10-CM

## 2024-07-17 DIAGNOSIS — L82.1 OTHER SEBORRHEIC KERATOSIS: ICD-10-CM

## 2024-07-17 DIAGNOSIS — Z85.820 PERSONAL HISTORY OF MALIGNANT MELANOMA OF SKIN: ICD-10-CM

## 2024-07-17 DIAGNOSIS — L81.4 OTHER MELANIN HYPERPIGMENTATION: ICD-10-CM

## 2024-07-17 PROBLEM — D22.5 MELANOCYTIC NEVI OF TRUNK: Status: ACTIVE | Noted: 2024-07-17

## 2024-07-17 PROBLEM — D18.01 HEMANGIOMA OF SKIN AND SUBCUTANEOUS TISSUE: Status: ACTIVE | Noted: 2024-07-17

## 2024-07-17 PROCEDURE — OTHER COUNSELING: OTHER

## 2024-07-17 PROCEDURE — OTHER MIPS QUALITY: OTHER

## 2024-07-17 PROCEDURE — 99203 OFFICE O/P NEW LOW 30 MIN: CPT | Mod: 25

## 2024-07-17 PROCEDURE — OTHER ADDITIONAL NOTES: OTHER

## 2024-07-17 PROCEDURE — 17000 DESTRUCT PREMALG LESION: CPT

## 2024-07-17 PROCEDURE — OTHER LIQUID NITROGEN: OTHER

## 2024-07-17 ASSESSMENT — LOCATION DETAILED DESCRIPTION DERM
LOCATION DETAILED: LEFT INFERIOR MEDIAL MALAR CHEEK
LOCATION DETAILED: RIGHT ANTERIOR SHOULDER
LOCATION DETAILED: LEFT SUPERIOR MEDIAL UPPER BACK
LOCATION DETAILED: LEFT MEDIAL UPPER BACK
LOCATION DETAILED: LEFT SUPERIOR LATERAL MALAR CHEEK

## 2024-07-17 ASSESSMENT — LOCATION ZONE DERM
LOCATION ZONE: FACE
LOCATION ZONE: ARM
LOCATION ZONE: TRUNK

## 2024-07-17 ASSESSMENT — LOCATION SIMPLE DESCRIPTION DERM
LOCATION SIMPLE: LEFT CHEEK
LOCATION SIMPLE: RIGHT SHOULDER
LOCATION SIMPLE: LEFT UPPER BACK

## 2024-07-17 NOTE — HPI: FULL BODY SKIN EXAMINATION
What Type Of Note Output Would You Prefer (Optional)?: Standard Output
What Is The Reason For Today's Visit?: Full Body Skin Examination
What Is The Reason For Today's Visit? (Being Monitored For X): concerning skin lesions on an annual basis
Additional History: Pt reports concerning lesion on the hand, present for months.

## 2024-07-17 NOTE — PROCEDURE: MIPS QUALITY
Detail Level: Detailed
Quality 137: Melanoma: Continuity Of Care - Recall System: Recall system not utilized, reason not otherwise specified
Quality 47: Advance Care Plan: Advance Care Planning discussed and documented; advance care plan or surrogate decision maker documented in the medical record.
Quality 226: Preventive Care And Screening: Tobacco Use: Screening And Cessation Intervention: Patient screened for tobacco use and is an ex/non-smoker

## 2024-07-17 NOTE — PROCEDURE: LIQUID NITROGEN
Detail Level: Detailed
Show Applicator Variable?: Yes
Application Tool (Optional): Liquid Nitrogen Sprayer
Render Note In Bullet Format When Appropriate: No
Post-Care Instructions: I reviewed with the patient in detail post-care instructions. Patient is to wear sunprotection, and avoid picking at any of the treated lesions. Pt may apply Vaseline to crusted or scabbing areas.
Duration Of Freeze Thaw-Cycle (Seconds): 2
Number Of Freeze-Thaw Cycles: 2 freeze-thaw cycles
Consent: The patient's consent was obtained including but not limited to risks of crusting, scabbing, blistering, scarring, darker or lighter pigmentary change, recurrence, incomplete removal and infection.

## 2024-07-17 NOTE — PROCEDURE: ADDITIONAL NOTES
Detail Level: Simple
Additional Notes: - Recommended massaging previous surgical sites
Render Risk Assessment In Note?: no

## 2024-07-25 ENCOUNTER — OFFICE VISIT (OUTPATIENT)
Dept: FAMILY MEDICINE | Facility: CLINIC | Age: 79
End: 2024-07-25
Payer: MEDICARE

## 2024-07-25 VITALS
HEIGHT: 64 IN | OXYGEN SATURATION: 92 % | HEART RATE: 82 BPM | DIASTOLIC BLOOD PRESSURE: 82 MMHG | BODY MASS INDEX: 23.88 KG/M2 | WEIGHT: 139.9 LBS | SYSTOLIC BLOOD PRESSURE: 137 MMHG | RESPIRATION RATE: 16 BRPM | TEMPERATURE: 98 F

## 2024-07-25 DIAGNOSIS — F33.9 RECURRENT MAJOR DEPRESSIVE EPISODES (H): ICD-10-CM

## 2024-07-25 DIAGNOSIS — I10 BENIGN ESSENTIAL HYPERTENSION: ICD-10-CM

## 2024-07-25 DIAGNOSIS — E11.22 TYPE 2 DIABETES MELLITUS WITH STAGE 1 CHRONIC KIDNEY DISEASE, WITHOUT LONG-TERM CURRENT USE OF INSULIN (H): Primary | ICD-10-CM

## 2024-07-25 DIAGNOSIS — E78.5 HYPERLIPIDEMIA LDL GOAL <100: ICD-10-CM

## 2024-07-25 DIAGNOSIS — R42 VERTIGO: ICD-10-CM

## 2024-07-25 DIAGNOSIS — N18.1 TYPE 2 DIABETES MELLITUS WITH STAGE 1 CHRONIC KIDNEY DISEASE, WITHOUT LONG-TERM CURRENT USE OF INSULIN (H): Primary | ICD-10-CM

## 2024-07-25 DIAGNOSIS — I48.20 CHRONIC ATRIAL FIBRILLATION (H): ICD-10-CM

## 2024-07-25 DIAGNOSIS — N18.31 STAGE 3A CHRONIC KIDNEY DISEASE (H): ICD-10-CM

## 2024-07-25 PROBLEM — S46.011D STRAIN OF RIGHT ROTATOR CUFF CAPSULE, SUBSEQUENT ENCOUNTER: Status: RESOLVED | Noted: 2019-12-30 | Resolved: 2024-07-25

## 2024-07-25 PROBLEM — T50.901A OVERDOSE, ACCIDENTAL OR UNINTENTIONAL, INITIAL ENCOUNTER: Status: RESOLVED | Noted: 2017-06-13 | Resolved: 2024-07-25

## 2024-07-25 PROBLEM — I48.11 LONGSTANDING PERSISTENT ATRIAL FIBRILLATION (H): Status: RESOLVED | Noted: 2021-02-26 | Resolved: 2024-07-25

## 2024-07-25 PROBLEM — L82.0 INFLAMED SEBORRHEIC KERATOSIS: Status: RESOLVED | Noted: 2020-07-19 | Resolved: 2024-07-25

## 2024-07-25 PROBLEM — Z00.00 ROUTINE GENERAL MEDICAL EXAMINATION AT A HEALTH CARE FACILITY: Status: RESOLVED | Noted: 2020-07-19 | Resolved: 2024-07-25

## 2024-07-25 LAB
HBA1C MFR BLD: 6.1 % (ref 0–5.6)
VIT B12 SERPL-MCNC: 902 PG/ML (ref 232–1245)

## 2024-07-25 PROCEDURE — 36415 COLL VENOUS BLD VENIPUNCTURE: CPT | Performed by: INTERNAL MEDICINE

## 2024-07-25 PROCEDURE — G2211 COMPLEX E/M VISIT ADD ON: HCPCS | Performed by: INTERNAL MEDICINE

## 2024-07-25 PROCEDURE — 99214 OFFICE O/P EST MOD 30 MIN: CPT | Performed by: INTERNAL MEDICINE

## 2024-07-25 PROCEDURE — 82607 VITAMIN B-12: CPT | Performed by: INTERNAL MEDICINE

## 2024-07-25 PROCEDURE — 83036 HEMOGLOBIN GLYCOSYLATED A1C: CPT | Performed by: INTERNAL MEDICINE

## 2024-07-25 RX ORDER — ATENOLOL 25 MG/1
25 TABLET ORAL 3 TIMES DAILY
Qty: 270 TABLET | Refills: 3 | Status: SHIPPED | OUTPATIENT
Start: 2024-07-25 | End: 2024-07-25

## 2024-07-25 RX ORDER — ATENOLOL 25 MG/1
25 TABLET ORAL 3 TIMES DAILY
Qty: 270 TABLET | Refills: 3 | Status: SHIPPED | OUTPATIENT
Start: 2024-07-25

## 2024-07-25 ASSESSMENT — PATIENT HEALTH QUESTIONNAIRE - PHQ9
SUM OF ALL RESPONSES TO PHQ QUESTIONS 1-9: 2
SUM OF ALL RESPONSES TO PHQ QUESTIONS 1-9: 2
10. IF YOU CHECKED OFF ANY PROBLEMS, HOW DIFFICULT HAVE THESE PROBLEMS MADE IT FOR YOU TO DO YOUR WORK, TAKE CARE OF THINGS AT HOME, OR GET ALONG WITH OTHER PEOPLE: SOMEWHAT DIFFICULT

## 2024-07-25 ASSESSMENT — PAIN SCALES - GENERAL: PAINLEVEL: NO PAIN (0)

## 2024-07-25 NOTE — PROGRESS NOTES
Assessment & Plan     Type 2 diabetes mellitus with stage 1 chronic kidney disease, without long-term current use of insulin (H)  Check A1c  - Hemoglobin A1c; Future    Chronic atrial fibrillation (H)  Rate OK  On warfarin  EPIC medication list updated   - atenolol (TENORMIN) 25 MG tablet; Take 1 tablet (25 mg) by mouth 3 times daily    Stage 3a chronic kidney disease (H)  Recheck October     Benign essential hypertension  OK, continue current drugs     Recurrent major depressive episodes (H24)  Continue sertraline     Hyperlipidemia LDL goal <100  On statin therapy and fibrate; recheck in October    Vertigo  Unclear etiology  Check brain MRI to exclude stroke, mass lesion/neoplasm, demyelinating disease   If that is negative trial of PT for balance   Eval for B12 def   - Physical Therapy  Referral; Future  - MR Brain w/o & w Contrast; Future  - Vitamin B12; Future            FUTURE APPOINTMENTS:       - Follow-up for annual visit or as needed    Subjective   Jennifer is a 78 year old, presenting for the following health issues:  Follow Up        7/25/2024     3:20 PM   Additional Questions   Roomed by Parish   Accompanied by Not applicable, by themselves     History of Present Illness       Reason for visit:  F/u    She eats 2-3 servings of fruits and vegetables daily.She consumes 1 sweetened beverage(s) daily.She exercises with enough effort to increase her heart rate 9 or less minutes per day.  She exercises with enough effort to increase her heart rate 3 or less days per week.   She is taking medications regularly.     She has been feeling unsteady since she had melanoma surgery on her face in [place]  No recent falls  No focal weakness or numbness or vision change  Treated for pneumonia in [place]  Cardiologist there increased atenolol to 25 TID and heart rate has been under better control                Objective    /82 (BP Location: Left arm, Cuff Size: Adult Regular)   Pulse 82   Temp 98  F  "(36.7  C) (Temporal)   Resp 16   Ht 1.62 m (5' 3.78\")   Wt 63.5 kg (139 lb 14.4 oz)   SpO2 92%   BMI 24.18 kg/m    Body mass index is 24.18 kg/m .  Physical Exam   GENERAL: alert and no distress  RESP: lungs clear to auscultation - no rales, rhonchi or wheezes  CV: The heart has an irregularly irregular rhythm with a normal rate.   ABDOMEN: soft, nontender, no hepatosplenomegaly, no masses and bowel sounds normal  MS: no gross musculoskeletal defects noted, no edema  SKIN: no suspicious lesions or rashes  NEURO: Alert and oriented to person, place and time. Cranial nerves 2-12 appear grossly intact.   No pronator drift.  Cerebellar testing OK.  Symmetric strength.  Normal gait down hallway of clinic.  Passes the 'get up and go' test.  Normal Romberg test.    PSYCH: mentation appears normal and anxious            Signed Electronically by: Sulaiman rTinh MD    "

## 2024-07-25 NOTE — RESULT ENCOUNTER NOTE
The following letter pertains to your most recent diagnostic tests:    Good news! -Your hemoglobin A1c test which averages your blood sugars over the last 3 months returned at 6.1 which is at your goal of hemoglobin A1c at least less than 7 and improved since last check!       Sincerely,    Dr. Trinh

## 2024-07-26 NOTE — RESULT ENCOUNTER NOTE
The following letter pertains to your most recent diagnostic tests:    -Your vitamin B12 level is normal.       Sincerely,    Dr. Trinh

## 2024-07-30 ENCOUNTER — THERAPY VISIT (OUTPATIENT)
Dept: PHYSICAL THERAPY | Facility: CLINIC | Age: 79
End: 2024-07-30
Attending: INTERNAL MEDICINE
Payer: MEDICARE

## 2024-07-30 DIAGNOSIS — R26.89 IMBALANCE: ICD-10-CM

## 2024-07-30 DIAGNOSIS — R42 VERTIGO: Primary | ICD-10-CM

## 2024-07-30 PROCEDURE — 97530 THERAPEUTIC ACTIVITIES: CPT | Mod: GP

## 2024-07-30 PROCEDURE — 97162 PT EVAL MOD COMPLEX 30 MIN: CPT | Mod: GP

## 2024-07-30 NOTE — PROGRESS NOTES
PHYSICAL THERAPY EVALUATION  Type of Visit: Evaluation    Fall Risk Screen:  Fall screen completed by: PT  Have you fallen 2 or more times in the past year?: Yes  Have you fallen and had an injury in the past year?: No  Is patient a fall risk?: Yes  Fall screen comments: h/o falls    Subjective   Pt is 77 y/o female referred to OP PT with diagnosis of  vertigo  however upon subjective discussion pt states she does not get dizzy she just feels off balance. Pt states she has had significant imbalance for quite some time that has been worsening for no apparent reason. Pt reports it got to a point that her PCP recommended she start using a cane but she didn't want to so she started PT for balance last spring in [place], mostly worked on strength and uneven surface training and coordination. Pt states she was almost finished with her PT and her balance had greatly improved when she was diagnosed with melanoma and had to have 2 surgeries on her face then about 1 week after her last surgery she contracted pneumonia and had been in bed recovering for a long time. Still feels exhausted since coming back to [facility] and now she feels right back to where she was before starting PT for her balance. She feels she tends to veer side to side with walking, has trouble getting out of a car, and any movement can cause imbalance with sometimes an associated dizziness. Has h/o schwannoma resection in 1991, B mastectomy. Feels that balance has diminished over last 8 years, fell 2x last fall. Going back to [place] at end of October. Has MRI scheduled in a few weeks to rule out neurologically cause of imbalance.      VESTIBULAR EVALUATION  ADDITIONAL HISTORY:  Description of symptoms: Off balance; Likely to fall; Light-headedness  Dizzy attacks:   Start:     Last attack:     Frequency of occurrences:     Length of attack:    Difficulty hearing: Both ears  Noise in ears? Yes tinnitus  Alleviates symptoms: nothing  Worsens symptoms:     Activities that bring on symptoms:    constantly    Pertinent visual history: denies changes to vision  Pertinent history of current vestibular problem: Hearing loss  DHI:  40/100 (although pt states she answered questions in terms of feeling off balance NOT dizzy)        Presenting condition or subjective complaint: Balance  Date of onset: 07/25/24 (order date)    Relevant medical history: Cancer; Dizziness; Hearing problems; High blood pressure; Significant weakness; Vision problems   Dates & types of surgery:      Prior diagnostic imaging/testing results: Other MRI in a few weeks   Prior therapy history for the same diagnosis, illness or injury: Yes Balance therapy in [place]    Prior Level of Function  Transfers: Independent  Ambulation: Independent  ADL: Independent  IADL:  IND    Living Environment  Social support:     Type of home:     Stairs to enter the home:         Ramp:     Stairs inside the home:         Help at home:    Equipment owned:       Employment:      Hobbies/Interests:      Patient goals for therapy: Get out of car or chair normally - walk better    Pain assessment: Pain denied     Objective   Cognitive Status Examination  Orientation: Oriented to person, place and time   Level of Consciousness: Alert    OBSERVATION:   POSTURE: WFL  PALPATION:   RANGE OF MOTION:   STRENGTH: R DF 4/5, L hamstring painful with resistance, B hip flex 4/5, B shldr flex 4/5,  strength L>R (L handed)    BED MOBILITY: not seen    TRANSFERS: Independent    GAIT:   Gait Description: Pt ambulates with occasional path veering, no AD    BALANCE:     SPECIAL TESTS  Functional Gait Assessment (FGA) TOTAL SCORE: (MAXIMUM SCORE 30): 20  (<22/30 indicates fall risk)     Dynamic Gait Index (DGI)    (<19/24 indicates fall risk)   Modified CTSIB Conditions (sec) Cond 1: 30 sec  Cond 2: 12 sec  Cond 4: 30 sec  Cond 5 : 30 sec (severe sway)   5xSTS 15.22 sec   Gait speed (10mwt) 5.56 sec = 1.08 m/s     SENSATION:  denies  N/T  COORDINATION:     VESTIBULAR  Deferred today d/t pt denying dizziness and endorsing symptoms as imbalance        Assessment & Plan   CLINICAL IMPRESSIONS  Medical Diagnosis: Vertigo    Treatment Diagnosis: Imbalance   Impression/Assessment: Patient is a 78 year old female with imbalance complaints.  The following significant findings have been identified: Decreased strength, Impaired balance, Impaired gait, Decreased activity tolerance, and Instability. These impairments interfere with their ability to perform recreational activities, household chores, household mobility, and community mobility as compared to previous level of function.     Clinical Decision Making (Complexity):  Clinical Presentation: Evolving/Changing  Clinical Presentation Rationale: based on medical and personal factors listed in PT evaluation  Clinical Decision Making (Complexity): Moderate complexity    PLAN OF CARE  Treatment Interventions:  Interventions: Gait Training, Manual Therapy, Neuromuscular Re-education, Therapeutic Activity, Therapeutic Exercise    Long Term Goals     PT Goal 1  Goal Identifier: mCTSIB  Goal Description: Pt will be able to maintain balance in all conditons of the mCTSIB for at least 30 sec in order to reduce risk of falls in low light and compliant surface conditions (ie. showering)  Goal Progress: eval: Cond 1: 30 sec  Cond 2: 12 sec  Cond 4: 30 sec  Cond 5 : 30 sec (severe sway)  Target Date: 10/28/24  PT Goal 2  Goal Identifier: 5xSTS  Goal Description: Pt will perform 5x STS in <12 sec in order to improve safety and independence with transfers at home  Goal Progress: eval: 15.22 sec  Target Date: 10/28/24  PT Goal 3  Goal Identifier: FGA  Goal Description: Pt will score at least 24/30 on the FGA in order to indicate reduced risk of falls when ambulating in the community  Goal Progress: eval: 20/30  Target Date: 10/28/24  PT Goal 4  Goal Identifier: 6mwt  Goal Description: Pt will be able to ambulate at  least 150 ft further in 6 min compared to initial testing in order to demonstrate improved activity tolerance to community ambulation  Goal Progress: eval: NT  Target Date: 10/28/24  PT Goal 5  Goal Identifier: HEP  Goal Description: Pt will be independent with HEP at least 3x/week in order to improve self management of impairments  Target Date: 10/28/24      Frequency of Treatment: 1x/week  Duration of Treatment: 90 days    Recommended Referrals to Other Professionals:   Education Assessment:   Learner/Method: Patient    Risks and benefits of evaluation/treatment have been explained.   Patient/Family/caregiver agrees with Plan of Care.     Evaluation Time:     PT Eval, Moderate Complexity Minutes (83378): 35       Signing Clinician: Matilde Lowry, PT        [facility]                                                                                   OUTPATIENT PHYSICAL THERAPY      PLAN OF TREATMENT FOR OUTPATIENT REHABILITATION   Patient's Last Name, First Name, MASSIELCatalina Prince YOB: 1945   Provider's Name   [facility]   Medical Record No.  [id number]     Onset Date: 07/25/24 (order date)  Start of Care Date: 07/30/24     Medical Diagnosis:  Vertigo      PT Treatment Diagnosis:  Imbalance Plan of Treatment  Frequency/Duration: 1x/week/ 90 days    Certification date from 07/30/24 to 10/28/24         See note for plan of treatment details and functional goals     Matilde Lowry, PT                         I CERTIFY THE NEED FOR THESE SERVICES FURNISHED UNDER        THIS PLAN OF TREATMENT AND WHILE UNDER MY CARE     (Physician attestation of this document indicates review and certification of the therapy plan).              Referring Provider:  Sulaiman Trinh    Initial Assessment  See Epic Evaluation- Start of Care Date: 07/30/24

## 2024-08-06 ENCOUNTER — THERAPY VISIT (OUTPATIENT)
Dept: PHYSICAL THERAPY | Facility: CLINIC | Age: 79
End: 2024-08-06
Attending: INTERNAL MEDICINE
Payer: MEDICARE

## 2024-08-06 DIAGNOSIS — R26.89 IMBALANCE: ICD-10-CM

## 2024-08-06 DIAGNOSIS — R42 VERTIGO: Primary | ICD-10-CM

## 2024-08-06 PROCEDURE — 97530 THERAPEUTIC ACTIVITIES: CPT | Mod: GP

## 2024-08-06 PROCEDURE — 97110 THERAPEUTIC EXERCISES: CPT | Mod: GP

## 2024-08-09 ENCOUNTER — ANTICOAGULATION THERAPY VISIT (OUTPATIENT)
Dept: ANTICOAGULATION | Facility: CLINIC | Age: 79
End: 2024-08-09

## 2024-08-09 ENCOUNTER — LAB (OUTPATIENT)
Dept: LAB | Facility: CLINIC | Age: 79
End: 2024-08-09
Payer: MEDICARE

## 2024-08-09 DIAGNOSIS — Z79.01 LONG TERM CURRENT USE OF ANTICOAGULANT THERAPY: ICD-10-CM

## 2024-08-09 DIAGNOSIS — Z79.01 LONG TERM CURRENT USE OF ANTICOAGULANT THERAPY: Primary | ICD-10-CM

## 2024-08-09 DIAGNOSIS — I48.20 CHRONIC ATRIAL FIBRILLATION (H): ICD-10-CM

## 2024-08-09 LAB — INR BLD: 2.7 (ref 0.9–1.1)

## 2024-08-09 PROCEDURE — 36416 COLLJ CAPILLARY BLOOD SPEC: CPT

## 2024-08-09 PROCEDURE — 85610 PROTHROMBIN TIME: CPT

## 2024-08-09 NOTE — PROGRESS NOTES
ANTICOAGULATION MANAGEMENT     Catalina Marie 78 year old female is on warfarin with therapeutic INR result. (Goal INR 2.0-3.0)    Recent labs: (last 7 days)     08/09/24  1105   INR 2.7*       ASSESSMENT     Warfarin Lab Questionnaire    Warfarin Doses Last 7 Days      8/9/2024    11:03 AM   Dose in Tablet or Mg   TAB or MG? tablet (tab)     Pt Rptd Dose SUNDAY MONDAY TUESDAY WED THURS FRIDAY SATURDAY 8/9/2024  11:03 AM 5 5 5 5 5 5 5         8/9/2024   Warfarin Lab Questionnaire   Missed doses within past 14 days? No   Changes in diet or alcohol within past 14 days? No   Medication changes since last result? No   Injuries or illness since last result? No   New shortness of breath, severe headaches or sudden changes in vision since last result? No   Abnormal bleeding since last result? No   Upcoming surgery, procedure? No        Previous result: Therapeutic last 2(+) visits  Additional findings: None       PLAN     Recommended plan for no diet, medication or health factor changes affecting INR     Dosing Instructions: Continue your current warfarin dose with next INR in 5 weeks       Summary  As of 8/9/2024      Full warfarin instructions:  5 mg every day   Next INR check:  9/13/2024               Telephone call with Jennifer who verbalizes understanding and agrees to plan    Lab visit scheduled    Education provided: Please call back if any changes to your diet, medications or how you've been taking warfarin    Plan made per [facility] anticoagulation protocol    Hamlet Thakkar RN  Anticoagulation Clinic  8/9/2024    _______________________________________________________________________     Anticoagulation Episode Summary       Current INR goal:  2.0-3.0   TTR:  54.4% (4.3 mo)   Target end date:  Indefinite   Send INR reminders to:  DEMOND FRANCO    Indications    Long term current use of anticoagulant therapy [Z79.01]  Chronic atrial fibrillation (H) [I48.20]             Comments:                Anticoagulation Care Providers       Provider Role Specialty Phone number    Sulaiman Trinh MD Referring Internal Medicine [phone]

## 2024-08-12 ENCOUNTER — ANCILLARY PROCEDURE (OUTPATIENT)
Dept: MRI IMAGING | Facility: CLINIC | Age: 79
End: 2024-08-12
Attending: INTERNAL MEDICINE
Payer: MEDICARE

## 2024-08-12 DIAGNOSIS — R42 VERTIGO: ICD-10-CM

## 2024-08-12 PROCEDURE — A9585 GADOBUTROL INJECTION: HCPCS | Performed by: INTERNAL MEDICINE

## 2024-08-12 PROCEDURE — 70553 MRI BRAIN STEM W/O & W/DYE: CPT | Mod: MG

## 2024-08-12 PROCEDURE — 255N000002 HC RX 255 OP 636: Performed by: INTERNAL MEDICINE

## 2024-08-12 RX ORDER — GADOBUTROL 604.72 MG/ML
6.5 INJECTION INTRAVENOUS ONCE
Status: COMPLETED | OUTPATIENT
Start: 2024-08-12 | End: 2024-08-12

## 2024-08-12 RX ADMIN — GADOBUTROL 6.5 ML: 604.72 INJECTION INTRAVENOUS at 12:06

## 2024-08-13 ENCOUNTER — THERAPY VISIT (OUTPATIENT)
Dept: PHYSICAL THERAPY | Facility: CLINIC | Age: 79
End: 2024-08-13
Attending: INTERNAL MEDICINE
Payer: MEDICARE

## 2024-08-13 DIAGNOSIS — R26.89 IMBALANCE: ICD-10-CM

## 2024-08-13 DIAGNOSIS — R42 VERTIGO: Primary | ICD-10-CM

## 2024-08-13 PROCEDURE — 97110 THERAPEUTIC EXERCISES: CPT | Mod: GP

## 2024-08-13 NOTE — RESULT ENCOUNTER NOTE
The following letter pertains to your most recent diagnostic tests:    Good news! The MRI does NOT show a dangerous cause for your dizziness.  It is a normal brain MRI for someone who has used her brain for 78 years.   I would keep working with physical therapy to strengthen the muscles of balance to help with your sense of balance and to reduce your risk for falls.        Sincerely,    Dr. Trinh

## 2024-08-19 ENCOUNTER — THERAPY VISIT (OUTPATIENT)
Dept: PHYSICAL THERAPY | Facility: CLINIC | Age: 79
End: 2024-08-19
Payer: MEDICARE

## 2024-08-19 DIAGNOSIS — R42 VERTIGO: Primary | ICD-10-CM

## 2024-08-19 DIAGNOSIS — R26.89 IMBALANCE: ICD-10-CM

## 2024-08-19 PROCEDURE — 97112 NEUROMUSCULAR REEDUCATION: CPT | Mod: GP

## 2024-08-26 ENCOUNTER — THERAPY VISIT (OUTPATIENT)
Dept: PHYSICAL THERAPY | Facility: CLINIC | Age: 79
End: 2024-08-26
Payer: MEDICARE

## 2024-08-26 DIAGNOSIS — R42 VERTIGO: Primary | ICD-10-CM

## 2024-08-26 DIAGNOSIS — R26.89 IMBALANCE: ICD-10-CM

## 2024-08-26 PROCEDURE — 99207 PR NO CHARGE LOS: CPT | Mod: GP

## 2024-09-03 ENCOUNTER — THERAPY VISIT (OUTPATIENT)
Dept: PHYSICAL THERAPY | Facility: CLINIC | Age: 79
End: 2024-09-03
Payer: MEDICARE

## 2024-09-03 DIAGNOSIS — R26.89 IMBALANCE: Primary | ICD-10-CM

## 2024-09-03 DIAGNOSIS — R42 VERTIGO: ICD-10-CM

## 2024-09-03 PROCEDURE — 97112 NEUROMUSCULAR REEDUCATION: CPT | Mod: GP

## 2024-09-03 PROCEDURE — 97110 THERAPEUTIC EXERCISES: CPT | Mod: GP

## 2024-09-05 ENCOUNTER — OFFICE VISIT (OUTPATIENT)
Dept: FAMILY MEDICINE | Facility: CLINIC | Age: 79
End: 2024-09-05
Payer: MEDICARE

## 2024-09-05 ENCOUNTER — TELEPHONE (OUTPATIENT)
Dept: FAMILY MEDICINE | Facility: CLINIC | Age: 79
End: 2024-09-05

## 2024-09-05 VITALS
BODY MASS INDEX: 24.3 KG/M2 | SYSTOLIC BLOOD PRESSURE: 133 MMHG | TEMPERATURE: 98.1 F | HEIGHT: 64 IN | HEART RATE: 89 BPM | OXYGEN SATURATION: 96 % | WEIGHT: 142.3 LBS | DIASTOLIC BLOOD PRESSURE: 83 MMHG | RESPIRATION RATE: 16 BRPM

## 2024-09-05 DIAGNOSIS — I48.20 CHRONIC ATRIAL FIBRILLATION (H): ICD-10-CM

## 2024-09-05 DIAGNOSIS — E11.22 TYPE 2 DIABETES MELLITUS WITH STAGE 1 CHRONIC KIDNEY DISEASE, WITHOUT LONG-TERM CURRENT USE OF INSULIN (H): ICD-10-CM

## 2024-09-05 DIAGNOSIS — I10 BENIGN ESSENTIAL HYPERTENSION: ICD-10-CM

## 2024-09-05 DIAGNOSIS — R29.6 FALLS FREQUENTLY: Primary | ICD-10-CM

## 2024-09-05 DIAGNOSIS — E78.5 HYPERLIPIDEMIA LDL GOAL <100: ICD-10-CM

## 2024-09-05 DIAGNOSIS — N18.1 TYPE 2 DIABETES MELLITUS WITH STAGE 1 CHRONIC KIDNEY DISEASE, WITHOUT LONG-TERM CURRENT USE OF INSULIN (H): ICD-10-CM

## 2024-09-05 DIAGNOSIS — G95.9 CERVICAL MYELOPATHY (H): ICD-10-CM

## 2024-09-05 DIAGNOSIS — J30.0 CHRONIC VASOMOTOR RHINITIS: ICD-10-CM

## 2024-09-05 PROCEDURE — 99214 OFFICE O/P EST MOD 30 MIN: CPT | Performed by: INTERNAL MEDICINE

## 2024-09-05 RX ORDER — IPRATROPIUM BROMIDE 42 UG/1
2 SPRAY, METERED NASAL 4 TIMES DAILY
Qty: 45 ML | Refills: 3 | Status: SHIPPED | OUTPATIENT
Start: 2024-09-05

## 2024-09-05 ASSESSMENT — PAIN SCALES - GENERAL: PAINLEVEL: MILD PAIN (3)

## 2024-09-05 NOTE — PROGRESS NOTES
Assessment & Plan     Falls frequently  Unclear why this patient is falling  She had a negative brain MRI  Cervical spine MRI shows mild cervical spine canal stenosis which could cause myelopathy which might be responsible for falls although she really does not have any overt signs of myelopathy on my examination  Other considerations could be normal pressure hydrocephalus or other rare causes of falls  She saw a nurse practitioner at the [facility], [facility], but I do think she would benefit from a neurologist examining her before she commits herself to cervical spine surgery  I recommended the [facility] neurology consultation service and made a referral  I placed a priority referral, because she plans to leave for [place] later this fall  I do think she would benefit from that consult before she leaves for [place]  - Adult Neurology  Referral; Future    Cervical myelopathy (H)  Refer to discussion above, I am not certain she has this problem, but I would like her to have a good neurologic examination from a neurologist to see if this could be the contributing factor to her falls or if another rare cause for falls could be identified on that examination.    Benign essential hypertension  She does not have any signs of positional blood pressure drop on exam today.  Continue current medications    Type 2 diabetes mellitus with stage 1 chronic kidney disease, without long-term current use of insulin (H)  Under good control    Hyperlipidemia LDL goal <100  On statin therapy    Chronic atrial fibrillation (H)  Rate is well-controlled, on anticoagulant for stroke prophylaxis    Chronic vasomotor rhinitis  She asked for refill of her ipratropium nasal spray for her nonallergic rhinitis  - ipratropium (ATROVENT) 0.06 % nasal spray; Spray 2 sprays into both nostrils 4 times daily.            FUTURE APPOINTMENTS:       -Recommend that she see me when she gets home from [place], she should see a  "neurologist as soon as an appointment is available and hopefully before she departs for [facility]   Jennifer is a 78 year old, presenting for the following health issues:  Results (MRI, Balance)    History of Present Illness       Reason for visit:  Balance afib    She eats 2-3 servings of fruits and vegetables daily.She consumes 1 sweetened beverage(s) daily.She exercises with enough effort to increase her heart rate 20 to 29 minutes per day.  She is missing 7 dose(s) of medications per week.     Jennifer returns regarding her frequent falls   She has been participating in physical therapy and finds that it is helpful  However, she did have another fall since her last visit  She describes getting up from a swivel chair at her desk and her study and feeling dizzy and striking the window and the wall and falling to the ground  Luckily, there were no injuries  She saw a neurologist and a CT of the cervical spine was obtained  This showed mild cervical spine canal stenosis  She perceived that surgery was indicated for her neck through her conversations with the nurse practitioner at the neurology clinic  She denies any difficulties buttoning her clothing or zipping up her zippers she has not been having any troubles dropping her keys or other symptoms that might be indicative of cervical myelopathy  She denies any new bowel or bladder incontinence            Objective    /83 (BP Location: Right arm, Patient Position: Sitting, Cuff Size: Adult Large)   Pulse 89   Temp 98.1  F (36.7  C) (Tympanic)   Resp 16   Ht 1.626 m (5' 4\")   Wt 64.5 kg (142 lb 4.8 oz)   SpO2 96%   BMI 24.43 kg/m    Body mass index is 24.43 kg/m .  Physical Exam     Standing blood pressure was about 135/80  General: This is a well-appearing female in no acute distress although she has an anxious affect.  Neurological: She passes the \"get up and go\" test, she seems to have a steady gait although it may have a more wide-based than " normal, she has 5 out of 5 strength in upper and lower extremity muscle groups, negative bilateral Rg signs, negative Babinski reflexes in both feet, no clonus, deep tendon reflexes at the patella, Achilles, triceps, biceps, brachioradialis tendons are 2+.            Signed Electronically by: Sulaiman Trinh MD

## 2024-09-05 NOTE — TELEPHONE ENCOUNTER
"Patients  calling stating, \"Neurology cannot see patient until end of January and we leave October 27th. Dr. Baron is not available until January.\"    Writer advised that the order was already listed as priority and to be seen in one to two weeks.  "

## 2024-09-05 NOTE — TELEPHONE ENCOUNTER
Called pt's spouse (C2C on file) and discussed PCP response below     He is agreeable to recommendation     Maylin HOWARD, Triage RN  [facility]

## 2024-09-05 NOTE — TELEPHONE ENCOUNTER
One option is to see another neurologist within the [facility] group as they are all very well-qualified and excellent.  Another option would be to return to the [facility], [facility] where she is already an established patient and see if she can secure an appointment with a neurologist there.  Dr. Chung or Dr. Miller are good neurologists in that group.

## 2024-09-10 ENCOUNTER — THERAPY VISIT (OUTPATIENT)
Dept: PHYSICAL THERAPY | Facility: CLINIC | Age: 79
End: 2024-09-10
Payer: MEDICARE

## 2024-09-10 DIAGNOSIS — R42 VERTIGO: ICD-10-CM

## 2024-09-10 DIAGNOSIS — R26.89 IMBALANCE: Primary | ICD-10-CM

## 2024-09-10 PROCEDURE — 97112 NEUROMUSCULAR REEDUCATION: CPT | Mod: GP

## 2024-09-13 ENCOUNTER — ANTICOAGULATION THERAPY VISIT (OUTPATIENT)
Dept: ANTICOAGULATION | Facility: CLINIC | Age: 79
End: 2024-09-13

## 2024-09-13 ENCOUNTER — LAB (OUTPATIENT)
Dept: LAB | Facility: CLINIC | Age: 79
End: 2024-09-13
Payer: MEDICARE

## 2024-09-13 DIAGNOSIS — I48.20 CHRONIC ATRIAL FIBRILLATION (H): ICD-10-CM

## 2024-09-13 DIAGNOSIS — Z79.01 LONG TERM CURRENT USE OF ANTICOAGULANT THERAPY: Primary | ICD-10-CM

## 2024-09-13 DIAGNOSIS — Z79.01 LONG TERM CURRENT USE OF ANTICOAGULANT THERAPY: ICD-10-CM

## 2024-09-13 DIAGNOSIS — N18.30 CKD (CHRONIC KIDNEY DISEASE) STAGE 3, GFR 30-59 ML/MIN (H): Primary | ICD-10-CM

## 2024-09-13 LAB — INR BLD: 2.2 (ref 0.9–1.1)

## 2024-09-13 PROCEDURE — 85610 PROTHROMBIN TIME: CPT

## 2024-09-13 PROCEDURE — 36416 COLLJ CAPILLARY BLOOD SPEC: CPT

## 2024-09-13 NOTE — PROGRESS NOTES
ANTICOAGULATION MANAGEMENT     Catalina Marie 78 year old female is on warfarin with therapeutic INR result. (Goal INR 2.0-3.0)    Recent labs: (last 7 days)     09/13/24  1107   INR 2.2*       ASSESSMENT     Source(s): Chart Review and Patient/Caregiver Call     Warfarin doses taken: Warfarin taken as instructed  Diet: No new diet changes identified  Medication/supplement changes: None noted  New illness, injury, or hospitalization: No  Signs or symptoms of bleeding or clotting: No  Previous result: Therapeutic last 2(+) visits  Additional findings:  Will be leaving for [place] at the end of October , not 100% on the date yet but states she will have one more INR done prior to leaving for the winter. She has a provider in [place] that manages her INR.        PLAN     Recommended plan for no diet, medication or health factor changes affecting INR     Dosing Instructions: Continue your current warfarin dose with next INR in 6 weeks       Summary  As of 9/13/2024      Full warfarin instructions:  5 mg every day   Next INR check:  10/25/2024               Telephone call with Jennifer who verbalizes understanding and agrees to plan    Patient offered & declined to schedule next visit    Education provided: Contact [phone] with any changes, questions or concerns.     Plan made per [facility] anticoagulation protocol    Pascale Mahoney RN  9/13/2024  Anticoagulation Clinic  [place] for routing messages: adina FRANCO  [facility] patient phone line: [phone]        _______________________________________________________________________     Anticoagulation Episode Summary       Current INR goal:  2.0-3.0   TTR:  72.4% (4.3 mo)   Target end date:  Indefinite   Send INR reminders to:  DEMOND FRANCO    Indications    Long term current use of anticoagulant therapy [Z79.01]  Chronic atrial fibrillation (H) [I48.20]             Comments:               Anticoagulation Care Providers       Provider Role Specialty Phone number    Ziggy,  Sulaiman DEAN MD [facility] Internal Medicine [phone]

## 2024-09-16 ENCOUNTER — TRANSFERRED RECORDS (OUTPATIENT)
Dept: HEALTH INFORMATION MANAGEMENT | Facility: CLINIC | Age: 79
End: 2024-09-16
Payer: MEDICARE

## 2024-09-16 ENCOUNTER — MYC MEDICAL ADVICE (OUTPATIENT)
Dept: FAMILY MEDICINE | Facility: CLINIC | Age: 79
End: 2024-09-16
Payer: MEDICARE

## 2024-09-17 ENCOUNTER — VIRTUAL VISIT (OUTPATIENT)
Dept: FAMILY MEDICINE | Facility: CLINIC | Age: 79
End: 2024-09-17
Payer: MEDICARE

## 2024-09-17 ENCOUNTER — THERAPY VISIT (OUTPATIENT)
Dept: PHYSICAL THERAPY | Facility: CLINIC | Age: 79
End: 2024-09-17
Payer: MEDICARE

## 2024-09-17 DIAGNOSIS — I48.20 CHRONIC ATRIAL FIBRILLATION (H): Primary | ICD-10-CM

## 2024-09-17 DIAGNOSIS — R26.89 IMBALANCE: Primary | ICD-10-CM

## 2024-09-17 DIAGNOSIS — R29.6 FALLS FREQUENTLY: ICD-10-CM

## 2024-09-17 DIAGNOSIS — R42 VERTIGO: ICD-10-CM

## 2024-09-17 PROCEDURE — 97112 NEUROMUSCULAR REEDUCATION: CPT | Mod: GP

## 2024-09-17 PROCEDURE — 99442 PR PHYSICIAN TELEPHONE EVALUATION 11-20 MIN: CPT | Mod: 93 | Performed by: INTERNAL MEDICINE

## 2024-09-17 NOTE — PROGRESS NOTES
Jennifer is a 78 year old who is being evaluated via a billable telephone visit.    What phone number would you like to be contacted at? [phone]  How would you like to obtain your AVS? Criselda  Originating Location (pt. Location): Home    Distant Location (provider location):  On-site    Assessment & Plan     Chronic atrial fibrillation (H)  Falls frequently    Stroke risk is elevated, so I do recommend an anticoagulant to mitigate the risk of stroke associated with atrial fibrillation  However, I told Jennifer that I did not think there was a clear right or wrong answer as to which anticoagulant she should use  There are clearly pros and cons to each choice  I told her that studies show that there is less bleeding risk associated with Eliquis and for that reason she could mitigate bleeding risk by switching to that newer drug   The cost of Eliquis and the less readily available reversal and I doubt for Eliquis as compared to warfarin our downsides of choosing the newer drug rather than staying on warfarin  She understood these pros and cons and wished to take some additional time to discuss the matter with her   If she decides to switch to Eliquis, she will contact me  Until then, she will continue to work on physical therapy interventions to mitigate her risk for falls            FUTURE APPOINTMENTS:       - Follow-up for annual visit or as needed    Subjective   Jennifer is a 78 year old, presenting for the following health issues:  Recheck Medication (Patient is having a telephone visit to discuss Eliquis vs Warfarin.  )        7/25/2024     3:20 PM   Additional Questions   Roomed by Parish   Accompanied by Not applicable, by themselves     History of Present Illness       Reason for visit:  Balance afib    She eats 2-3 servings of fruits and vegetables daily.She consumes 1 sweetened beverage(s) daily.She exercises with enough effort to increase her heart rate 20 to 29 minutes per day.  She is missing 7 dose(s)  of medications per week.             78-year-old female with atrial fibrillation, hypertension, type 2 diabetes, hyperlipidemia, CKD, history of breast cancer, depression    She describes a several month history of falls and has seen providers at the neurology clinic regarding this    Most recently, she met with Dr. Matthews who felt that her cervical spine findings do not explain her falls and recommended additional PT to mitigate the risk of falls.  However, he expressed concern about the bleeding risk associated with warfarin and recommended that she switch to a novel oral anticoagulant.    Jennifer had some questions and concerns about this.  Notably, she is concerned about the cost of warfarin furthermore, she is very comfortable with using warfarin and for the most part has kept her INR within the therapeutic range although there are some mildly supratherapeutic INR's that I noted.      Objective           Vitals:  No vitals were obtained today due to virtual visit.    Physical Exam   General: Alert and no distress //Respiratory: No audible wheeze, cough, or shortness of breath // Psychiatric:  Appropriate affect, tone, and pace of words            Phone call duration: 16:03 minutes  Signed Electronically by: Sulaiman Trinh MD

## 2024-09-17 NOTE — TELEPHONE ENCOUNTER
Can you please help her schedule a telephone or video appointment to discuss the pros and cons of this.  I have openings this afternoon, I believe.

## 2024-09-23 ENCOUNTER — THERAPY VISIT (OUTPATIENT)
Dept: PHYSICAL THERAPY | Facility: CLINIC | Age: 79
End: 2024-09-23
Payer: MEDICARE

## 2024-09-23 DIAGNOSIS — R42 VERTIGO: ICD-10-CM

## 2024-09-23 DIAGNOSIS — R26.89 IMBALANCE: Primary | ICD-10-CM

## 2024-09-23 PROCEDURE — 97112 NEUROMUSCULAR REEDUCATION: CPT | Mod: GP

## 2024-09-30 ENCOUNTER — THERAPY VISIT (OUTPATIENT)
Dept: PHYSICAL THERAPY | Facility: CLINIC | Age: 79
End: 2024-09-30
Payer: MEDICARE

## 2024-09-30 DIAGNOSIS — R42 VERTIGO: ICD-10-CM

## 2024-09-30 DIAGNOSIS — R26.89 IMBALANCE: Primary | ICD-10-CM

## 2024-09-30 PROCEDURE — 97750 PHYSICAL PERFORMANCE TEST: CPT | Mod: GP

## 2024-09-30 NOTE — PROGRESS NOTES
09/30/24 0500   Appointment Info   Signing clinician's name / credentials Ysabel Lowry, PT, DPT   Visits Used 10   Medical Diagnosis Vertigo   PT Tx Diagnosis Imbalance   Quick Adds Certification   Progress Note/Certification   Start of Care Date 07/30/24   Onset of illness/injury or Date of Surgery 07/25/24  (order date)   Therapy Frequency 1x/week   Predicted Duration 90 days   Certification date from 07/30/24   Certification date to 10/28/24   GOALS   PT Goals 2;3;4;5   PT Goal 1   Goal Identifier mCTSIB   Goal Description Pt will be able to maintain balance in all conditons of the mCTSIB for at least 30 sec in order to reduce risk of falls in low light and compliant surface conditions (ie. showering)   Goal Progress eval: Cond 1: 30 sec  Cond 2: 12 sec  Cond 4: 30 sec  Cond 5 : 30 sec (severe sway); PROGRESSION 9/30/24: 30 sec in all positions except 26.07 sec in cond 5   Target Date 10/28/24   PT Goal 2   Goal Identifier 5xSTS   Goal Description Pt will perform 5x STS in <12 sec in order to improve safety and independence with transfers at home   Goal Progress eval: 15.22 sec; MET 9/30/24: 10.43 sec   Target Date 10/28/24   Date Met 09/30/24   PT Goal 3   Goal Identifier FGA   Goal Description Pt will score at least 24/30 on the FGA in order to indicate reduced risk of falls when ambulating in the community   Goal Progress eval: 20/30; PROGRESSING 9/30/24: 22/30   Target Date 10/28/24   PT Goal 4   Goal Identifier 6mwt   Goal Description Pt will be able to ambulate at least 150 ft further in 6 min compared to initial testing in order to demonstrate improved activity tolerance to community ambulation   Goal Progress eval: NT; 8/6/24: 1406 ft; PROGRESSING 9/30/24: 1503 ft   Target Date 10/28/24   PT Goal 5   Goal Identifier HEP   Goal Description Pt will be independent with HEP at least 3x/week in order to improve self management of impairments   Goal Progress PROGRESSING 9/30/24: pt is more consistent with  HEP now   Target Date 10/28/24   Subjective Report   Subjective Report Had birthday weekend so skipped that day for exercises but otherwise feels she has been good about doing exercises. Got a call from neurologist clinic that they got blood results - she is wondering what this has to do with balance   Objective Measures   Objective Measures Objective Measure 3;Objective Measure 4   Objective Measure 1   Objective Measure 6mwt   Details 1503 ft   Objective Measure 2   Objective Measure mCTSIB   Details 30 sec in all positions except 26.07 sec in cond 5   Objective Measure 3   Objective Measure 5xSTS   Details 10.43 sec   Objective Measure 4   Objective Measure FGA   Details 22/30   Eval/Assessments   Assessments Physical Performance Test/Measures   Physical Performance Test/measures   Physical Performance Test/Measurement, Minutes (96467) 40   Physical Performance Test/Measurement Details Reassessment of 6mwt, mCTSIB, 5xSTS, and FGA. POC discussion for ongoing until end of OCtober   Skilled Intervention Reassessment of all goals demonstrating progress but no goal met yet   Plan   Home program PTRx + PWR twist in standing   Plan for next session progress slowly adding reactivity, dual tasking as able, head turning. Update HEP   Total Session Time   Timed Code Treatment Minutes 40   Total Treatment Time (sum of timed and untimed services) 40         PLAN  Continue therapy per current plan of care. Pt continuing to make slow progress in her balance. Would benefit from ongoing skilled PT until leaving for [place] in end of October.    Beginning/End Dates of Progress Note Reporting Period:   07/30/2024  to 09/30/2024    Referring Provider:  Sulaiman Trinh

## 2024-10-07 ENCOUNTER — THERAPY VISIT (OUTPATIENT)
Dept: PHYSICAL THERAPY | Facility: CLINIC | Age: 79
End: 2024-10-07
Payer: MEDICARE

## 2024-10-07 DIAGNOSIS — R26.89 IMBALANCE: Primary | ICD-10-CM

## 2024-10-07 DIAGNOSIS — R42 VERTIGO: ICD-10-CM

## 2024-10-07 PROCEDURE — 97112 NEUROMUSCULAR REEDUCATION: CPT | Mod: GP

## 2024-10-07 PROCEDURE — 97110 THERAPEUTIC EXERCISES: CPT | Mod: GP

## 2024-10-15 ENCOUNTER — THERAPY VISIT (OUTPATIENT)
Dept: PHYSICAL THERAPY | Facility: CLINIC | Age: 79
End: 2024-10-15
Payer: MEDICARE

## 2024-10-15 DIAGNOSIS — R42 VERTIGO: ICD-10-CM

## 2024-10-15 DIAGNOSIS — R26.89 IMBALANCE: Primary | ICD-10-CM

## 2024-10-15 PROCEDURE — 97112 NEUROMUSCULAR REEDUCATION: CPT | Mod: GP

## 2024-10-15 NOTE — PROGRESS NOTES
10/15/24 0500   Appointment Info   Signing clinician's name / credentials Ysabel Lowry, PT, DPT   Visits Used 12   Medical Diagnosis Vertigo   PT Tx Diagnosis Imbalance   Quick Adds Certification   Progress Note/Certification   Start of Care Date 07/30/24   Onset of illness/injury or Date of Surgery 07/25/24  (order date)   Therapy Frequency 1x/week   Predicted Duration 90 days   Certification date from 07/30/24   Certification date to 10/28/24   GOALS   PT Goals 2;3;4;5   PT Goal 1   Goal Identifier mCTSIB   Goal Description Pt will be able to maintain balance in all conditons of the mCTSIB for at least 30 sec in order to reduce risk of falls in low light and compliant surface conditions (ie. showering)   Goal Progress eval: Cond 1: 30 sec  Cond 2: 12 sec  Cond 4: 30 sec  Cond 5 : 30 sec (severe sway); PROGRESSION 9/30/24: 30 sec in all positions except 26.07 sec in cond 5   Target Date 10/28/24   PT Goal 2   Goal Identifier 5xSTS   Goal Description Pt will perform 5x STS in <12 sec in order to improve safety and independence with transfers at home   Goal Progress eval: 15.22 sec; MET 9/30/24: 10.43 sec   Target Date 10/28/24   Date Met 09/30/24   PT Goal 3   Goal Identifier FGA   Goal Description Pt will score at least 24/30 on the FGA in order to indicate reduced risk of falls when ambulating in the community   Goal Progress eval: 20/30; PROGRESSING 9/30/24: 22/30   Target Date 10/28/24   PT Goal 4   Goal Identifier 6mwt   Goal Description Pt will be able to ambulate at least 150 ft further in 6 min compared to initial testing in order to demonstrate improved activity tolerance to community ambulation   Goal Progress eval: NT; 8/6/24: 1406 ft; PROGRESSING 9/30/24: 1503 ft   Target Date 10/28/24   PT Goal 5   Goal Identifier HEP   Goal Description Pt will be independent with HEP at least 3x/week in order to improve self management of impairments   Goal Progress PROGRESSING 9/30/24: pt is more consistent with  HEP now   Target Date 10/28/24   Subjective Report   Subjective Report Pt still planning on going to [place] and today will be last day with us in clinic. Pt would like something to bring to her PT's down in [place] when she continues with them.   Neuromuscular Re-education   Neuromuscular re-ed of mvmt, balance, coord, kinesthetic sense, posture, proprioception minutes (13315) 40   Neuro Re-ed 1 - Details Walking in hallway with 5 lb DB held in one hand to challenge balance with aerobic warm up 2x 150 ft in each hand so 4x150 ft total. Then walking in hallway holding 5 lb DB near shoulder with alternating hip march x50 ft with weight in R hand, then struggled more with weight in L hand and was able to completed 2x 10 ft - seemed to have reduced anticipatory balance with L sided anticipatory perturbations. Step ups to bosu ball to facilitate reactionary stepping to catch LOB, x15 reps with good reactions noted with LOB in anterior, lateral, and posterior directions. Obstacle course of 6x wu pads and 6x stepping stones placed in random order for pt to ambulate over uin straight line - good balance demonstrated with full attention on task. Same obstacle course performed with manual dual task of balancing cone on slider held in palm x1 in R hand, then unable to complete successfully when held in L handz. Seemed to have lack of attentional awareness to L side compared to R.   Skilled Intervention guarding, cues, education   Patient Response/Progress pt demonstrates reduced balance reactions - both anticipatory and reactionary when L side challenged more, appears to have reduced attentional awareness to L side as well. With any level of cognitive dual tasking pt balance severely reduced, with manual dual tasking balance is reduced but not as challenging.   Plan   Home program PTRx + PWR twist in standing   Updates to plan of care Discharge with pt plan to re-instate balance PT when in [place]   Total Session Time   Timed Code  Treatment Minutes 40   Total Treatment Time (sum of timed and untimed services) 40         DISCHARGE  Reason for Discharge: Patient chooses to discontinue therapy.  Pt has made slow and gradual improvements in balance, however still is a fall risk based on recent FGA scores. Pt seems to be the most limited in anticipatory and reactionary balance especially with any added dual tasking. The cause of this is currently unknown as she has had a recent normal MRI. Pt has demonstrated some apparent vestibular weakness that could be associated wit her imbalance d/t history of occasional dizziness, although has yet to be followed up by an ENT or had any further vestibular testing despite recommendation by this PT. Pt is being discharged d/t winter plans to return to [place].    Equipment Issued:     Discharge Plan: Patient to continue home program.  Other services: Pt plans to follow up with PT in [place] when she returns there for the winter months.    Referring Provider:  Sulaiman Trinh

## 2024-10-21 ENCOUNTER — APPOINTMENT (OUTPATIENT)
Dept: URBAN - METROPOLITAN AREA CLINIC 255 | Age: 79
Setting detail: DERMATOLOGY
End: 2024-10-21

## 2024-10-21 VITALS — HEIGHT: 64.5 IN | WEIGHT: 138 LBS

## 2024-10-21 DIAGNOSIS — D22 MELANOCYTIC NEVI: ICD-10-CM

## 2024-10-21 DIAGNOSIS — L57.0 ACTINIC KERATOSIS: ICD-10-CM

## 2024-10-21 DIAGNOSIS — L82.1 OTHER SEBORRHEIC KERATOSIS: ICD-10-CM

## 2024-10-21 DIAGNOSIS — Z71.89 OTHER SPECIFIED COUNSELING: ICD-10-CM

## 2024-10-21 DIAGNOSIS — L57.8 OTHER SKIN CHANGES DUE TO CHRONIC EXPOSURE TO NONIONIZING RADIATION: ICD-10-CM

## 2024-10-21 DIAGNOSIS — D18.0 HEMANGIOMA: ICD-10-CM

## 2024-10-21 DIAGNOSIS — Z85.820 PERSONAL HISTORY OF MALIGNANT MELANOMA OF SKIN: ICD-10-CM

## 2024-10-21 PROBLEM — D22.71 MELANOCYTIC NEVI OF RIGHT LOWER LIMB, INCLUDING HIP: Status: ACTIVE | Noted: 2024-10-21

## 2024-10-21 PROBLEM — D18.01 HEMANGIOMA OF SKIN AND SUBCUTANEOUS TISSUE: Status: ACTIVE | Noted: 2024-10-21

## 2024-10-21 PROBLEM — D22.5 MELANOCYTIC NEVI OF TRUNK: Status: ACTIVE | Noted: 2024-10-21

## 2024-10-21 PROBLEM — D22.62 MELANOCYTIC NEVI OF LEFT UPPER LIMB, INCLUDING SHOULDER: Status: ACTIVE | Noted: 2024-10-21

## 2024-10-21 PROBLEM — D22.72 MELANOCYTIC NEVI OF LEFT LOWER LIMB, INCLUDING HIP: Status: ACTIVE | Noted: 2024-10-21

## 2024-10-21 PROBLEM — D22.61 MELANOCYTIC NEVI OF RIGHT UPPER LIMB, INCLUDING SHOULDER: Status: ACTIVE | Noted: 2024-10-21

## 2024-10-21 PROCEDURE — OTHER MIPS QUALITY: OTHER

## 2024-10-21 PROCEDURE — 99213 OFFICE O/P EST LOW 20 MIN: CPT | Mod: 25

## 2024-10-21 PROCEDURE — OTHER COUNSELING: OTHER

## 2024-10-21 PROCEDURE — OTHER LIQUID NITROGEN: OTHER

## 2024-10-21 PROCEDURE — 17000 DESTRUCT PREMALG LESION: CPT

## 2024-10-21 PROCEDURE — OTHER PATIENT SPECIFIC COUNSELING: OTHER

## 2024-10-21 PROCEDURE — OTHER SUNSCREEN RECOMMENDATIONS: OTHER

## 2024-10-21 PROCEDURE — OTHER SEPARATE AND IDENTIFIABLE DOCUMENTATION: OTHER

## 2024-10-21 ASSESSMENT — LOCATION DETAILED DESCRIPTION DERM
LOCATION DETAILED: LEFT VENTRAL PROXIMAL FOREARM
LOCATION DETAILED: RIGHT ANTERIOR PROXIMAL THIGH
LOCATION DETAILED: LEFT DISTAL DORSAL FOREARM
LOCATION DETAILED: LEFT INFERIOR CENTRAL MALAR CHEEK
LOCATION DETAILED: LEFT MEDIAL UPPER BACK
LOCATION DETAILED: RIGHT VENTRAL PROXIMAL FOREARM
LOCATION DETAILED: RIGHT ANTERIOR DISTAL THIGH
LOCATION DETAILED: LEFT VENTRAL DISTAL FOREARM
LOCATION DETAILED: RIGHT VENTRAL DISTAL FOREARM
LOCATION DETAILED: LEFT ANTECUBITAL SKIN
LOCATION DETAILED: RIGHT MEDIAL SUPERIOR CHEST
LOCATION DETAILED: EPIGASTRIC SKIN
LOCATION DETAILED: LEFT ANTERIOR DISTAL THIGH
LOCATION DETAILED: LEFT INFERIOR MEDIAL MALAR CHEEK
LOCATION DETAILED: INFERIOR THORACIC SPINE
LOCATION DETAILED: RIGHT PROXIMAL DORSAL FOREARM
LOCATION DETAILED: MIDDLE STERNUM
LOCATION DETAILED: LEFT SUPERIOR LATERAL MALAR CHEEK
LOCATION DETAILED: LEFT INFERIOR LATERAL MIDBACK
LOCATION DETAILED: LEFT ANTERIOR PROXIMAL THIGH

## 2024-10-21 ASSESSMENT — LOCATION SIMPLE DESCRIPTION DERM
LOCATION SIMPLE: CHEST
LOCATION SIMPLE: LEFT LOWER BACK
LOCATION SIMPLE: RIGHT FOREARM
LOCATION SIMPLE: LEFT FOREARM
LOCATION SIMPLE: LEFT UPPER ARM
LOCATION SIMPLE: RIGHT THIGH
LOCATION SIMPLE: ABDOMEN
LOCATION SIMPLE: LEFT THIGH
LOCATION SIMPLE: LEFT UPPER BACK
LOCATION SIMPLE: LEFT CHEEK
LOCATION SIMPLE: UPPER BACK

## 2024-10-21 ASSESSMENT — LOCATION ZONE DERM
LOCATION ZONE: FACE
LOCATION ZONE: TRUNK
LOCATION ZONE: ARM
LOCATION ZONE: LEG

## 2024-10-21 NOTE — PROCEDURE: LIQUID NITROGEN
Render Post-Care Instructions In Note?: no
Show Aperture Variable?: Yes
Duration Of Freeze Thaw-Cycle (Seconds): 6
Consent: The patient's consent was obtained including but not limited to risks of crusting, scabbing, blistering, scarring, darker or lighter pigmentary change, recurrence, incomplete removal and infection.
Number Of Freeze-Thaw Cycles: 1 freeze-thaw cycle
Post-Care Instructions: I reviewed with the patient in detail post-care instructions. Patient is to wear sunprotection, and avoid picking at any of the treated lesions. Pt may apply Vaseline to crusted or scabbing areas.
Detail Level: Detailed
Application Tool (Optional): Liquid Nitrogen Sprayer

## 2024-10-21 NOTE — HPI: FULL BODY SKIN EXAMINATION
What Type Of Note Output Would You Prefer (Optional)?: Standard Output
What Is The Reason For Today's Visit?: Full Body Skin Examination
What Is The Reason For Today's Visit? (Being Monitored For X): concerning skin lesions on a periodic basis
Additional History: Patient reports no areas of concern with today’s exam, but would like her melanoma scar rechecked. She feels it is taking a long time to heal and feels tender. Her melanoma was from 2023.\\n\\nShe presents for further evaluation, management and treatment.

## 2024-10-24 ENCOUNTER — ANTICOAGULATION THERAPY VISIT (OUTPATIENT)
Dept: ANTICOAGULATION | Facility: CLINIC | Age: 79
End: 2024-10-24

## 2024-10-24 ENCOUNTER — LAB (OUTPATIENT)
Dept: LAB | Facility: CLINIC | Age: 79
End: 2024-10-24
Payer: MEDICARE

## 2024-10-24 DIAGNOSIS — Z79.01 LONG TERM CURRENT USE OF ANTICOAGULANT THERAPY: Primary | ICD-10-CM

## 2024-10-24 DIAGNOSIS — I48.20 CHRONIC ATRIAL FIBRILLATION (H): ICD-10-CM

## 2024-10-24 DIAGNOSIS — Z79.01 LONG TERM CURRENT USE OF ANTICOAGULANT THERAPY: ICD-10-CM

## 2024-10-24 DIAGNOSIS — E11.22 TYPE 2 DIABETES MELLITUS WITH STAGE 1 CHRONIC KIDNEY DISEASE, WITHOUT LONG-TERM CURRENT USE OF INSULIN (H): Primary | ICD-10-CM

## 2024-10-24 DIAGNOSIS — N18.30 CKD (CHRONIC KIDNEY DISEASE) STAGE 3, GFR 30-59 ML/MIN (H): ICD-10-CM

## 2024-10-24 DIAGNOSIS — N18.1 TYPE 2 DIABETES MELLITUS WITH STAGE 1 CHRONIC KIDNEY DISEASE, WITHOUT LONG-TERM CURRENT USE OF INSULIN (H): Primary | ICD-10-CM

## 2024-10-24 LAB — INR BLD: 5.5 (ref 0.9–1.1)

## 2024-10-24 PROCEDURE — 85610 PROTHROMBIN TIME: CPT

## 2024-10-24 PROCEDURE — 36416 COLLJ CAPILLARY BLOOD SPEC: CPT

## 2024-10-24 NOTE — PROGRESS NOTES
ANTICOAGULATION MANAGEMENT     Catalina Marie 79 year old female is on warfarin with supratherapeutic INR result. (Goal INR 2.0-3.0)    Recent labs: (last 7 days)     10/24/24  1147   INR 5.5*       ASSESSMENT     Source(s): Chart Review and Patient/Caregiver Call     Warfarin doses taken: Warfarin taken as instructed  Diet: Increased greens/vitamin K in diet x 1 DAY (more greens with dinner last night) - plans to resume previous intake + slightly lower intake in general  Medication/supplement changes: None noted  New illness, injury, or hospitalization: Ongoing balance issues & falls, denies any in the last week + intermittent constipation   Signs or symptoms of bleeding or clotting: No  Previous result: Therapeutic last 2(+) visits  Additional findings: 9/16/24 ov with neurology - recommend switching to Apixaban due to multiple falls. 9/17/24 virtual visit with PCP - re-visited doac plan, still undecided, patient plans to consult with [place] provider later this month and keep [facility] team posted if she transitions to Apixaban.       PLAN     Recommended plan for temporary change(s) and ongoing change(s) affecting INR     Dosing Instructions: hold dose then decrease your warfarin dose (14.3% change) with next INR in 1 day     **patient only informed of today's hold, plan above to be revisited tomorrow.       Summary  As of 10/24/2024      Full warfarin instructions:  10/24: Hold; Otherwise 2.5 mg every Mon, Fri; 5 mg all other days   Next INR check:  10/25/2024               Telephone call with Jennifer who agrees to plan and repeated back plan correctly    Lab visit scheduled    Education provided: Please call back if any changes to your diet, medications or how you've been taking warfarin  Symptom monitoring: monitoring for bleeding signs and symptoms and monitoring for clotting signs and symptoms    Plan made per [facility] anticoagulation protocol + sent to PCP for co-sign d/t STEFANIE Golden,  RN  10/24/2024  Anticoagulation Clinic  [facility] for routing messages: adina FRANCO  [facility] patient phone line: [phone]        _______________________________________________________________________     Anticoagulation Episode Summary       Current INR goal:  2.0-3.0   TTR:  76.8% (4.4 mo)   Target end date:  Indefinite   Send INR reminders to:  DEMOND FRANCO    Indications    Long term current use of anticoagulant therapy [Z79.01]  Chronic atrial fibrillation (H) [I48.20]             Comments:  --             Anticoagulation Care Providers       Provider Role Specialty Phone number    Sulaiman Trinh MD Referring Internal Medicine [phone]

## 2024-10-25 ENCOUNTER — TELEPHONE (OUTPATIENT)
Dept: FAMILY MEDICINE | Facility: CLINIC | Age: 79
End: 2024-10-25

## 2024-10-25 ENCOUNTER — ANTICOAGULATION THERAPY VISIT (OUTPATIENT)
Dept: ANTICOAGULATION | Facility: CLINIC | Age: 79
End: 2024-10-25

## 2024-10-25 ENCOUNTER — LAB (OUTPATIENT)
Dept: LAB | Facility: CLINIC | Age: 79
End: 2024-10-25
Payer: MEDICARE

## 2024-10-25 DIAGNOSIS — Z79.01 LONG TERM CURRENT USE OF ANTICOAGULANT THERAPY: Primary | ICD-10-CM

## 2024-10-25 DIAGNOSIS — Z79.01 LONG TERM CURRENT USE OF ANTICOAGULANT THERAPY: ICD-10-CM

## 2024-10-25 DIAGNOSIS — I48.20 CHRONIC ATRIAL FIBRILLATION (H): ICD-10-CM

## 2024-10-25 LAB — INR BLD: 3.3 (ref 0.9–1.1)

## 2024-10-25 PROCEDURE — 85610 PROTHROMBIN TIME: CPT

## 2024-10-25 PROCEDURE — 36416 COLLJ CAPILLARY BLOOD SPEC: CPT

## 2024-10-25 NOTE — TELEPHONE ENCOUNTER
Problem: Self Care Deficits Care Plan (Adult)  Goal: *Acute Goals and Plan of Care (Insert Text)  Description:   FUNCTIONAL STATUS PRIOR TO ADMISSION: patient was independent prior to recent hospital admissions and has had a complicated medical course including NG tube and trach. Patient recently at [facility] for vent weaning. HOME SUPPORT: The patient lived alone with parents in area to provide assistance. 5/27/2021 stating wife assisting with in hospital stay. Occupational Therapy Goals  Revised 5/27/2021, revised 6/3/2021  1. Patient will perform brushing teeth using L UE as stabilizer with min assistance within 7 day(s). 2.  Patient will perform bathing anterior body sitting EOB with moderate assistance within 7 day(s). 3.  Patient will perform upper body dressing nirmal technique with moderate assistance within 7 days. 4.  Patient will open one ADL item with left UE as stabilizer with moderate assistance within 7 days. Met 6/3/2021, upgrade to open two ADL items with moderate assistance within 7 days. 5.  Patient will demonstrate [facility] transfer with moderate assistance within 7 days. Met 6/2/2021, upgrade to toilet transfer supervision within 7 days. 6. Patient will demonstrate upper extremity therapeutic exercise/activities self ROM (L to 90* shoulder flexion) with moderate assistance minutes within 7 day(s). Initiated 5/11/2021, Reviewed 5/18/21, continue all goals  1. Patient will perform 2 simple grooming tasks in supported sitting with minimal assistance/contact guard assist within 7 day(s). 2.  Patient will perform anterior neck to thigh bathing in supported sitting with moderate assistance within 7 day(s). 3.  Patient will tolerate sitting EOB in prep for ADLs with max A within 7 days. 4.  Patient will track to L of midline during ADLs/therapeutic activities with moderate cues within 7 days.    5.  Patient will participate in upper extremity therapeutic exercise/activities with Writer returned call to patient and reviewed INR level and dosing plan. See [facility] encounter. FRIDA GibbsN, RN     moderate assistance  for 5 minutes within 7 day(s). Outcome: Progressing Towards Goal   OCCUPATIONAL THERAPY TREATMENT; weekly re-evaluation  Patient: Khadijah Michaels (1 y.o. male)  Date: 6/3/2021  Diagnosis: Cardiac arrest [facility]) [I46.9] Cardiac arrest with pulseless electrical activity ([facility] [address]  Procedure(s) (LRB):  INSERT ICD DUAL (N/A)  Eval Icd Generator & Leads W Testing At Implant (N/A) 9 Days Post-Op  Precautions:  (Pacemaker precautions)  Chart, occupational therapy assessment, plan of care, and goals were reviewed. ASSESSMENT  Patient continues with skilled OT services and is progressing towards goals physically (L shoulder flexion, elbow -3/5, 1/5 forearm - digits!), standing static balance during ADL with no LOB today, cognitively and emotionally. This left hand dominant patient fully participating today. ADLs limited by ICD precautions L; L UE 1/5 strength, R UE +3/5 and B LEs; sitting tolerance due to back pain and skin sensitivity on sacrum; L visual inattention; static standing balance (multiple LOB with narrower base of support this session); pain management (orthopedically back, knees, neurologically L UE), cardiopulmonary tolerance (trach, room air), cognition (short term memory loss, attention to task, complex processing, problem solving, self initiation with difficult tasks, early termination of task not self motivated by, requires backwards chaining, small achievable goals) and behavior (requires external cues to participate in self care, learned dependency, potentially depressed and could use behavioral and medication management). Current Level of Function Impacting Discharge (ADLs): moderate assistance overall upper and lower body ADLs; moderate assistance bed<>bathroom functional mobility         PLAN :  Patient continues to benefit from skilled intervention to address the above impairments 5x/week.       Recommend with staff/Brain injury tips for more buy in/participationg to his self care:   -Recommend one person talking to patient at a time.   -One step/concept at a time to ensure heard and processed information  -Keeping schedule  -Day/night schedule   -Give and take on his ADLs in which patient has some say to direct care /time of day to complete but also firm it needs to be completed. -Encourage self completion of ADLs. Recommend next OT session: gather ADL items high and low; w/c outside; exercise bike     Recommendation for discharge: (in order for the patient to meet his/her long term goals)  Therapy 3 hours per day 5-7 days per week to address medical complexities, utilized eStimulation (Candido Barnes), progress patient in a \"well\" environment that is structured to assist with the physical, cognitive and emotional deficits occurring. Patient can tolerate 3 hours of therapy, is motivated to be more independent, is succeeding in a more structured therapy program, and is now asking for more exercise opportunities. This discharge recommendation:  Has not yet been discussed the attending provider and/or case management     IF patient discharges home will need the following DME: wheelchair, nirmal propel         SUBJECTIVE:   Patient stated I have not had enough coffee yet but it will be nice to brush my teeth.     OBJECTIVE DATA SUMMARY:   Cognitive/Behavioral Status:      Alert, oriented, noted more fatigued today as face appeared tired and per patient report no sleep last night. Increased time to process, problem solve, chose ADL task and plan ADL. Functional Mobility and Transfers for ADLs:  Bed Mobility:   Min assistance exit R side; physical assistance blanket off L LE    Transfers:     Functional Transfers  Bathroom Mobility: Moderate assistance (setup door) verbal cue to turn on light located on R side of body        Balance:  Sitting: Intact; Without support  Standing: Impaired; Without support  Standing - Static: Fair  Standing - Dynamic : Poor    ADL Intervention:       Grooming  Brushing Teeth: Minimum assistance  Patient received supine with nurse and mother in room caring for patient's instrumental ADLs (meds and SS). Patient stating whatever I want to do. Instruction on different items, patient choosing brushing teeth at sink. Instruction goal to increase standing tolerance, WB L UE, utilizing L UE for neuro re-education and patient agreed to goals. Patient demonstrated functional mobility to bathroom with moderate assistance gait belt, physical assistance LOB 3x, instruction increase base of support, L UE WB with swing pattern to increase neuro re-education during functional mobility. Standing tolerance 1.5 mins with increased fatigue noted by patient leaving water on and all items unkept on sink. Instruction on benefits cap on paste, brush out of sink and turning water; patient demonstrated utilizing mouth to close cap and rushing to then get back to bed. Noted even more decreased ABELINO. Therapeutic Exercises:   Patient asking for exercise bike. Pain:  B knees    Activity Tolerance:   Fair; fatigue after standing 1.5 mins at sink; stating no sleep last night and has not had coffee yet    After treatment patient left in no apparent distress:   Supine in bed, Call bell within reach, Bed / chair alarm activated, and Side rails x 3    COMMUNICATION/COLLABORATION:   The patients plan of care was discussed with: Registered nurse.      Lilia Stafford  Time Calculation: 16 mins

## 2024-10-25 NOTE — TELEPHONE ENCOUNTER
General Call      Reason for Call:  Results     What are your questions or concerns:  Pt calling wondering results and dosage instruction for the weekend? - Pt is leaving on Sunday to [place] **     Date of last appointment with provider: 10/25/24    Could we send this information to you in IForem or would you prefer to receive a phone call?:   Patient would prefer a phone call   Okay to leave a detailed message?: Yes at Home number on file

## 2024-10-25 NOTE — PROGRESS NOTES
ANTICOAGULATION MANAGEMENT     Catalina Marie 79 year old female is on warfarin with supratherapeutic INR result. (Goal INR 2.0-3.0)    Recent labs: (last 7 days)     10/25/24  1504   INR 3.3*       ASSESSMENT     Source(s): Chart Review and Patient/Caregiver Call     Warfarin doses taken: Held for 1 day  recently which may be affecting INR + MD reduced by 14.3% yesterday  Diet: Ongoing lower intake as noted yesterday   Medication/supplement changes: None noted  New illness, injury, or hospitalization: Ongoing balance issues & falls, denies any in the last week + intermittent constipation as noted yesterday  Signs or symptoms of bleeding or clotting: No  Previous result: Supratherapeutic - large drop in level 5.5 --> 3.3 in one day  Additional findings:  Leaves for [place] on [place] 10/27/24 - AC therapy will be managed by Dr. Ball until Spring 2025. Patient will keep [facility] team updated on DOAC plans.       PLAN     Recommended plan for temporary change(s) and ongoing change(s) affecting INR     Dosing Instructions: Continue your current warfarin dose with next INR in 10 days       **slight change in dosing pattern d/t large drop in level (changed 2.5 mg from Fri to Thur)    Summary  As of 10/25/2024      Full warfarin instructions:  2.5 mg every Mon, Thu; 5 mg all other days   Next INR check:  11/4/2024               Telephone call with Jennifer who verbalizes understanding and agrees to plan    Patient using outside facility for labs    Education provided: Please call back if any changes to your diet, medications or how you've been taking warfarin  Symptom monitoring: monitoring for bleeding signs and symptoms, monitoring for clotting signs and symptoms, monitoring for stroke signs and symptoms, when to seek medical attention/emergency care, and if you hit your head or have a bad fall seek emergency care    Plan made per [facility] anticoagulation protocol    Thu Golden RN  10/25/2024  Anticoagulation Clinic  [employer]  pool for routing messages: adina FRANCO  [facility] patient phone line: [phone]        _______________________________________________________________________     Anticoagulation Episode Summary       Current INR goal:  2.0-3.0   TTR:  76.1% (4.5 mo)   Target end date:  Indefinite   Send INR reminders to:  DEMOND FARNCO    Indications    Long term current use of anticoagulant therapy [Z79.01]  Chronic atrial fibrillation (H) [I48.20]             Comments:  --             Anticoagulation Care Providers       Provider Role Specialty Phone number    Sulaiman Trinh MD Referring Internal Medicine [phone]

## 2024-11-05 ENCOUNTER — TELEPHONE (OUTPATIENT)
Dept: ANTICOAGULATION | Facility: CLINIC | Age: 79
End: 2024-11-05
Payer: MEDICARE

## 2024-11-05 NOTE — TELEPHONE ENCOUNTER
ANTICOAGULATION     Catalinajulianne Marie is overdue for an INR check.     Left message for patient to call and schedule lab appointment as soon as possible. If returning call, please schedule.     Genevieve Hawk RN  11/5/2024  Anticoagulation Clinic  [facility] for routing messages: adina FRANCO  [facility] patient phone line: [phone]

## 2024-11-12 ENCOUNTER — TELEPHONE (OUTPATIENT)
Dept: ANTICOAGULATION | Facility: CLINIC | Age: 79
End: 2024-11-12
Payer: MEDICARE

## 2024-11-12 DIAGNOSIS — Z79.01 LONG TERM CURRENT USE OF ANTICOAGULANT THERAPY: Primary | ICD-10-CM

## 2024-11-12 DIAGNOSIS — I48.20 CHRONIC ATRIAL FIBRILLATION (H): ICD-10-CM

## 2024-11-12 NOTE — TELEPHONE ENCOUNTER
ANTICOAGULATION     Catalina Marie is overdue for an INR check.     Spoke with Jennifer, she is down in [place] now and is managed by provider there. Updated [facility] calendar    Genevieve Hawk RN  11/12/2024  Anticoagulation Clinic  [facility] for routing messages: adina FRANCO  [facility] patient phone line: [phone]

## 2025-01-25 ENCOUNTER — HEALTH MAINTENANCE LETTER (OUTPATIENT)
Age: 80
End: 2025-01-25

## 2025-02-22 ENCOUNTER — HEALTH MAINTENANCE LETTER (OUTPATIENT)
Age: 80
End: 2025-02-22

## 2025-05-09 ENCOUNTER — TRANSFERRED RECORDS (OUTPATIENT)
Dept: HEALTH INFORMATION MANAGEMENT | Facility: CLINIC | Age: 80
End: 2025-05-09
Payer: MEDICARE

## 2025-06-09 ENCOUNTER — RESULTS FOLLOW-UP (OUTPATIENT)
Dept: ANTICOAGULATION | Facility: CLINIC | Age: 80
End: 2025-06-09

## 2025-06-09 ENCOUNTER — DOCUMENTATION ONLY (OUTPATIENT)
Dept: ANTICOAGULATION | Facility: CLINIC | Age: 80
End: 2025-06-09

## 2025-06-09 ENCOUNTER — LAB (OUTPATIENT)
Dept: LAB | Facility: CLINIC | Age: 80
End: 2025-06-09
Payer: MEDICARE

## 2025-06-09 ENCOUNTER — ANTICOAGULATION THERAPY VISIT (OUTPATIENT)
Dept: ANTICOAGULATION | Facility: CLINIC | Age: 80
End: 2025-06-09

## 2025-06-09 DIAGNOSIS — I48.20 CHRONIC ATRIAL FIBRILLATION (H): Primary | ICD-10-CM

## 2025-06-09 DIAGNOSIS — N18.1 TYPE 2 DIABETES MELLITUS WITH STAGE 1 CHRONIC KIDNEY DISEASE, WITHOUT LONG-TERM CURRENT USE OF INSULIN (H): Primary | ICD-10-CM

## 2025-06-09 DIAGNOSIS — Z79.01 LONG TERM CURRENT USE OF ANTICOAGULANT THERAPY: Primary | ICD-10-CM

## 2025-06-09 DIAGNOSIS — I48.20 CHRONIC ATRIAL FIBRILLATION (H): ICD-10-CM

## 2025-06-09 DIAGNOSIS — E11.22 TYPE 2 DIABETES MELLITUS WITH STAGE 1 CHRONIC KIDNEY DISEASE, WITHOUT LONG-TERM CURRENT USE OF INSULIN (H): Primary | ICD-10-CM

## 2025-06-09 DIAGNOSIS — Z79.01 LONG TERM CURRENT USE OF ANTICOAGULANT THERAPY: ICD-10-CM

## 2025-06-09 LAB — INR BLD: 2.7 (ref 0.9–1.1)

## 2025-06-09 PROCEDURE — 36416 COLLJ CAPILLARY BLOOD SPEC: CPT

## 2025-06-09 PROCEDURE — 85610 PROTHROMBIN TIME: CPT

## 2025-06-09 NOTE — PROGRESS NOTES
ANTICOAGULATION MANAGEMENT     Catalina Marie 79 year old female is on warfarin with therapeutic INR result. (Goal INR 2.0-3.0)    Recent labs: (last 7 days)     06/09/25  1051   INR 2.7*       ASSESSMENT     Source(s): Chart Review and Patient/Caregiver Call     Warfarin doses taken: Warfarin taken differently, but did not change total weekly dose - has been taking 2.5 mg doses on Mon/Fri rather than Mon/Thurs  Diet: No new diet changes identified  Medication/supplement changes: Reports that she's been taking spironolactone while in [place]. No longer on lasix. Med list will be cleaned up at upcoming OV with PCP on 6/16.   New illness, injury, or hospitalization: Had some bruising and swelling of her right foot a couple weeks ago. Did see her local provider there for this issue. Now resolved.  Signs or symptoms of bleeding or clotting: No  Previous result: Therapeutic last visit; previously outside of goal range (per her report of [place] readings, unable to view in Care Everywhere after numerous attempts to refresh)  Additional findings: Jennifer has been in [place] since October 2024 with INR managed by Dr. Ball. Now resuming care of [facility] upon returning home.       PLAN     Recommended plan for no diet, medication or health factor changes affecting INR     Dosing Instructions: Continue your current warfarin dose with next INR in 4 weeks       Summary  As of 6/9/2025      Full warfarin instructions:  2.5 mg every Mon, Fri; 5 mg all other days   Next INR check:  7/11/2025               Telephone call with Jennifer who verbalizes understanding and agrees to plan    Lab visit scheduled    Education provided: Please call back if any changes to your diet, medications or how you've been taking warfarin    Plan made per [facility] anticoagulation protocol    Laura Llanos RN  6/9/2025  Anticoagulation Clinic  [employer] for routing messages: adina FRANCO  [facility] patient phone line:  [phone]        _______________________________________________________________________     Anticoagulation Episode Summary       Current INR goal:  2.0-3.0   TTR:  76.1% (4.5 mo)   Target end date:  Indefinite   Send INR reminders to:  DEMOND FRANCO    Indications    Long term current use of anticoagulant therapy [Z79.01]  Chronic atrial fibrillation (H) [I48.20]             Comments:  --             Anticoagulation Care Providers       Provider Role Specialty Phone number    Sulaiman Trinh MD Referring Internal Medicine [phone]

## 2025-06-09 NOTE — PROGRESS NOTES
ANTICOAGULATION CLINIC REFERRAL RENEWAL REQUEST       An annual renewal order is required for all patients referred to [facility].?  Please review and sign the pended referral order for Catalina Marie.       ANTICOAGULATION SUMMARY      Warfarin indication(s)   Atrial Fibrillation    Mechanical heart valve present?  NO       Current goal range   INR: 2.0-3.0     Goal appropriate for indication? Goal INR 2-3, standard for indication(s) above     Time in Therapeutic Range (TTR)  (Goal > 60%) 76.1%       Office visit with referring provider's group within last year yes on 9/17/24 (virtual)       Laura Llanos RN  [facility]

## 2025-06-16 ENCOUNTER — TELEPHONE (OUTPATIENT)
Dept: NURSING | Facility: CLINIC | Age: 80
End: 2025-06-16

## 2025-06-16 ENCOUNTER — OFFICE VISIT (OUTPATIENT)
Dept: FAMILY MEDICINE | Facility: CLINIC | Age: 80
End: 2025-06-16
Payer: MEDICARE

## 2025-06-16 VITALS
WEIGHT: 141.6 LBS | SYSTOLIC BLOOD PRESSURE: 129 MMHG | TEMPERATURE: 97.9 F | DIASTOLIC BLOOD PRESSURE: 75 MMHG | RESPIRATION RATE: 16 BRPM | HEART RATE: 85 BPM | OXYGEN SATURATION: 97 % | BODY MASS INDEX: 24.17 KG/M2 | HEIGHT: 64 IN

## 2025-06-16 DIAGNOSIS — N18.30 STAGE 3 CHRONIC KIDNEY DISEASE, UNSPECIFIED WHETHER STAGE 3A OR 3B CKD (H): ICD-10-CM

## 2025-06-16 DIAGNOSIS — I10 BENIGN ESSENTIAL HYPERTENSION: ICD-10-CM

## 2025-06-16 DIAGNOSIS — Z00.00 ENCOUNTER FOR MEDICARE ANNUAL WELLNESS EXAM: Primary | ICD-10-CM

## 2025-06-16 DIAGNOSIS — N18.1 TYPE 2 DIABETES MELLITUS WITH STAGE 1 CHRONIC KIDNEY DISEASE, WITHOUT LONG-TERM CURRENT USE OF INSULIN (H): ICD-10-CM

## 2025-06-16 DIAGNOSIS — E11.22 TYPE 2 DIABETES MELLITUS WITH STAGE 1 CHRONIC KIDNEY DISEASE, WITHOUT LONG-TERM CURRENT USE OF INSULIN (H): ICD-10-CM

## 2025-06-16 DIAGNOSIS — Z13.89 SCREENING FOR DIABETIC PERIPHERAL NEUROPATHY: ICD-10-CM

## 2025-06-16 DIAGNOSIS — F33.9 RECURRENT MAJOR DEPRESSIVE EPISODES: ICD-10-CM

## 2025-06-16 DIAGNOSIS — I48.20 CHRONIC ATRIAL FIBRILLATION (H): ICD-10-CM

## 2025-06-16 DIAGNOSIS — E78.5 HYPERLIPIDEMIA LDL GOAL <100: ICD-10-CM

## 2025-06-16 DIAGNOSIS — R21 RASH: ICD-10-CM

## 2025-06-16 LAB
ALBUMIN SERPL BCG-MCNC: 4.5 G/DL (ref 3.5–5.2)
ALP SERPL-CCNC: 87 U/L (ref 40–150)
ALT SERPL W P-5'-P-CCNC: 25 U/L (ref 0–50)
ANION GAP SERPL CALCULATED.3IONS-SCNC: 9 MMOL/L (ref 7–15)
AST SERPL W P-5'-P-CCNC: 35 U/L (ref 0–45)
BILIRUB SERPL-MCNC: 0.3 MG/DL
BUN SERPL-MCNC: 38.4 MG/DL (ref 8–23)
CALCIUM SERPL-MCNC: 9.5 MG/DL (ref 8.8–10.4)
CHLORIDE SERPL-SCNC: 109 MMOL/L (ref 98–107)
CHOLEST SERPL-MCNC: 134 MG/DL
CREAT SERPL-MCNC: 1.17 MG/DL (ref 0.51–0.95)
CREAT UR-MCNC: 88.1 MG/DL
EGFRCR SERPLBLD CKD-EPI 2021: 47 ML/MIN/1.73M2
ERYTHROCYTE [DISTWIDTH] IN BLOOD BY AUTOMATED COUNT: 14 % (ref 10–15)
EST. AVERAGE GLUCOSE BLD GHB EST-MCNC: 128 MG/DL
FASTING STATUS PATIENT QL REPORTED: NO
FASTING STATUS PATIENT QL REPORTED: NO
GLUCOSE SERPL-MCNC: 103 MG/DL (ref 70–99)
HBA1C MFR BLD: 6.1 % (ref 0–5.6)
HCO3 SERPL-SCNC: 24 MMOL/L (ref 22–29)
HCT VFR BLD AUTO: 43 % (ref 35–47)
HDLC SERPL-MCNC: 47 MG/DL
HGB BLD-MCNC: 13.6 G/DL (ref 11.7–15.7)
LDLC SERPL CALC-MCNC: 73 MG/DL
MCH RBC QN AUTO: 30.4 PG (ref 26.5–33)
MCHC RBC AUTO-ENTMCNC: 31.6 G/DL (ref 31.5–36.5)
MCV RBC AUTO: 96 FL (ref 78–100)
MICROALBUMIN UR-MCNC: 22.6 MG/L
MICROALBUMIN/CREAT UR: 25.65 MG/G CR (ref 0–25)
NONHDLC SERPL-MCNC: 87 MG/DL
PLATELET # BLD AUTO: 250 10E3/UL (ref 150–450)
POTASSIUM SERPL-SCNC: 6.1 MMOL/L (ref 3.4–5.3)
PROT SERPL-MCNC: 7.7 G/DL (ref 6.4–8.3)
RBC # BLD AUTO: 4.47 10E6/UL (ref 3.8–5.2)
SODIUM SERPL-SCNC: 142 MMOL/L (ref 135–145)
TRIGL SERPL-MCNC: 71 MG/DL
WBC # BLD AUTO: 6.9 10E3/UL (ref 4–11)

## 2025-06-16 PROCEDURE — 36415 COLL VENOUS BLD VENIPUNCTURE: CPT | Performed by: INTERNAL MEDICINE

## 2025-06-16 PROCEDURE — 80053 COMPREHEN METABOLIC PANEL: CPT | Performed by: INTERNAL MEDICINE

## 2025-06-16 PROCEDURE — 1126F AMNT PAIN NOTED NONE PRSNT: CPT | Performed by: INTERNAL MEDICINE

## 2025-06-16 PROCEDURE — 3074F SYST BP LT 130 MM HG: CPT | Performed by: INTERNAL MEDICINE

## 2025-06-16 PROCEDURE — G0439 PPPS, SUBSEQ VISIT: HCPCS | Performed by: INTERNAL MEDICINE

## 2025-06-16 PROCEDURE — 82043 UR ALBUMIN QUANTITATIVE: CPT | Performed by: INTERNAL MEDICINE

## 2025-06-16 PROCEDURE — 99207 PR FOOT EXAM NO CHARGE: CPT | Performed by: INTERNAL MEDICINE

## 2025-06-16 PROCEDURE — 82570 ASSAY OF URINE CREATININE: CPT | Performed by: INTERNAL MEDICINE

## 2025-06-16 PROCEDURE — 85027 COMPLETE CBC AUTOMATED: CPT | Performed by: INTERNAL MEDICINE

## 2025-06-16 PROCEDURE — 83036 HEMOGLOBIN GLYCOSYLATED A1C: CPT | Performed by: INTERNAL MEDICINE

## 2025-06-16 PROCEDURE — 3078F DIAST BP <80 MM HG: CPT | Performed by: INTERNAL MEDICINE

## 2025-06-16 PROCEDURE — 80061 LIPID PANEL: CPT | Performed by: INTERNAL MEDICINE

## 2025-06-16 RX ORDER — TRIAMCINOLONE ACETONIDE 1 MG/G
CREAM TOPICAL 2 TIMES DAILY
Qty: 60 G | Refills: 2 | Status: SHIPPED | OUTPATIENT
Start: 2025-06-16

## 2025-06-16 RX ORDER — SPIRONOLACTONE 25 MG/1
25 TABLET ORAL DAILY
COMMUNITY

## 2025-06-16 SDOH — HEALTH STABILITY: PHYSICAL HEALTH: ON AVERAGE, HOW MANY DAYS PER WEEK DO YOU ENGAGE IN MODERATE TO STRENUOUS EXERCISE (LIKE A BRISK WALK)?: 4 DAYS

## 2025-06-16 ASSESSMENT — PATIENT HEALTH QUESTIONNAIRE - PHQ9: SUM OF ALL RESPONSES TO PHQ QUESTIONS 1-9: 1

## 2025-06-16 ASSESSMENT — SOCIAL DETERMINANTS OF HEALTH (SDOH): HOW OFTEN DO YOU GET TOGETHER WITH FRIENDS OR RELATIVES?: MORE THAN THREE TIMES A WEEK

## 2025-06-16 ASSESSMENT — PAIN SCALES - GENERAL: PAINLEVEL_OUTOF10: NO PAIN (0)

## 2025-06-16 NOTE — PATIENT INSTRUCTIONS
"You should get the shingles vaccine series \"SHINGRIX\" at a pharmacy.       Patient Education   Preventive Care Advice   This is general advice given by our system to help you stay healthy. However, your care team may have specific advice just for you. Please talk to your care team about your preventive care needs.  Nutrition  Eat 5 or more servings of fruits and vegetables each day.  Try wheat bread, brown rice and whole grain pasta (instead of white bread, rice, and pasta).  Get enough calcium and vitamin D. Check the label on foods and aim for 100% of the RDA (recommended daily allowance).  Lifestyle  Exercise at least 150 minutes each week  (30 minutes a day, 5 days a week).  Do muscle strengthening activities 2 days a week. These help control your weight and prevent disease.  No smoking.  Wear sunscreen to prevent skin cancer.  Have a dental exam and cleaning every 6 months.  Yearly exams  See your health care team every year to talk about:  Any changes in your health.  Any medicines your care team has prescribed.  Preventive care, family planning, and ways to prevent chronic diseases.  Shots (vaccines)   HPV shots (up to age 26), if you've never had them before.  Hepatitis B shots (up to age 59), if you've never had them before.  COVID-19 shot: Get this shot when it's due.  Flu shot: Get a flu shot every year.  Tetanus shot: Get a tetanus shot every 10 years.  Pneumococcal, hepatitis A, and RSV shots: Ask your care team if you need these based on your risk.  Shingles shot (for age 50 and up)  General health tests  Diabetes screening:  Starting at age 35, Get screened for diabetes at least every 3 years.  If you are younger than age 35, ask your care team if you should be screened for diabetes.  Cholesterol test: At age 39, start having a cholesterol test every 5 years, or more often if advised.  Bone density scan (DEXA): At age 50, ask your care team if you should have this scan for osteoporosis (brittle " bones).  Hepatitis C: Get tested at least once in your life.  STIs (sexually transmitted infections)  Before age 24: Ask your care team if you should be screened for STIs.  After age 24: Get screened for STIs if you're at risk. You are at risk for STIs (including HIV) if:  You are sexually active with more than one person.  You don't use condoms every time.  You or a partner was diagnosed with a sexually transmitted infection.  If you are at risk for HIV, ask about PrEP medicine to prevent HIV.  Get tested for HIV at least once in your life, whether you are at risk for HIV or not.  Cancer screening tests  Cervical cancer screening: If you have a cervix, begin getting regular cervical cancer screening tests starting at age 21.  Breast cancer scan (mammogram): If you've ever had breasts, begin having regular mammograms starting at age 40. This is a scan to check for breast cancer.  Colon cancer screening: It is important to start screening for colon cancer at age 45.  Have a colonoscopy test every 10 years (or more often if you're at risk) Or, ask your provider about stool tests like a FIT test every year or Cologuard test every 3 years.  To learn more about your testing options, visit:   .  For help making a decision, visit:   https://bit.ly/co47040.  Prostate cancer screening test: If you have a prostate, ask your care team if a prostate cancer screening test (PSA) at age 55 is right for you.  Lung cancer screening: If you are a current or former smoker ages 50 to 80, ask your care team if ongoing lung cancer screenings are right for you.  For informational purposes only. Not to replace the advice of your health care provider. Copyright   2023 [facility]. All rights reserved. Clinically reviewed by the [facility] Transitions Program. [employer] 287980 - REV 01/24.  Learning About Activities of Daily Living  What are activities of daily living?     Activities of daily living (ADLs) are the basic  self-care tasks you do every day. These include eating, bathing, dressing, and moving around.  As you age, and if you have health problems, you may find that it's harder to do some of these tasks. If so, your doctor can suggest ideas that may help.  To measure what kind of help you may need, your doctor will ask how well you are able to do ADLs. Let your doctor know if there are any tasks that you are having trouble doing. This is an important first step to getting help. And when you have the help you need, you can stay as independent as possible.  How will a doctor assess your ADLs?  Asking about ADLs is part of a routine health checkup your doctor will likely do as you age. Your health check might be done in a doctor's office, in your home, or at a hospital. The goal is to find out if you are having any problems that could make it hard to care for yourself or that make it unsafe for you to be on your own.  To measure your ADLs, your doctor will ask how hard it is for you to do routine tasks. Your doctor may also want to know if you have changed the way you do a task because of a health problem. Your doctor may watch how you:  Walk back and forth.  Keep your balance while you stand or walk.  Move from sitting to standing or from a bed to a chair.  Button or unbutton a shirt or sweater.  Remove and put on your shoes.  It's common to feel a little worried or anxious if you find you can't do all the things you used to be able to do. Talking with your doctor about ADLs is a way to make sure you're as safe as possible and able to care for yourself as well as you can. You may want to bring a caregiver, friend, or family member to your checkup. They can help you talk to your doctor.  Follow-up care is a key part of your treatment and safety. Be sure to make and go to all appointments, and call your doctor if you are having problems. It's also a good idea to know your test results and keep a list of the medicines you  take.  Current as of: October 24, 2024  Content Version: 14.5    6554-1330 [employer].   Care instructions adapted under license by your healthcare professional. If you have questions about a medical condition or this instruction, always ask your healthcare professional. [employer] disclaims any warranty or liability for your use of this information.    Hearing Loss: Care Instructions  Overview     Hearing loss is a sudden or slow decrease in how well you hear. It can range from slight to profound. Permanent hearing loss can occur with aging. It also can happen when you are exposed long-term to loud noise. Examples include listening to loud music, riding motorcycles, or being around other loud machines.  Hearing loss can affect your work and home life. It can make you feel lonely or depressed. You may feel that you have lost your independence. But hearing aids and other devices can help you hear better and feel connected to others.  Follow-up care is a key part of your treatment and safety. Be sure to make and go to all appointments, and call your doctor if you are having problems. It's also a good idea to know your test results and keep a list of the medicines you take.  How can you care for yourself at home?  Avoid loud noises whenever possible. This helps keep your hearing from getting worse.  Always wear hearing protection around loud noises.  Wear a hearing aid as directed.  A professional can help you pick a hearing aid that will work best for you.  You can also get hearing aids over the counter for mild to moderate hearing loss.  Have hearing tests as your doctor suggests. They can show whether your hearing has changed. Your hearing aid may need to be adjusted.  Use other devices as needed. These may include:  Telephone amplifiers and hearing aids that can connect to a television, stereo, radio, or microphone.  Devices that use lights or vibrations. These alert you to the doorbell, a  "ringing telephone, or a baby monitor.  Television closed-captioning. This shows the words at the bottom of the screen. Most new TVs can do this.  TTY (text telephone). This lets you type messages back and forth on the telephone instead of talking or listening. These devices are also called TDD. When messages are typed on the keyboard, they are sent over the phone line to a receiving TTY. The message is shown on a monitor.  Use text messaging, social media, and email if it is hard for you to communicate by telephone.  Try to learn a listening technique called speechreading. It is not lipreading. You pay attention to people's gestures, expressions, posture, and tone of voice. These clues can help you understand what a person is saying. Face the person you are talking to, and have them face you. Make sure the lighting is good. You need to see the other person's face clearly.  Think about counseling if you need help to adjust to your hearing loss.  When should you call for help?  Watch closely for changes in your health, and be sure to contact your doctor if:    You think your hearing is getting worse.     You have new symptoms, such as dizziness or nausea.   Where can you learn more?  Go to https://www.[employer].net/patiented  Enter R798 in the search box to learn more about \"Hearing Loss: Care Instructions.\"  Current as of: October 27, 2024  Content Version: 14.5 2024-2025 [employer].   Care instructions adapted under license by your healthcare professional. If you have questions about a medical condition or this instruction, always ask your healthcare professional. [employer] disclaims any warranty or liability for your use of this information.    Learning About Stress  What is stress?     Stress is your body's response to a hard situation. Your body can have a physical, emotional, or mental response. Stress is a fact of life for most people, and it affects everyone differently. What " causes stress for you may not be stressful for someone else.  A lot of things can cause stress. You may feel stress when you go on a job interview, take a test, or run a race. This kind of short-term stress is normal and even useful. It can help you if you need to work hard or react quickly. For example, stress can help you finish an important job on time.  Long-term stress is caused by ongoing stressful situations or events. Examples of long-term stress include long-term health problems, ongoing problems at work, or conflicts in your family. Long-term stress can harm your health.  How does stress affect your health?  When you are stressed, your body responds as though you are in danger. It makes hormones that speed up your heart, make you breathe faster, and give you a burst of energy. This is called the fight-or-flight stress response. If the stress is over quickly, your body goes back to normal and no harm is done.  But if stress happens too often or lasts too long, it can have bad effects. Long-term stress can make you more likely to get sick, and it can make symptoms of some diseases worse. If you tense up when you are stressed, you may develop neck, shoulder, or low back pain. Stress is linked to high blood pressure and heart disease.  Stress also harms your emotional health. It can make you hansen, tense, or depressed. Your relationships may suffer, and you may not do well at work or school.  What can you do to manage stress?  You can try these things to help manage stress:   Do something active. Exercise or activity can help reduce stress. Walking is a great way to get started. Even everyday activities such as housecleaning or yard work can help.  Try yoga or tyler chi. These techniques combine exercise and meditation. You may need some training at first to learn them.  Do something you enjoy. For example, listen to music or go to a movie. Practice your hobby or do volunteer work.  Meditate. This can help you  "relax, because you are not worrying about what happened before or what may happen in the future.  Do guided imagery. Imagine yourself in any setting that helps you feel calm. You can use online videos, books, or a teacher to guide you.  Do breathing exercises. For example:  From a standing position, bend forward from the waist with your knees slightly bent. Let your arms dangle close to the floor.  Breathe in slowly and deeply as you return to a standing position. Roll up slowly and lift your head last.  Hold your breath for just a few seconds in the standing position.  Breathe out slowly and bend forward from the waist.  Let your feelings out. Talk, laugh, cry, and express anger when you need to. Talking with supportive friends or family, a counselor, or a corby leader about your feelings is a healthy way to relieve stress. Avoid discussing your feelings with people who make you feel worse.  Write. It may help to write about things that are bothering you. This helps you find out how much stress you feel and what is causing it. When you know this, you can find better ways to cope.  What can you do to prevent stress?  You might try some of these things to help prevent stress:  Manage your time. This helps you find time to do the things you want and need to do.  Get enough sleep. Your body recovers from the stresses of the day while you are sleeping.  Get support. Your family, friends, and community can make a difference in how you experience stress.  Limit your news feed. Avoid or limit time on social media or news that may make you feel stressed.  Do something active. Exercise or activity can help reduce stress. Walking is a great way to get started.  Where can you learn more?  Go to https://www.[employer].net/patiented  Enter N032 in the search box to learn more about \"Learning About Stress.\"  Current as of: October 24, 2024  Content Version: 14.5 2024-2025 [facility] [employer].   Care instructions adapted " under license by your healthcare professional. If you have questions about a medical condition or this instruction, always ask your healthcare professional. [employer] disclaims any warranty or liability for your use of this information.    Bladder Training: Care Instructions  Your Care Instructions     Bladder training is used to treat urge incontinence and stress incontinence. Urge incontinence means that the need to urinate comes on so fast that you can't get to a toilet in time. Stress incontinence means that you leak urine because of pressure on your bladder. For example, it may happen when you laugh, cough, or lift something heavy.  Bladder training can increase how long you can wait before you have to urinate. It can also help your bladder hold more urine. And it can give you better control over the urge to urinate.  It is important to remember that bladder training takes a few weeks to a few months to make a difference. You may not see results right away, but don't give up.  Follow-up care is a key part of your treatment and safety. Be sure to make and go to all appointments, and call your doctor if you are having problems. It's also a good idea to know your test results and keep a list of the medicines you take.  How can you care for yourself at home?  Work with your doctor to come up with a bladder training program that is right for you. You may use one or more of the following methods.  Delayed urination  In the beginning, try to keep from urinating for 5 minutes after you first feel the need to go.  While you wait, take deep, slow breaths to relax. Kegel exercises can also help you delay the need to go to the bathroom.  After some practice, when you can easily wait 5 minutes to urinate, try to wait 10 minutes before you urinate.  Slowly increase the waiting period until you are able to control when you have to urinate.  Scheduled urination  Empty your bladder when you first wake up in the  "morning.  Schedule times throughout the day when you will urinate.  Start by going to the bathroom every hour, even if you don't need to go.  Slowly increase the time between trips to the bathroom.  When you have found a schedule that works well for you, keep doing it.  If you wake up during the night and have to urinate, do it. Apply your schedule to waking hours only.  Kegel exercises  These tighten and strengthen pelvic muscles, which can help you control the flow of urine. (If doing these exercises causes pain, stop doing them and talk with your doctor.) To do Kegel exercises:  Squeeze your muscles as if you were trying not to pass gas. Or squeeze your muscles as if you were stopping the flow of urine. Your belly, legs, and buttocks shouldn't move.  Hold the squeeze for 3 seconds, then relax for 5 to 10 seconds.  Start with 3 seconds, then add 1 second each week until you are able to squeeze for 10 seconds.  Repeat the exercise 10 times a session. Do 3 to 8 sessions a day.  When should you call for help?  Watch closely for changes in your health, and be sure to contact your doctor if:    Your incontinence is getting worse.     You do not get better as expected.   Where can you learn more?  Go to https://www.[employer].net/patiented  Enter V684 in the search box to learn more about \"Bladder Training: Care Instructions.\"  Current as of: April 30, 2024  Content Version: 14.5 2024-2025 [email or web address].   Care instructions adapted under license by your healthcare professional. If you have questions about a medical condition or this instruction, always ask your healthcare professional. [email or web address] disclaims any warranty or liability for your use of this information.       "

## 2025-06-16 NOTE — PROGRESS NOTES
Assessment & Plan     Encounter for Medicare annual wellness exam      Rash  Complains of non itchy scaly patches on legs present for months  Recommended over the counter emollient and below  If that does not help enough, then see dermatologist  She sees dermatologist regularly due to history of melanoma   - triamcinolone (KENALOG) 0.1 % external cream; Apply topically 2 times daily.    Type 2 diabetes mellitus with stage 1 chronic kidney disease, without long-term current use of insulin (H)  Recheck diabetes labs has been under good control with diet interventions alone   - Adult Eye  Referral; Future  - HEMOGLOBIN A1C; Future  - Albumin Random Urine Quantitative with Creat Ratio; Future  - **Hemoglobin A1c FUTURE 6mo; Future  - HEMOGLOBIN A1C  - Albumin Random Urine Quantitative with Creat Ratio    Chronic atrial fibrillation (H)  Rate OK ; on warfarin for stroke prophylaxis   - Digoxin level; Future    Stage 3 chronic kidney disease, unspecified whether stage 3a or 3b CKD (H)  Recheck renal labs     Benign essential hypertension  Under good control     Hyperlipidemia LDL goal <100  On statin therapy; recheck lipids   - Comprehensive metabolic panel; Future  - CBC with platelets; Future  - Lipid panel reflex to direct LDL Fasting; Future  - Comprehensive metabolic panel  - CBC with platelets  - Lipid panel reflex to direct LDL Fasting    Recurrent major depressive episodes  stable    Screening for diabetic peripheral neuropathy    - FOOT EXAM  NO CHARGE [10940.921]    Patient has been advised of split billing requirements and indicates understanding: Yes        Counseling  Appropriate preventive services were addressed with this patient via screening, questionnaire, or discussion as appropriate for fall prevention, nutrition, physical activity, Tobacco-use cessation, social engagement, weight loss and cognition.  Checklist reviewing preventive services available has been given to the patient.  Reviewed  patient's diet, addressing concerns and/or questions.   She is at risk for psychosocial distress and has been provided with information to reduce risk.   Updated plan of care.  Patient reported difficulty with activities of daily living were addressed today.Addressed any concerns about safety while driving.  The patient was provided with written information regarding signs of hearing loss.   Information on urinary incontinence and treatment options given to patient.   She declined my recommendation for COVID shot  Reminded her to get shingrix at pharmacy     Follow-up    Follow-up Visit   Expected date:  Dec 16, 2025 (Approximate)      Follow Up Appointment Details:     Follow-up with whom?: Other Primary Care Services    Follow-Up for what?: Lab Visit Comment - a1c    How?: In Person             Follow-up Visit   Expected date:  Jun 23, 2026 (Approximate)      Follow Up Appointment Details:     Follow-up with whom?: PCP    Follow-Up for what?: Medicare Wellness    Welcome or Annual?: Annual Wellness    How?: In Person                 Subjective   Jennifer is a 79 year old, presenting for the following health issues:  Follow Up and Physical        6/16/2025    11:32 AM   Additional Questions   Roomed by Jazlyn   Accompanied by none     History of Present Illness       Reason for visit:  Physical She is missing 1 dose(s) of medications per week.  She is not taking prescribed medications regularly due to remembering to take.        Annual Wellness Visit   Patient has been advised of split billing requirements and indicates understanding: Yes      Health Care Directive  Patient does not have a Health Care Directive: Patient states has Advance Directive and will bring in a copy to clinic.      6/16/2025   General Health   How would you rate your overall physical health? (!) FAIR   Feel stress (tense, anxious, or unable to sleep) To some extent          6/16/2025   Nutrition   Diet: Regular (no restrictions)         6/16/2025    Exercise   Days per week of moderate/strenous exercise 4 days           6/16/2025   Social Factors   Frequency of gathering with friends or relatives More than three times a week   Worry food won't last until get money to buy more No   Food not last or not have enough money for food? No   Do you have housing? (Housing is defined as stable permanent housing and does not include staying outside in a car, in a tent, in an abandoned building, in an overnight shelter, or couch-surfing.) Yes   Are you worried about losing your housing? No   Lack of transportation? No   Unable to get utilities (heat,electricity)? No           6/16/2025   Fall Risk   Fallen 2 or more times in the past year? No    No   Trouble with walking or balance? No    Yes       Multiple values from one day are sorted in reverse-chronological order          6/16/2025   Activities of Daily Living- Home Safety   Needs help with the following daily activites Transportation   Safety concerns in the home None of the above         6/16/2025   Dental   Dentist two times every year? Yes         6/16/2025   Hearing Screening   Hearing concerns? (!) I FEEL THAT PEOPLE ARE MUMBLING OR NOT SPEAKING CLEARLY.    (!) I NEED TO ASK PEOPLE TO SPEAK UP OR REPEAT THEMSELVES.    (!) TROUBLE UNDERSTANDING SOFT OR WHISPERED SPEECH.       Multiple values from one day are sorted in reverse-chronological order         6/16/2025   Driving Risk Screening   Patient/family members have concerns about driving (!) YES          6/16/2025   General Alertness/Fatigue Screening   Have you been more tired than usual lately? No         6/16/2025   Urinary Incontinence Screening   Bothered by leaking urine in past 6 months Yes          No data to display                  6/16/2025   Substance Use   Alcohol more than 3/day or more than 7/wk No   Do you have a current opioid prescription? No   How severe/bad is pain from 1 to 10? 0/10 (No Pain)   Do you use any other substances  recreationally? No     Social History     Tobacco Use    Smoking status: Former     Current packs/day: 0.00     Types: Cigarettes     Quit date:      Years since quittin.4    Smokeless tobacco: Never   Vaping Use    Vaping status: Never Used   Substance Use Topics    Alcohol use: Yes     Comment: 1 wine with dinner    Drug use: No       Today's PHQ-9 Score:       2025    11:36 AM   PHQ-9 SCORE   PHQ-9 Total Score 1               Reviewed and updated as needed this visit by Provider                    Past Medical History:   Diagnosis Date    Arrhythmia     Diabetes (H)     diet controlled    Hypertension     Schwannoma      Past Surgical History:   Procedure Laterality Date    BREAST SURGERY      COLONOSCOPY      EYE SURGERY      HEAD & NECK SURGERY      brain tumor resection    PHACOEMULSIFICATION CLEAR CORNEA WITH STANDARD INTRAOCULAR LENS IMPLANT Right 2016    Procedure: PHACOEMULSIFICATION CLEAR CORNEA WITH STANDARD INTRAOCULAR LENS IMPLANT;  Surgeon: Aiden Lee MD;  Location: [facility]     BP Readings from Last 3 Encounters:   25 129/75   24 133/83   24 137/82    Wt Readings from Last 3 Encounters:   25 64.2 kg (141 lb 9.6 oz)   24 64.5 kg (142 lb 4.8 oz)   24 63.5 kg (139 lb 14.4 oz)                  Patient Active Problem List   Diagnosis    Long term current use of anticoagulant therapy    Benign essential hypertension    Type 2 diabetes mellitus with stage 1 chronic kidney disease, without long-term current use of insulin (H)    Hyperlipidemia LDL goal <100    CKD (chronic kidney disease) stage 3, GFR 30-59 ml/min (H)    Personal history of malignant neoplasm of breast    H/O bilateral mastectomy    Nontoxic multinodular goiter    Chronic atrial fibrillation (H)    Chronic vasomotor rhinitis    Recurrent major depressive episodes     Past Surgical History:   Procedure Laterality Date    BREAST SURGERY      COLONOSCOPY      EYE SURGERY      HEAD & NECK  SURGERY      brain tumor resection    PHACOEMULSIFICATION CLEAR CORNEA WITH STANDARD INTRAOCULAR LENS IMPLANT Right 2016    Procedure: PHACOEMULSIFICATION CLEAR CORNEA WITH STANDARD INTRAOCULAR LENS IMPLANT;  Surgeon: Aiden Lee MD;  Location: [facility]       Social History     Tobacco Use    Smoking status: Former     Current packs/day: 0.00     Types: Cigarettes     Quit date:      Years since quittin.4    Smokeless tobacco: Never   Substance Use Topics    Alcohol use: Yes     Comment: 1 wine with dinner     No family history on file.      Current Outpatient Medications   Medication Sig Dispense Refill    atenolol (TENORMIN) 25 MG tablet Take 1 tablet (25 mg) by mouth 3 times daily 270 tablet 3    atorvastatin (LIPITOR) 10 MG tablet TAKE 1/2 TABLET BY MOUTH EVERY DAY 45 tablet 3    Calcium Carbonate-Vitamin D (CALCIUM + D PO) Take by mouth daily      carboxymethylcellulose (CELLUVISC/REFRESH LIQUIGEL) 1 % ophthalmic solution Place 1 drop into both eyes 4 times daily as needed for dry eyes      fenofibrate (TRIGLIDE/LOFIBRA) 160 MG tablet TAKE ONE TABLET BY MOUTH ONCE DAILY. 90 tablet 0    furosemide (LASIX) 20 MG tablet TAKE 1 TABLET BY MOUTH TWICE A  tablet 3    ipratropium (ATROVENT) 0.06 % nasal spray Spray 2 sprays into both nostrils 4 times daily. 45 mL 3    lisinopril (ZESTRIL) 40 MG tablet Take 1 tablet (40 mg) by mouth daily 90 tablet 3    sertraline (ZOLOFT) 100 MG tablet TAKE 1 TABLET BY MOUTH EVERY DAY 90 tablet 1    spironolactone (ALDACTONE) 25 MG tablet Take 25 mg by mouth daily.      verapamil ER (VERELAN) 240 MG 24 hr capsule Take 1 capsule (240 mg) by mouth daily 90 capsule 3    warfarin ANTICOAGULANT (COUMADIN) 5 MG tablet TAKE 1/2 TO 1 TABLET EVERY DAY OR AS DIRECTED BY INR CLINIC 90 tablet 0       Current providers sharing in care for this patient include:  Patient Care Team:  Sulaiman Trinh MD as PCP - General (Internal Medicine)  Sulaiman Trinh MD as Assigned  "PCP  William Babin MD as MD (Cardiovascular Disease)  Amna Dewitt PA-C as Physician Assistant (Psychiatry & Neurology Vascular Neurology)    The following health maintenance items are reviewed in Epic and correct as of today:  Health Maintenance   Topic Date Due    DEPRESSION ACTION PLAN  Never done    ZOSTER VACCINE (2 of 3) 05/19/2013    COVID-19 VACCINE (4 - 2024-25 season) 09/01/2024    LIPID  10/19/2024    MICROALBUMIN  10/19/2024    DIGOXIN  10/19/2024    DIABETIC FOOT EXAM  10/19/2024    HEMOGLOBIN  10/19/2024    MEDICARE ANNUAL WELLNESS VISIT  12/07/2024    A1C  01/25/2025    EYE EXAM  06/25/2025    ANNUAL REVIEW OF HM ORDERS  07/25/2025    PHQ-9  12/16/2025    FALL RISK ASSESSMENT  06/16/2026    ADVANCE CARE PLANNING  06/16/2030    DTAP/TDAP/TD VACCINE (4 - Td or Tdap) 12/21/2030    DEXA  01/22/2040    INFLUENZA VACCINE  Completed    PNEUMOCOCCAL VACCINE 50+ YEARS  Completed    URINALYSIS  Completed    RSV VACCINE  Completed    HPV VACCINE  Aged Out    MENINGITIS VACCINE  Aged Out    BMP  Discontinued    TSH W/FREE T4 REFLEX  Discontinued    COLORECTAL CANCER SCREENING  Discontinued       Appropriate preventive services were discussed with this patient, including applicable screening as appropriate for fall prevention, nutrition, physical activity, Tobacco-use cessation, weight loss and cognition.  Checklist reviewing preventive services available has been given to the patient.           6/16/2025   Mini Cog   Clock Draw Score 2 Normal   3 Item Recall 3 objects recalled   Mini Cog Total Score 5                A 10 organ systems ROS is negative other than any pertinent positives or negatives previously stated.       Objective    /75   Pulse 85   Temp 97.9  F (36.6  C) (Oral)   Resp 16   Ht 1.626 m (5' 4\")   Wt 64.2 kg (141 lb 9.6 oz)   SpO2 97%   BMI 24.31 kg/m    Body mass index is 24.31 kg/m .  Physical Exam   GENERAL: alert and no distress  EYES: Eyes grossly normal to " inspection, PERRL and conjunctivae and sclerae normal  HENT: ear canals and TM's normal, nose and mouth without ulcers or lesions  NECK: no adenopathy, no asymmetry, masses, or scars  RESP: lungs clear to auscultation - no rales, rhonchi or wheezes  CV: The heart has an irregularly irregular rhythm with a normal rate.   ABDOMEN: soft, nontender, no hepatosplenomegaly, no masses and bowel sounds normal  MS: no gross musculoskeletal defects noted, no edema  SKIN: scaly plaques on left distal leg, one below knee and one further down distal anterior leg   NEURO: Normal strength and tone, mentation intact and speech normal  PSYCH: mentation appears normal, affect normal/bright  Diabetic foot exam: normal DP and PT pulses, no trophic changes or ulcerative lesions, and normal sensory exam            Signed Electronically by: Sulaiman Trinh MD

## 2025-06-17 ENCOUNTER — LAB (OUTPATIENT)
Dept: LAB | Facility: CLINIC | Age: 80
End: 2025-06-17
Payer: MEDICARE

## 2025-06-17 ENCOUNTER — PATIENT OUTREACH (OUTPATIENT)
Dept: CARE COORDINATION | Facility: CLINIC | Age: 80
End: 2025-06-17

## 2025-06-17 ENCOUNTER — RESULTS FOLLOW-UP (OUTPATIENT)
Dept: FAMILY MEDICINE | Facility: CLINIC | Age: 80
End: 2025-06-17

## 2025-06-17 DIAGNOSIS — E87.5 HYPERKALEMIA: ICD-10-CM

## 2025-06-17 DIAGNOSIS — E87.5 HYPERKALEMIA: Primary | ICD-10-CM

## 2025-06-17 LAB
ANION GAP SERPL CALCULATED.3IONS-SCNC: 11 MMOL/L (ref 3–14)
BUN SERPL-MCNC: 31 MG/DL (ref 7–30)
CALCIUM SERPL-MCNC: 9.4 MG/DL (ref 8.5–10.1)
CHLORIDE BLD-SCNC: 105 MMOL/L (ref 94–109)
CO2 SERPL-SCNC: 26 MMOL/L (ref 20–32)
CREAT SERPL-MCNC: 1.2 MG/DL (ref 0.52–1.04)
EGFRCR SERPLBLD CKD-EPI 2021: 45 ML/MIN/1.73M2
GLUCOSE BLD-MCNC: 112 MG/DL (ref 70–99)
POTASSIUM BLD-SCNC: 4.6 MMOL/L (ref 3.4–5.3)
SODIUM SERPL-SCNC: 142 MMOL/L (ref 135–145)

## 2025-06-17 PROCEDURE — 36415 COLL VENOUS BLD VENIPUNCTURE: CPT

## 2025-06-17 PROCEDURE — 80048 BASIC METABOLIC PNL TOTAL CA: CPT

## 2025-06-17 NOTE — TELEPHONE ENCOUNTER
It appears that Dr. Trinh addressed lab and sent pt a [employer] message.  Repeat potassium is normal and was likely a lab error per provider.  Closing encounter.    Delia Gutierrez, RN, BSN Nurse Triage Advisor 6/17/2025 4:36 PM

## 2025-06-17 NOTE — TELEPHONE ENCOUNTER
Critical results    Date/time of call received from lab: 06/16/25 at 7:58 PM.    Lab test:  Potassium    Lab value:  6.1    Ordering provider name: Sulaiman Trinh    Ordering provider department: Internal Med [place]    Primary Care Provider: Sulaiman Trinh     Result managed by: After Hours: [place] Nurse Advisor Does patient have an [facility] PCP? Yes, patient has [facility] PCP. On-call provider for PCP will be paged.     Action taken: RN will page on-call provider, following paging standard work process.           [place] Primary Care Provider consult indicated.    Reason for page: Critical Potassium 6.1    Specialty Group number: [id number]   Specialty Group: IM- Internal Medicine    Initial page sent to Dr. Sean Davies by RN at 8:06 PM .     [place] Primary Care Provider, Dr. Sean Davies, returning page to Nurse Advisors at 8:10 PM    Provider recommended plan of care: Pt to hold taking spironolactone tomorrow.  Provider will order digoxin levels to be drawn tomorrow.  Pt to call clinic tomorrow for further instructions.    Provider Recommendation Follow Up:   Reached patient/caregiver. Informed of provider's recommendations. Patient verbalized understanding and agrees with the plan.     Delia Gutierrez RN, BSN Nurse Triage Advisor 6/16/2025 8:24 PM

## 2025-06-17 NOTE — CONFIDENTIAL NOTE
Patient calling clinic as instructed by nurse advisors RN due to critical potassium lab value reported yesterday evening. Covering provider recommended she come today for lab appointment for digoxin level. She is scheduled for lab appointment this morning but no current lab order placed and patient would like to confirm digoxin level is correct as she does not take this medication.     Reviewed medications with patient and updated current medication list.     Please place appropriate lab orders as indicated.     Can we leave a detailed message on this number? YES  Phone number patient can be reached at: Home number on file [phone] (home)    Tammie Tse RN  [facility] Triage

## 2025-06-19 ENCOUNTER — PATIENT OUTREACH (OUTPATIENT)
Dept: CARE COORDINATION | Facility: CLINIC | Age: 80
End: 2025-06-19
Payer: MEDICARE

## 2025-06-23 ENCOUNTER — APPOINTMENT (OUTPATIENT)
Dept: URBAN - METROPOLITAN AREA CLINIC 255 | Age: 80
Setting detail: DERMATOLOGY
End: 2025-06-23

## 2025-06-23 VITALS — HEIGHT: 64 IN | WEIGHT: 139 LBS

## 2025-06-23 DIAGNOSIS — Z85.820 PERSONAL HISTORY OF MALIGNANT MELANOMA OF SKIN: ICD-10-CM

## 2025-06-23 DIAGNOSIS — L57.0 ACTINIC KERATOSIS: ICD-10-CM

## 2025-06-23 DIAGNOSIS — Z71.89 OTHER SPECIFIED COUNSELING: ICD-10-CM

## 2025-06-23 DIAGNOSIS — L82.1 OTHER SEBORRHEIC KERATOSIS: ICD-10-CM

## 2025-06-23 DIAGNOSIS — L28.0 LICHEN SIMPLEX CHRONICUS: ICD-10-CM

## 2025-06-23 DIAGNOSIS — D18.0 HEMANGIOMA: ICD-10-CM

## 2025-06-23 DIAGNOSIS — D49.2 NEOPLASM OF UNSPECIFIED BEHAVIOR OF BONE, SOFT TISSUE, AND SKIN: ICD-10-CM

## 2025-06-23 DIAGNOSIS — L57.8 OTHER SKIN CHANGES DUE TO CHRONIC EXPOSURE TO NONIONIZING RADIATION: ICD-10-CM

## 2025-06-23 DIAGNOSIS — D22 MELANOCYTIC NEVI: ICD-10-CM

## 2025-06-23 PROBLEM — D22.61 MELANOCYTIC NEVI OF RIGHT UPPER LIMB, INCLUDING SHOULDER: Status: ACTIVE | Noted: 2025-06-23

## 2025-06-23 PROBLEM — D22.72 MELANOCYTIC NEVI OF LEFT LOWER LIMB, INCLUDING HIP: Status: ACTIVE | Noted: 2025-06-23

## 2025-06-23 PROBLEM — D22.5 MELANOCYTIC NEVI OF TRUNK: Status: ACTIVE | Noted: 2025-06-23

## 2025-06-23 PROBLEM — D22.71 MELANOCYTIC NEVI OF RIGHT LOWER LIMB, INCLUDING HIP: Status: ACTIVE | Noted: 2025-06-23

## 2025-06-23 PROBLEM — D22.62 MELANOCYTIC NEVI OF LEFT UPPER LIMB, INCLUDING SHOULDER: Status: ACTIVE | Noted: 2025-06-23

## 2025-06-23 PROBLEM — D18.01 HEMANGIOMA OF SKIN AND SUBCUTANEOUS TISSUE: Status: ACTIVE | Noted: 2025-06-23

## 2025-06-23 PROCEDURE — OTHER LIQUID NITROGEN: OTHER

## 2025-06-23 PROCEDURE — OTHER SUNSCREEN RECOMMENDATIONS: OTHER

## 2025-06-23 PROCEDURE — OTHER PATIENT SPECIFIC COUNSELING: OTHER

## 2025-06-23 PROCEDURE — 17000 DESTRUCT PREMALG LESION: CPT | Mod: 59

## 2025-06-23 PROCEDURE — 11102 TANGNTL BX SKIN SINGLE LES: CPT

## 2025-06-23 PROCEDURE — OTHER COUNSELING: OTHER

## 2025-06-23 PROCEDURE — OTHER PRESCRIPTION MEDICATION MANAGEMENT: OTHER

## 2025-06-23 PROCEDURE — 99213 OFFICE O/P EST LOW 20 MIN: CPT | Mod: 25

## 2025-06-23 PROCEDURE — OTHER BIOPSY BY SHAVE METHOD: OTHER

## 2025-06-23 ASSESSMENT — LOCATION DETAILED DESCRIPTION DERM
LOCATION DETAILED: LEFT SUPERIOR LATERAL MALAR CHEEK
LOCATION DETAILED: RIGHT ANTERIOR DISTAL THIGH
LOCATION DETAILED: MIDDLE STERNUM
LOCATION DETAILED: RIGHT VENTRAL PROXIMAL FOREARM
LOCATION DETAILED: INFERIOR THORACIC SPINE
LOCATION DETAILED: LEFT INFERIOR MEDIAL MALAR CHEEK
LOCATION DETAILED: RIGHT PROXIMAL PRETIBIAL REGION
LOCATION DETAILED: RIGHT PROXIMAL DORSAL FOREARM
LOCATION DETAILED: RIGHT VENTRAL DISTAL FOREARM
LOCATION DETAILED: LEFT PROXIMAL PRETIBIAL REGION
LOCATION DETAILED: RIGHT LATERAL SUPERIOR CHEST
LOCATION DETAILED: LEFT INFERIOR CENTRAL MALAR CHEEK
LOCATION DETAILED: LEFT ANTERIOR DISTAL THIGH
LOCATION DETAILED: LEFT INFERIOR LATERAL MIDBACK
LOCATION DETAILED: EPIGASTRIC SKIN
LOCATION DETAILED: LEFT ANTERIOR PROXIMAL THIGH
LOCATION DETAILED: LEFT VENTRAL PROXIMAL FOREARM
LOCATION DETAILED: LEFT MEDIAL UPPER BACK
LOCATION DETAILED: RIGHT ANTERIOR PROXIMAL THIGH
LOCATION DETAILED: LEFT DISTAL DORSAL FOREARM
LOCATION DETAILED: LEFT VENTRAL DISTAL FOREARM
LOCATION DETAILED: LEFT ANTECUBITAL SKIN

## 2025-06-23 ASSESSMENT — LOCATION ZONE DERM
LOCATION ZONE: FACE
LOCATION ZONE: LEG
LOCATION ZONE: ARM
LOCATION ZONE: TRUNK

## 2025-06-23 ASSESSMENT — LOCATION SIMPLE DESCRIPTION DERM
LOCATION SIMPLE: LEFT CHEEK
LOCATION SIMPLE: LEFT THIGH
LOCATION SIMPLE: RIGHT PRETIBIAL REGION
LOCATION SIMPLE: ABDOMEN
LOCATION SIMPLE: LEFT UPPER ARM
LOCATION SIMPLE: RIGHT THIGH
LOCATION SIMPLE: UPPER BACK
LOCATION SIMPLE: LEFT UPPER BACK
LOCATION SIMPLE: LEFT FOREARM
LOCATION SIMPLE: RIGHT FOREARM
LOCATION SIMPLE: LEFT LOWER BACK
LOCATION SIMPLE: LEFT PRETIBIAL REGION
LOCATION SIMPLE: CHEST

## 2025-07-03 ENCOUNTER — OFFICE VISIT (OUTPATIENT)
Dept: URGENT CARE | Facility: URGENT CARE | Age: 80
End: 2025-07-03
Payer: MEDICARE

## 2025-07-03 VITALS
TEMPERATURE: 97.6 F | OXYGEN SATURATION: 99 % | BODY MASS INDEX: 24.34 KG/M2 | WEIGHT: 141.8 LBS | HEART RATE: 51 BPM | RESPIRATION RATE: 19 BRPM | SYSTOLIC BLOOD PRESSURE: 156 MMHG | DIASTOLIC BLOOD PRESSURE: 82 MMHG

## 2025-07-03 DIAGNOSIS — B02.9 HERPES ZOSTER WITHOUT COMPLICATION: Primary | ICD-10-CM

## 2025-07-03 RX ORDER — VALACYCLOVIR HYDROCHLORIDE 1 G/1
1000 TABLET, FILM COATED ORAL 2 TIMES DAILY
Qty: 14 TABLET | Refills: 0 | Status: SHIPPED | OUTPATIENT
Start: 2025-07-03 | End: 2025-07-10

## 2025-07-03 NOTE — PROGRESS NOTES
Urgent Care Clinic Visit    Chief Complaint   Patient presents with    Urgent Care    Shingles     Itchy rash below neck that's getting bigger, x3 -4 days               7/3/2025    12:42 PM   Additional Questions   Roomed by Andra Malcolm   Accompanied by nobody

## 2025-07-03 NOTE — PATIENT INSTRUCTIONS
You have been diagnosed with shingles, herpes zoster.  Treatment is valacyclovir 1000 mg three times a day for 7 days.     The symptoms began within 72 hours and they had new lesions as early as yesterday so treatment with valacyclovir recommended.      The goal of antiviral therapy are to lessen the severity and duration of pain associated with acute neuritis, promote rapid healing of skin lesions, prevent new lesion formation, decrease viral shedding to reduce the risk of transmission and prevent postherpetic neuralgia.    We briefly touched on risks of developing postherpetic neuralgia.      Can treat pain with Tylenol.    Patient is not immunocompromised and there is no ocular involvement.    Any red flag symptoms go to the Emergency Department.

## 2025-07-03 NOTE — PROGRESS NOTES
Assessment & Plan       ICD-10-CM    1. Herpes zoster without complication  B02.9 valACYclovir (VALTREX) 1000 mg tablet           MDM: Rash does follow along dermatome 7 although not consistently all the way around.  No vesicles are present at this point.  However triamcinolone has not helped the rash and it is worsened slightly.  Patient has not received shingles vaccine.  Therefore we will treat for herpes zoster with Valtrex.  Dosing is adjusted due to kidney function.  We discussed signs and symptoms that would warrant further evaluation from a health care provider. The plan of care was discussed.The patient understands and agree with the course of treatments. A printed summary was given including instructions and medications.     Patient Instructions   You have been diagnosed with shingles, herpes zoster.  Treatment is valacyclovir 1000 mg three times a day for 7 days.     The symptoms began within 72 hours and they had new lesions as early as yesterday so treatment with valacyclovir recommended.      The goal of antiviral therapy are to lessen the severity and duration of pain associated with acute neuritis, promote rapid healing of skin lesions, prevent new lesion formation, decrease viral shedding to reduce the risk of transmission and prevent postherpetic neuralgia.    We briefly touched on risks of developing postherpetic neuralgia.      Can treat pain with Tylenol.    Patient is not immunocompromised and there is no ocular involvement.    Any red flag symptoms go to the Emergency Department.         Yury Rodriguez is a 79 year old female who presents to clinic today for the following health issues:  Chief Complaint   Patient presents with    Urgent Care    Shingles     Itchy rash below neck that's getting bigger, x3 -4 days     HPI  Patient states she has had a rash on the left side of her neck going down her anterior chest for approximately 3 days.  She states it is itchy in nature and not painful.   She states she has applied triamcinolone to the rash and it has worsened slightly.  She states she has not received her shingles vaccine.  She denies any fevers or other systemic symptoms.      Review of Systems   All other systems reviewed and are negative.      Problem List:  2022: Recurrent major depressive episodes  2021: Longstanding persistent atrial fibrillation (H)  2020: Chronic vasomotor rhinitis  2020: Inflamed seborrheic keratosis  2020: Routine general medical examination at a health care facility  2020: Chronic atrial fibrillation (H)  2019: Strain of right rotator cuff capsule, subsequent encounter  2018: Nontoxic multinodular goiter  2017-10: Acute diastolic congestive heart failure (H)  2017: Personal history of malignant neoplasm of breast  2017: H/O bilateral mastectomy  2017: Overdose, accidental or unintentional, initial encounter  2016: CKD (chronic kidney disease) stage 3, GFR 30-59 ml/min (H)  2016: Benign essential hypertension  2016: Type 2 diabetes mellitus with stage 1 chronic kidney disease,   without long-term current use of insulin (H)  2016: Adjustment disorder with anxious mood  2016: Hyperlipidemia LDL goal <100  2016: Long term current use of anticoagulant therapy  2016: Atrial fibrillation (H) [I48.91]      Past Medical History:   Diagnosis Date    Arrhythmia     Diabetes (H)     diet controlled    Hypertension     Schwannoma          Social History     Tobacco Use    Smoking status: Former     Current packs/day: 0.00     Types: Cigarettes     Quit date:      Years since quittin.5    Smokeless tobacco: Never   Substance Use Topics    Alcohol use: Yes     Comment: 1 wine with dinner           Objective    BP (!) 156/82   Pulse 51   Temp 97.6  F (36.4  C) (Tympanic)   Resp 19   Wt 64.3 kg (141 lb 12.8 oz)   SpO2 99%   BMI 24.34 kg/m    Physical Exam  Vitals and nursing note reviewed.   Constitutional:        Appearance: Normal appearance.   HENT:      Head: Normocephalic and atraumatic.      Right Ear: Tympanic membrane and ear canal normal.      Left Ear: Tympanic membrane and ear canal normal.      Nose: Nose normal.      Mouth/Throat:      Mouth: Mucous membranes are moist.      Pharynx: Oropharynx is clear.   Eyes:      General:         Right eye: No discharge.         Left eye: No discharge.      Conjunctiva/sclera: Conjunctivae normal.      Pupils: Pupils are equal, round, and reactive to light.   Cardiovascular:      Rate and Rhythm: Normal rate and regular rhythm.      Heart sounds: Normal heart sounds.   Pulmonary:      Effort: Pulmonary effort is normal.      Breath sounds: Normal breath sounds.   Musculoskeletal:      Cervical back: Normal range of motion and neck supple.   Lymphadenopathy:      Cervical: No cervical adenopathy.   Skin:     Comments: Patient has a mildly erythematous raised rash on the left side of her neck following the C7 dermatome.  No vesicles or blisters noted at this time.  No secondary excoriation.  Patient does have a Band-Aid on her right chest from a dermatology biopsy performed previously.  No signs of infection.   Neurological:      Mental Status: She is alert.              Christin Tao NP

## 2025-07-22 ENCOUNTER — TELEPHONE (OUTPATIENT)
Dept: FAMILY MEDICINE | Facility: CLINIC | Age: 80
End: 2025-07-22
Payer: MEDICARE

## 2025-07-22 NOTE — TELEPHONE ENCOUNTER
had called asking about a billing concern. Sent [facility] message to pt's portal for steps on how to escalate this concern.     Lakisha Moore RN on 7/23/2025 at 8:38 AM

## 2025-08-11 ENCOUNTER — TELEPHONE (OUTPATIENT)
Dept: FAMILY MEDICINE | Facility: CLINIC | Age: 80
End: 2025-08-11
Payer: MEDICARE

## 2025-08-11 ENCOUNTER — HOSPITAL ENCOUNTER (EMERGENCY)
Facility: CLINIC | Age: 80
Discharge: LEFT BEFORE DISCHARGE | End: 2025-08-11
Attending: SOCIAL WORKER | Admitting: STUDENT IN AN ORGANIZED HEALTH CARE EDUCATION/TRAINING PROGRAM
Payer: MEDICARE

## 2025-08-11 ENCOUNTER — NURSE TRIAGE (OUTPATIENT)
Dept: FAMILY MEDICINE | Facility: CLINIC | Age: 80
End: 2025-08-11
Payer: MEDICARE

## 2025-08-11 VITALS
DIASTOLIC BLOOD PRESSURE: 102 MMHG | TEMPERATURE: 97.9 F | OXYGEN SATURATION: 97 % | HEART RATE: 107 BPM | RESPIRATION RATE: 10 BRPM | SYSTOLIC BLOOD PRESSURE: 152 MMHG

## 2025-08-11 DIAGNOSIS — I49.5 SICK SINUS SYNDROME (H): ICD-10-CM

## 2025-08-11 DIAGNOSIS — Z53.29 LEFT AGAINST MEDICAL ADVICE: ICD-10-CM

## 2025-08-11 DIAGNOSIS — I48.91 ATRIAL FIBRILLATION WITH RVR (H): Primary | ICD-10-CM

## 2025-08-11 PROBLEM — C43.9 MALIGNANT MELANOMA OF SKIN (H): Status: ACTIVE | Noted: 2023-12-06

## 2025-08-11 PROBLEM — E04.9 GOITER: Status: ACTIVE | Noted: 2025-08-11

## 2025-08-11 PROBLEM — I05.9 MITRAL VALVE DISORDER: Status: ACTIVE | Noted: 2023-03-27

## 2025-08-11 PROBLEM — F41.9 ANXIETY: Status: ACTIVE | Noted: 2024-12-10

## 2025-08-11 PROBLEM — F33.9 RECURRENT MAJOR DEPRESSIVE EPISODES: Status: ACTIVE | Noted: 2022-08-18

## 2025-08-11 PROBLEM — N95.1 MENOPAUSAL SYNDROME: Status: ACTIVE | Noted: 2022-12-05

## 2025-08-11 PROBLEM — I20.9 ANGINA PECTORIS: Status: ACTIVE | Noted: 2023-11-29

## 2025-08-11 PROBLEM — R00.1 BRADYCARDIA: Status: ACTIVE | Noted: 2025-08-11

## 2025-08-11 PROBLEM — M85.80 OSTEOPENIA: Status: ACTIVE | Noted: 2025-01-21

## 2025-08-11 PROBLEM — R00.2 PALPITATIONS: Status: ACTIVE | Noted: 2023-03-27

## 2025-08-11 PROBLEM — F32.A DEPRESSIVE DISORDER: Status: ACTIVE | Noted: 2025-08-11

## 2025-08-11 PROBLEM — K90.0 CELIAC DISEASE: Status: ACTIVE | Noted: 2019-11-25

## 2025-08-11 PROBLEM — R73.01 IMPAIRED FASTING GLUCOSE: Status: ACTIVE | Noted: 2025-08-11

## 2025-08-11 LAB
ANION GAP SERPL CALCULATED.3IONS-SCNC: 14 MMOL/L (ref 7–15)
ATRIAL RATE - MUSE: 288 BPM
BASOPHILS # BLD AUTO: 0 10E3/UL (ref 0–0.2)
BASOPHILS NFR BLD AUTO: 1 %
BUN SERPL-MCNC: 23.5 MG/DL (ref 8–23)
CALCIUM SERPL-MCNC: 9.4 MG/DL (ref 8.8–10.4)
CHLORIDE SERPL-SCNC: 103 MMOL/L (ref 98–107)
CREAT SERPL-MCNC: 0.97 MG/DL (ref 0.51–0.95)
DIASTOLIC BLOOD PRESSURE - MUSE: NORMAL MMHG
EGFRCR SERPLBLD CKD-EPI 2021: 58 ML/MIN/1.73M2
EOSINOPHIL # BLD AUTO: 0.1 10E3/UL (ref 0–0.7)
EOSINOPHIL NFR BLD AUTO: 2 %
ERYTHROCYTE [DISTWIDTH] IN BLOOD BY AUTOMATED COUNT: 14.1 % (ref 10–15)
GLUCOSE SERPL-MCNC: 98 MG/DL (ref 70–99)
HCO3 SERPL-SCNC: 25 MMOL/L (ref 22–29)
HCT VFR BLD AUTO: 42.2 % (ref 35–47)
HGB BLD-MCNC: 13.8 G/DL (ref 11.7–15.7)
IMM GRANULOCYTES # BLD: 0 10E3/UL
IMM GRANULOCYTES NFR BLD: 0 %
INR PPP: 2.59 (ref 0.85–1.15)
INTERPRETATION ECG - MUSE: NORMAL
LYMPHOCYTES # BLD AUTO: 1.8 10E3/UL (ref 0.8–5.3)
LYMPHOCYTES NFR BLD AUTO: 30 %
MAGNESIUM SERPL-MCNC: 2.3 MG/DL (ref 1.7–2.3)
MCH RBC QN AUTO: 30.9 PG (ref 26.5–33)
MCHC RBC AUTO-ENTMCNC: 32.7 G/DL (ref 31.5–36.5)
MCV RBC AUTO: 95 FL (ref 78–100)
MONOCYTES # BLD AUTO: 0.9 10E3/UL (ref 0–1.3)
MONOCYTES NFR BLD AUTO: 15 %
NEUTROPHILS # BLD AUTO: 3.1 10E3/UL (ref 1.6–8.3)
NEUTROPHILS NFR BLD AUTO: 52 %
NRBC # BLD AUTO: 0 10E3/UL
NRBC BLD AUTO-RTO: 0 /100
P AXIS - MUSE: NORMAL DEGREES
PLATELET # BLD AUTO: 243 10E3/UL (ref 150–450)
POTASSIUM SERPL-SCNC: 4.6 MMOL/L (ref 3.4–5.3)
PR INTERVAL - MUSE: NORMAL MS
PROTHROMBIN TIME: 26.7 SECONDS (ref 11.8–14.8)
QRS DURATION - MUSE: 64 MS
QT - MUSE: 174 MS
QTC - MUSE: 233 MS
R AXIS - MUSE: 48 DEGREES
RBC # BLD AUTO: 4.46 10E6/UL (ref 3.8–5.2)
SODIUM SERPL-SCNC: 142 MMOL/L (ref 135–145)
SYSTOLIC BLOOD PRESSURE - MUSE: NORMAL MMHG
T AXIS - MUSE: 245 DEGREES
VENTRICULAR RATE- MUSE: 108 BPM
WBC # BLD AUTO: 6 10E3/UL (ref 4–11)

## 2025-08-11 PROCEDURE — 99284 EMERGENCY DEPT VISIT MOD MDM: CPT | Performed by: SOCIAL WORKER

## 2025-08-11 PROCEDURE — 85004 AUTOMATED DIFF WBC COUNT: CPT | Performed by: SOCIAL WORKER

## 2025-08-11 PROCEDURE — 99207 REFERRAL TO ACUTE AND DIAGNOSTIC SERVICES: CPT | Performed by: INTERNAL MEDICINE

## 2025-08-11 PROCEDURE — 36415 COLL VENOUS BLD VENIPUNCTURE: CPT | Performed by: SOCIAL WORKER

## 2025-08-11 PROCEDURE — 80048 BASIC METABOLIC PNL TOTAL CA: CPT | Performed by: SOCIAL WORKER

## 2025-08-11 PROCEDURE — 85610 PROTHROMBIN TIME: CPT | Performed by: SOCIAL WORKER

## 2025-08-11 PROCEDURE — 93005 ELECTROCARDIOGRAM TRACING: CPT

## 2025-08-11 PROCEDURE — 83735 ASSAY OF MAGNESIUM: CPT | Performed by: SOCIAL WORKER

## 2025-08-11 RX ORDER — METOPROLOL TARTRATE 25 MG/1
25 TABLET, FILM COATED ORAL ONCE
Status: COMPLETED | OUTPATIENT
Start: 2025-08-11 | End: 2025-08-11

## 2025-08-11 ASSESSMENT — ACTIVITIES OF DAILY LIVING (ADL)
ADLS_ACUITY_SCORE: 41

## 2025-08-12 ENCOUNTER — TELEPHONE (OUTPATIENT)
Dept: FAMILY MEDICINE | Facility: CLINIC | Age: 80
End: 2025-08-12

## 2025-08-12 ENCOUNTER — TELEPHONE (OUTPATIENT)
Dept: ANTICOAGULATION | Facility: CLINIC | Age: 80
End: 2025-08-12